# Patient Record
Sex: FEMALE | Race: OTHER
[De-identification: names, ages, dates, MRNs, and addresses within clinical notes are randomized per-mention and may not be internally consistent; named-entity substitution may affect disease eponyms.]

---

## 2020-08-21 ENCOUNTER — HOSPITAL ENCOUNTER (INPATIENT)
Dept: HOSPITAL 72 - EMR | Age: 70
LOS: 27 days | Discharge: NURSING, REHAB OR LONG-TERM CARE | DRG: 871 | End: 2020-09-17
Payer: MEDICARE

## 2020-08-21 VITALS — WEIGHT: 114.64 LBS | HEIGHT: 62 IN | BODY MASS INDEX: 21.1 KG/M2

## 2020-08-21 VITALS — DIASTOLIC BLOOD PRESSURE: 58 MMHG | SYSTOLIC BLOOD PRESSURE: 118 MMHG

## 2020-08-21 VITALS — SYSTOLIC BLOOD PRESSURE: 113 MMHG | DIASTOLIC BLOOD PRESSURE: 68 MMHG

## 2020-08-21 VITALS — SYSTOLIC BLOOD PRESSURE: 108 MMHG | DIASTOLIC BLOOD PRESSURE: 56 MMHG

## 2020-08-21 VITALS — DIASTOLIC BLOOD PRESSURE: 80 MMHG | SYSTOLIC BLOOD PRESSURE: 107 MMHG

## 2020-08-21 VITALS — SYSTOLIC BLOOD PRESSURE: 108 MMHG | DIASTOLIC BLOOD PRESSURE: 53 MMHG

## 2020-08-21 VITALS — DIASTOLIC BLOOD PRESSURE: 51 MMHG | SYSTOLIC BLOOD PRESSURE: 100 MMHG

## 2020-08-21 VITALS — SYSTOLIC BLOOD PRESSURE: 120 MMHG | DIASTOLIC BLOOD PRESSURE: 88 MMHG

## 2020-08-21 VITALS — DIASTOLIC BLOOD PRESSURE: 61 MMHG | SYSTOLIC BLOOD PRESSURE: 102 MMHG

## 2020-08-21 DIAGNOSIS — D63.1: ICD-10-CM

## 2020-08-21 DIAGNOSIS — Z66: ICD-10-CM

## 2020-08-21 DIAGNOSIS — E86.0: ICD-10-CM

## 2020-08-21 DIAGNOSIS — R65.20: ICD-10-CM

## 2020-08-21 DIAGNOSIS — J12.89: ICD-10-CM

## 2020-08-21 DIAGNOSIS — Z93.1: ICD-10-CM

## 2020-08-21 DIAGNOSIS — A41.9: Primary | ICD-10-CM

## 2020-08-21 DIAGNOSIS — J96.01: ICD-10-CM

## 2020-08-21 DIAGNOSIS — N39.0: ICD-10-CM

## 2020-08-21 DIAGNOSIS — R33.9: ICD-10-CM

## 2020-08-21 DIAGNOSIS — E87.5: ICD-10-CM

## 2020-08-21 DIAGNOSIS — F25.1: ICD-10-CM

## 2020-08-21 DIAGNOSIS — N17.9: ICD-10-CM

## 2020-08-21 DIAGNOSIS — U07.1: ICD-10-CM

## 2020-08-21 DIAGNOSIS — I12.9: ICD-10-CM

## 2020-08-21 DIAGNOSIS — B49: ICD-10-CM

## 2020-08-21 DIAGNOSIS — B02.9: ICD-10-CM

## 2020-08-21 DIAGNOSIS — E87.0: ICD-10-CM

## 2020-08-21 DIAGNOSIS — Z95.0: ICD-10-CM

## 2020-08-21 DIAGNOSIS — N18.9: ICD-10-CM

## 2020-08-21 LAB
ADD MANUAL DIFF: YES
ADD MANUAL DIFF: YES
ALBUMIN SERPL-MCNC: 2.7 G/DL (ref 3.4–5)
ALBUMIN/GLOB SERPL: 0.5 {RATIO} (ref 1–2.7)
ALP SERPL-CCNC: 95 U/L (ref 46–116)
ALT SERPL-CCNC: 38 U/L (ref 12–78)
ANION GAP SERPL CALC-SCNC: 13 MMOL/L (ref 5–15)
ANION GAP SERPL CALC-SCNC: 15 MMOL/L (ref 5–15)
APPEARANCE UR: (no result)
APTT PPP: YELLOW S
AST SERPL-CCNC: 34 U/L (ref 15–37)
BILIRUB SERPL-MCNC: 0.5 MG/DL (ref 0.2–1)
BUN SERPL-MCNC: 109 MG/DL (ref 7–18)
BUN SERPL-MCNC: 131 MG/DL (ref 7–18)
CALCIUM SERPL-MCNC: 8.2 MG/DL (ref 8.5–10.1)
CALCIUM SERPL-MCNC: 9.7 MG/DL (ref 8.5–10.1)
CHLORIDE SERPL-SCNC: 138 MMOL/L (ref 98–107)
CHLORIDE SERPL-SCNC: 142 MMOL/L (ref 98–107)
CK MB SERPL-MCNC: 1.8 NG/ML (ref 0–3.6)
CK SERPL-CCNC: 694 U/L (ref 26–308)
CO2 SERPL-SCNC: 22 MMOL/L (ref 21–32)
CO2 SERPL-SCNC: 26 MMOL/L (ref 21–32)
CREAT SERPL-MCNC: 4.1 MG/DL (ref 0.55–1.3)
CREAT SERPL-MCNC: 5.1 MG/DL (ref 0.55–1.3)
ERYTHROCYTE [DISTWIDTH] IN BLOOD BY AUTOMATED COUNT: 13.7 % (ref 11.6–14.8)
ERYTHROCYTE [DISTWIDTH] IN BLOOD BY AUTOMATED COUNT: 14.4 % (ref 11.6–14.8)
GLOBULIN SER-MCNC: 5.4 G/DL
GLUCOSE UR STRIP-MCNC: NEGATIVE MG/DL
HCT VFR BLD CALC: 32.5 % (ref 37–47)
HCT VFR BLD CALC: 37.8 % (ref 37–47)
HGB BLD-MCNC: 11.6 G/DL (ref 12–16)
HGB BLD-MCNC: 9.4 G/DL (ref 12–16)
KETONES UR QL STRIP: NEGATIVE
LEUKOCYTE ESTERASE UR QL STRIP: (no result)
MCV RBC AUTO: 103 FL (ref 80–99)
MCV RBC AUTO: 106 FL (ref 80–99)
NITRITE UR QL STRIP: NEGATIVE
PH UR STRIP: 6.5 [PH] (ref 4.5–8)
PLATELET # BLD: 169 K/UL (ref 150–450)
PLATELET # BLD: 257 K/UL (ref 150–450)
POTASSIUM SERPL-SCNC: 4.9 MMOL/L (ref 3.5–5.1)
POTASSIUM SERPL-SCNC: 5.5 MMOL/L (ref 3.5–5.1)
PROT UR QL STRIP: (no result)
RBC # BLD AUTO: 3.06 M/UL (ref 4.2–5.4)
RBC # BLD AUTO: 3.68 M/UL (ref 4.2–5.4)
SODIUM SERPL-SCNC: 179 MMOL/L (ref 136–145)
SODIUM SERPL-SCNC: 180 MMOL/L (ref 136–145)
SP GR UR STRIP: 1.01 (ref 1–1.03)
UROBILINOGEN UR-MCNC: NORMAL MG/DL (ref 0–1)
WBC # BLD AUTO: 31.6 K/UL (ref 4.8–10.8)
WBC # BLD AUTO: 36.6 K/UL (ref 4.8–10.8)

## 2020-08-21 PROCEDURE — 85025 COMPLETE CBC W/AUTO DIFF WBC: CPT

## 2020-08-21 PROCEDURE — 82553 CREATINE MB FRACTION: CPT

## 2020-08-21 PROCEDURE — 94640 AIRWAY INHALATION TREATMENT: CPT

## 2020-08-21 PROCEDURE — 84100 ASSAY OF PHOSPHORUS: CPT

## 2020-08-21 PROCEDURE — 86920 COMPATIBILITY TEST SPIN: CPT

## 2020-08-21 PROCEDURE — 96365 THER/PROPH/DIAG IV INF INIT: CPT

## 2020-08-21 PROCEDURE — 99291 CRITICAL CARE FIRST HOUR: CPT

## 2020-08-21 PROCEDURE — 82746 ASSAY OF FOLIC ACID SERUM: CPT

## 2020-08-21 PROCEDURE — 76770 US EXAM ABDO BACK WALL COMP: CPT

## 2020-08-21 PROCEDURE — 80053 COMPREHEN METABOLIC PANEL: CPT

## 2020-08-21 PROCEDURE — 87070 CULTURE OTHR SPECIMN AEROBIC: CPT

## 2020-08-21 PROCEDURE — 84484 ASSAY OF TROPONIN QUANT: CPT

## 2020-08-21 PROCEDURE — 84550 ASSAY OF BLOOD/URIC ACID: CPT

## 2020-08-21 PROCEDURE — 94664 DEMO&/EVAL PT USE INHALER: CPT

## 2020-08-21 PROCEDURE — 82977 ASSAY OF GGT: CPT

## 2020-08-21 PROCEDURE — 82150 ASSAY OF AMYLASE: CPT

## 2020-08-21 PROCEDURE — 87040 BLOOD CULTURE FOR BACTERIA: CPT

## 2020-08-21 PROCEDURE — 96361 HYDRATE IV INFUSION ADD-ON: CPT

## 2020-08-21 PROCEDURE — 85651 RBC SED RATE NONAUTOMATED: CPT

## 2020-08-21 PROCEDURE — 80202 ASSAY OF VANCOMYCIN: CPT

## 2020-08-21 PROCEDURE — 93306 TTE W/DOPPLER COMPLETE: CPT

## 2020-08-21 PROCEDURE — 80061 LIPID PANEL: CPT

## 2020-08-21 PROCEDURE — 80048 BASIC METABOLIC PNL TOTAL CA: CPT

## 2020-08-21 PROCEDURE — 83735 ASSAY OF MAGNESIUM: CPT

## 2020-08-21 PROCEDURE — 93970 EXTREMITY STUDY: CPT

## 2020-08-21 PROCEDURE — 71045 X-RAY EXAM CHEST 1 VIEW: CPT

## 2020-08-21 PROCEDURE — 87324 CLOSTRIDIUM AG IA: CPT

## 2020-08-21 PROCEDURE — 83605 ASSAY OF LACTIC ACID: CPT

## 2020-08-21 PROCEDURE — 86900 BLOOD TYPING SEROLOGIC ABO: CPT

## 2020-08-21 PROCEDURE — 93005 ELECTROCARDIOGRAM TRACING: CPT

## 2020-08-21 PROCEDURE — 86901 BLOOD TYPING SEROLOGIC RH(D): CPT

## 2020-08-21 PROCEDURE — 87205 SMEAR GRAM STAIN: CPT

## 2020-08-21 PROCEDURE — 81003 URINALYSIS AUTO W/O SCOPE: CPT

## 2020-08-21 PROCEDURE — 87086 URINE CULTURE/COLONY COUNT: CPT

## 2020-08-21 PROCEDURE — 82607 VITAMIN B-12: CPT

## 2020-08-21 PROCEDURE — 84300 ASSAY OF URINE SODIUM: CPT

## 2020-08-21 PROCEDURE — 96367 TX/PROPH/DG ADDL SEQ IV INF: CPT

## 2020-08-21 PROCEDURE — 83615 LACTATE (LD) (LDH) ENZYME: CPT

## 2020-08-21 PROCEDURE — 86140 C-REACTIVE PROTEIN: CPT

## 2020-08-21 PROCEDURE — 82728 ASSAY OF FERRITIN: CPT

## 2020-08-21 PROCEDURE — 83550 IRON BINDING TEST: CPT

## 2020-08-21 PROCEDURE — 83036 HEMOGLOBIN GLYCOSYLATED A1C: CPT

## 2020-08-21 PROCEDURE — 36415 COLL VENOUS BLD VENIPUNCTURE: CPT

## 2020-08-21 PROCEDURE — 85007 BL SMEAR W/DIFF WBC COUNT: CPT

## 2020-08-21 PROCEDURE — 82962 GLUCOSE BLOOD TEST: CPT

## 2020-08-21 PROCEDURE — 83540 ASSAY OF IRON: CPT

## 2020-08-21 PROCEDURE — 86850 RBC ANTIBODY SCREEN: CPT

## 2020-08-21 PROCEDURE — 83690 ASSAY OF LIPASE: CPT

## 2020-08-21 PROCEDURE — 85379 FIBRIN DEGRADATION QUANT: CPT

## 2020-08-21 PROCEDURE — 82550 ASSAY OF CK (CPK): CPT

## 2020-08-21 PROCEDURE — 87181 SC STD AGAR DILUTION PER AGT: CPT

## 2020-08-21 PROCEDURE — 83880 ASSAY OF NATRIURETIC PEPTIDE: CPT

## 2020-08-21 PROCEDURE — 82803 BLOOD GASES ANY COMBINATION: CPT

## 2020-08-21 PROCEDURE — 36600 WITHDRAWAL OF ARTERIAL BLOOD: CPT

## 2020-08-21 RX ADMIN — PANTOPRAZOLE SODIUM SCH MG: 40 INJECTION, POWDER, FOR SOLUTION INTRAVENOUS at 21:43

## 2020-08-21 RX ADMIN — DOCUSATE SODIUM SCH MG: 50 LIQUID ORAL at 13:27

## 2020-08-21 RX ADMIN — ENOXAPARIN SODIUM SCH MG: 30 INJECTION SUBCUTANEOUS at 10:10

## 2020-08-21 RX ADMIN — DOCUSATE SODIUM SCH MG: 50 LIQUID ORAL at 16:29

## 2020-08-21 RX ADMIN — SODIUM CHLORIDE SCH MLS/HR: 0.9 INJECTION INTRAVENOUS at 10:09

## 2020-08-21 RX ADMIN — POLYETHYLENE GLYCOL 3350 SCH GM: 17 POWDER, FOR SOLUTION ORAL at 21:43

## 2020-08-21 NOTE — EMERGENCY ROOM REPORT
History of Present Illness


General


Chief Complaint:  Dyspnea/Respdistress


Source:  Medical Record





Present Illness


HPI


This is an unfortunate 70-year-old female from a nursing home.  She presents 

with chief complaint of shortness of breath.  Onset around 10 PM.  She had 

multiple admission for sepsis and pneumonia.  She was covered positive in the 

past.  However on recent admission to [facility] last week, she was covered 

negative.  She was this sent back to nursing home and became dyspneic tonight.  

No nausea no vomiting.  No reported fever.  EMS states she was hypoxic and was 

placed on oxygen.  She also had fever here of 101.  Per EMS, patient has a 

POLST that stated DNR.  It was not signed however.


Allergies:  


Coded Allergies:  


     AMPICILLIN (Verified  Allergy, Unknown, 8/21/20)


     PENICILLINS (Verified  Allergy, Unknown, 8/21/20)


     TETRACYCLINES (Verified  Allergy, Unknown, 8/21/20)





COVID-19 Screening


Contact w/high risk pt:  No


Experienced COVID-19 symptoms?:  Yes


COVID-19 Testing performed PTA:  Yes


COVID-19 Screening:  Negative COVID-19


COVID-19 Testing Source:  8/9/20





Patient History


Past Medical History:  see triage record, old chart reviewed


Past Surgical History:  other


Pertinent Family History:  none


Social History:  Denies: smoking


Last Menstrual Period:  n/a


Pregnant Now:  No


Immunizations:  other


Reviewed Nursing Documentation:  PMH: Agreed; PSxH: Agreed





Nursing Documentation-PMH


Past Medical History:  No History, Except For


Hx Gastrointestinal Problems:  Yes - GERD





Review of Systems


Eye:  Denies: eye pain, blurred vision


ENT:  Denies: ear pain, nose congestion, throat swelling


Respiratory:  Reports: shortness of breath


Cardiovascular:  Denies: chest pain, palpitations


Gastrointestinal:  Denies: abdominal pain, diarrhea, nausea, vomiting


Musculoskeletal:  Denies: back pain, joint pain


Skin:  Denies: rash


Neurological:  Denies: headache, numbness


Endocrine:  Denies: increased thirst, increased urine


Hematologic/Lymphatic:  Denies: easy bruising


All Other Systems:  negative except mentioned in HPI





Physical Exam





Vital Signs








  Date Time  Temp Pulse Resp B/P (MAP) Pulse Ox O2 Delivery O2 Flow Rate FiO2


 


8/21/20 01:28  120 18 171/100 (123) 96 Nasal Cannula 6.0 





Vitals with fever and tachycardia


Sp02 EP Interpretation:  abnormal


General Appearance:  moderate distress, thin, Chronically Ill


Head:  normocephalic, atraumatic


Eyes:  bilateral eye PERRL, bilateral eye EOMI


ENT:  dry mucus membranes


Neck:  full range of motion, supple, no meningismus


Respiratory:  chest non-tender, respiratory distress, accessory muscle use


Cardiovascular #1:  regular rate, rhythm, no murmur


Gastrointestinal:  normal bowel sounds, non tender, no mass, no organomegaly, 

no bruit, distended


Musculoskeletal:  back normal, normal range of motion


Neurologic:  grossly normal


Skin:  other - Diffuse skin excoriation





Procedures


Critical Care Time


Critical Care Time


Critical care is mandated in this patient who presented with severe sepsis 

secondary to UTI.  Patient require my urgent intervention to attenuate the 

risks of the metabolic collapse which may lead to cardiovascular collapse and 

death.  Critical care time is 35 minutes excluding any reportable procedure.  

Critical care time included evaluation, multiple reevaluation, looking at old 

charts, interpreting laboratory and diagnostic data, discussing case with 

patient and family and consultants, and charting.





Medical Decision Making


Diagnostic Impression:  


 Primary Impression:  


 Sepsis


 Qualified Codes:  A41.9 - Sepsis, unspecified organism; R65.20 - Severe sepsis 

without septic shock; N17.9 - Acute kidney failure, unspecified


 Additional Impressions:  


 UTI (urinary tract infection)


 Qualified Codes:  N30.00 - Acute cystitis without hematuria


 Acute urinary retention


 ARF (acute renal failure)


 Qualified Codes:  N17.9 - Acute kidney failure, unspecified


 Severe dehydration


 Hypernatremia


ER Course


Patient presents with fever and altered mental status.  She has severe 

abdominal distention.  A Mitchell was placed and she had purulent urine that came 

out.  She actually had over 4 L urine output.  Distention resolved.  

Antibiotics given here.  Chest x-ray is unremarkable.  COVID was negative.





She has severe sepsis secondary to UTI.  This may be secondary to obstruction.  

After fluids and antibiotics, she is more alert and able to converse.  Will 

admit for further work-up.  Prognosis is serious.  I discussed the case with 

Dr. Wahl will admit.


EKG Diagnostic Results


Rate:  normal


Rhythm:  NSR


ST Segments:  no acute changes





Rhythm Strip Diag. Results


EP Interpretation:  yes


Rate:  105


Rhythm:  NSR, no PVC's, no ectopy





Chest X-Ray Diagnostic Results


Chest X-Ray Diagnostic Results :  


   Chest X-Ray Ordered:  Yes


   # of Views/Limited/Complete:  1 View


   Indication:  Shortness of Breath


   EP Interpretation:  Yes


   Interpretation:  no consolidation, no effusion, no pneumothorax, no acute 

cardiopulmonary disease


   Impression:  No acute disease


   Electronically Signed by:  Spencer Melendez MD





Last Vital Signs








  Date Time  Temp Pulse Resp B/P (MAP) Pulse Ox O2 Delivery O2 Flow Rate FiO2


 


8/21/20 01:28  120 18 171/100 (123) 96 Nasal Cannula 6.0 








Status:  improved


Disposition:  ADMITTED AS INPATIENT


Condition:  Serious


Referrals:  


NOT CHOSEN IPA/MD,REFERRING (PCP)











Spencer Melendez MD Aug 21, 2020 02:03

## 2020-08-21 NOTE — DIAGNOSTIC IMAGING REPORT
Procedure: XRAY Chest 1v

Reason for study: Reason For Exam: SOB

 

Comparison films:  None.

 

FINDINGS:

 

There is a pacer in the right upper chest with pacer leads at the right atrium and

right ventricle. Vascularity is normal. Mild densities noted in the left retrocardiac

region either atelectasis or mild infiltrate. Cardiac and mediastinal silhouette are

within normal limits. CP angles are sharp.  The bony thorax appear unremarkable.

 

IMPRESSION:  

 

Left retrocardiac mild atelectasis versus infiltrate.

## 2020-08-21 NOTE — CONSULTATION
History of Present Illness


General


Chief Complaint:  Dyspnea/Respdistress


Referring physician:  Dr. Wahl


Reason for Consultation:  Sepsis





Present Illness


Allergies:  


Coded Allergies:  


     AMPICILLIN (Verified  Allergy, Unknown, hives, 8/21/20)


     PENICILLINS (Verified  Allergy, Unknown, hives, 8/21/20)


     TETRACYCLINES (Verified  Allergy, Unknown, hives, 8/21/20)





Medication History


Scheduled


Aripiprazole* (Abilify*), 20 MG ORAL DAILY, (Reported)


Bethanechol Chl* (Urecholine*), 25 MG ORAL THREE TIMES A DAY, (Reported)


Bisacodyl* (Dulcolax*), 5 MG ORAL DAILY, (Reported)


Ciprofloxacin* (Ciprofloxacin*), 250 MG PO BID, (Reported)


Clonazepam* (Klonopin*), 0.5 MG ORAL Q6H, (Reported)


Clonidine Hcl* (Catapres*), 0.1 MG ORAL EVERY 6 HOURS, (Reported)


Docusate Sodium (Docusate Sodium), 100 MG GT DAILY, (Reported)


Enoxaparin* (Lovenox*), 30 MG SUBQ DAILY, (Reported)


Famotidine* (Pepcid 20mg tablet*), 20 MG ORAL DAILY, (Reported)


Ferrous Sulfate* (Ferrous Sulfate*), 325 MG ORAL DAILY, (Reported)


Latanoprost* (Xalatan*), 1 DROP BOTH EYES BEDTIME, (Reported)





Scheduled PRN


Acetaminophen 160MG/5ML* (Acetaminophen*), 5 ML ORAL THREE TIMES A DAY PRN for 

Fever/Headache/Mild Pain, (Reported)





Miscellaneous Medications


Metoclopramide Hcl (Metoclopramide Hcl*), 5 MG IJ, (Reported)


Sennosides/Docusate Sodium (Senna Plus 8.6-50 mg Tablet), 1 EACH PO, (Reported)


Vancomycin/Water For Inj (Peg) (Vancomycin 1 Gram/200 ml Bag), 1 GM IV, (

Reported)





Patient History


Healthcare decision maker





Resuscitation status





Advanced Directive on File








Physical Exam





Last 24 Hour Vital Signs








  Date Time  Temp Pulse Resp B/P (MAP) Pulse Ox O2 Delivery O2 Flow Rate FiO2


 


8/21/20 12:00  86      


 


8/21/20 12:00 97.7 87 20 113/68 (83) 100   


 


8/21/20 09:00      Non-Rebreather 15.0 


 


8/21/20 08:00 96.7 72 18 102/61 (75) 96   


 


8/21/20 08:00  93      


 


8/21/20 06:48      Nasal Cannula 6.0 


 


8/21/20 06:00 98.6 102 22 100/51 (67) 97   


 


8/21/20 04:45 98.5 105 18 120/88 100 Nasal Cannula 6.0 


 


8/21/20 04:00 98.5 105 18 120/88 100 Nasal Cannula 6.0 


 


8/21/20 03:00 99.3 108 18 108/53 100 Nasal Cannula 6.0 


 


8/21/20 02:36 101.2       


 


8/21/20 02:15 101.4 116 18 118/58 98 Nasal Cannula 6.0 


 


8/21/20 01:45  120 18   Nasal Cannula 6.0 


 


8/21/20 01:28 101.1 120 18 171/100 (123) 96 Nasal Cannula 6.0 

















Intake and Output  


 


 8/20/20 8/21/20





 19:00 07:00


 


Output Total  4200 ml


 


Balance  -4200 ml


 


  


 


Output Urine Total  4200 ml


 


# Bowel Movements  1











Laboratory Tests








Test


  8/21/20


01:40 8/21/20


01:53 8/21/20


03:43 8/21/20


10:35


 


White Blood Count


  31.6 K/UL


(4.8-10.8)  *H 


  


  


 


 


Red Blood Count


  3.68 M/UL


(4.20-5.40)  L 


  


  


 


 


Hemoglobin


  11.6 G/DL


(12.0-16.0)  L 


  


  


 


 


Hematocrit


  37.8 %


(37.0-47.0) 


  


  


 


 


Mean Corpuscular Volume


  103 FL (80-99)


H 


  


  


 


 


Mean Corpuscular Hemoglobin


  31.6 PG


(27.0-31.0)  H 


  


  


 


 


Mean Corpuscular Hemoglobin


Concent 30.8 G/DL


(32.0-36.0)  L 


  


  


 


 


Red Cell Distribution Width


  13.7 %


(11.6-14.8) 


  


  


 


 


Platelet Count


  257 K/UL


(150-450) 


  


  


 


 


Mean Platelet Volume


  6.3 FL


(6.5-10.1)  L 


  


  


 


 


Neutrophils (%) (Auto)


  % (45.0-75.0)


  


  


  


 


 


Lymphocytes (%) (Auto)


  % (20.0-45.0)


  


  


  


 


 


Monocytes (%) (Auto)  % (1.0-10.0)     


 


Eosinophils (%) (Auto)  % (0.0-3.0)     


 


Basophils (%) (Auto)  % (0.0-2.0)     


 


Differential Total Cells


Counted 100  


  


  


  


 


 


Neutrophils % (Manual) 77 % (45-75)  H   


 


Lymphocytes % (Manual) 20 % (20-45)     


 


Monocytes % (Manual) 3 % (1-10)     


 


Eosinophils % (Manual) 0 % (0-3)     


 


Basophils % (Manual) 0 % (0-2)     


 


Band Neutrophils 0 % (0-8)     


 


Platelet Estimate Adequate     


 


Platelet Morphology Normal     


 


D-Dimer


  6.46 mg/L FEU


(0.00-0.49)  H 


  


  


 


 


Sodium Level


  179 MMOL/L


(136-145)  *H 


  


  


 


 


Potassium Level


  5.5 MMOL/L


(3.5-5.1)  H 


  


  


 


 


Chloride Level


  138 MMOL/L


()  H 


  


  


 


 


Carbon Dioxide Level


  26 MMOL/L


(21-32) 


  


  


 


 


Anion Gap


  15 mmol/L


(5-15) 


  


  


 


 


Blood Urea Nitrogen


  131 mg/dL


(7-18)  H 


  


  


 


 


Creatinine


  5.1 MG/DL


(0.55-1.30)  H 


  


  


 


 


Estimat Glomerular Filtration


Rate 8.3 mL/min


(>60) 


  


  


 


 


Glucose Level


  150 MG/DL


()  H 


  


  


 


 


Lactic Acid Level


  3.70 mmol/L


(0.4-2.0)  H 


  3.30 mmol/L


(0.66-2.22)  H 


 


 


Calcium Level


  9.7 MG/DL


(8.5-10.1) 


  


  


 


 


Total Bilirubin


  0.5 MG/DL


(0.2-1.0) 


  


  


 


 


Aspartate Amino Transf


(AST/SGOT) 34 U/L (15-37)


  


  


  


 


 


Alanine Aminotransferase


(ALT/SGPT) 38 U/L (12-78)


  


  


  


 


 


Alkaline Phosphatase


  95 U/L


() 


  


  


 


 


Total Creatine Kinase


  694 U/L


()  H 


  


  


 


 


Creatine Kinase MB


  1.8 NG/ML


(0.0-3.6) 


  


  


 


 


Creatine Kinase MB Relative


Index 0.2  


  


  


  


 


 


Troponin I


  0.075 ng/mL


(0.000-0.056) 


  


  


 


 


C-Reactive Protein,


Quantitative 7.4 mg/dL


(0.00-0.90)  H 


  


  


 


 


Total Protein


  8.1 G/DL


(6.4-8.2) 


  


  


 


 


Albumin


  2.7 G/DL


(3.4-5.0)  L 


  


  


 


 


Globulin 5.4 g/dL     


 


Albumin/Globulin Ratio


  0.5 (1.0-2.7)


L 


  


  


 


 


Urine Color  Yellow    


 


Urine Appearance


  


  Slightly


cloudy 


  


 


 


Urine pH  6.5 (4.5-8.0)    


 


Urine Specific Gravity


  


  1.010


(1.005-1.035) 


  


 


 


Urine Protein


  


  2+ (NEGATIVE)


H 


  


 


 


Urine Glucose (UA)


  


  Negative


(NEGATIVE) 


  


 


 


Urine Ketones


  


  Negative


(NEGATIVE) 


  


 


 


Urine Blood


  


  3+ (NEGATIVE)


H 


  


 


 


Urine Nitrite


  


  Negative


(NEGATIVE) 


  


 


 


Urine Bilirubin


  


  Negative


(NEGATIVE) 


  


 


 


Urine Urobilinogen


  


  Normal MG/DL


(0.0-1.0) 


  


 


 


Urine Leukocyte Esterase


  


  3+ (NEGATIVE)


H 


  


 


 


Urine RBC


  


  5-10 /HPF (0 -


2)  H 


  


 


 


Urine WBC


  


  Tntc /HPF (0 -


2)  H 


  


 


 


Urine Squamous Epithelial


Cells 


  Few /LPF


(NONE/OCC) 


  


 


 


Urine Amorphous Sediment


  


  Many /LPF


(NONE)  H 


  


 


 


Urine Bacteria


  


  Many /HPF


(NONE)  H 


  


 


 


Urine Random Sodium


  


  


  


  79 mmol/L


()


 


Test


  8/21/20


12:00 


  


  


 


 


White Blood Count


  36.6 K/UL


(4.8-10.8)  *H 


  


  


 


 


Red Blood Count


  3.06 M/UL


(4.20-5.40)  L 


  


  


 


 


Hemoglobin


  9.4 G/DL


(12.0-16.0)  L 


  


  


 


 


Hematocrit


  32.5 %


(37.0-47.0)  L 


  


  


 


 


Mean Corpuscular Volume


  106 FL (80-99)


H 


  


  


 


 


Mean Corpuscular Hemoglobin


  30.6 PG


(27.0-31.0) 


  


  


 


 


Mean Corpuscular Hemoglobin


Concent 28.8 G/DL


(32.0-36.0)  L 


  


  


 


 


Red Cell Distribution Width


  14.4 %


(11.6-14.8) 


  


  


 


 


Platelet Count


  169 K/UL


(150-450) 


  


  


 


 


Mean Platelet Volume


  6.1 FL


(6.5-10.1)  L 


  


  


 


 


Neutrophils (%) (Auto)


  % (45.0-75.0)


  


  


  


 


 


Lymphocytes (%) (Auto)


  % (20.0-45.0)


  


  


  


 


 


Monocytes (%) (Auto)  % (1.0-10.0)     


 


Eosinophils (%) (Auto)  % (0.0-3.0)     


 


Basophils (%) (Auto)  % (0.0-2.0)     


 


Differential Total Cells


Counted 100  


  


  


  


 


 


Neutrophils % (Manual) 83 % (45-75)  H   


 


Lymphocytes % (Manual) 13 % (20-45)  L   


 


Monocytes % (Manual) 3 % (1-10)     


 


Eosinophils % (Manual) 0 % (0-3)     


 


Basophils % (Manual) 0 % (0-2)     


 


Band Neutrophils 1 % (0-8)     


 


Platelet Estimate Adequate     


 


Platelet Morphology Normal     


 


Hypochromasia 1+     


 


Macrocytosis 1+     


 


Sodium Level


  180 MMOL/L


(136-145)  *H 


  


  


 


 


Potassium Level


  4.9 MMOL/L


(3.5-5.1) 


  


  


 


 


Chloride Level


  142 MMOL/L


()  H 


  


  


 


 


Carbon Dioxide Level


  22 MMOL/L


(21-32) 


  


  


 


 


Anion Gap


  13 mmol/L


(5-15) 


  


  


 


 


Blood Urea Nitrogen


  109 mg/dL


(7-18)  H 


  


  


 


 


Creatinine


  4.1 MG/DL


(0.55-1.30)  H 


  


  


 


 


Estimat Glomerular Filtration


Rate 10.7 mL/min


(>60) 


  


  


 


 


Glucose Level


  265 MG/DL


()  #H 


  


  


 


 


Calcium Level


  8.2 MG/DL


(8.5-10.1)  L 


  


  


 











Microbiology








 Date/Time


Source Procedure


Growth Status


 


 


 8/21/20 01:40


Nasopharynx SARS-CoV-2 RdRp Gene Assay - Final Complete








Height (Feet):  5


Height (Inches):  2.00


Weight (Pounds):  125


Medications





Current Medications








 Medications


  (Trade)  Dose


 Ordered  Sig/Raji


 Route


 PRN Reason  Start Time


 Stop Time Status Last Admin


Dose Admin


 


 Albumin Human  500 ml @ 0


 mls/hr  Q0M


 IV


   8/21/20 11:00


 8/21/20 15:00   


 


 


 Bisacodyl


  (Dulcolax)  5 mg  DAILYPRN  PRN


 ORAL


 Constipation  8/21/20 08:30


 11/19/20 08:29 UNV  


 


 


 Cefepime HCl 1 gm/


 Dextrose  55 ml @ 


 110 mls/hr  Q24H


 IVPB


   8/21/20 10:00


 8/28/20 09:59  8/21/20 10:09


 


 


 Dextrose  1,000 ml @ 


 150 mls/hr  Q6H40M


 IV


   8/21/20 11:30


 9/20/20 11:29  8/21/20 11:13


 


 


 Docusate Sodium


  (Colace)  100 mg  TID


 GT


   8/21/20 13:00


 9/20/20 08:59  8/21/20 13:27


 


 


 Enoxaparin Sodium


  (Lovenox)  30 mg  DAILY


 SUBQ


   8/21/20 09:00


 11/19/20 08:59  8/21/20 10:10


 


 


 Pantoprazole


  (Protonix)  40 mg  EVERY 12  HOURS


 IVP


   8/21/20 21:00


 9/20/20 20:59   


 


 


 Vancomycin HCl


  (Vanco pharmacy


 to dose)  1 ea  DAILY  PRN


 MISC


 Per rx protocol  8/21/20 08:30


 9/20/20 08:29   


 











Assessment/Plan


Problem List:  


(1) Sepsis


ICD Codes:  A41.9 - Sepsis, unspecified organism


SNOMED:  81936644


Qualifiers:  


   Qualified Codes:  A41.9 - Sepsis, unspecified organism; R65.20 - Severe 

sepsis without septic shock; N17.9 - Acute kidney failure, unspecified


(2) ARF (acute renal failure)


ICD Codes:  N17.9 - Acute kidney failure, unspecified


SNOMED:  59615764


Qualifiers:  


   Qualified Codes:  N17.9 - Acute kidney failure, unspecified


(3) Severe dehydration


ICD Codes:  E86.0 - Dehydration


SNOMED:  270292446


(4) Hypernatremia


ICD Codes:  E87.0 - Hyperosmolality and hypernatremia


SNOMED:  465031762


(5) Psychosis


ICD Codes:  F29 - Unspecified psychosis not due to a substance or known 

physiological condition


SNOMED:  76398969


Assessment/Plan:


pan cultures


iv fluids 


f/u electrolytes


renal studies


urine electrolytes


dvt prophylaxis











Live Brambila MD Aug 21, 2020 14:24

## 2020-08-21 NOTE — CONSULTATION
History of Present Illness


General


Date patient seen:  Aug 21, 2020


Time patient seen:  11:40


Chief Complaint:  Dyspnea/Respdistress


Referring physician:  Dr. Wahl


Reason for Consultation:  Sepsis





Present Illness


HPI





69yo F from SNF who p/w SOB, multiple prior a/f sepsis and pna, prior COVID+, 

but upon admission to SNF last week was COVID neg. 





Pt is not interactive. History obtained via chart review.





PMH/PSH: Dementia, h/o pna, otherwise unable to obtain


All: PCNs, tetracyclines


Social hx: Resides at [facility]


Allergies:  


Coded Allergies:  


     AMPICILLIN (Verified  Allergy, Unknown, hives, 8/21/20)


     PENICILLINS (Verified  Allergy, Unknown, hives, 8/21/20)


     TETRACYCLINES (Verified  Allergy, Unknown, hives, 8/21/20)





Medication History


Scheduled


Aripiprazole* (Abilify*), 20 MG ORAL DAILY, (Reported)


Bethanechol Chl* (Urecholine*), 25 MG ORAL THREE TIMES A DAY, (Reported)


Bisacodyl* (Dulcolax*), 5 MG ORAL DAILY, (Reported)


Ciprofloxacin* (Ciprofloxacin*), 250 MG PO BID, (Reported)


Clonazepam* (Klonopin*), 0.5 MG ORAL Q6H, (Reported)


Clonidine Hcl* (Catapres*), 0.1 MG ORAL EVERY 6 HOURS, (Reported)


Docusate Sodium (Docusate Sodium), 100 MG GT DAILY, (Reported)


Enoxaparin* (Lovenox*), 30 MG SUBQ DAILY, (Reported)


Famotidine* (Pepcid 20mg tablet*), 20 MG ORAL DAILY, (Reported)


Ferrous Sulfate* (Ferrous Sulfate*), 325 MG ORAL DAILY, (Reported)


Latanoprost* (Xalatan*), 1 DROP BOTH EYES BEDTIME, (Reported)





Scheduled PRN


Acetaminophen 160MG/5ML* (Acetaminophen*), 5 ML ORAL THREE TIMES A DAY PRN for 

Fever/Headache/Mild Pain, (Reported)





Miscellaneous Medications


Metoclopramide Hcl (Metoclopramide Hcl*), 5 MG IJ, (Reported)


Sennosides/Docusate Sodium (Senna Plus 8.6-50 mg Tablet), 1 EACH PO, (Reported)


Vancomycin/Water For Inj (Peg) (Vancomycin 1 Gram/200 ml Bag), 1 GM IV, (

Reported)





Patient History


Healthcare decision maker





Resuscitation status





Advanced Directive on File








Review of Systems


ROS Narrative


Unable to obtain 2/2 pt condition





Physical Exam


Physical Exam Narrative





Gen: Older woman, on NRB face mask


HEENT: NRB mask


Pulm: BL chest rise on NRB face mask


Abd: Soft, NTND


Ext: R forearm w/ dry rash, appears likely perhaps resolving vesicular rash, 

now small pustules dry and crusting; L arm w/ less intense rash though also is 

small pin point erythematous macules, dry/crusting


Neuro: Awake, not alert nor interactive





Last 24 Hour Vital Signs








  Date Time  Temp Pulse Resp B/P (MAP) Pulse Ox O2 Delivery O2 Flow Rate FiO2


 


8/21/20 09:00      Non-Rebreather 15.0 


 


8/21/20 08:00 96.7 72 18 102/61 (75) 96   


 


8/21/20 08:00  93      


 


8/21/20 06:48      Nasal Cannula 6.0 


 


8/21/20 06:00 98.6 102 22 100/51 (67) 97   


 


8/21/20 04:45 98.5 105 18 120/88 100 Nasal Cannula 6.0 


 


8/21/20 04:00 98.5 105 18 120/88 100 Nasal Cannula 6.0 


 


8/21/20 03:00 99.3 108 18 108/53 100 Nasal Cannula 6.0 


 


8/21/20 02:36 101.2       


 


8/21/20 02:15 101.4 116 18 118/58 98 Nasal Cannula 6.0 


 


8/21/20 01:45  120 18   Nasal Cannula 6.0 


 


8/21/20 01:28 101.1 120 18 171/100 (123) 96 Nasal Cannula 6.0 

















Intake and Output  


 


 8/20/20 8/21/20





 19:00 07:00


 


Output Total  4200 ml


 


Balance  -4200 ml


 


  


 


Output Urine Total  4200 ml


 


# Bowel Movements  1











Laboratory Tests








Test


  8/21/20


01:40 8/21/20


01:53 8/21/20


03:43 8/21/20


10:35


 


White Blood Count


  31.6 K/UL


(4.8-10.8)  *H 


  


  


 


 


Red Blood Count


  3.68 M/UL


(4.20-5.40)  L 


  


  


 


 


Hemoglobin


  11.6 G/DL


(12.0-16.0)  L 


  


  


 


 


Hematocrit


  37.8 %


(37.0-47.0) 


  


  


 


 


Mean Corpuscular Volume


  103 FL (80-99)


H 


  


  


 


 


Mean Corpuscular Hemoglobin


  31.6 PG


(27.0-31.0)  H 


  


  


 


 


Mean Corpuscular Hemoglobin


Concent 30.8 G/DL


(32.0-36.0)  L 


  


  


 


 


Red Cell Distribution Width


  13.7 %


(11.6-14.8) 


  


  


 


 


Platelet Count


  257 K/UL


(150-450) 


  


  


 


 


Mean Platelet Volume


  6.3 FL


(6.5-10.1)  L 


  


  


 


 


Neutrophils (%) (Auto)


  % (45.0-75.0)


  


  


  


 


 


Lymphocytes (%) (Auto)


  % (20.0-45.0)


  


  


  


 


 


Monocytes (%) (Auto)  % (1.0-10.0)     


 


Eosinophils (%) (Auto)  % (0.0-3.0)     


 


Basophils (%) (Auto)  % (0.0-2.0)     


 


Differential Total Cells


Counted 100  


  


  


  


 


 


Neutrophils % (Manual) 77 % (45-75)  H   


 


Lymphocytes % (Manual) 20 % (20-45)     


 


Monocytes % (Manual) 3 % (1-10)     


 


Eosinophils % (Manual) 0 % (0-3)     


 


Basophils % (Manual) 0 % (0-2)     


 


Band Neutrophils 0 % (0-8)     


 


Platelet Estimate Adequate     


 


Platelet Morphology Normal     


 


D-Dimer


  6.46 mg/L FEU


(0.00-0.49)  H 


  


  


 


 


Sodium Level


  179 MMOL/L


(136-145)  *H 


  


  


 


 


Potassium Level


  5.5 MMOL/L


(3.5-5.1)  H 


  


  


 


 


Chloride Level


  138 MMOL/L


()  H 


  


  


 


 


Carbon Dioxide Level


  26 MMOL/L


(21-32) 


  


  


 


 


Anion Gap


  15 mmol/L


(5-15) 


  


  


 


 


Blood Urea Nitrogen


  131 mg/dL


(7-18)  H 


  


  


 


 


Creatinine


  5.1 MG/DL


(0.55-1.30)  H 


  


  


 


 


Estimat Glomerular Filtration


Rate 8.3 mL/min


(>60) 


  


  


 


 


Glucose Level


  150 MG/DL


()  H 


  


  


 


 


Lactic Acid Level


  3.70 mmol/L


(0.4-2.0)  H 


  3.30 mmol/L


(0.66-2.22)  H 


 


 


Calcium Level


  9.7 MG/DL


(8.5-10.1) 


  


  


 


 


Total Bilirubin


  0.5 MG/DL


(0.2-1.0) 


  


  


 


 


Aspartate Amino Transf


(AST/SGOT) 34 U/L (15-37)


  


  


  


 


 


Alanine Aminotransferase


(ALT/SGPT) 38 U/L (12-78)


  


  


  


 


 


Alkaline Phosphatase


  95 U/L


() 


  


  


 


 


Total Creatine Kinase


  694 U/L


()  H 


  


  


 


 


Creatine Kinase MB


  1.8 NG/ML


(0.0-3.6) 


  


  


 


 


Creatine Kinase MB Relative


Index 0.2  


  


  


  


 


 


Troponin I


  0.075 ng/mL


(0.000-0.056) 


  


  


 


 


C-Reactive Protein,


Quantitative 7.4 mg/dL


(0.00-0.90)  H 


  


  


 


 


Total Protein


  8.1 G/DL


(6.4-8.2) 


  


  


 


 


Albumin


  2.7 G/DL


(3.4-5.0)  L 


  


  


 


 


Globulin 5.4 g/dL     


 


Albumin/Globulin Ratio


  0.5 (1.0-2.7)


L 


  


  


 


 


Urine Color  Yellow    


 


Urine Appearance


  


  Slightly


cloudy 


  


 


 


Urine pH  6.5 (4.5-8.0)    


 


Urine Specific Gravity


  


  1.010


(1.005-1.035) 


  


 


 


Urine Protein


  


  2+ (NEGATIVE)


H 


  


 


 


Urine Glucose (UA)


  


  Negative


(NEGATIVE) 


  


 


 


Urine Ketones


  


  Negative


(NEGATIVE) 


  


 


 


Urine Blood


  


  3+ (NEGATIVE)


H 


  


 


 


Urine Nitrite


  


  Negative


(NEGATIVE) 


  


 


 


Urine Bilirubin


  


  Negative


(NEGATIVE) 


  


 


 


Urine Urobilinogen


  


  Normal MG/DL


(0.0-1.0) 


  


 


 


Urine Leukocyte Esterase


  


  3+ (NEGATIVE)


H 


  


 


 


Urine RBC


  


  5-10 /HPF (0 -


2)  H 


  


 


 


Urine WBC


  


  Tntc /HPF (0 -


2)  H 


  


 


 


Urine Squamous Epithelial


Cells 


  Few /LPF


(NONE/OCC) 


  


 


 


Urine Amorphous Sediment


  


  Many /LPF


(NONE)  H 


  


 


 


Urine Bacteria


  


  Many /HPF


(NONE)  H 


  


 


 


Urine Random Sodium    Pending  











Microbiology








 Date/Time


Source Procedure


Growth Status


 


 


 8/21/20 01:40


Nasopharynx SARS-CoV-2 RdRp Gene Assay - Final Complete








Height (Feet):  5


Height (Inches):  2.00


Weight (Pounds):  125


Medications





Current Medications








 Medications


  (Trade)  Dose


 Ordered  Sig/Raji


 Route


 PRN Reason  Start Time


 Stop Time Status Last Admin


Dose Admin


 


 Albumin Human  500 ml @ 0


 mls/hr  Q0M


 IV


   8/21/20 11:00


 8/21/20 15:00   


 


 


 Bisacodyl


  (Dulcolax)  5 mg  DAILYPRN  PRN


 ORAL


 Constipation  8/21/20 08:30


 11/19/20 08:29 UNV  


 


 


 Cefepime HCl 1 gm/


 Dextrose  55 ml @ 


 110 mls/hr  Q24H


 IVPB


   8/21/20 10:00


 8/28/20 09:59  8/21/20 10:09


 


 


 Dextrose  1,000 ml @ 


 150 mls/hr  Q6H40M


 IV


   8/21/20 11:30


 9/20/20 11:29  8/21/20 11:13


 


 


 Docusate Sodium


  (Colace)  100 mg  TID


 GT


   8/21/20 13:00


 9/20/20 08:59   


 


 


 Enoxaparin Sodium


  (Lovenox)  30 mg  DAILY


 SUBQ


   8/21/20 09:00


 11/19/20 08:59  8/21/20 10:10


 


 


 Pantoprazole


  (Protonix)  40 mg  EVERY 12  HOURS


 IVP


   8/21/20 21:00


 9/20/20 20:59   


 


 


 Sodium


 Polystyrene


 Sulfonate


  (Kayexalate)  30 gm  ONCE


 GT


   8/21/20 11:00


 8/21/20 13:00   


 


 


 Vancomycin HCl


  (Vanco pharmacy


 to dose)  1 ea  DAILY  PRN


 MISC


 Per rx protocol  8/21/20 08:30


 9/20/20 08:29   


 











Assessment/Plan


Assessment/Plan:





69yo F with:





Sepsis


Febrile to 101.4


Leukocytosis to 31


JASWINDER to 5.1


Hypoxic resp failure, on NRB mask


   8/21 BCx p


   8/21 UA+, UCx p


   8/21 CXR: Left retrocardiac mild atelectasis versus infiltrate.


   8/21 COVID rapid neg





BL arm rash, appears evolving, now small pustules with dry/crusting, querry 

resolving Shingles? Though odd to have on both arms


   8/21 HSV & VZV PCR ordered





Plan:


Cont empiric cefepime/vanco #1


Avoid acyclovir for possible Shingles for now given crusting of rash means that 

if Shingles it's resolving on its own and given severe JASWINDER want to avoid 

nephrotoxic acyclovir


Send HSV & VZV PCR swab from unroofed pustule, explained to MARTIN Hernandez how to 

obtain


F/u BCx, UCx


Trend leukocytosis


Trend JASWINDER


Trend resp status





Monitor CBC/BMP


Trend BUE rash





D/w RN at length





Thank you for this consult. [facility] will continue to follow.











Chiquita Penn M.D. Aug 21, 2020 11:29

## 2020-08-21 NOTE — DIAGNOSTIC IMAGING REPORT
Indication: Abnormal renal function tests

 

Technique: Grayscale and duplex images of the kidneys, retroperitoneum, and bladder

were obtained.

 

Comparison: none            

 

Findings: Right kidney measures 8.9 cm in length. Left kidney measures 9 cm in

length. Both kidneys demonstrate markedly increased echogenicity. No hydronephrosis. 

There are bilateral renal cysts. Normal inferior vena cava. Bladder is empty,

contains a Mitchell catheter. 

 

Incidentally noted are gallstones.

 

Impression: Markedly echogenic kidneys, consistent with medical renal disease

 

Negative for hydronephrosis

 

Empty bladder with a Mitchell catheter

 

Incidental finding of cholelithiasis.

## 2020-08-21 NOTE — EMERGENCY ROOM REPORT
Sepsis Event Note


Evaluation


Current Stage of Sepsis:  Severe Sepsis


Possible Source:  Genitourinary





Focused Exam


Allergies:  


Coded Allergies:  


     AMPICILLIN (Verified  Allergy, Unknown, 8/21/20)


     PENICILLINS (Verified  Allergy, Unknown, 8/21/20)


     TETRACYCLINES (Verified  Allergy, Unknown, 8/21/20)


Date Exam Occurred:  Aug 21, 2020


Time Exam Occurred:  03:07


Laboratory Studies





Laboratory Tests








Test


  8/21/20


01:40 8/21/20


01:53


 


White Blood Count


  31.6 K/UL


(4.8-10.8)  *H 


 


 


Red Blood Count


  3.68 M/UL


(4.20-5.40)  L 


 


 


Hemoglobin


  11.6 G/DL


(12.0-16.0)  L 


 


 


Hematocrit


  37.8 %


(37.0-47.0) 


 


 


Mean Corpuscular Volume


  103 FL (80-99)


H 


 


 


Mean Corpuscular Hemoglobin


  31.6 PG


(27.0-31.0)  H 


 


 


Mean Corpuscular Hemoglobin


Concent 30.8 G/DL


(32.0-36.0)  L 


 


 


Red Cell Distribution Width


  13.7 %


(11.6-14.8) 


 


 


Platelet Count


  257 K/UL


(150-450) 


 


 


Mean Platelet Volume


  6.3 FL


(6.5-10.1)  L 


 


 


Neutrophils (%) (Auto)


  % (45.0-75.0)


  


 


 


Lymphocytes (%) (Auto)


  % (20.0-45.0)


  


 


 


Monocytes (%) (Auto)  % (1.0-10.0)   


 


Eosinophils (%) (Auto)  % (0.0-3.0)   


 


Basophils (%) (Auto)  % (0.0-2.0)   


 


Neutrophils % (Manual) Pending   


 


Lymphocytes % (Manual) Pending   


 


Platelet Estimate Pending   


 


Platelet Morphology Pending   


 


D-Dimer


  6.46 mg/L FEU


(0.00-0.49)  H 


 


 


Sodium Level


  179 MMOL/L


(136-145)  *H 


 


 


Potassium Level


  5.5 MMOL/L


(3.5-5.1)  H 


 


 


Chloride Level


  138 MMOL/L


()  H 


 


 


Carbon Dioxide Level


  26 MMOL/L


(21-32) 


 


 


Anion Gap


  15 mmol/L


(5-15) 


 


 


Blood Urea Nitrogen


  131 mg/dL


(7-18)  H 


 


 


Creatinine


  5.1 MG/DL


(0.55-1.30)  H 


 


 


Estimat Glomerular Filtration


Rate 8.3 mL/min


(>60) 


 


 


Glucose Level


  150 MG/DL


()  H 


 


 


Lactic Acid Level


  3.70 mmol/L


(0.4-2.0)  H 


 


 


Calcium Level


  9.7 MG/DL


(8.5-10.1) 


 


 


Total Bilirubin


  0.5 MG/DL


(0.2-1.0) 


 


 


Aspartate Amino Transf


(AST/SGOT) 34 U/L (15-37)


  


 


 


Alanine Aminotransferase


(ALT/SGPT) 38 U/L (12-78)


  


 


 


Alkaline Phosphatase


  95 U/L


() 


 


 


Total Creatine Kinase


  694 U/L


()  H 


 


 


Creatine Kinase MB


  1.8 NG/ML


(0.0-3.6) 


 


 


Creatine Kinase MB Relative


Index 0.2  


  


 


 


Troponin I


  0.075 ng/mL


(0.000-0.056) 


 


 


C-Reactive Protein,


Quantitative 7.4 mg/dL


(0.00-0.90)  H 


 


 


Total Protein


  8.1 G/DL


(6.4-8.2) 


 


 


Albumin


  2.7 G/DL


(3.4-5.0)  L 


 


 


Globulin 5.4 g/dL   


 


Albumin/Globulin Ratio


  0.5 (1.0-2.7)


L 


 


 


Urine Color  Yellow  


 


Urine Appearance


  


  Slightly


cloudy


 


Urine pH  6.5 (4.5-8.0)  


 


Urine Specific Gravity


  


  1.010


(1.005-1.035)


 


Urine Protein


  


  2+ (NEGATIVE)


H


 


Urine Glucose (UA)


  


  Negative


(NEGATIVE)


 


Urine Ketones


  


  Negative


(NEGATIVE)


 


Urine Blood


  


  3+ (NEGATIVE)


H


 


Urine Nitrite


  


  Negative


(NEGATIVE)


 


Urine Bilirubin


  


  Negative


(NEGATIVE)


 


Urine Urobilinogen


  


  Normal MG/DL


(0.0-1.0)


 


Urine Leukocyte Esterase


  


  3+ (NEGATIVE)


H


 


Urine RBC


  


  5-10 /HPF (0 -


2)  H


 


Urine WBC


  


  Tntc /HPF (0 -


2)  H


 


Urine Squamous Epithelial


Cells 


  Few /LPF


(NONE/OCC)


 


Urine Amorphous Sediment


  


  Many /LPF


(NONE)  H


 


Urine Bacteria


  


  Many /HPF


(NONE)  H








Vital Signs





Last 24 Hour Vital Signs








  Date Time  Temp Pulse Resp B/P (MAP) Pulse Ox O2 Delivery O2 Flow Rate FiO2


 


8/21/20 02:36 101.2       


 


8/21/20 02:15 101.4 116 18 118/58 98 Nasal Cannula 6.0 


 


8/21/20 01:45  120 18   Nasal Cannula 6.0 


 


8/21/20 01:28 101.1 120 18 171/100 (123) 96 Nasal Cannula 6.0 








Respiratory Exam:  Clear


Cardiovascular Exam:  RRR


Capillary Refill:  Less Than 2 Seconds


Peripheral Pulse:  Strong


Pulse Location:  Radial


Skin Exam:  Normal Turgor











Spencer Melendez MD Aug 21, 2020 03:07

## 2020-08-21 NOTE — CONSULTATION
Consult Note


Consult Note





I was asked to evaluate the patient at the request of Dr. Wahl for renal 

failure and electrolyte imbalances


Patient seen, examined, non-historian


ER note:





Chief Complaint:  Dyspnea/Respdistress





This is an unfortunate 70-year-old female from a nursing home.  She presents 

with chief complaint of shortness of breath.  Onset around 10 PM.  She had 

multiple admission for sepsis and pneumonia.  She was covered positive in the 

past.  However on recent admission to [facility] last week, she was covered 

negative.  She was this sent back to nursing home and became dyspneic tonight.  

No nausea no vomiting.  No reported fever.  EMS states she was hypoxic and was 

placed on oxygen.  She also had fever here of 101.  Per EMS, patient has a 

POLST that stated DNR.  It was not signed however.


Allergies:  


AMPICILLIN (Verified  Allergy, Unknown, 8/21/20)


PENICILLINS (Verified  Allergy, Unknown, 8/21/20)


TETRACYCLINES (Verified  Allergy, Unknown, 8/21/20)





COVID-19 Screening


Contact w/high risk pt:  No


Experienced COVID-19 symptoms?:  Yes


COVID-19 Testing performed PTA:  Yes


COVID-19 Screening:  Negative COVID-19


COVID-19 Testing Source:  8/9/20








Past Medical History:  see triage record, old chart reviewed


Past Surgical History:  other


Pertinent Family History:  none


Social History:  Denies: smoking


Last Menstrual Period:  n/a


Pregnant Now:  No


Immunizations:  other


Reviewed Nursing Documentation:  PMH: Agreed; PSxH: Agreed








Past Medical History:  No History, Except For


Hx Gastrointestinal Problems:  Yes - GERD





PHYSICAL EXAMINATION:


VITAL SIGNS:  Temperature 101.1 degrees Fahrenheit, pulse 120, respirations


18, and blood pressure 171/100.


GENERAL:  The patient is a well-developed and well-nourished 


female, in no apparent distress.


HEENT:  Eyes, pupils are equal and responsive to light and accommodation.


Extraocular movements are intact.


NECK:  Supple without lymphadenopathy.


CHEST:  Decreased breath sounds to bilateral base, otherwise clear to


auscultation without wheezes or rales.


CARDIOVASCULAR:  Regular rhythm and rate.  S1, S2 normal without murmurs,


rubs, or gallops.


ABDOMEN:  Soft, nontender, and nondistended.  Positive bowel sounds.  No


evidence of hepatosplenomegaly.  Currently, no rebound or guarding


noted.


EXTREMITIES:  Negative for clubbing, cyanosis, or edema.


RECTAL/GENITAL:  Not performed.


NEUROLOGIC:  Cranial nerves II through XII are grossly intact without focal


deficits.  Motor strength is 5/5 bilaterally.  Deep tendon reflexes 2+


plantar.





IMAGING:  Chest x-ray demonstrated left retrocardiac consolidation


consistent with pneumonia.





LABORATORY STUDIES:  WBC 31.6, hemoglobin 11.6, hematocrit 37.8, and


platelets 257,000.  Sodium 180, potassium 4.9, chloride 142, CO2 22, BUN


109, creatinine 4.1.  Glucose 265.





.





.


Assessment/Plan





Impression:


Acute renal failure


Possible underlying chronic renal insufficiency


Severe dehydration


Hypotension


Sepsis, UTI


History of psychiatric disease


Elevated troponin I


Patient full code





\





Suggestion:


Fluid challenge


Monitor renal parameters and electrolytes


Monitor intake and output


Mitchell catheter


Antibiotics


Avoid nephrotoxic's





Chest x-ray:IMPRESSION:  


Left retrocardiac mild atelectasis versus infiltrate.





Kidney ultrasound impression: 


Markedly echogenic kidneys, consistent with medical renal disease


Negative for hydronephrosis


Empty bladder with a Mitchell catheter


Incidental finding of cholelithiasis.














Derik Dos Santos MD Aug 21, 2020 09:38

## 2020-08-22 VITALS — DIASTOLIC BLOOD PRESSURE: 47 MMHG | SYSTOLIC BLOOD PRESSURE: 96 MMHG

## 2020-08-22 VITALS — DIASTOLIC BLOOD PRESSURE: 50 MMHG | SYSTOLIC BLOOD PRESSURE: 108 MMHG

## 2020-08-22 VITALS — DIASTOLIC BLOOD PRESSURE: 52 MMHG | SYSTOLIC BLOOD PRESSURE: 107 MMHG

## 2020-08-22 VITALS — SYSTOLIC BLOOD PRESSURE: 96 MMHG | DIASTOLIC BLOOD PRESSURE: 54 MMHG

## 2020-08-22 VITALS — SYSTOLIC BLOOD PRESSURE: 98 MMHG | DIASTOLIC BLOOD PRESSURE: 49 MMHG

## 2020-08-22 VITALS — DIASTOLIC BLOOD PRESSURE: 54 MMHG | SYSTOLIC BLOOD PRESSURE: 96 MMHG

## 2020-08-22 LAB
ADD MANUAL DIFF: YES
ADD MANUAL DIFF: YES
ALBUMIN SERPL-MCNC: 2.3 G/DL (ref 3.4–5)
ALBUMIN/GLOB SERPL: 0.8 {RATIO} (ref 1–2.7)
ALP SERPL-CCNC: 70 U/L (ref 46–116)
ALT SERPL-CCNC: 25 U/L (ref 12–78)
ANION GAP SERPL CALC-SCNC: 13 MMOL/L (ref 5–15)
AST SERPL-CCNC: 31 U/L (ref 15–37)
BILIRUB SERPL-MCNC: 0.5 MG/DL (ref 0.2–1)
BUN SERPL-MCNC: 86 MG/DL (ref 7–18)
CALCIUM SERPL-MCNC: 7.8 MG/DL (ref 8.5–10.1)
CHLORIDE SERPL-SCNC: 129 MMOL/L (ref 98–107)
CK SERPL-CCNC: 517 U/L (ref 26–308)
CO2 SERPL-SCNC: 24 MMOL/L (ref 21–32)
CREAT SERPL-MCNC: 2.9 MG/DL (ref 0.55–1.3)
ERYTHROCYTE [DISTWIDTH] IN BLOOD BY AUTOMATED COUNT: 13.6 % (ref 11.6–14.8)
ERYTHROCYTE [DISTWIDTH] IN BLOOD BY AUTOMATED COUNT: 13.7 % (ref 11.6–14.8)
GAMMA GLUTAMYL TRANSPEPTIDASE: 24 U/L (ref 5–85)
GLOBULIN SER-MCNC: 2.8 G/DL
HCT VFR BLD CALC: 25.6 % (ref 37–47)
HCT VFR BLD CALC: 26.4 % (ref 37–47)
HGB BLD-MCNC: 7.8 G/DL (ref 12–16)
HGB BLD-MCNC: 8.2 G/DL (ref 12–16)
LDH SERPL L TO P-CCNC: 319 U/L (ref 81–234)
MCV RBC AUTO: 103 FL (ref 80–99)
MCV RBC AUTO: 104 FL (ref 80–99)
PHOSPHATE SERPL-MCNC: 4.7 MG/DL (ref 2.5–4.9)
PLATELET # BLD: 109 K/UL (ref 150–450)
PLATELET # BLD: 121 K/UL (ref 150–450)
POTASSIUM SERPL-SCNC: 2.8 MMOL/L (ref 3.5–5.1)
RBC # BLD AUTO: 2.47 M/UL (ref 4.2–5.4)
RBC # BLD AUTO: 2.55 M/UL (ref 4.2–5.4)
SODIUM SERPL-SCNC: 166 MMOL/L (ref 136–145)
WBC # BLD AUTO: 22 K/UL (ref 4.8–10.8)
WBC # BLD AUTO: 24.4 K/UL (ref 4.8–10.8)

## 2020-08-22 RX ADMIN — SODIUM CHLORIDE SCH MLS/HR: 0.9 INJECTION INTRAVENOUS at 09:03

## 2020-08-22 RX ADMIN — PANTOPRAZOLE SODIUM SCH MG: 40 INJECTION, POWDER, FOR SOLUTION INTRAVENOUS at 09:02

## 2020-08-22 RX ADMIN — PANTOPRAZOLE SODIUM SCH MG: 40 INJECTION, POWDER, FOR SOLUTION INTRAVENOUS at 20:16

## 2020-08-22 RX ADMIN — DOCUSATE SODIUM SCH MG: 50 LIQUID ORAL at 17:48

## 2020-08-22 RX ADMIN — DOCUSATE SODIUM SCH MG: 50 LIQUID ORAL at 09:02

## 2020-08-22 RX ADMIN — DOCUSATE SODIUM SCH MG: 50 LIQUID ORAL at 13:00

## 2020-08-22 RX ADMIN — POLYETHYLENE GLYCOL 3350 SCH GM: 17 POWDER, FOR SOLUTION ORAL at 20:16

## 2020-08-22 RX ADMIN — ENOXAPARIN SODIUM SCH MG: 30 INJECTION SUBCUTANEOUS at 09:00

## 2020-08-22 NOTE — PULMONOLOGY PROGRESS NOTE
Subjective


ROS Limited/Unobtainable:  No


Allergies:  


Coded Allergies:  


     AMPICILLIN (Verified  Allergy, Unknown, hives, 8/21/20)


     PENICILLINS (Verified  Allergy, Unknown, hives, 8/21/20)


     TETRACYCLINES (Verified  Allergy, Unknown, hives, 8/21/20)





Objective





Last 24 Hour Vital Signs








  Date Time  Temp Pulse Resp B/P (MAP) Pulse Ox O2 Delivery O2 Flow Rate FiO2


 


8/22/20 09:00      Non-Rebreather 15.0 


 


8/22/20 08:00 97.9 67 20 96/54 (68) 100   


 


8/22/20 04:00 98.4 78 20 98/49 (65) 100   


 


8/22/20 04:00  78      


 


8/22/20 00:00 97.9 78 20 107/52 (70) 100   


 


8/22/20 00:00  78      


 


8/21/20 21:00      Non-Rebreather 15.0 


 


8/21/20 20:00 97.7 82 20 108/56 (73) 95   


 


8/21/20 20:00  83      

















Intake and Output  


 


 8/21/20 8/22/20





 19:00 07:00


 


Intake Total 55 ml 950 ml


 


Output Total  1050 ml


 


Balance 55 ml -100 ml


 


  


 


Intake Free Water  400 ml


 


Tube Feeding 55 ml 550 ml


 


Output Urine Total  1050 ml


 


# Bowel Movements  2








General Appearance:  cachetic


HEENT:  normocephalic, atraumatic


Respiratory:  chest wall non-tender, lungs clear


Cardiovascular:  normal peripheral pulses, normal rate


Abdomen:  normal bowel sounds, soft, non tender


Genitourinary:  normal external genitalia


Skin:  no rash


Neurologic:  CNs II-XII grossly normal


Lymphatic:  no neck adenopathy





Microbiology








 Date/Time


Source Procedure


Growth Status


 


 


 8/21/20 01:55


Blood Blood Culture - Preliminary


NO GROWTH AFTER 24 HOURS Resulted


 


 8/21/20 01:40


Blood Blood Culture - Preliminary


NO GROWTH AFTER 24 HOURS Resulted





 8/22/20 02:00


Sputum Expectorated Gram Stain - Final Resulted


 


 8/22/20 02:00


Sputum Expectorated Sputum Culture


Pending Resulted


 


 8/21/20 01:40


Nasopharynx SARS-CoV-2 RdRp Gene Assay - Final Complete


 


 8/21/20 01:53


Urine,Clean Catch Urine Culture - Preliminary


Strep Species, Gamma-Hemolytic Resulted








Laboratory Tests


8/22/20 08:25: 


White Blood Count 24.4*H, Red Blood Count 2.55L, Hemoglobin 8.2L, Hematocrit 

26.4L, Mean Corpuscular Volume 103H, Mean Corpuscular Hemoglobin 32.0H, Mean 

Corpuscular Hemoglobin Concent 31.0L, Red Cell Distribution Width 13.6, 

Platelet Count 121L, Mean Platelet Volume 7.0, Neutrophils (%) (Auto) , 

Lymphocytes (%) (Auto) , Monocytes (%) (Auto) , Eosinophils (%) (Auto) , 

Basophils (%) (Auto) , Differential Total Cells Counted 100, Neutrophils % (

Manual) 86H, Lymphocytes % (Manual) 12L, Monocytes % (Manual) 0L, Eosinophils % 

(Manual) 2, Basophils % (Manual) 0, Band Neutrophils 0, Platelet Estimate 

DecreasedL, Platelet Morphology Normal, Hypochromasia 1+, Macrocytosis 1+, 

Sodium Level 166#*H, Potassium Level 2.8L, Chloride Level 129H, Carbon Dioxide 

Level 24, Anion Gap 13, Blood Urea Nitrogen 86H, Creatinine 2.9H, Estimat 

Glomerular Filtration Rate 16.0, Glucose Level 168H, Uric Acid 4.1, Calcium 

Level 7.8L, Phosphorus Level 4.7, Magnesium Level 3.0H, Total Bilirubin 0.5, 

Gamma Glutamyl Transpeptidase 24, Aspartate Amino Transf (AST/SGOT) 31, Alanine 

Aminotransferase (ALT/SGPT) 25, Alkaline Phosphatase 70, Lactate Dehydrogenase 

319H, Total Creatine Kinase 517H, Troponin I 0.040, C-Reactive Protein, 

Quantitative 6.5H, Total Protein 5.1#L, Albumin 2.3L, Globulin 2.8, Albumin/

Globulin Ratio 0.8L





Current Medications








 Medications


  (Trade)  Dose


 Ordered  Sig/Raji


 Route


 PRN Reason  Start Time


 Stop Time Status Last Admin


Dose Admin


 


 Acetaminophen


  (Tylenol)  650 mg  Q6H  PRN


 ORAL


 For Headache  8/22/20 15:45


 9/21/20 15:44   


 


 


 Cefepime HCl 1 gm/


 Dextrose  55 ml @ 


 110 mls/hr  Q24H


 IVPB


   8/21/20 10:00


 8/28/20 09:59  8/22/20 09:03


 


 


 Dextrose  1,000 ml @ 


 150 mls/hr  Q6H40M


 IV


   8/21/20 11:30


 9/20/20 11:29  8/22/20 14:25


 


 


 Docusate Sodium


  (Colace)  100 mg  TID


 GT


   8/21/20 13:00


 9/20/20 08:59  8/22/20 13:00


 


 


 Enoxaparin Sodium


  (Lovenox)  30 mg  DAILY


 SUBQ


   8/21/20 09:00


 11/19/20 08:59  8/21/20 10:10


 


 


 Pantoprazole


  (Protonix)  40 mg  EVERY 12  HOURS


 IVP


   8/21/20 21:00


 9/20/20 20:59  8/22/20 09:02


 


 


 Polyethylene


 Glycol


  (Miralax)  17 gm  BEDTIME


 ORAL


   8/21/20 21:00


 9/20/20 20:59  8/21/20 21:43


 


 


 Vancomycin HCl


  (Vanco pharmacy


 to dose)  1 ea  DAILY  PRN


 MISC


 Per rx protocol  8/21/20 08:30


 9/20/20 08:29   


 











Assessment/Plan


Problems:  


(1) Sepsis


(2) ARF (acute renal failure)


(3) Severe dehydration


(4) Hypernatremia


(5) Psychosis


Assessment/Plan


febrile again


pan cultures


iv fluids 


f/u electrolytes


renal studies


urine electrolytes


dvt prophylaxis











Live Brambila MD Aug 22, 2020 16:24

## 2020-08-22 NOTE — DIAGNOSTIC IMAGING REPORT
EXAM:

  XR Chest, 1 View

 

CLINICAL HISTORY:

  COUGH

 

TECHNIQUE:

  Frontal view of the chest.

 

COMPARISON:

  Chest x-ray 8/21/20 145

 

FINDINGS:

  Lungs:  Hypoventilatory lungs.  Bibasilar lung atelectasis.  Continued 

elevated right hemidiaphragm.

  Pleural space:  Unremarkable.  No pneumothorax.

  Heart:  Unremarkable.  No cardiomegaly.

  Mediastinum:  Unremarkable.

  Bones/joints:  Unremarkable.

  Tubes, lines and devices:  Cardiac pacemaker.

 

IMPRESSION:     

  Hypoventilatory lungs.  Bibasilar lung atelectasis.  Continued elevated 

right hemidiaphragm.

## 2020-08-22 NOTE — INTERNAL MED PROGRESS NOTE
Subjective


Date of Service:  Aug 22, 2020


Physician Name


EmileeWing


Attending Physician


Umer Wahl MD





Current Medications








 Medications


  (Trade)  Dose


 Ordered  Sig/Raji


 Route


 PRN Reason  Start Time


 Stop Time Status Last Admin


Dose Admin


 


 Acetaminophen


  (Tylenol)  650 mg  Q6H  PRN


 ORAL


 For Headache  8/22/20 15:45


 9/21/20 15:44   


 


 


 Cefepime HCl 1 gm/


 Dextrose  55 ml @ 


 110 mls/hr  Q24H


 IVPB


   8/21/20 10:00


 8/28/20 09:59  8/22/20 09:03


 


 


 Dextrose  1,000 ml @ 


 150 mls/hr  Q6H40M


 IV


   8/21/20 11:30


 9/20/20 11:29  8/22/20 14:25


 


 


 Docusate Sodium


  (Colace)  100 mg  TID


 GT


   8/21/20 13:00


 9/20/20 08:59  8/22/20 13:00


 


 


 Enoxaparin Sodium


  (Lovenox)  30 mg  DAILY


 SUBQ


   8/21/20 09:00


 11/19/20 08:59  8/21/20 10:10


 


 


 Pantoprazole


  (Protonix)  40 mg  EVERY 12  HOURS


 IVP


   8/21/20 21:00


 9/20/20 20:59  8/22/20 09:02


 


 


 Polyethylene


 Glycol


  (Miralax)  17 gm  BEDTIME


 ORAL


   8/21/20 21:00


 9/20/20 20:59  8/21/20 21:43


 


 


 Vancomycin HCl


  ([facility] pharmacy


 to dose)  1 ea  DAILY  PRN


 MISC


 Per rx protocol  8/21/20 08:30


 9/20/20 08:29   


 








Allergies:  


Coded Allergies:  


     AMPICILLIN (Verified  Allergy, Unknown, hives, 8/21/20)


     PENICILLINS (Verified  Allergy, Unknown, hives, 8/21/20)


     TETRACYCLINES (Verified  Allergy, Unknown, hives, 8/21/20)


ROS Limited/Unobtainable:  No


Constitutional:  Reports: no symptoms


HEENT:  Reports: no symptoms


Cardiovascular:  Reports: no symptoms


Respiratory:  Reports: no symptoms


Gastrointestinal/Abdominal:  Reports: no symptoms


Genitourinary:  Reports: no symptoms


Neurologic/Psychiatric:  Reports: no symptoms


Subjective


71 YO F with previous COVID 19 pos admitted with shortness of breath.  Now 

pneumonia and sepsis.  Cover for Int Med-DR Wahl





Objective





Last Vital Signs








  Date Time  Temp Pulse Resp B/P (MAP) Pulse Ox O2 Delivery O2 Flow Rate FiO2


 


8/22/20 09:00      Non-Rebreather 15.0 


 


8/22/20 08:00 97.9 67 20 96/54 (68) 100   








General Appearance:  WD/WN, no apparent distress, lethargic


EENT:  PERRL/EOMI, normal ENT inspection


Neck:  non-tender, normal alignment, supple, normal inspection


Cardiovascular:  normal peripheral pulses, normal rate, regular rhythm, no 

gallop/murmur, no JVD


Respiratory/Chest:  chest wall non-tender, respiratory distress, crackles/rales

, rhonchi - bilaterally, expiratory wheezing


Abdomen:  normal bowel sounds, non tender, soft, no organomegaly, no mass


Extremities:  normal range of motion, non-tender


Neurologic:  CNs II-XII grossly normal, no motor/sensory deficits


Skin:  normal pigmentation, warm/dry





Laboratory Tests








Test


  8/22/20


08:25


 


White Blood Count


  24.4 K/UL


(4.8-10.8)  *H


 


Red Blood Count


  2.55 M/UL


(4.20-5.40)  L


 


Hemoglobin


  8.2 G/DL


(12.0-16.0)  L


 


Hematocrit


  26.4 %


(37.0-47.0)  L


 


Mean Corpuscular Volume


  103 FL (80-99)


H


 


Mean Corpuscular Hemoglobin


  32.0 PG


(27.0-31.0)  H


 


Mean Corpuscular Hemoglobin


Concent 31.0 G/DL


(32.0-36.0)  L


 


Red Cell Distribution Width


  13.6 %


(11.6-14.8)


 


Platelet Count


  121 K/UL


(150-450)  L


 


Mean Platelet Volume


  7.0 FL


(6.5-10.1)


 


Neutrophils (%) (Auto)


  % (45.0-75.0)


 


 


Lymphocytes (%) (Auto)


  % (20.0-45.0)


 


 


Monocytes (%) (Auto)  % (1.0-10.0)  


 


Eosinophils (%) (Auto)  % (0.0-3.0)  


 


Basophils (%) (Auto)  % (0.0-2.0)  


 


Differential Total Cells


Counted 100  


 


 


Neutrophils % (Manual) 86 % (45-75)  H


 


Lymphocytes % (Manual) 12 % (20-45)  L


 


Monocytes % (Manual) 0 % (1-10)  L


 


Eosinophils % (Manual) 2 % (0-3)  


 


Basophils % (Manual) 0 % (0-2)  


 


Band Neutrophils 0 % (0-8)  


 


Platelet Estimate Decreased  L


 


Platelet Morphology Normal  


 


Hypochromasia 1+  


 


Macrocytosis 1+  


 


Sodium Level


  166 MMOL/L


(136-145)  #*H


 


Potassium Level


  2.8 MMOL/L


(3.5-5.1)  L


 


Chloride Level


  129 MMOL/L


()  H


 


Carbon Dioxide Level


  24 MMOL/L


(21-32)


 


Anion Gap


  13 mmol/L


(5-15)


 


Blood Urea Nitrogen


  86 mg/dL


(7-18)  H


 


Creatinine


  2.9 MG/DL


(0.55-1.30)  H


 


Estimat Glomerular Filtration


Rate 16.0 mL/min


(>60)


 


Glucose Level


  168 MG/DL


()  H


 


Uric Acid


  4.1 MG/DL


(2.6-7.2)


 


Calcium Level


  7.8 MG/DL


(8.5-10.1)  L


 


Phosphorus Level


  4.7 MG/DL


(2.5-4.9)


 


Magnesium Level


  3.0 MG/DL


(1.8-2.4)  H


 


Total Bilirubin


  0.5 MG/DL


(0.2-1.0)


 


Gamma Glutamyl Transpeptidase 24 U/L (5-85)  


 


Aspartate Amino Transf


(AST/SGOT) 31 U/L (15-37)


 


 


Alanine Aminotransferase


(ALT/SGPT) 25 U/L (12-78)


 


 


Alkaline Phosphatase


  70 U/L


()


 


Lactate Dehydrogenase


  319 U/L


()  H


 


Total Creatine Kinase


  517 U/L


()  H


 


Troponin I


  0.040 ng/mL


(0.000-0.056)


 


C-Reactive Protein,


Quantitative 6.5 mg/dL


(0.00-0.90)  H


 


Total Protein


  5.1 G/DL


(6.4-8.2)  #L


 


Albumin


  2.3 G/DL


(3.4-5.0)  L


 


Globulin 2.8 g/dL  


 


Albumin/Globulin Ratio


  0.8 (1.0-2.7)


L











Microbiology








 Date/Time


Source Procedure


Growth Status


 


 


 8/21/20 01:55


Blood Blood Culture - Preliminary


NO GROWTH AFTER 24 HOURS Resulted


 


 8/21/20 01:40


Blood Blood Culture - Preliminary


NO GROWTH AFTER 24 HOURS Resulted





 8/22/20 02:00


Sputum Expectorated Gram Stain - Final Resulted


 


 8/22/20 02:00


Sputum Expectorated Sputum Culture


Pending Resulted


 


 8/21/20 01:40


Nasopharynx SARS-CoV-2 RdRp Gene Assay - Final Complete


 


 8/21/20 01:53


Urine,Clean Catch Urine Culture - Preliminary


Strep Species, Gamma-Hemolytic Resulted

















Intake and Output  


 


 8/21/20 8/22/20





 19:00 07:00


 


Intake Total 55 ml 950 ml


 


Output Total  1050 ml


 


Balance 55 ml -100 ml


 


  


 


Intake Free Water  400 ml


 


Tube Feeding 55 ml 550 ml


 


Output Urine Total  1050 ml


 


# Bowel Movements  2











Assessment/Plan


Problem List:  


(1) UTI (urinary tract infection)


Assessment & Plan:  Strep; gamma hemolytic.  Continue vanco and cefepime per ID





(2) Sepsis


Assessment & Plan:  ID= Dr Penn.  Continue vanco and cefepime





(3) Hypernatremia


(4) Severe dehydration


(5) ARF (acute renal failure)


Assessment & Plan:  Nephrology = Wing Orozco MD Aug 22, 2020 17:31

## 2020-08-22 NOTE — HISTORY AND PHYSICAL REPORT
DATE OF ADMISSION:  08/21/2020

CHIEF COMPLAINT:  The patient is a 70-year-old  female, who

presents with a chief complaint of shortness of breath.



HISTORY OF PRESENT ILLNESS:  The patient is a resident of [facility].  The patient herself is unable to

contribute much to the history and physical.  Much of the history and

physical is obtained from the patient's chart.



The patient has a history of COVID-19 positive approximately a month

ago.  The patient has been seen a couple of times at [facility] for shortness of breath.  The patient was not admitted for

COVID-19.



According to staff at [facility], the patient became short of

breath On August 21, 2020.  The patient was transferred to [facility].  The patient was admitted for shortness of breath to rule out

pneumonia.



REVIEW OF SYSTEMS:  Unable to assess secondary to the patient's mental

status.



PAST MEDICAL HISTORY:  Significant for:



1. COVID-19 positive one month previously.

2. Schizophrenia.

3. Major depression.

4. History of pacemaker implantation.

5. Chronic renal failure.



PAST SURGICAL HISTORY:  Significant for:



1. Pacemaker implantation.

2. Gastrostomy tube placement.



CURRENT MEDICATIONS:

1. Abilify 20 mg p.o. daily.

2. Urecholine 25 mg p.o. three times daily.

3. Klonopin 0.5 mg p.o. q.6 h. p.r.n.

4. Lovenox 30 mg subcutaneous daily.

5. Pepcid 20 mg p.o. daily.

6. Iron sulfate 325 mg p.o. daily.

7. Xalatan one drop to both eyes nightly.

8. Metoclopramide 5 mg p.o. three times daily.



ALLERGIES:

1. Ampicillin.

2. Penicillin.

3. Tetracycline.



SOCIAL HISTORY:  The patient is single and a resident of [facility].  The patient denies tobacco or alcohol

use.



PHYSICAL EXAMINATION:

VITAL SIGNS:  Temperature 101.1 degrees Fahrenheit, pulse 120, respirations

18, and blood pressure 171/100.

GENERAL:  The patient is a well-developed and well-nourished 

female, in no apparent distress.

HEENT:  Eyes, pupils are equal and responsive to light and accommodation.

Extraocular movements are intact.

NECK:  Supple without lymphadenopathy.

CHEST:  Decreased breath sounds to bilateral base, otherwise clear to

auscultation without wheezes or rales.

CARDIOVASCULAR:  Regular rhythm and rate.  S1, S2 normal without murmurs,

rubs, or gallops.

ABDOMEN:  Soft, nontender, and nondistended.  Positive bowel sounds.  No

evidence of hepatosplenomegaly.  Currently, no rebound or guarding

noted.

EXTREMITIES:  Negative for clubbing, cyanosis, or edema.

RECTAL/GENITAL:  Not performed.

NEUROLOGIC:  Cranial nerves II through XII are grossly intact without focal

deficits.  Motor strength is 5/5 bilaterally.  Deep tendon reflexes 2+

plantar.



IMAGING:  Chest x-ray demonstrated left retrocardiac consolidation

consistent with pneumonia.



LABORATORY STUDIES:  WBC 31.6, hemoglobin 11.6, hematocrit 37.8, and

platelets 257,000.  Sodium 180, potassium 4.9, chloride 142, CO2 22, BUN

109, creatinine 4.1.  Glucose 265.



ASSESSMENT:  This is a 70-year-old  female with:



1. Left pneumonia.

2. Shortness of breath.

3. Presumed sepsis.

4. Renal failure.

5. Respiratory failure.

6. History of COVID-19 positive.

7. Depression.

8. Schizophrenia.

9. Chronic renal failure.

10. Pacemaker.



TREATMENT:

1. Left pneumonia/sepsis.  An Infectious Disease consultation has been

obtained with  _____.  The patient has been started empirically on

intravenous cefepime and vancomycin.  We will follow recommendations of

Infectious Disease.  Pulmonary consultation has been obtained with Dr. Live Brambila.  Follow recommendations of Pulmonary.

2. Renal failure/hypernatremia.  A Nephrology consultation has been

obtained with Dr. Derik Dos Santos.  Follow recommendations of Nephrology.

Differential includes acute on chronic renal failure versus extreme

dehydration.

3. Respiratory failure.  As above, a Pulmonary consultation has been

obtained with Dr. Live Brambila.

4. Depression/schizophrenia.  A psychiatric consultation has been

obtained with Dr. Hopson.

5. Pacemaker in situ.









  ______________________________________________

  Wing Hebert M.D.





DR:  KATHRYN

D:  08/22/2020 17:25

T:  08/22/2020 17:49

JOB#:  [id number]

CC:

## 2020-08-22 NOTE — NEPHROLOGY PROGRESS NOTE
Assessment/Plan


Problem List:  


(1) ARF (acute renal failure)


(2) Sepsis


(3) UTI (urinary tract infection)


(4) Severe dehydration


(5) Hypernatremia


(6) Acute urinary retention


Assessment





Acute renal failure


Possible underlying chronic renal insufficiency


Severe dehydration


Hypotension


Sepsis, UTI


History of psychiatric disease


Elevated troponin I


Patient full code


Plan


August 22: Renal parameters improving.  Continue D5W IV fluid.  Potassium 

supplement IV given.  Continue per consultants.





Previously:


Fluid challenge


Monitor renal parameters and electrolytes


Monitor intake and output


Mitchell catheter


Antibiotics


Avoid nephrotoxic's


Chest x-ray





Subjective


ROS Limited/Unobtainable:  Yes





Objective


Objective





Last 24 Hour Vital Signs








  Date Time  Temp Pulse Resp B/P (MAP) Pulse Ox O2 Delivery O2 Flow Rate FiO2


 


8/22/20 09:00      Non-Rebreather 15.0 


 


8/22/20 08:00 97.9 67 20 96/54 (68) 100   


 


8/22/20 04:00 98.4 78 20 98/49 (65) 100   


 


8/22/20 04:00  78      


 


8/22/20 00:00 97.9 78 20 107/52 (70) 100   


 


8/22/20 00:00  78      


 


8/21/20 21:00      Non-Rebreather 15.0 


 


8/21/20 20:00 97.7 82 20 108/56 (73) 95   


 


8/21/20 20:00  83      


 


8/21/20 16:00  82      


 


8/21/20 16:00 98.1 68 18 107/80 (89) 96   

















Intake and Output  


 


 8/21/20 8/22/20





 19:00 07:00


 


Intake Total 55 ml 950 ml


 


Output Total  1050 ml


 


Balance 55 ml -100 ml


 


  


 


Intake Free Water  400 ml


 


Tube Feeding 55 ml 550 ml


 


Output Urine Total  1050 ml


 


# Bowel Movements  2








Laboratory Tests


8/21/20 16:05: Miscellaneous Test [Pending]


8/22/20 08:25: 


White Blood Count 24.4*H, Red Blood Count 2.55L, Hemoglobin 8.2L, Hematocrit 

26.4L, Mean Corpuscular Volume 103H, Mean Corpuscular Hemoglobin 32.0H, Mean 

Corpuscular Hemoglobin Concent 31.0L, Red Cell Distribution Width 13.6, 

Platelet Count 121L, Mean Platelet Volume 7.0, Neutrophils (%) (Auto) , 

Lymphocytes (%) (Auto) , Monocytes (%) (Auto) , Eosinophils (%) (Auto) , 

Basophils (%) (Auto) , Differential Total Cells Counted 100, Neutrophils % (

Manual) 86H, Lymphocytes % (Manual) 12L, Monocytes % (Manual) 0L, Eosinophils % 

(Manual) 2, Basophils % (Manual) 0, Band Neutrophils 0, Platelet Estimate 

DecreasedL, Platelet Morphology Normal, Hypochromasia 1+, Macrocytosis 1+, 

Sodium Level 166#*H, Potassium Level 2.8L, Chloride Level 129H, Carbon Dioxide 

Level 24, Anion Gap 13, Blood Urea Nitrogen 86H, Creatinine 2.9H, Estimat 

Glomerular Filtration Rate 16.0, Glucose Level 168H, Uric Acid 4.1, Calcium 

Level 7.8L, Phosphorus Level 4.7, Magnesium Level 3.0H, Total Bilirubin 0.5, 

Gamma Glutamyl Transpeptidase 24, Aspartate Amino Transf (AST/SGOT) 31, Alanine 

Aminotransferase (ALT/SGPT) 25, Alkaline Phosphatase 70, Lactate Dehydrogenase 

319H, Total Creatine Kinase 517H, Troponin I 0.040, C-Reactive Protein, 

Quantitative 6.5H, Total Protein 5.1#L, Albumin 2.3L, Globulin 2.8, Albumin/

Globulin Ratio 0.8L


Height (Feet):  5


Height (Inches):  2.00


Weight (Pounds):  118


General Appearance:  no apparent distress


Cardiovascular:  normal rate


Respiratory/Chest:  decreased breath sounds


Abdomen:  soft











Derik Dos Santos MD Aug 22, 2020 14:02

## 2020-08-23 VITALS — DIASTOLIC BLOOD PRESSURE: 81 MMHG | SYSTOLIC BLOOD PRESSURE: 103 MMHG

## 2020-08-23 VITALS — DIASTOLIC BLOOD PRESSURE: 68 MMHG | SYSTOLIC BLOOD PRESSURE: 156 MMHG

## 2020-08-23 VITALS — SYSTOLIC BLOOD PRESSURE: 106 MMHG | DIASTOLIC BLOOD PRESSURE: 71 MMHG

## 2020-08-23 VITALS — SYSTOLIC BLOOD PRESSURE: 111 MMHG | DIASTOLIC BLOOD PRESSURE: 57 MMHG

## 2020-08-23 VITALS — DIASTOLIC BLOOD PRESSURE: 82 MMHG | SYSTOLIC BLOOD PRESSURE: 110 MMHG

## 2020-08-23 VITALS — DIASTOLIC BLOOD PRESSURE: 61 MMHG | SYSTOLIC BLOOD PRESSURE: 108 MMHG

## 2020-08-23 LAB
% IRON SATURATION: 16 % (ref 15–50)
ADD MANUAL DIFF: YES
ALBUMIN SERPL-MCNC: 2 G/DL (ref 3.4–5)
ALBUMIN/GLOB SERPL: 0.6 {RATIO} (ref 1–2.7)
ALP SERPL-CCNC: 89 U/L (ref 46–116)
ALT SERPL-CCNC: 26 U/L (ref 12–78)
ANION GAP SERPL CALC-SCNC: 13 MMOL/L (ref 5–15)
AST SERPL-CCNC: 25 U/L (ref 15–37)
BILIRUB SERPL-MCNC: 0.3 MG/DL (ref 0.2–1)
BUN SERPL-MCNC: 74 MG/DL (ref 7–18)
CALCIUM SERPL-MCNC: 8.2 MG/DL (ref 8.5–10.1)
CHLORIDE SERPL-SCNC: 119 MMOL/L (ref 98–107)
CHOLEST SERPL-MCNC: 114 MG/DL (ref ?–200)
CO2 SERPL-SCNC: 24 MMOL/L (ref 21–32)
CREAT SERPL-MCNC: 2.4 MG/DL (ref 0.55–1.3)
ERYTHROCYTE [DISTWIDTH] IN BLOOD BY AUTOMATED COUNT: 12.7 % (ref 11.6–14.8)
FERRITIN SERPL-MCNC: 327 NG/ML (ref 8–388)
GAMMA GLUTAMYL TRANSPEPTIDASE: 19 U/L (ref 5–85)
GLOBULIN SER-MCNC: 3.5 G/DL
HCT VFR BLD CALC: 26 % (ref 37–47)
HDLC SERPL-MCNC: 21 MG/DL (ref 40–60)
HGB BLD-MCNC: 8 G/DL (ref 12–16)
IRON SERPL-MCNC: 29 UG/DL (ref 50–175)
LDH SERPL L TO P-CCNC: 347 U/L (ref 81–234)
MCV RBC AUTO: 101 FL (ref 80–99)
PHOSPHATE SERPL-MCNC: 4.5 MG/DL (ref 2.5–4.9)
PLATELET # BLD: 117 K/UL (ref 150–450)
POTASSIUM SERPL-SCNC: 3.3 MMOL/L (ref 3.5–5.1)
RBC # BLD AUTO: 2.56 M/UL (ref 4.2–5.4)
SODIUM SERPL-SCNC: 155 MMOL/L (ref 136–145)
TIBC SERPL-MCNC: 180 UG/DL (ref 250–450)
TRIGL SERPL-MCNC: 158 MG/DL (ref 30–150)
UNSATURATED IRON BINDING: 151 UG/DL (ref 112–346)
WBC # BLD AUTO: 20.7 K/UL (ref 4.8–10.8)

## 2020-08-23 RX ADMIN — ENOXAPARIN SODIUM SCH MG: 30 INJECTION SUBCUTANEOUS at 08:36

## 2020-08-23 RX ADMIN — DOCUSATE SODIUM SCH MG: 50 LIQUID ORAL at 17:21

## 2020-08-23 RX ADMIN — DOCUSATE SODIUM SCH MG: 50 LIQUID ORAL at 12:54

## 2020-08-23 RX ADMIN — SODIUM CHLORIDE SCH MLS/HR: 0.9 INJECTION INTRAVENOUS at 12:54

## 2020-08-23 RX ADMIN — DOCUSATE SODIUM SCH MG: 50 LIQUID ORAL at 08:34

## 2020-08-23 RX ADMIN — POLYETHYLENE GLYCOL 3350 SCH GM: 17 POWDER, FOR SOLUTION ORAL at 20:49

## 2020-08-23 RX ADMIN — PANTOPRAZOLE SODIUM SCH MG: 40 INJECTION, POWDER, FOR SOLUTION INTRAVENOUS at 08:34

## 2020-08-23 RX ADMIN — PANTOPRAZOLE SODIUM SCH MG: 40 INJECTION, POWDER, FOR SOLUTION INTRAVENOUS at 20:49

## 2020-08-23 NOTE — INTERNAL MED PROGRESS NOTE
Subjective


Date of Service:  Aug 23, 2020


Physician Name


Wing Hebert


Attending Physician


Umer Wahl MD





Current Medications








 Medications


  (Trade)  Dose


 Ordered  Sig/Raji


 Route


 PRN Reason  Start Time


 Stop Time Status Last Admin


Dose Admin


 


 Acetaminophen


  (Tylenol)  650 mg  Q6H  PRN


 ORAL


 For Headache  8/22/20 15:45


 9/21/20 15:44  8/22/20 18:05


 


 


 Cefepime HCl 1 gm/


 Dextrose  55 ml @ 


 110 mls/hr  Q24H


 IVPB


   8/21/20 10:00


 8/28/20 09:59  8/23/20 12:54


 


 


 Dextrose  1,000 ml @ 


 100 mls/hr  Q10H


 IV


   8/24/20 16:00


 9/20/20 15:59   


 


 


 Docusate Sodium


  (Colace)  100 mg  TID


 GT


   8/21/20 13:00


 9/20/20 08:59  8/23/20 12:54


 


 


 Enoxaparin Sodium


  (Lovenox)  30 mg  DAILY


 SUBQ


   8/21/20 09:00


 11/19/20 08:59  8/23/20 08:36


 


 


 Epoetin Jaswant


  (Epoetin


 Jaswant-EPBX(NON


 ESRD))  10,000 unit  MON-WED-FRI


 SUBQ


   8/24/20 21:00


 11/22/20 20:59   


 


 


 Linezolid  300 ml @ 


 300 mls/hr  Q12H


 IVPB


   8/23/20 14:00


 8/30/20 13:59  8/23/20 14:26


 


 


 Non-Formulary


 Medication


  (Non-Formulary


 Med)  1 ea  DAILY


 IV


   8/23/20 09:00


 9/22/20 08:59 UNV  


 


 


 Pantoprazole


  (Protonix)  40 mg  EVERY 12  HOURS


 IVP


   8/21/20 21:00


 9/20/20 20:59  8/23/20 08:34


 


 


 Polyethylene


 Glycol


  (Miralax)  17 gm  BEDTIME


 ORAL


   8/21/20 21:00


 9/20/20 20:59  8/21/20 21:43


 








Allergies:  


Coded Allergies:  


     AMPICILLIN (Verified  Allergy, Unknown, hives, 8/21/20)


     PENICILLINS (Verified  Allergy, Unknown, hives, 8/21/20)


     TETRACYCLINES (Verified  Allergy, Unknown, hives, 8/21/20)


ROS Limited/Unobtainable:  No


Constitutional:  Reports: no symptoms


HEENT:  Reports: no symptoms


Cardiovascular:  Reports: no symptoms


Respiratory:  Reports: shortness of breath


Gastrointestinal/Abdominal:  Reports: no symptoms


Genitourinary:  Reports: no symptoms


Subjective


69 YO F with previous COVID 19 pos admitted with shortness of breath.  Now 

pneumonia and sepsis.  Cover for Int [place]-DR Wahl





Objective





Last Vital Signs








  Date Time  Temp Pulse Resp B/P (MAP) Pulse Ox O2 Delivery O2 Flow Rate FiO2


 


8/23/20 16:00 98.7 61 20 108/61 (77) 100   


 


8/23/20 09:00      Non-Rebreather 15.0 











Laboratory Tests








Test


  8/22/20


18:10 8/23/20


07:45


 


White Blood Count


  22.0 K/UL


(4.8-10.8)  H 20.7 K/UL


(4.8-10.8)  H


 


Red Blood Count


  2.47 M/UL


(4.20-5.40)  L 2.56 M/UL


(4.20-5.40)  L


 


Hemoglobin


  7.8 G/DL


(12.0-16.0)  L 8.0 G/DL


(12.0-16.0)  L


 


Hematocrit


  25.6 %


(37.0-47.0)  L 26.0 %


(37.0-47.0)  L


 


Mean Corpuscular Volume


  104 FL (80-99)


H 101 FL (80-99)


H


 


Mean Corpuscular Hemoglobin


  31.8 PG


(27.0-31.0)  H 31.4 PG


(27.0-31.0)  H


 


Mean Corpuscular Hemoglobin


Concent 30.6 G/DL


(32.0-36.0)  L 30.9 G/DL


(32.0-36.0)  L


 


Red Cell Distribution Width


  13.7 %


(11.6-14.8) 12.7 %


(11.6-14.8)


 


Platelet Count


  109 K/UL


(150-450)  L 117 K/UL


(150-450)  L


 


Mean Platelet Volume


  8.1 FL


(6.5-10.1) 7.8 FL


(6.5-10.1)


 


Neutrophils (%) (Auto)


  % (45.0-75.0)


  % (45.0-75.0)


 


 


Lymphocytes (%) (Auto)


  % (20.0-45.0)


  % (20.0-45.0)


 


 


Monocytes (%) (Auto)  % (1.0-10.0)    % (1.0-10.0)  


 


Eosinophils (%) (Auto)  % (0.0-3.0)    % (0.0-3.0)  


 


Basophils (%) (Auto)  % (0.0-2.0)    % (0.0-2.0)  


 


Differential Total Cells


Counted 100  


  100  


 


 


Neutrophils % (Manual) 83 % (45-75)  H 85 % (45-75)  H


 


Lymphocytes % (Manual) 13 % (20-45)  L 10 % (20-45)  L


 


Monocytes % (Manual) 1 % (1-10)   1 % (1-10)  


 


Eosinophils % (Manual) 1 % (0-3)   4 % (0-3)  H


 


Basophils % (Manual) 0 % (0-2)   0 % (0-2)  


 


Band Neutrophils 2 % (0-8)   0 % (0-8)  


 


Platelet Estimate Decreased  L Decreased  L


 


Platelet Morphology Normal   Normal  


 


Hypochromasia 1+   1+  


 


Macrocytosis 1+   1+  


 


Erythrocyte Sedimentation Rate


  


  131 MM/HR


(0-30)  H


 


Sodium Level


  


  155 MMOL/L


(136-145)  #H


 


Potassium Level


  


  3.3 MMOL/L


(3.5-5.1)  L


 


Chloride Level


  


  119 MMOL/L


()  H


 


Carbon Dioxide Level


  


  24 MMOL/L


(21-32)


 


Anion Gap


  


  13 mmol/L


(5-15)


 


Blood Urea Nitrogen


  


  74 mg/dL


(7-18)  H


 


Creatinine


  


  2.4 MG/DL


(0.55-1.30)  H


 


Estimat Glomerular Filtration


Rate 


  19.8 mL/min


(>60)


 


Glucose Level


  


  154 MG/DL


()  H


 


Hemoglobin A1c


  


  6.4 %


(4.3-6.0)  H


 


Uric Acid


  


  7.7 MG/DL


(2.6-7.2)  H


 


Calcium Level


  


  8.2 MG/DL


(8.5-10.1)  L


 


Phosphorus Level


  


  4.5 MG/DL


(2.5-4.9)


 


Magnesium Level


  


  2.9 MG/DL


(1.8-2.4)  H


 


Iron Level


  


  29 ug/dL


()  L


 


Total Iron Binding Capacity


  


  180 ug/dL


(250-450)  L


 


Percent Iron Saturation  16 % (15-50)  


 


Unsaturated Iron Binding


  


  151 ug/dL


(112-346)


 


Ferritin


  


  327 NG/ML


(8-388)


 


Total Bilirubin


  


  0.3 MG/DL


(0.2-1.0)


 


Gamma Glutamyl Transpeptidase  19 U/L (5-85)  


 


Aspartate Amino Transf


(AST/SGOT) 


  25 U/L (15-37)


 


 


Alanine Aminotransferase


(ALT/SGPT) 


  26 U/L (12-78)


 


 


Alkaline Phosphatase


  


  89 U/L


()


 


Lactate Dehydrogenase


  


  347 U/L


()  H


 


C-Reactive Protein,


Quantitative 


  7.6 mg/dL


(0.00-0.90)  H


 


Pro-B-Type Natriuretic Peptide


  


  4633 pg/mL


(0-125)  H


 


Total Protein


  


  5.5 G/DL


(6.4-8.2)  L


 


Albumin


  


  2.0 G/DL


(3.4-5.0)  L


 


Globulin  3.5 g/dL  


 


Albumin/Globulin Ratio


  


  0.6 (1.0-2.7)


L


 


Triglycerides Level


  


  158 MG/DL


()  H


 


Cholesterol Level


  


  114 MG/DL (<


200)


 


LDL Cholesterol


  


  72 mg/dL


(<100)


 


HDL Cholesterol


  


  21 MG/DL


(40-60)  L


 


Cholesterol/HDL Ratio


  


  5.4 (3.3-4.4)


H


 


Vitamin B12 Level


  


  478 PG/ML


(193-986)


 


Folate


  


  12.9 NG/ML


(8.6-58.9)


 


Random Vancomycin Level  37.2 ug/mL  











Microbiology








 Date/Time


Source Procedure


Growth Status


 


 


 8/21/20 01:55


Blood Blood Culture - Preliminary


NO GROWTH AFTER 48 HOURS Resulted


 


 8/21/20 01:40


Blood Blood Culture - Preliminary


NO GROWTH AFTER 48 HOURS Resulted


 


 8/22/20 20:30


Nasopharynx SARS-CoV-2 RdRp Gene Assay - Final Complete





 8/22/20 02:00


Sputum Expectorated Gram Stain - Final Resulted


 


 8/22/20 02:00


Sputum Expectorated Sputum Culture - Preliminary Resulted


 


 8/21/20 01:40


Nasopharynx SARS-CoV-2 RdRp Gene Assay - Final Complete


 


 8/21/20 01:53


Urine,Clean Catch Urine Culture - Final


Enterococcus Faecalis - Vre Complete

















Intake and Output  


 


 8/22/20 8/23/20





 19:00 07:00


 


Intake Total  205 ml


 


Balance  205 ml


 


  


 


IV Total  150 ml


 


Tube Feeding  55 ml


 


# Voids 1 2


 


# Bowel Movements 1 2








Objective


General Appearance:  WD/WN, no apparent distress, lethargic


EENT:  PERRL/EOMI, normal ENT inspection


Neck:  non-tender, normal alignment, supple, normal inspection


Cardiovascular:  normal peripheral pulses, normal rate, regular rhythm, no 

gallop/murmur, no JVD


Respiratory/Chest:  chest wall non-tender, respiratory distress, crackles/rales

, rhonchi - bilaterally, expiratory wheezing


Abdomen:  normal bowel sounds, non tender, soft, no organomegaly, no mass


Extremities:  normal range of motion, non-tender


Neurologic:  CNs II-XII grossly normal, no motor/sensory deficits


Skin:  normal pigmentation, warm/dry





Assessment/Plan


Problem List:  


(1) UTI (urinary tract infection)


Assessment & Plan:  Strep; gamma hemolytic.  Continue vanco and cefepime per ID





(2) Sepsis


Assessment & Plan:  ID= Dr Penn.  Continue vanco and cefepime





(3) Hypernatremia


(4) Severe dehydration


(5) ARF (acute renal failure)


Assessment & Plan:  Nephrology = Wing Orozco MD Aug 23, 2020 17:01

## 2020-08-23 NOTE — NEPHROLOGY PROGRESS NOTE
Assessment/Plan


Problem List:  


(1) ARF (acute renal failure)


(2) Sepsis


(3) UTI (urinary tract infection)


(4) Severe dehydration


(5) Hypernatremia


(6) Acute urinary retention


(7) Anemia


Assessment





Acute renal failure


Possible underlying chronic renal insufficiency


Severe dehydration


Hypotension


Sepsis, UTI


History of psychiatric disease


Elevated troponin I


Patient full code


Plan


August 23: Discussed with MARTIN Carter.  Patient needs a Mitchell catheter.  Accurate 

intake and output.  Decrease  cc D5W an hour.  1 dose of IV Venofer and 

then subcu Epogen ordered for anemia.  Continue to monitor renal parameters


August 22: Renal parameters improving.  Continue D5W IV fluid.  Potassium 

supplement IV given.  Continue per consultants.





Previously:


Fluid challenge


Monitor renal parameters and electrolytes


Monitor intake and output


Mitchell catheter


Antibiotics


Avoid nephrotoxic's


Chest x-ray





Subjective


ROS Limited/Unobtainable:  No


Constitutional:  Reports: malaise, weakness





Objective


Objective





Last 24 Hour Vital Signs








  Date Time  Temp Pulse Resp B/P (MAP) Pulse Ox O2 Delivery O2 Flow Rate FiO2


 


8/23/20 12:00  86      


 


8/23/20 12:00 96.7 87 20 106/71 (83) 100   


 


8/23/20 08:00  83      


 


8/23/20 08:00 99.0 87 20 103/81 (88) 98   


 


8/23/20 07:50 99.0 87 20 103/81 (88) 98   


 


8/23/20 04:00  100      


 


8/23/20 04:00 99.0 102 24 111/57 (75) 100   


 


8/23/20 00:00 99.1 99 24 156/68 (97) 100   


 


8/23/20 00:00  82      


 


8/22/20 21:00      Non-Rebreather 15.0 


 


8/22/20 20:00 98.1 78 24 108/50 (69) 100   


 


8/22/20 20:00  77      


 


8/22/20 18:35 99.3       


 


8/22/20 16:55 99.3       


 


8/22/20 16:00  77      


 


8/22/20 16:00 101.3 62 20 100/52 (68) 98   


 


8/22/20 16:00 97.9 67 20 96/54 (68) 100   

















Intake and Output  


 


 8/22/20 8/23/20





 19:00 07:00


 


  


 


# Voids 1 2


 


# Bowel Movements 1 2











Current Medications








 Medications


  (Trade)  Dose


 Ordered  Sig/Raji


 Route


 PRN Reason  Start Time


 Stop Time Status Last Admin


Dose Admin


 


 Acetaminophen


  (Tylenol)  650 mg  Q6H  PRN


 ORAL


 For Headache  8/22/20 15:45


 9/21/20 15:44  8/22/20 18:05


 


 


 Cefepime HCl 1 gm/


 Dextrose  55 ml @ 


 110 mls/hr  Q24H


 IVPB


   8/21/20 10:00


 8/28/20 09:59  8/23/20 12:54


 


 


 Dextrose  1,000 ml @ 


 100 mls/hr  Q10H


 IV


   8/24/20 11:30


 9/20/20 11:29 UNV  


 


 


 Docusate Sodium


  (Colace)  100 mg  TID


 GT


   8/21/20 13:00


 9/20/20 08:59  8/23/20 12:54


 


 


 Enoxaparin Sodium


  (Lovenox)  30 mg  DAILY


 SUBQ


   8/21/20 09:00


 11/19/20 08:59  8/23/20 08:36


 


 


 Linezolid  300 ml @ 


 300 mls/hr  Q12H


 IVPB


   8/23/20 14:00


 8/30/20 13:59  8/23/20 14:26


 


 


 Non-Formulary


 Medication


  (Non-Formulary


 Med)  1 ea  DAILY


 IV


   8/23/20 09:00


 9/22/20 08:59 UNV  


 


 


 Pantoprazole


  (Protonix)  40 mg  EVERY 12  HOURS


 IVP


   8/21/20 21:00


 9/20/20 20:59  8/23/20 08:34


 


 


 Polyethylene


 Glycol


  (Miralax)  17 gm  BEDTIME


 ORAL


   8/21/20 21:00


 9/20/20 20:59  8/21/20 21:43


 





Laboratory Tests


8/22/20 18:10: 


White Blood Count 22.0H, Red Blood Count 2.47L, Hemoglobin 7.8L, Hematocrit 

25.6L, Mean Corpuscular Volume 104H, Mean Corpuscular Hemoglobin 31.8H, Mean 

Corpuscular Hemoglobin Concent 30.6L, Red Cell Distribution Width 13.7, 

Platelet Count 109L, Mean Platelet Volume 8.1, Neutrophils (%) (Auto) , 

Lymphocytes (%) (Auto) , Monocytes (%) (Auto) , Eosinophils (%) (Auto) , 

Basophils (%) (Auto) , Differential Total Cells Counted 100, Neutrophils % (

Manual) 83H, Lymphocytes % (Manual) 13L, Monocytes % (Manual) 1, Eosinophils % (

Manual) 1, Basophils % (Manual) 0, Band Neutrophils 2, Platelet Estimate 

DecreasedL, Platelet Morphology Normal, Hypochromasia 1+, Macrocytosis 1+


8/23/20 07:45: 


White Blood Count 20.7H, Red Blood Count 2.56L, Hemoglobin 8.0L, Hematocrit 

26.0L, Mean Corpuscular Volume 101H, Mean Corpuscular Hemoglobin 31.4H, Mean 

Corpuscular Hemoglobin Concent 30.9L, Red Cell Distribution Width 12.7, 

Platelet Count 117L, Mean Platelet Volume 7.8, Neutrophils (%) (Auto) , 

Lymphocytes (%) (Auto) , Monocytes (%) (Auto) , Eosinophils (%) (Auto) , 

Basophils (%) (Auto) , Differential Total Cells Counted 100, Neutrophils % (

Manual) 85H, Lymphocytes % (Manual) 10L, Monocytes % (Manual) 1, Eosinophils % (

Manual) 4H, Basophils % (Manual) 0, Band Neutrophils 0, Platelet Estimate 

DecreasedL, Platelet Morphology Normal, Hypochromasia 1+, Macrocytosis 1+, 

Erythrocyte Sedimentation Rate 131H, Sodium Level 155#H, Potassium Level 3.3L, 

Chloride Level 119H, Carbon Dioxide Level 24, Anion Gap 13, Blood Urea Nitrogen 

74H, Creatinine 2.4H, Estimat Glomerular Filtration Rate 19.8, Glucose Level 

154H, Hemoglobin A1c 6.4H, Uric Acid 7.7H, Calcium Level 8.2L, Phosphorus Level 

4.5, Magnesium Level 2.9H, Iron Level 29L, Total Iron Binding Capacity 180L, 

Percent Iron Saturation 16, Unsaturated Iron Binding 151, Ferritin 327, Total 

Bilirubin 0.3, Gamma Glutamyl Transpeptidase 19, Aspartate Amino Transf (AST/

SGOT) 25, Alanine Aminotransferase (ALT/SGPT) 26, Alkaline Phosphatase 89, 

Lactate Dehydrogenase 347H, C-Reactive Protein, Quantitative 7.6H, Pro-B-Type 

Natriuretic Peptide 4633H, Total Protein 5.5L, Albumin 2.0L, Globulin 3.5, 

Albumin/Globulin Ratio 0.6L, Triglycerides Level 158H, Cholesterol Level 114, 

LDL Cholesterol 72, HDL Cholesterol 21L, Cholesterol/HDL Ratio 5.4H, Vitamin 

B12 Level 478, Folate 12.9, Random Vancomycin Level 37.2


Height (Feet):  5


Height (Inches):  2.00


Weight (Pounds):  123


General Appearance:  no apparent distress, lethargic


Cardiovascular:  tachycardia - Heart rate 80s and 90s


Respiratory/Chest:  decreased breath sounds


Abdomen:  soft, distended











Derik Dos Santos MD Aug 23, 2020 15:22

## 2020-08-23 NOTE — INFECTIOUS DISEASES PROG NOTE
Assessment/Plan


69yo F with:





Sepsis


Febrile to 101.4


Leukocytosis to 31, improving 


JASWINDER to 5.1m, improving 


Hypoxic resp failure, on NRB mask


? VRE UTI


Hx of COVID-19 1 mo ago ( doubt recurrent COVID-19) 


   8/21 BCx p


   8/21 UA+, UCx p


   8/21 CXR: Left retrocardiac mild atelectasis versus infiltrate.


   8/21 COVID rapid neg repeat COVID-19 +





BL arm rash, appears evolving, now small pustules with dry/crusting, querry 

resolving Shingles? Though odd to have on both arms


   8/21 HSV & VZV PCR ordered





Plan:


Cont empiric cefepime # 3 and Change vanco #3 to Zyvox (2/2  JASWINDER and VRE)


Avoid acyclovir for possible Shingles for now given crusting of rash means that 

if Shingles it's resolving on its own and given severe JASWINDER want to avoid 

nephrotoxic acyclovir


Send HSV & VZV PCR swab from unroofed pustule, explained to MARTIN Hernandez how to 

obtain


F/u BCx, UCx


Trend leukocytosis


Trend JASWINDER


Trend resp status





Monitor CBC/BMP


Trend BUE rash


CT A/P ( oral Contrast ) 


COVID isolation for now





Subjective


Allergies:  


Coded Allergies:  


     AMPICILLIN (Verified  Allergy, Unknown, hives, 8/21/20)


     PENICILLINS (Verified  Allergy, Unknown, hives, 8/21/20)


     TETRACYCLINES (Verified  Allergy, Unknown, hives, 8/21/20)


fever


on NRB FM





Objective





Last 24 Hour Vital Signs








  Date Time  Temp Pulse Resp B/P (MAP) Pulse Ox O2 Delivery O2 Flow Rate FiO2


 


8/23/20 08:00 99.0 87 20 103/81 (88) 98   


 


8/23/20 07:50 99.0 87 20 103/81 (88) 98   


 


8/23/20 04:00  100      


 


8/23/20 04:00 99.0 102 24 111/57 (75) 100   


 


8/23/20 00:00 99.1 99 24 156/68 (97) 100   


 


8/23/20 00:00  82      


 


8/22/20 21:00      Non-Rebreather 15.0 


 


8/22/20 20:00 98.1 78 24 108/50 (69) 100   


 


8/22/20 20:00  77      


 


8/22/20 18:35 99.3       


 


8/22/20 16:55 99.3       


 


8/22/20 16:00  77      


 


8/22/20 16:00 101.3 62 20 100/52 (68) 98   


 


8/22/20 16:00 97.9 67 20 96/54 (68) 100   


 


8/22/20 12:00  79      


 


8/22/20 12:00 100.1 62 20 100/47 (64) 100   


 


8/22/20 12:00 97.9 67 20 96/54 (68) 100   








Height (Feet):  5


Height (Inches):  2.00


Weight (Pounds):  123


HEENT:  atraumatic


Respiratory/Chest:  other - mild Rsp distress


Abdomen:  no organomegaly





Microbiology








 Date/Time


Source Procedure


Growth Status


 


 


 8/21/20 01:55


Blood Blood Culture - Preliminary


NO GROWTH AFTER 48 HOURS Resulted


 


 8/21/20 01:40


Blood Blood Culture - Preliminary


NO GROWTH AFTER 48 HOURS Resulted


 


 8/22/20 20:30


Nasopharynx SARS-CoV-2 RdRp Gene Assay - Final Complete





 8/22/20 02:00


Sputum Expectorated Gram Stain - Final Resulted


 


 8/22/20 02:00


Sputum Expectorated Sputum Culture - Preliminary Resulted


 


 8/21/20 01:40


Nasopharynx SARS-CoV-2 RdRp Gene Assay - Final Complete


 


 8/21/20 01:53


Urine,Clean Catch Urine Culture - Final


Enterococcus Faecalis - Vre Complete











Laboratory Tests








Test


  8/22/20


18:10 8/23/20


07:45


 


White Blood Count


  22.0 K/UL


(4.8-10.8)  H 20.7 K/UL


(4.8-10.8)  H


 


Red Blood Count


  2.47 M/UL


(4.20-5.40)  L 2.56 M/UL


(4.20-5.40)  L


 


Hemoglobin


  7.8 G/DL


(12.0-16.0)  L 8.0 G/DL


(12.0-16.0)  L


 


Hematocrit


  25.6 %


(37.0-47.0)  L 26.0 %


(37.0-47.0)  L


 


Mean Corpuscular Volume


  104 FL (80-99)


H 101 FL (80-99)


H


 


Mean Corpuscular Hemoglobin


  31.8 PG


(27.0-31.0)  H 31.4 PG


(27.0-31.0)  H


 


Mean Corpuscular Hemoglobin


Concent 30.6 G/DL


(32.0-36.0)  L 30.9 G/DL


(32.0-36.0)  L


 


Red Cell Distribution Width


  13.7 %


(11.6-14.8) 12.7 %


(11.6-14.8)


 


Platelet Count


  109 K/UL


(150-450)  L 117 K/UL


(150-450)  L


 


Mean Platelet Volume


  8.1 FL


(6.5-10.1) 7.8 FL


(6.5-10.1)


 


Neutrophils (%) (Auto)


  % (45.0-75.0)


  % (45.0-75.0)


 


 


Lymphocytes (%) (Auto)


  % (20.0-45.0)


  % (20.0-45.0)


 


 


Monocytes (%) (Auto)  % (1.0-10.0)    % (1.0-10.0)  


 


Eosinophils (%) (Auto)  % (0.0-3.0)    % (0.0-3.0)  


 


Basophils (%) (Auto)  % (0.0-2.0)    % (0.0-2.0)  


 


Differential Total Cells


Counted 100  


  


 


 


Neutrophils % (Manual) 83 % (45-75)  H Pending  


 


Lymphocytes % (Manual) 13 % (20-45)  L Pending  


 


Monocytes % (Manual) 1 % (1-10)   


 


Eosinophils % (Manual) 1 % (0-3)   


 


Basophils % (Manual) 0 % (0-2)   


 


Band Neutrophils 2 % (0-8)   


 


Platelet Estimate Decreased  L Pending  


 


Platelet Morphology Normal   Pending  


 


Hypochromasia 1+   


 


Macrocytosis 1+   


 


Erythrocyte Sedimentation Rate


  


  131 MM/HR


(0-30)  H


 


Sodium Level


  


  155 MMOL/L


(136-145)  #H


 


Potassium Level


  


  3.3 MMOL/L


(3.5-5.1)  L


 


Chloride Level


  


  119 MMOL/L


()  H


 


Carbon Dioxide Level


  


  24 MMOL/L


(21-32)


 


Anion Gap


  


  13 mmol/L


(5-15)


 


Blood Urea Nitrogen


  


  74 mg/dL


(7-18)  H


 


Creatinine


  


  2.4 MG/DL


(0.55-1.30)  H


 


Estimat Glomerular Filtration


Rate 


  19.8 mL/min


(>60)


 


Glucose Level


  


  154 MG/DL


()  H


 


Hemoglobin A1c


  


  6.4 %


(4.3-6.0)  H


 


Uric Acid


  


  7.7 MG/DL


(2.6-7.2)  H


 


Calcium Level


  


  8.2 MG/DL


(8.5-10.1)  L


 


Phosphorus Level


  


  4.5 MG/DL


(2.5-4.9)


 


Magnesium Level


  


  2.9 MG/DL


(1.8-2.4)  H


 


Iron Level


  


  29 ug/dL


()  L


 


Total Iron Binding Capacity


  


  180 ug/dL


(250-450)  L


 


Percent Iron Saturation  16 % (15-50)  


 


Unsaturated Iron Binding


  


  151 ug/dL


(112-346)


 


Ferritin


  


  327 NG/ML


(8-388)


 


Total Bilirubin


  


  0.3 MG/DL


(0.2-1.0)


 


Gamma Glutamyl Transpeptidase  19 U/L (5-85)  


 


Aspartate Amino Transf


(AST/SGOT) 


  25 U/L (15-37)


 


 


Alanine Aminotransferase


(ALT/SGPT) 


  26 U/L (12-78)


 


 


Alkaline Phosphatase


  


  89 U/L


()


 


Lactate Dehydrogenase


  


  347 U/L


()  H


 


C-Reactive Protein,


Quantitative 


  7.6 mg/dL


(0.00-0.90)  H


 


Pro-B-Type Natriuretic Peptide


  


  4633 pg/mL


(0-125)  H


 


Total Protein


  


  5.5 G/DL


(6.4-8.2)  L


 


Albumin


  


  2.0 G/DL


(3.4-5.0)  L


 


Globulin  3.5 g/dL  


 


Albumin/Globulin Ratio


  


  0.6 (1.0-2.7)


L


 


Triglycerides Level


  


  158 MG/DL


()  H


 


Cholesterol Level


  


  114 MG/DL (<


200)


 


LDL Cholesterol


  


  72 mg/dL


(<100)


 


HDL Cholesterol


  


  21 MG/DL


(40-60)  L


 


Cholesterol/HDL Ratio


  


  5.4 (3.3-4.4)


H


 


Vitamin B12 Level


  


  478 PG/ML


(193-986)


 


Folate


  


  12.9 NG/ML


(8.6-58.9)


 


Random Vancomycin Level  37.2 ug/mL  











Current Medications








 Medications


  (Trade)  Dose


 Ordered  Sig/Raji


 Route


 PRN Reason  Start Time


 Stop Time Status Last Admin


Dose Admin


 


 Acetaminophen


  (Tylenol)  650 mg  Q6H  PRN


 ORAL


 For Headache  8/22/20 15:45


 9/21/20 15:44  8/22/20 18:05


 


 


 Cefepime HCl 1 gm/


 Dextrose  55 ml @ 


 110 mls/hr  Q24H


 IVPB


   8/21/20 10:00


 8/28/20 09:59  8/22/20 09:03


 


 


 Dextrose  1,000 ml @ 


 150 mls/hr  Q6H40M


 IV


   8/21/20 11:30


 9/20/20 11:29  8/23/20 03:45


 


 


 Docusate Sodium


  (Colace)  100 mg  TID


 GT


   8/21/20 13:00


 9/20/20 08:59  8/23/20 08:34


 


 


 Enoxaparin Sodium


  (Lovenox)  30 mg  DAILY


 SUBQ


   8/21/20 09:00


 11/19/20 08:59  8/23/20 08:36


 


 


 Non-Formulary


 Medication


  (Non-Formulary


 Med)  1 ea  DAILY


 IV


   8/23/20 09:00


 9/22/20 08:59 UNV  


 


 


 Pantoprazole


  (Protonix)  40 mg  EVERY 12  HOURS


 IVP


   8/21/20 21:00


 9/20/20 20:59  8/23/20 08:34


 


 


 Polyethylene


 Glycol


  (Miralax)  17 gm  BEDTIME


 ORAL


   8/21/20 21:00


 9/20/20 20:59  8/21/20 21:43


 


 


 Vancomycin HCl


  (Vanco pharmacy


 to dose)  1 ea  DAILY  PRN


 MISC


 Per rx protocol  8/21/20 08:30


 9/20/20 08:29   


 

















Hiren Urbina MD Aug 23, 2020 11:33

## 2020-08-23 NOTE — PULMONOLOGY PROGRESS NOTE
Subjective


ROS Limited/Unobtainable:  No


Constitutional:  Reports: no symptoms


HEENT:  Repors: no symptoms


Respiratory:  Reports: no symptoms


Allergies:  


Coded Allergies:  


     AMPICILLIN (Verified  Allergy, Unknown, hives, 8/21/20)


     PENICILLINS (Verified  Allergy, Unknown, hives, 8/21/20)


     TETRACYCLINES (Verified  Allergy, Unknown, hives, 8/21/20)





Objective





Last 24 Hour Vital Signs








  Date Time  Temp Pulse Resp B/P (MAP) Pulse Ox O2 Delivery O2 Flow Rate FiO2


 


8/23/20 16:00 98.7 61 20 108/61 (77) 100   


 


8/23/20 12:00  86      


 


8/23/20 12:00 96.7 87 20 106/71 (83) 100   


 


8/23/20 09:00      Non-Rebreather 15.0 


 


8/23/20 08:00  83      


 


8/23/20 08:00 99.0 87 20 103/81 (88) 98   


 


8/23/20 07:50 99.0 87 20 103/81 (88) 98   


 


8/23/20 04:00  100      


 


8/23/20 04:00 99.0 102 24 111/57 (75) 100   


 


8/23/20 00:00 99.1 99 24 156/68 (97) 100   


 


8/23/20 00:00  82      


 


8/22/20 21:00      Non-Rebreather 15.0 


 


8/22/20 20:00 98.1 78 24 108/50 (69) 100   


 


8/22/20 20:00  77      


 


8/22/20 18:35 99.3       

















Intake and Output  


 


 8/22/20 8/23/20





 19:00 07:00


 


Intake Total  205 ml


 


Balance  205 ml


 


  


 


IV Total  150 ml


 


Tube Feeding  55 ml


 


# Voids 1 2


 


# Bowel Movements 1 2








General Appearance:  cachetic


HEENT:  normocephalic, atraumatic


Respiratory:  chest wall non-tender, lungs clear


Cardiovascular:  normal peripheral pulses, normal rate


Abdomen:  normal bowel sounds, soft, non tender


Genitourinary:  normal external genitalia


Skin:  no rash


Neurologic:  CNs II-XII grossly normal


Lymphatic:  no neck adenopathy





Microbiology








 Date/Time


Source Procedure


Growth Status


 


 


 8/21/20 01:55


Blood Blood Culture - Preliminary


NO GROWTH AFTER 48 HOURS Resulted


 


 8/21/20 01:40


Blood Blood Culture - Preliminary


NO GROWTH AFTER 48 HOURS Resulted


 


 8/22/20 20:30


Nasopharynx SARS-CoV-2 RdRp Gene Assay - Final Complete





 8/22/20 02:00


Sputum Expectorated Gram Stain - Final Resulted


 


 8/22/20 02:00


Sputum Expectorated Sputum Culture - Preliminary Resulted


 


 8/21/20 01:40


Nasopharynx SARS-CoV-2 RdRp Gene Assay - Final Complete


 


 8/21/20 01:53


Urine,Clean Catch Urine Culture - Final


Enterococcus Faecalis - Vre Complete








Laboratory Tests


8/22/20 18:10: 


White Blood Count 22.0H, Red Blood Count 2.47L, Hemoglobin 7.8L, Hematocrit 

25.6L, Mean Corpuscular Volume 104H, Mean Corpuscular Hemoglobin 31.8H, Mean 

Corpuscular Hemoglobin Concent 30.6L, Red Cell Distribution Width 13.7, 

Platelet Count 109L, Mean Platelet Volume 8.1, Neutrophils (%) (Auto) , 

Lymphocytes (%) (Auto) , Monocytes (%) (Auto) , Eosinophils (%) (Auto) , 

Basophils (%) (Auto) , Differential Total Cells Counted 100, Neutrophils % (

Manual) 83H, Lymphocytes % (Manual) 13L, Monocytes % (Manual) 1, Eosinophils % (

Manual) 1, Basophils % (Manual) 0, Band Neutrophils 2, Platelet Estimate 

DecreasedL, Platelet Morphology Normal, Hypochromasia 1+, Macrocytosis 1+


8/23/20 07:45: 


White Blood Count 20.7H, Red Blood Count 2.56L, Hemoglobin 8.0L, Hematocrit 

26.0L, Mean Corpuscular Volume 101H, Mean Corpuscular Hemoglobin 31.4H, Mean 

Corpuscular Hemoglobin Concent 30.9L, Red Cell Distribution Width 12.7, 

Platelet Count 117L, Mean Platelet Volume 7.8, Neutrophils (%) (Auto) , 

Lymphocytes (%) (Auto) , Monocytes (%) (Auto) , Eosinophils (%) (Auto) , 

Basophils (%) (Auto) , Differential Total Cells Counted 100, Neutrophils % (

Manual) 85H, Lymphocytes % (Manual) 10L, Monocytes % (Manual) 1, Eosinophils % (

Manual) 4H, Basophils % (Manual) 0, Band Neutrophils 0, Platelet Estimate 

DecreasedL, Platelet Morphology Normal, Hypochromasia 1+, Macrocytosis 1+, 

Erythrocyte Sedimentation Rate 131H, Sodium Level 155#H, Potassium Level 3.3L, 

Chloride Level 119H, Carbon Dioxide Level 24, Anion Gap 13, Blood Urea Nitrogen 

74H, Creatinine 2.4H, Estimat Glomerular Filtration Rate 19.8, Glucose Level 

154H, Hemoglobin A1c 6.4H, Uric Acid 7.7H, Calcium Level 8.2L, Phosphorus Level 

4.5, Magnesium Level 2.9H, Iron Level 29L, Total Iron Binding Capacity 180L, 

Percent Iron Saturation 16, Unsaturated Iron Binding 151, Ferritin 327, Total 

Bilirubin 0.3, Gamma Glutamyl Transpeptidase 19, Aspartate Amino Transf (AST/

SGOT) 25, Alanine Aminotransferase (ALT/SGPT) 26, Alkaline Phosphatase 89, 

Lactate Dehydrogenase 347H, C-Reactive Protein, Quantitative 7.6H, Pro-B-Type 

Natriuretic Peptide 4633H, Total Protein 5.5L, Albumin 2.0L, Globulin 3.5, 

Albumin/Globulin Ratio 0.6L, Triglycerides Level 158H, Cholesterol Level 114, 

LDL Cholesterol 72, HDL Cholesterol 21L, Cholesterol/HDL Ratio 5.4H, Vitamin 

B12 Level 478, Folate 12.9, Random Vancomycin Level 37.2





Current Medications








 Medications


  (Trade)  Dose


 Ordered  Sig/Raji


 Route


 PRN Reason  Start Time


 Stop Time Status Last Admin


Dose Admin


 


 Acetaminophen


  (Tylenol)  650 mg  Q6H  PRN


 ORAL


 For Headache  8/22/20 15:45


 9/21/20 15:44  8/22/20 18:05


 


 


 Cefepime HCl 1 gm/


 Dextrose  55 ml @ 


 110 mls/hr  Q24H


 IVPB


   8/21/20 10:00


 8/28/20 09:59  8/23/20 12:54


 


 


 Dextrose  1,000 ml @ 


 100 mls/hr  Q10H


 IV


   8/24/20 16:00


 9/20/20 15:59   


 


 


 Docusate Sodium


  (Colace)  100 mg  TID


 GT


   8/21/20 13:00


 9/20/20 08:59  8/23/20 17:21


 


 


 Enoxaparin Sodium


  (Lovenox)  30 mg  DAILY


 SUBQ


   8/21/20 09:00


 11/19/20 08:59  8/23/20 08:36


 


 


 Epoetin Jaswant


  (Epoetin


 Jaswant-EPBX(NON


 ESRD))  10,000 unit  MON-WED-FRI


 SUBQ


   8/24/20 21:00


 11/22/20 20:59   


 


 


 Linezolid  300 ml @ 


 300 mls/hr  Q12H


 IVPB


   8/23/20 14:00


 8/30/20 13:59  8/23/20 14:26


 


 


 Non-Formulary


 Medication


  (Non-Formulary


 Med)  1 ea  DAILY


 IV


   8/23/20 09:00


 9/22/20 08:59 UNV  


 


 


 Pantoprazole


  (Protonix)  40 mg  EVERY 12  HOURS


 IVP


   8/21/20 21:00


 9/20/20 20:59  8/23/20 08:34


 


 


 Polyethylene


 Glycol


  (Miralax)  17 gm  BEDTIME


 ORAL


   8/21/20 21:00


 9/20/20 20:59  8/21/20 21:43


 











Assessment/Plan


Problems:  


(1) Sepsis


(2) ARF (acute renal failure)


(3) Severe dehydration


(4) Hypernatremia


(5) Psychosis


Assessment/Plan


doing better


WBC lower


COVID negative X 2


pan cultures


iv fluids 


f/u electrolytes


renal studies


urine electrolytes


dvt prophylaxis











Live Brambila MD Aug 23, 2020 17:58

## 2020-08-24 VITALS — DIASTOLIC BLOOD PRESSURE: 83 MMHG | SYSTOLIC BLOOD PRESSURE: 151 MMHG

## 2020-08-24 VITALS — SYSTOLIC BLOOD PRESSURE: 133 MMHG | DIASTOLIC BLOOD PRESSURE: 67 MMHG

## 2020-08-24 VITALS — SYSTOLIC BLOOD PRESSURE: 135 MMHG | DIASTOLIC BLOOD PRESSURE: 90 MMHG

## 2020-08-24 VITALS — SYSTOLIC BLOOD PRESSURE: 156 MMHG | DIASTOLIC BLOOD PRESSURE: 77 MMHG

## 2020-08-24 VITALS — DIASTOLIC BLOOD PRESSURE: 63 MMHG | SYSTOLIC BLOOD PRESSURE: 129 MMHG

## 2020-08-24 VITALS — SYSTOLIC BLOOD PRESSURE: 123 MMHG | DIASTOLIC BLOOD PRESSURE: 62 MMHG

## 2020-08-24 LAB
ADD MANUAL DIFF: YES
ALBUMIN SERPL-MCNC: 2 G/DL (ref 3.4–5)
ALBUMIN/GLOB SERPL: 0.5 {RATIO} (ref 1–2.7)
ALP SERPL-CCNC: 77 U/L (ref 46–116)
ALT SERPL-CCNC: 24 U/L (ref 12–78)
ANION GAP SERPL CALC-SCNC: 14 MMOL/L (ref 5–15)
AST SERPL-CCNC: 22 U/L (ref 15–37)
BILIRUB SERPL-MCNC: 0.4 MG/DL (ref 0.2–1)
BUN SERPL-MCNC: 61 MG/DL (ref 7–18)
CALCIUM SERPL-MCNC: 9 MG/DL (ref 8.5–10.1)
CHLORIDE SERPL-SCNC: 116 MMOL/L (ref 98–107)
CO2 SERPL-SCNC: 24 MMOL/L (ref 21–32)
CREAT SERPL-MCNC: 2.4 MG/DL (ref 0.55–1.3)
ERYTHROCYTE [DISTWIDTH] IN BLOOD BY AUTOMATED COUNT: 12.7 % (ref 11.6–14.8)
GLOBULIN SER-MCNC: 3.9 G/DL
HCT VFR BLD CALC: 25.5 % (ref 37–47)
HGB BLD-MCNC: 8 G/DL (ref 12–16)
MCV RBC AUTO: 100 FL (ref 80–99)
PHOSPHATE SERPL-MCNC: 4.1 MG/DL (ref 2.5–4.9)
PLATELET # BLD: 118 K/UL (ref 150–450)
POTASSIUM SERPL-SCNC: 3.7 MMOL/L (ref 3.5–5.1)
RBC # BLD AUTO: 2.55 M/UL (ref 4.2–5.4)
SODIUM SERPL-SCNC: 154 MMOL/L (ref 136–145)
WBC # BLD AUTO: 19.2 K/UL (ref 4.8–10.8)

## 2020-08-24 RX ADMIN — ENOXAPARIN SODIUM SCH MG: 30 INJECTION SUBCUTANEOUS at 08:49

## 2020-08-24 RX ADMIN — DOCUSATE SODIUM SCH MG: 50 LIQUID ORAL at 13:56

## 2020-08-24 RX ADMIN — SULFAMETHOXAZOLE AND TRIMETHOPRIM SCH ML: 200; 40 SUSPENSION ORAL at 17:09

## 2020-08-24 RX ADMIN — PANTOPRAZOLE SODIUM SCH MG: 40 INJECTION, POWDER, FOR SOLUTION INTRAVENOUS at 08:47

## 2020-08-24 RX ADMIN — PANTOPRAZOLE SODIUM SCH MG: 40 INJECTION, POWDER, FOR SOLUTION INTRAVENOUS at 21:20

## 2020-08-24 RX ADMIN — DOCUSATE SODIUM SCH MG: 50 LIQUID ORAL at 08:46

## 2020-08-24 RX ADMIN — SODIUM CHLORIDE SCH MLS/HR: 0.9 INJECTION INTRAVENOUS at 11:19

## 2020-08-24 RX ADMIN — POLYETHYLENE GLYCOL 3350 SCH GM: 17 POWDER, FOR SOLUTION ORAL at 21:21

## 2020-08-24 RX ADMIN — DOCUSATE SODIUM SCH MG: 50 LIQUID ORAL at 17:10

## 2020-08-24 RX ADMIN — COLISTIMETHATE SODIUM SCH MG: 150 INJECTION, POWDER, LYOPHILIZED, FOR SOLUTION INTRAMUSCULAR; INTRAVENOUS at 22:00

## 2020-08-24 NOTE — INFECTIOUS DISEASES PROG NOTE
Assessment/Plan


69yo F with:





Sepsis


Febrile to 101.4


Leukocytosis to 31, improving 


JASWINDER to 5.1m, improving 


Hypoxic resp failure, sp NRB mask >VM


VRE UTI


Hx of COVID-19 1 mo ago ( doubt recurrent COVID-19) 


HAP


            8/22 sp cx MDR ABC (S bactrim)


   8/21 BCx NTD


   8/21 UA+, UCx >100k VRE (S Zyvox)


   8/21 CXR: Left retrocardiac mild atelectasis versus infiltrate.


   8/21 COVID rapid neg repeat COVID-19 +





BL arm rash, appears evolving, now small pustules with dry/crusting, querry 

resolving Shingles? Though odd to have on both arms


   8/21 HSV & VZV PCR ordered





Plan:


Switch empiric cefepime # 4 to bactrim and start INH colistin for MDR ABC PNA 

based on cultures


 Zyvox #2 (2/2  JASWINDER and VRE)





Avoid acyclovir for possible Shingles for now given crusting of rash means that 

if Shingles it's resolving on its own and given severe JASWINDER want to avoid 

nephrotoxic acyclovir


Send HSV & VZV PCR swab from unroofed pustule, explained to RN David how to 

obtain


       --8/23 SP IV Vancomcyin #4





F/u BCx, UCx


Trend leukocytosis


Trend JASWINDER


Trend resp status





Monitor CBC/BMP


Trend BUE rash


CT A/P ( oral Contrast ) 


COVID isolation for now 





Discussed with RN and pharmacist





Subjective


Allergies:  


Coded Allergies:  


     AMPICILLIN (Verified  Allergy, Unknown, hives, 8/21/20)


     PENICILLINS (Verified  Allergy, Unknown, hives, 8/21/20)


     TETRACYCLINES (Verified  Allergy, Unknown, hives, 8/21/20)


.6


on VM


wbc improving





Objective





Last 24 Hour Vital Signs








  Date Time  Temp Pulse Resp B/P (MAP) Pulse Ox O2 Delivery O2 Flow Rate FiO2


 


8/24/20 12:00  101      


 


8/24/20 12:00 98.8 99 20 135/90 (105) 100   


 


8/24/20 10:28      Venturi Mask 8.0 


 


8/24/20 09:18 98.8       


 


8/24/20 08:00  96      


 


8/24/20 08:00 100.0 102 18 156/77 (103) 100   


 


8/24/20 04:00  103      


 


8/24/20 04:00 98.9 100 22 129/63 (85) 98   


 


8/24/20 00:00  95      


 


8/24/20 00:00 100.6 76 22 133/67 (89) 96   


 


8/23/20 21:00      Non-Rebreather 15.0 


 


8/23/20 20:00 98.2 80 20 110/82 (91) 95   


 


8/23/20 20:00  97      


 


8/23/20 16:00  87      


 


8/23/20 16:00 98.7 61 20 108/61 (77) 100   








Height (Feet):  5


Height (Inches):  2.00


Weight (Pounds):  121


HEENT:  atraumatic


Respiratory/Chest:  other - mild Rsp distress


Abdomen:  no organomegaly





Microbiology








 Date/Time


Source Procedure


Growth Status


 


 


 8/22/20 20:30


Nasopharynx SARS-CoV-2 RdRp Gene Assay - Final Complete





 8/22/20 02:00


Sputum Expectorated Gram Stain - Final Resulted


 


 8/22/20 02:00 Sputum Culture - Preliminary


A.baumanii Complx - Mdr Resulted


 


 8/23/20 11:38


Urine,Clean Catch Urine Culture - Preliminary


NO GROWTH Resulted











Laboratory Tests








Test


  8/24/20


04:00


 


White Blood Count


  19.2 K/UL


(4.8-10.8)  H


 


Red Blood Count


  2.55 M/UL


(4.20-5.40)  L


 


Hemoglobin


  8.0 G/DL


(12.0-16.0)  L


 


Hematocrit


  25.5 %


(37.0-47.0)  L


 


Mean Corpuscular Volume


  100 FL (80-99)


H


 


Mean Corpuscular Hemoglobin


  31.6 PG


(27.0-31.0)  H


 


Mean Corpuscular Hemoglobin


Concent 31.5 G/DL


(32.0-36.0)  L


 


Red Cell Distribution Width


  12.7 %


(11.6-14.8)


 


Platelet Count


  118 K/UL


(150-450)  L


 


Mean Platelet Volume


  6.6 FL


(6.5-10.1)


 


Neutrophils (%) (Auto)


  % (45.0-75.0)


 


 


Lymphocytes (%) (Auto)


  % (20.0-45.0)


 


 


Monocytes (%) (Auto)  % (1.0-10.0)  


 


Eosinophils (%) (Auto)  % (0.0-3.0)  


 


Basophils (%) (Auto)  % (0.0-2.0)  


 


Differential Total Cells


Counted 100  


 


 


Neutrophils % (Manual) 81 % (45-75)  H


 


Lymphocytes % (Manual) 11 % (20-45)  L


 


Monocytes % (Manual) 6 % (1-10)  


 


Eosinophils % (Manual) 2 % (0-3)  


 


Basophils % (Manual) 0 % (0-2)  


 


Band Neutrophils 0 % (0-8)  


 


Platelet Estimate Decreased  L


 


Platelet Morphology Normal  


 


Hypochromasia 1+  


 


Macrocytosis 1+  


 


Sodium Level


  154 MMOL/L


(136-145)  H


 


Potassium Level


  3.7 MMOL/L


(3.5-5.1)


 


Chloride Level


  116 MMOL/L


()  H


 


Carbon Dioxide Level


  24 MMOL/L


(21-32)


 


Anion Gap


  14 mmol/L


(5-15)


 


Blood Urea Nitrogen


  61 mg/dL


(7-18)  H


 


Creatinine


  2.4 MG/DL


(0.55-1.30)  H


 


Estimat Glomerular Filtration


Rate 19.8 mL/min


(>60)


 


Glucose Level


  131 MG/DL


()  H


 


Calcium Level


  9.0 MG/DL


(8.5-10.1)


 


Phosphorus Level


  4.1 MG/DL


(2.5-4.9)


 


Magnesium Level


  2.7 MG/DL


(1.8-2.4)  H


 


Total Bilirubin


  0.4 MG/DL


(0.2-1.0)


 


Aspartate Amino Transf


(AST/SGOT) 22 U/L (15-37)


 


 


Alanine Aminotransferase


(ALT/SGPT) 24 U/L (12-78)


 


 


Alkaline Phosphatase


  77 U/L


()


 


C-Reactive Protein,


Quantitative 13.8 mg/dL


(0.00-0.90)  H


 


Pro-B-Type Natriuretic Peptide


  9342 pg/mL


(0-125)  H


 


Total Protein


  5.9 G/DL


(6.4-8.2)  L


 


Albumin


  2.0 G/DL


(3.4-5.0)  L


 


Globulin 3.9 g/dL  


 


Albumin/Globulin Ratio


  0.5 (1.0-2.7)


L











Current Medications








 Medications


  (Trade)  Dose


 Ordered  Sig/Raji


 Route


 PRN Reason  Start Time


 Stop Time Status Last Admin


Dose Admin


 


 Acetaminophen


  (Tylenol)  650 mg  Q6H  PRN


 ORAL


 For Headache  8/22/20 15:45


 9/21/20 15:44  8/24/20 08:48


 


 


 Cefepime HCl 1 gm/


 Dextrose  55 ml @ 


 110 mls/hr  Q24H


 IVPB


   8/21/20 10:00


 8/28/20 09:59  8/24/20 11:19


 


 


 Clonidine HCl


  (Catapres Tab)  0.1 mg  Q4H  PRN


 ORAL


 bp over 160 syst  8/24/20 10:15


 11/22/20 10:14   


 


 


 Dextrose  1,000 ml @ 


 75 mls/hr  [id number]


 IV


   8/24/20 10:15


 9/20/20 10:14  8/24/20 11:19


 


 


 Docusate Sodium


  (Colace)  100 mg  TID


 GT


   8/21/20 13:00


 9/20/20 08:59  8/24/20 13:56


 


 


 Enoxaparin Sodium


  (Lovenox)  30 mg  DAILY


 SUBQ


   8/21/20 09:00


 11/19/20 08:59  8/23/20 08:36


 


 


 Epoetin Jaswant


  (Epoetin


 Jaswant-EPBX(NON


 ESRD))  10,000 unit  MON-WED-FRI


 SUBQ


   8/24/20 21:00


 11/22/20 20:59   


 


 


 Linezolid  300 ml @ 


 300 mls/hr  Q12H


 IVPB


   8/23/20 14:00


 8/30/20 13:59  8/24/20 13:58


 


 


 Non-Formulary


 Medication


  (Non-Formulary


 Med)  1 ea  DAILY


 IV


   8/23/20 09:00


 9/22/20 08:59 UNV  


 


 


 Pantoprazole


  (Protonix)  40 mg  EVERY 12  HOURS


 IVP


   8/21/20 21:00


 9/20/20 20:59  8/24/20 08:47


 


 


 Polyethylene


 Glycol


  (Miralax)  17 gm  BEDTIME


 ORAL


   8/21/20 21:00


 9/20/20 20:59  8/23/20 20:49


 

















Tanisha Brownlee M.D. Aug 24, 2020 15:35

## 2020-08-24 NOTE — INTERNAL MED PROGRESS NOTE
Subjective


Date of Service:  Aug 24, 2020


Physician Name


Wing Hebert


Attending Physician


Umer Wahl MD





Current Medications








 Medications


  (Trade)  Dose


 Ordered  Sig/Raji


 Route


 PRN Reason  Start Time


 Stop Time Status Last Admin


Dose Admin


 


 Acetaminophen


  (Tylenol)  650 mg  Q6H  PRN


 ORAL


 For Headache  8/22/20 15:45


 9/21/20 15:44  8/24/20 08:48


 


 


 Cefepime HCl 1 gm/


 Dextrose  55 ml @ 


 110 mls/hr  Q24H


 IVPB


   8/21/20 10:00


 8/28/20 09:59  8/24/20 11:19


 


 


 Clonidine HCl


  (Catapres Tab)  0.1 mg  Q4H  PRN


 ORAL


 bp over 160 syst  8/24/20 10:15


 11/22/20 10:14   


 


 


 Dextrose  1,000 ml @ 


 75 mls/hr  [id number]


 IV


   8/24/20 10:15


 9/20/20 10:14  8/24/20 11:19


 


 


 Docusate Sodium


  (Colace)  100 mg  TID


 GT


   8/21/20 13:00


 9/20/20 08:59  8/24/20 08:46


 


 


 Enoxaparin Sodium


  (Lovenox)  30 mg  DAILY


 SUBQ


   8/21/20 09:00


 11/19/20 08:59  8/23/20 08:36


 


 


 Epoetin Jaswant


  (Epoetin


 Jaswant-EPBX(NON


 ESRD))  10,000 unit  MON-WED-FRI


 SUBQ


   8/24/20 21:00


 11/22/20 20:59   


 


 


 Linezolid  300 ml @ 


 300 mls/hr  Q12H


 IVPB


   8/23/20 14:00


 8/30/20 13:59  8/24/20 01:47


 


 


 Non-Formulary


 Medication


  (Non-Formulary


 Med)  1 ea  DAILY


 IV


   8/23/20 09:00


 9/22/20 08:59 UNV  


 


 


 Pantoprazole


  (Protonix)  40 mg  EVERY 12  HOURS


 IVP


   8/21/20 21:00


 9/20/20 20:59  8/24/20 08:47


 


 


 Polyethylene


 Glycol


  (Miralax)  17 gm  BEDTIME


 ORAL


   8/21/20 21:00


 9/20/20 20:59  8/23/20 20:49


 








Allergies:  


Coded Allergies:  


     AMPICILLIN (Verified  Allergy, Unknown, hives, 8/21/20)


     PENICILLINS (Verified  Allergy, Unknown, hives, 8/21/20)


     TETRACYCLINES (Verified  Allergy, Unknown, hives, 8/21/20)


ROS Limited/Unobtainable:  No


Constitutional:  Reports: no symptoms


HEENT:  Reports: no symptoms


Cardiovascular:  Reports: no symptoms


Respiratory:  Reports: shortness of breath


Gastrointestinal/Abdominal:  Reports: no symptoms


Genitourinary:  Reports: no symptoms


Neurologic/Psychiatric:  Reports: no symptoms


Subjective


71 YO F with previous COVID 19 pos admitted with shortness of breath.  Now 

pneumonia and sepsis.  Cover for Int Med-DR Wahl.  Low grade fever





Objective





Last Vital Signs








  Date Time  Temp Pulse Resp B/P (MAP) Pulse Ox O2 Delivery O2 Flow Rate FiO2


 


8/24/20 12:00  101      


 


8/24/20 12:00 98.8  20 135/90 (105) 100   


 


8/24/20 10:28      Venturi Mask 8.0 











Laboratory Tests








Test


  8/24/20


04:00


 


White Blood Count


  19.2 K/UL


(4.8-10.8)  H


 


Red Blood Count


  2.55 M/UL


(4.20-5.40)  L


 


Hemoglobin


  8.0 G/DL


(12.0-16.0)  L


 


Hematocrit


  25.5 %


(37.0-47.0)  L


 


Mean Corpuscular Volume


  100 FL (80-99)


H


 


Mean Corpuscular Hemoglobin


  31.6 PG


(27.0-31.0)  H


 


Mean Corpuscular Hemoglobin


Concent 31.5 G/DL


(32.0-36.0)  L


 


Red Cell Distribution Width


  12.7 %


(11.6-14.8)


 


Platelet Count


  118 K/UL


(150-450)  L


 


Mean Platelet Volume


  6.6 FL


(6.5-10.1)


 


Neutrophils (%) (Auto)


  % (45.0-75.0)


 


 


Lymphocytes (%) (Auto)


  % (20.0-45.0)


 


 


Monocytes (%) (Auto)  % (1.0-10.0)  


 


Eosinophils (%) (Auto)  % (0.0-3.0)  


 


Basophils (%) (Auto)  % (0.0-2.0)  


 


Differential Total Cells


Counted 100  


 


 


Neutrophils % (Manual) 81 % (45-75)  H


 


Lymphocytes % (Manual) 11 % (20-45)  L


 


Monocytes % (Manual) 6 % (1-10)  


 


Eosinophils % (Manual) 2 % (0-3)  


 


Basophils % (Manual) 0 % (0-2)  


 


Band Neutrophils 0 % (0-8)  


 


Platelet Estimate Decreased  L


 


Platelet Morphology Normal  


 


Hypochromasia 1+  


 


Macrocytosis 1+  


 


Sodium Level


  154 MMOL/L


(136-145)  H


 


Potassium Level


  3.7 MMOL/L


(3.5-5.1)


 


Chloride Level


  116 MMOL/L


()  H


 


Carbon Dioxide Level


  24 MMOL/L


(21-32)


 


Anion Gap


  14 mmol/L


(5-15)


 


Blood Urea Nitrogen


  61 mg/dL


(7-18)  H


 


Creatinine


  2.4 MG/DL


(0.55-1.30)  H


 


Estimat Glomerular Filtration


Rate 19.8 mL/min


(>60)


 


Glucose Level


  131 MG/DL


()  H


 


Calcium Level


  9.0 MG/DL


(8.5-10.1)


 


Phosphorus Level


  4.1 MG/DL


(2.5-4.9)


 


Magnesium Level


  2.7 MG/DL


(1.8-2.4)  H


 


Total Bilirubin


  0.4 MG/DL


(0.2-1.0)


 


Aspartate Amino Transf


(AST/SGOT) 22 U/L (15-37)


 


 


Alanine Aminotransferase


(ALT/SGPT) 24 U/L (12-78)


 


 


Alkaline Phosphatase


  77 U/L


()


 


C-Reactive Protein,


Quantitative 13.8 mg/dL


(0.00-0.90)  H


 


Pro-B-Type Natriuretic Peptide


  9342 pg/mL


(0-125)  H


 


Total Protein


  5.9 G/DL


(6.4-8.2)  L


 


Albumin


  2.0 G/DL


(3.4-5.0)  L


 


Globulin 3.9 g/dL  


 


Albumin/Globulin Ratio


  0.5 (1.0-2.7)


L











Microbiology








 Date/Time


Source Procedure


Growth Status


 


 


 8/22/20 20:30


Nasopharynx SARS-CoV-2 RdRp Gene Assay - Final Complete





 8/22/20 02:00


Sputum Expectorated Gram Stain - Final Resulted


 


 8/22/20 02:00 Sputum Culture - Preliminary


A.baumanii Complx - Mdr Resulted


 


 8/23/20 11:38


Urine,Clean Catch Urine Culture - Preliminary


NO GROWTH Resulted

















Intake and Output  


 


 8/23/20 8/24/20





 19:00 07:00


 


Intake Total 2035 ml 805 ml


 


Output Total 2200 ml 2000 ml


 


Balance -165 ml -1195 ml


 


  


 


Intake Free Water  200 ml


 


IV Total 1540 ml 


 


Tube Feeding 495 ml 605 ml


 


Output Urine Total 2200 ml 2000 ml


 


# Bowel Movements 1 2








Objective


General Appearance:  WD/WN, no apparent distress, lethargic


EENT:  PERRL/EOMI, normal ENT inspection


Neck:  non-tender, normal alignment, supple, normal inspection


Cardiovascular:  normal peripheral pulses, normal rate, regular rhythm, no 

gallop/murmur, no JVD


Respiratory/Chest:  chest wall non-tender, respiratory distress, crackles/rales

, rhonchi - bilaterally, expiratory wheezing


Abdomen:  normal bowel sounds, non tender, soft, no organomegaly, no mass


Extremities:  normal range of motion, non-tender


Neurologic:  CNs II-XII grossly normal, no motor/sensory deficits


Skin:  normal pigmentation, warm/dry





Assessment/Plan


Problem List:  


(1) UTI (urinary tract infection)


Assessment & Plan:  Strep; gamma hemolytic.  Continue vanco and cefepime per ID





(2) Sepsis


Assessment & Plan:  ID= Dr Penn.  Continue vanco and cefepime





(3) Hypernatremia


(4) Severe dehydration


(5) ARF (acute renal failure)


Assessment & Plan:  Nephrology = Wing Orozco MD Aug 24, 2020 13:25

## 2020-08-24 NOTE — NEPHROLOGY PROGRESS NOTE
Assessment/Plan


Problem List:  


(1) ARF (acute renal failure)


Assessment:  Underlying CKD





(2) Sepsis


(3) UTI (urinary tract infection)


(4) Severe dehydration


(5) Hypernatremia


(6) Acute urinary retention


(7) Anemia


Assessment





Acute renal failure


Possible underlying chronic renal insufficiency


Severe dehydration


Hypotension


Sepsis, UTI


History of psychiatric disease


Elevated troponin I


Patient full code


Plan


August 24: Patient now on isolation for COVID-19.  Serum creatinine is 

plateauing.  Will decrease the IV fluid to 75 cc an hour.  Continue management 

per ID.  Will monitor electrolytes and renal parameters.


August 23: Discussed with MARTIN Carter.  Patient needs a Mitchell catheter.  Accurate 

intake and output.  Decrease  cc D5W an hour.  1 dose of IV Venofer and 

then subcu Epogen ordered for anemia.  Continue to monitor renal parameters


August 22: Renal parameters improving.  Continue D5W IV fluid.  Potassium 

supplement IV given.  Continue per consultants.





Previously:


Fluid challenge


Monitor renal parameters and electrolytes


Monitor intake and output


Mitchell catheter


Antibiotics


Avoid nephrotoxic's


Chest x-ray





Kidney ultrasound findings: 


Right kidney measures 8.9 cm in length. Left kidney measures 9 cm in


length. Both kidneys demonstrate markedly increased echogenicity. No 

hydronephrosis. 


There are bilateral renal cysts. Normal inferior vena cava. Bladder is empty,


contains a Mitchell catheter. 


 


Incidentally noted are gallstones.


 


Impression: Markedly echogenic kidneys, consistent with medical renal disease


Negative for hydronephrosis


Empty bladder with a Mitchell catheter


Incidental finding of cholelithiasis.





Subjective


ROS Limited/Unobtainable:  Yes





Objective


Objective





Last 24 Hour Vital Signs








  Date Time  Temp Pulse Resp B/P (MAP) Pulse Ox O2 Delivery O2 Flow Rate FiO2


 


8/24/20 08:00 100.0 102 18 156/77 (103) 100   


 


8/24/20 04:00  103      


 


8/24/20 04:00 98.9 100 22 129/63 (85) 98   


 


8/24/20 00:38 98.2       


 


8/24/20 00:00  95      


 


8/24/20 00:00 100.6 76 22 133/67 (89) 96   


 


8/23/20 21:00      Non-Rebreather 15.0 


 


8/23/20 20:00 98.2 80 20 110/82 (91) 95   


 


8/23/20 20:00  97      


 


8/23/20 16:00  87      


 


8/23/20 16:00 98.7 61 20 108/61 (77) 100   


 


8/23/20 12:00  86      


 


8/23/20 12:00 96.7 87 20 106/71 (83) 100   

















Intake and Output  


 


 8/23/20 8/24/20





 19:00 07:00


 


Intake Total 2035 ml 805 ml


 


Output Total 2200 ml 2000 ml


 


Balance -165 ml -1195 ml


 


  


 


Intake Free Water  200 ml


 


IV Total 1540 ml 


 


Tube Feeding 495 ml 605 ml


 


Output Urine Total 2200 ml 2000 ml


 


# Bowel Movements 1 2








Laboratory Tests


8/24/20 04:00: 


White Blood Count 19.2H, Red Blood Count 2.55L, Hemoglobin 8.0L, Hematocrit 

25.5L, Mean Corpuscular Volume 100H, Mean Corpuscular Hemoglobin 31.6H, Mean 

Corpuscular Hemoglobin Concent 31.5L, Red Cell Distribution Width 12.7, 

Platelet Count 118L, Mean Platelet Volume 6.6, Neutrophils (%) (Auto) , 

Lymphocytes (%) (Auto) , Monocytes (%) (Auto) , Eosinophils (%) (Auto) , 

Basophils (%) (Auto) , Differential Total Cells Counted 100, Neutrophils % (

Manual) 81H, Lymphocytes % (Manual) 11L, Monocytes % (Manual) 6, Eosinophils % (

Manual) 2, Basophils % (Manual) 0, Band Neutrophils 0, Platelet Estimate 

DecreasedL, Platelet Morphology Normal, Hypochromasia 1+, Macrocytosis 1+, 

Sodium Level 154H, Potassium Level 3.7, Chloride Level 116H, Carbon Dioxide 

Level 24, Anion Gap 14, Blood Urea Nitrogen 61H, Creatinine 2.4H, Estimat 

Glomerular Filtration Rate 19.8, Glucose Level 131H, Calcium Level 9.0, 

Phosphorus Level 4.1, Magnesium Level 2.7H, Total Bilirubin 0.4, Aspartate 

Amino Transf (AST/SGOT) 22, Alanine Aminotransferase (ALT/SGPT) 24, Alkaline 

Phosphatase 77, C-Reactive Protein, Quantitative 13.8H, Pro-B-Type Natriuretic 

Peptide 9342H, Total Protein 5.9L, Albumin 2.0L, Globulin 3.9, Albumin/Globulin 

Ratio 0.5L


Height (Feet):  5


Height (Inches):  2.00


Weight (Pounds):  121


General Appearance:  no apparent distress, lethargic


Cardiovascular:  tachycardia


Respiratory/Chest:  decreased breath sounds


Abdomen:  distended











Derik Dos Santos MD Aug 24, 2020 10:08

## 2020-08-24 NOTE — PULMONOLOGY PROGRESS NOTE
Subjective


ROS Limited/Unobtainable:  Yes


Constitutional:  Reports: no symptoms


HEENT:  Repors: no symptoms


Respiratory:  Reports: no symptoms


Allergies:  


Coded Allergies:  


     AMPICILLIN (Verified  Allergy, Unknown, hives, 8/21/20)


     PENICILLINS (Verified  Allergy, Unknown, hives, 8/21/20)


     TETRACYCLINES (Verified  Allergy, Unknown, hives, 8/21/20)





Objective





Last 24 Hour Vital Signs








  Date Time  Temp Pulse Resp B/P (MAP) Pulse Ox O2 Delivery O2 Flow Rate FiO2


 


8/24/20 10:28      Venturi Mask 8.0 


 


8/24/20 08:00  96      


 


8/24/20 08:00 100.0 102 18 156/77 (103) 100   


 


8/24/20 04:00  103      


 


8/24/20 04:00 98.9 100 22 129/63 (85) 98   


 


8/24/20 00:38 98.2       


 


8/24/20 00:00  95      


 


8/24/20 00:00 100.6 76 22 133/67 (89) 96   


 


8/23/20 21:00      Non-Rebreather 15.0 


 


8/23/20 20:00 98.2 80 20 110/82 (91) 95   


 


8/23/20 20:00  97      


 


8/23/20 16:00  87      


 


8/23/20 16:00 98.7 61 20 108/61 (77) 100   


 


8/23/20 12:00  86      


 


8/23/20 12:00 96.7 87 20 106/71 (83) 100   

















Intake and Output  


 


 8/23/20 8/24/20





 19:00 07:00


 


Intake Total 2035 ml 805 ml


 


Output Total 2200 ml 2000 ml


 


Balance -165 ml -1195 ml


 


  


 


Intake Free Water  200 ml


 


IV Total 1540 ml 


 


Tube Feeding 495 ml 605 ml


 


Output Urine Total 2200 ml 2000 ml


 


# Bowel Movements 1 2








General Appearance:  cachetic


HEENT:  normocephalic, atraumatic


Respiratory:  chest wall non-tender, lungs clear


Cardiovascular:  normal peripheral pulses, normal rate


Abdomen:  normal bowel sounds, soft, non tender


Genitourinary:  normal external genitalia


Skin:  no rash


Neurologic:  CNs II-XII grossly normal


Lymphatic:  no neck adenopathy





Microbiology








 Date/Time


Source Procedure


Growth Status


 


 


 8/22/20 20:30


Nasopharynx SARS-CoV-2 RdRp Gene Assay - Final Complete





 8/22/20 02:00


Sputum Expectorated Gram Stain - Final Resulted


 


 8/22/20 02:00 Sputum Culture - Preliminary


A.baumanii Complx - Mdr Resulted


 


 8/23/20 11:38


Urine,Clean Catch Urine Culture - Preliminary


NO GROWTH Resulted








Laboratory Tests


8/24/20 04:00: 


White Blood Count 19.2H, Red Blood Count 2.55L, Hemoglobin 8.0L, Hematocrit 

25.5L, Mean Corpuscular Volume 100H, Mean Corpuscular Hemoglobin 31.6H, Mean 

Corpuscular Hemoglobin Concent 31.5L, Red Cell Distribution Width 12.7, 

Platelet Count 118L, Mean Platelet Volume 6.6, Neutrophils (%) (Auto) , 

Lymphocytes (%) (Auto) , Monocytes (%) (Auto) , Eosinophils (%) (Auto) , 

Basophils (%) (Auto) , Differential Total Cells Counted 100, Neutrophils % (

Manual) 81H, Lymphocytes % (Manual) 11L, Monocytes % (Manual) 6, Eosinophils % (

Manual) 2, Basophils % (Manual) 0, Band Neutrophils 0, Platelet Estimate 

DecreasedL, Platelet Morphology Normal, Hypochromasia 1+, Macrocytosis 1+, 

Sodium Level 154H, Potassium Level 3.7, Chloride Level 116H, Carbon Dioxide 

Level 24, Anion Gap 14, Blood Urea Nitrogen 61H, Creatinine 2.4H, Estimat 

Glomerular Filtration Rate 19.8, Glucose Level 131H, Calcium Level 9.0, 

Phosphorus Level 4.1, Magnesium Level 2.7H, Total Bilirubin 0.4, Aspartate 

Amino Transf (AST/SGOT) 22, Alanine Aminotransferase (ALT/SGPT) 24, Alkaline 

Phosphatase 77, C-Reactive Protein, Quantitative 13.8H, Pro-B-Type Natriuretic 

Peptide 9342H, Total Protein 5.9L, Albumin 2.0L, Globulin 3.9, Albumin/Globulin 

Ratio 0.5L





Current Medications








 Medications


  (Trade)  Dose


 Ordered  Sig/Raji


 Route


 PRN Reason  Start Time


 Stop Time Status Last Admin


Dose Admin


 


 Acetaminophen


  (Tylenol)  650 mg  Q6H  PRN


 ORAL


 For Headache  8/22/20 15:45


 9/21/20 15:44  8/24/20 08:48


 


 


 Cefepime HCl 1 gm/


 Dextrose  55 ml @ 


 110 mls/hr  Q24H


 IVPB


   8/21/20 10:00


 8/28/20 09:59  8/24/20 11:19


 


 


 Clonidine HCl


  (Catapres Tab)  0.1 mg  Q4H  PRN


 ORAL


 bp over 160 syst  8/24/20 10:15


 11/22/20 10:14   


 


 


 Dextrose  1,000 ml @ 


 75 mls/hr  [id number]


 IV


   8/24/20 10:15


 9/20/20 10:14  8/24/20 11:19


 


 


 Docusate Sodium


  (Colace)  100 mg  TID


 GT


   8/21/20 13:00


 9/20/20 08:59  8/24/20 08:46


 


 


 Enoxaparin Sodium


  (Lovenox)  30 mg  DAILY


 SUBQ


   8/21/20 09:00


 11/19/20 08:59  8/23/20 08:36


 


 


 Epoetin Jaswant


  (Epoetin


 Jaswant-EPBX(NON


 ESRD))  10,000 unit  MON-WED-FRI


 SUBQ


   8/24/20 21:00


 11/22/20 20:59   


 


 


 Linezolid  300 ml @ 


 300 mls/hr  Q12H


 IVPB


   8/23/20 14:00


 8/30/20 13:59  8/24/20 01:47


 


 


 Non-Formulary


 Medication


  (Non-Formulary


 Med)  1 ea  DAILY


 IV


   8/23/20 09:00


 9/22/20 08:59 UNV  


 


 


 Pantoprazole


  (Protonix)  40 mg  EVERY 12  HOURS


 IVP


   8/21/20 21:00


 9/20/20 20:59  8/24/20 08:47


 


 


 Polyethylene


 Glycol


  (Miralax)  17 gm  BEDTIME


 ORAL


   8/21/20 21:00


 9/20/20 20:59  8/23/20 20:49


 











Assessment/Plan


Problems:  


(1) Sepsis


(2) ARF (acute renal failure)


(3) Severe dehydration


(4) Hypernatremia


(5) Psychosis


Assessment/Plan


Low grade fever


doing better


WBC lower


COVID negative X 2


pan cultures


iv fluids 


f/u electrolytes


renal studies


urine electrolytes


dvt prophylaxis











Live Brambila MD Aug 24, 2020 11:57

## 2020-08-25 VITALS — SYSTOLIC BLOOD PRESSURE: 145 MMHG | DIASTOLIC BLOOD PRESSURE: 31 MMHG

## 2020-08-25 VITALS — DIASTOLIC BLOOD PRESSURE: 50 MMHG | SYSTOLIC BLOOD PRESSURE: 119 MMHG

## 2020-08-25 VITALS — SYSTOLIC BLOOD PRESSURE: 141 MMHG | DIASTOLIC BLOOD PRESSURE: 97 MMHG

## 2020-08-25 VITALS — SYSTOLIC BLOOD PRESSURE: 132 MMHG | DIASTOLIC BLOOD PRESSURE: 57 MMHG

## 2020-08-25 VITALS — SYSTOLIC BLOOD PRESSURE: 105 MMHG | DIASTOLIC BLOOD PRESSURE: 51 MMHG

## 2020-08-25 VITALS — DIASTOLIC BLOOD PRESSURE: 57 MMHG | SYSTOLIC BLOOD PRESSURE: 137 MMHG

## 2020-08-25 LAB
ADD MANUAL DIFF: YES
ALBUMIN SERPL-MCNC: 2 G/DL (ref 3.4–5)
ALBUMIN/GLOB SERPL: 0.5 {RATIO} (ref 1–2.7)
ALP SERPL-CCNC: 97 U/L (ref 46–116)
ALT SERPL-CCNC: 25 U/L (ref 12–78)
ANION GAP SERPL CALC-SCNC: 14 MMOL/L (ref 5–15)
AST SERPL-CCNC: 24 U/L (ref 15–37)
BILIRUB SERPL-MCNC: 0.3 MG/DL (ref 0.2–1)
BUN SERPL-MCNC: 48 MG/DL (ref 7–18)
CALCIUM SERPL-MCNC: 8.7 MG/DL (ref 8.5–10.1)
CHLORIDE SERPL-SCNC: 126 MMOL/L (ref 98–107)
CO2 SERPL-SCNC: 25 MMOL/L (ref 21–32)
CREAT SERPL-MCNC: 2.1 MG/DL (ref 0.55–1.3)
ERYTHROCYTE [DISTWIDTH] IN BLOOD BY AUTOMATED COUNT: 12.6 % (ref 11.6–14.8)
GLOBULIN SER-MCNC: 4.3 G/DL
HCT VFR BLD CALC: 26.5 % (ref 37–47)
HGB BLD-MCNC: 8.4 G/DL (ref 12–16)
MCV RBC AUTO: 99 FL (ref 80–99)
PHOSPHATE SERPL-MCNC: 3.1 MG/DL (ref 2.5–4.9)
PLATELET # BLD: 132 K/UL (ref 150–450)
POTASSIUM SERPL-SCNC: 4.1 MMOL/L (ref 3.5–5.1)
RBC # BLD AUTO: 2.68 M/UL (ref 4.2–5.4)
SODIUM SERPL-SCNC: 164 MMOL/L (ref 136–145)
WBC # BLD AUTO: 20.9 K/UL (ref 4.8–10.8)

## 2020-08-25 RX ADMIN — POLYETHYLENE GLYCOL 3350 SCH GM: 17 POWDER, FOR SOLUTION ORAL at 20:49

## 2020-08-25 RX ADMIN — SULFAMETHOXAZOLE AND TRIMETHOPRIM SCH ML: 200; 40 SUSPENSION ORAL at 09:10

## 2020-08-25 RX ADMIN — PANTOPRAZOLE SODIUM SCH MG: 40 INJECTION, POWDER, FOR SOLUTION INTRAVENOUS at 09:10

## 2020-08-25 RX ADMIN — PANTOPRAZOLE SODIUM SCH MG: 40 INJECTION, POWDER, FOR SOLUTION INTRAVENOUS at 20:52

## 2020-08-25 RX ADMIN — DOCUSATE SODIUM SCH MG: 50 LIQUID ORAL at 09:10

## 2020-08-25 RX ADMIN — ENOXAPARIN SODIUM SCH MG: 30 INJECTION SUBCUTANEOUS at 09:00

## 2020-08-25 RX ADMIN — DOCUSATE SODIUM SCH MG: 50 LIQUID ORAL at 17:11

## 2020-08-25 RX ADMIN — MINOCYCLINE HYDROCHLORIDE SCH MG: 50 CAPSULE ORAL at 20:52

## 2020-08-25 RX ADMIN — SULFAMETHOXAZOLE AND TRIMETHOPRIM SCH MLS/HR: 80; 16 INJECTION, SOLUTION, CONCENTRATE INTRAVENOUS at 20:50

## 2020-08-25 RX ADMIN — MINOCYCLINE HYDROCHLORIDE SCH MG: 50 CAPSULE ORAL at 13:00

## 2020-08-25 RX ADMIN — DOCUSATE SODIUM SCH MG: 50 LIQUID ORAL at 13:00

## 2020-08-25 RX ADMIN — COLISTIMETHATE SODIUM SCH MG: 150 INJECTION, POWDER, LYOPHILIZED, FOR SOLUTION INTRAMUSCULAR; INTRAVENOUS at 10:00

## 2020-08-25 NOTE — INFECTIOUS DISEASES PROG NOTE
Assessment/Plan


69yo F with:





Sepsis


Fever; ongoing


Leukocytosis to 31, overall improved, but persistent


JASWINDER to 5.1m, improving 


Hypoxic resp failure, sp NRB mask >VM


VRE UTI


Hx of COVID-19 1 mo ago ( doubt recurrent COVID-19) 


HAP


            8/22 sp cx MDR ABC (S bactrim; I Colistin, Polymixin B, Minocycline)


   8/21 BCx NTD


   8/21 UA+, UCx >100k VRE (S Zyvox)


   8/21 CXR: Left retrocardiac mild atelectasis versus infiltrate.


   8/21 COVID rapid neg repeat COVID-19 +





BL arm rash, appears evolving, now small pustules with dry/crusting, querry 

resolving Shingles? Though odd to have on both arms


   8/21 HSV & VZV PCR ordered





Plan:


Continue bactrim #2 (switch to IV) and add PO Minocycline 200mg bid (high dose) 

MDR ABC PNA based on cultures


 Zyvox #3/5 for VRE UTI


      -monitor Platelets, cr





Avoid acyclovir for possible Shingles for now given crusting of rash means that 

if Shingles it's resolving on its own and given severe JASWINDER want to avoid 

nephrotoxic acyclovir


Send HSV & VZV PCR swab from unroofed pustule, explained to MARTIN Hernandez how to 

obtain


       --8/24 SP Cefepime #4


       --8/23 SP IV Vancomcyin #4





F/u cx


Monitor CBC/BMP


Trend BUE rash


COVID isolation for now 





Discussed with RN and pharmacist





Subjective


Allergies:  


Coded Allergies:  


     AMPICILLIN (Verified  Allergy, Unknown, hives, 8/21/20)


     PENICILLINS (Verified  Allergy, Unknown, hives, 8/21/20)


     TETRACYCLINES (Verified  Allergy, Unknown, hives, 8/21/20)


.6


on VM


wbc improving





Objective





Last 24 Hour Vital Signs








  Date Time  Temp Pulse Resp B/P (MAP) Pulse Ox O2 Delivery O2 Flow Rate FiO2


 


8/25/20 09:00      Venturi Mask 8.0 


 


8/25/20 08:00 101.1 104 24 132/57 (82) 98   


 


8/25/20 08:00  115      


 


8/25/20 07:00     98 Venturi Mask 8.0 40


 


8/25/20 06:15 100.1       


 


8/25/20 04:00 100.3 105 24 105/51 (69) 96   


 


8/25/20 04:00  105      


 


8/25/20 00:00  72      


 


8/25/20 00:00 100.0 72 22 137/57 (83) 99   


 


8/24/20 21:00      Venturi Mask 8.0 


 


8/24/20 20:00 98.2 114 22 123/62 (82) 96   


 


8/24/20 20:00  114      


 


8/24/20 16:00 98.8 105 20 151/83 (105) 100   


 


8/24/20 16:00  94      


 


8/24/20 12:00  101      


 


8/24/20 12:00 98.8 99 20 135/90 (105) 100   








Height (Feet):  5


Height (Inches):  2.00


Weight (Pounds):  120


HEENT:  atraumatic


Respiratory/Chest:  other - mild Rsp distress


Abdomen:  no organomegaly





Microbiology








 Date/Time


Source Procedure


Growth Status


 


 


 8/22/20 20:30


Nasopharynx SARS-CoV-2 RdRp Gene Assay - Final Complete


 


 8/23/20 11:38


Urine,Clean Catch Urine Culture - Preliminary Resulted











Laboratory Tests








Test


  8/25/20


05:00


 


White Blood Count


  20.9 K/UL


(4.8-10.8)  H


 


Red Blood Count


  2.68 M/UL


(4.20-5.40)  L


 


Hemoglobin


  8.4 G/DL


(12.0-16.0)  L


 


Hematocrit


  26.5 %


(37.0-47.0)  L


 


Mean Corpuscular Volume 99 FL (80-99)  


 


Mean Corpuscular Hemoglobin


  31.3 PG


(27.0-31.0)  H


 


Mean Corpuscular Hemoglobin


Concent 31.6 G/DL


(32.0-36.0)  L


 


Red Cell Distribution Width


  12.6 %


(11.6-14.8)


 


Platelet Count


  132 K/UL


(150-450)  L


 


Mean Platelet Volume


  8.0 FL


(6.5-10.1)


 


Neutrophils (%) (Auto)


  % (45.0-75.0)


 


 


Lymphocytes (%) (Auto)


  % (20.0-45.0)


 


 


Monocytes (%) (Auto)  % (1.0-10.0)  


 


Eosinophils (%) (Auto)  % (0.0-3.0)  


 


Basophils (%) (Auto)  % (0.0-2.0)  


 


Differential Total Cells


Counted 100  


 


 


Neutrophils % (Manual) 82 % (45-75)  H


 


Lymphocytes % (Manual) 16 % (20-45)  L


 


Monocytes % (Manual) 2 % (1-10)  


 


Eosinophils % (Manual) 0 % (0-3)  


 


Basophils % (Manual) 0 % (0-2)  


 


Band Neutrophils 0 % (0-8)  


 


Platelet Estimate Decreased  L


 


Platelet Morphology Normal  


 


Hypochromasia 2+  


 


Anisocytosis 1+  


 


Erythrocyte Sedimentation Rate


  134 MM/HR


(0-30)  H


 


Sodium Level


  164 MMOL/L


(136-145)  *H


 


Potassium Level


  4.1 MMOL/L


(3.5-5.1)


 


Chloride Level


  126 MMOL/L


()  H


 


Carbon Dioxide Level


  25 MMOL/L


(21-32)


 


Anion Gap


  14 mmol/L


(5-15)


 


Blood Urea Nitrogen


  48 mg/dL


(7-18)  H


 


Creatinine


  2.1 MG/DL


(0.55-1.30)  H


 


Estimat Glomerular Filtration


Rate 23.2 mL/min


(>60)


 


Glucose Level


  144 MG/DL


()  H


 


Uric Acid


  7.2 MG/DL


(2.6-7.2)


 


Calcium Level


  8.7 MG/DL


(8.5-10.1)


 


Phosphorus Level


  3.1 MG/DL


(2.5-4.9)


 


Magnesium Level


  2.5 MG/DL


(1.8-2.4)  H


 


Total Bilirubin


  0.3 MG/DL


(0.2-1.0)


 


Aspartate Amino Transf


(AST/SGOT) 24 U/L (15-37)


 


 


Alanine Aminotransferase


(ALT/SGPT) 25 U/L (12-78)


 


 


Alkaline Phosphatase


  97 U/L


()


 


C-Reactive Protein,


Quantitative 18.5 mg/dL


(0.00-0.90)  H


 


Pro-B-Type Natriuretic Peptide


  43667 pg/mL


(0-125)  H


 


Total Protein


  6.3 G/DL


(6.4-8.2)  L


 


Albumin


  2.0 G/DL


(3.4-5.0)  L


 


Globulin 4.3 g/dL  


 


Albumin/Globulin Ratio


  0.5 (1.0-2.7)


L











Current Medications








 Medications


  (Trade)  Dose


 Ordered  Sig/Raji


 Route


 PRN Reason  Start Time


 Stop Time Status Last Admin


Dose Admin


 


 Acetaminophen


  (Tylenol)  650 mg  Q6H  PRN


 ORAL


 For Headache  8/22/20 15:45


 9/21/20 15:44  8/25/20 05:45


 


 


 Clonidine HCl


  (Catapres Tab)  0.1 mg  Q4H  PRN


 ORAL


 bp over 160 syst  8/24/20 10:15


 11/22/20 10:14   


 


 


 Colistimethate


 Sodium


  (Colistin


 *inhalation use


 only*)  75 mg  Q12HR@10,22


 INH


   8/24/20 22:00


 8/31/20 21:59   


 


 


 Dextrose  1,000 ml @ 


 75 mls/hr  [id number]


 IV


   8/24/20 10:15


 9/20/20 10:14  8/24/20 23:28


 


 


 Docusate Sodium


  (Colace)  100 mg  TID


 GT


   8/21/20 13:00


 9/20/20 08:59  8/25/20 09:10


 


 


 Enoxaparin Sodium


  (Lovenox)  30 mg  DAILY


 SUBQ


   8/21/20 09:00


 11/19/20 08:59  8/23/20 08:36


 


 


 Epoetin Jaswant


  (Epoetin


 Jaswant-EPBX(NON


 ESRD))  10,000 unit  MON-WED-FRI


 SUBQ


   8/24/20 21:00


 11/22/20 20:59  8/24/20 21:20


 


 


 Linezolid  300 ml @ 


 300 mls/hr  Q12H


 IVPB


   8/23/20 14:00


 8/30/20 13:59  8/25/20 01:29


 


 


 Pantoprazole


  (Protonix)  40 mg  EVERY 12  HOURS


 IVP


   8/21/20 21:00


 9/20/20 20:59  8/25/20 09:10


 


 


 Polyethylene


 Glycol


  (Miralax)  17 gm  BEDTIME


 ORAL


   8/21/20 21:00


 9/20/20 20:59  8/24/20 21:21


 


 


 Trimethoprim/


 Sulfamethoxazole


  (Bactrim-DS)  20 ml  EVERY 12  HOURS


 GT


   8/24/20 17:00


 8/31/20 16:59  8/25/20 09:10


 

















Tanisha Brownlee M.D. Aug 25, 2020 12:06

## 2020-08-25 NOTE — PULMONOLOGY PROGRESS NOTE
Subjective


ROS Limited/Unobtainable:  Yes


Constitutional:  Reports: no symptoms


HEENT:  Repors: no symptoms


Respiratory:  Reports: no symptoms


Allergies:  


Coded Allergies:  


     AMPICILLIN (Verified  Allergy, Unknown, hives, 8/21/20)


     PENICILLINS (Verified  Allergy, Unknown, hives, 8/21/20)


     TETRACYCLINES (Verified  Allergy, Unknown, hives, 8/21/20)





Objective





Last 24 Hour Vital Signs








  Date Time  Temp Pulse Resp B/P (MAP) Pulse Ox O2 Delivery O2 Flow Rate FiO2


 


8/25/20 09:00      Venturi Mask 8.0 


 


8/25/20 08:00 101.1 104 24 132/57 (82) 98   


 


8/25/20 08:00  115      


 


8/25/20 07:00     98 Venturi Mask 8.0 40


 


8/25/20 06:15 100.1       


 


8/25/20 04:00 100.3 105 24 105/51 (69) 96   


 


8/25/20 04:00  105      


 


8/25/20 00:00  72      


 


8/25/20 00:00 100.0 72 22 137/57 (83) 99   


 


8/24/20 21:00      Venturi Mask 8.0 


 


8/24/20 20:00 98.2 114 22 123/62 (82) 96   


 


8/24/20 20:00  114      


 


8/24/20 16:00 98.8 105 20 151/83 (105) 100   


 


8/24/20 16:00  94      

















Intake and Output  


 


 8/24/20 8/25/20





 19:00 07:00


 


Intake Total 155 ml 860 ml


 


Output Total 2000 ml 1800 ml


 


Balance -1845 ml -940 ml


 


  


 


Intake Free Water 100 ml 200 ml


 


Tube Feeding 55 ml 660 ml


 


Output Urine Total 2000 ml 1800 ml


 


# Bowel Movements 1 2








General Appearance:  cachetic


HEENT:  normocephalic, atraumatic


Respiratory:  chest wall non-tender, lungs clear


Cardiovascular:  normal peripheral pulses, normal rate


Abdomen:  normal bowel sounds, soft, non tender


Genitourinary:  normal external genitalia


Skin:  no rash


Neurologic:  CNs II-XII grossly normal


Lymphatic:  no neck adenopathy





Microbiology








 Date/Time


Source Procedure


Growth Status


 


 


 8/22/20 20:30


Nasopharynx SARS-CoV-2 RdRp Gene Assay - Final Complete


 


 8/23/20 11:38


Urine,Clean Catch Urine Culture - Preliminary Resulted








Laboratory Tests


8/25/20 05:00: 


White Blood Count 20.9H, Red Blood Count 2.68L, Hemoglobin 8.4L, Hematocrit 

26.5L, Mean Corpuscular Volume 99, Mean Corpuscular Hemoglobin 31.3H, Mean 

Corpuscular Hemoglobin Concent 31.6L, Red Cell Distribution Width 12.6, 

Platelet Count 132L, Mean Platelet Volume 8.0, Neutrophils (%) (Auto) , 

Lymphocytes (%) (Auto) , Monocytes (%) (Auto) , Eosinophils (%) (Auto) , 

Basophils (%) (Auto) , Differential Total Cells Counted 100, Neutrophils % (

Manual) 82H, Lymphocytes % (Manual) 16L, Monocytes % (Manual) 2, Eosinophils % (

Manual) 0, Basophils % (Manual) 0, Band Neutrophils 0, Platelet Estimate 

DecreasedL, Platelet Morphology Normal, Hypochromasia 2+, Anisocytosis 1+, 

Erythrocyte Sedimentation Rate 134H, Sodium Level 164*H, Potassium Level 4.1, 

Chloride Level 126H, Carbon Dioxide Level 25, Anion Gap 14, Blood Urea Nitrogen 

48H, Creatinine 2.1H, Estimat Glomerular Filtration Rate 23.2, Glucose Level 

144H, Uric Acid 7.2, Calcium Level 8.7, Phosphorus Level 3.1, Magnesium Level 

2.5H, Total Bilirubin 0.3, Aspartate Amino Transf (AST/SGOT) 24, Alanine 

Aminotransferase (ALT/SGPT) 25, Alkaline Phosphatase 97, C-Reactive Protein, 

Quantitative 18.5H, Pro-B-Type Natriuretic Peptide 18858R, Total Protein 6.3L, 

Albumin 2.0L, Globulin 4.3, Albumin/Globulin Ratio 0.5L





Current Medications








 Medications


  (Trade)  Dose


 Ordered  Sig/Raji


 Route


 PRN Reason  Start Time


 Stop Time Status Last Admin


Dose Admin


 


 Acetaminophen


  (Tylenol)  650 mg  Q6H  PRN


 ORAL


 For Headache  8/22/20 15:45


 9/21/20 15:44  8/25/20 05:45


 


 


 Clonidine HCl


  (Catapres Tab)  0.1 mg  Q4H  PRN


 ORAL


 bp over 160 syst  8/24/20 10:15


 11/22/20 10:14   


 


 


 Dextrose  1,000 ml @ 


 75 mls/hr  [id number]


 IV


   8/24/20 10:15


 9/20/20 10:14  8/24/20 23:28


 


 


 Docusate Sodium


  (Colace)  100 mg  TID


 GT


   8/21/20 13:00


 9/20/20 08:59  8/25/20 09:10


 


 


 Enoxaparin Sodium


  (Lovenox)  30 mg  DAILY


 SUBQ


   8/21/20 09:00


 11/19/20 08:59  8/23/20 08:36


 


 


 Epoetin Jaswant


  (Epoetin


 Jaswant-EPBX(NON


 ESRD))  10,000 unit  MON-WED-FRI


 SUBQ


   8/24/20 21:00


 11/22/20 20:59  8/24/20 21:20


 


 


 Linezolid  300 ml @ 


 300 mls/hr  Q12H


 IVPB


   8/23/20 14:00


 8/30/20 13:59  8/25/20 01:29


 


 


 Minocycline HCl


  (Minocin)  200 mg  Q12HR


 ORAL


   8/25/20 12:00


 9/1/20 11:59   


 


 


 Pantoprazole


  (Protonix)  40 mg  EVERY 12  HOURS


 IVP


   8/21/20 21:00


 9/20/20 20:59  8/25/20 09:10


 


 


 Polyethylene


 Glycol


  (Miralax)  17 gm  BEDTIME


 ORAL


   8/21/20 21:00


 9/20/20 20:59  8/24/20 21:21


 


 


 Trimethoprim/


 Sulfamethoxazole


  (Bactrim-DS)  20 ml  EVERY 12  HOURS


 GT


   8/24/20 17:00


 8/31/20 16:59  8/25/20 09:10


 











Assessment/Plan


Problems:  


(1) 2019 novel coronavirus disease (COVID-19)


(2) Sepsis


(3) ARF (acute renal failure)


(4) Severe dehydration


(5) Hypernatremia


(6) Psychosis


Assessment/Plan


Low grade fever





WBC still high


COVID positive


pan cultures


iv fluids 


f/u electrolytes


renal studies


urine electrolytes


dvt prophylaxis











Live Brambila MD Aug 25, 2020 12:15

## 2020-08-25 NOTE — NEPHROLOGY PROGRESS NOTE
Assessment/Plan


Problem List:  


(1) ARF (acute renal failure)


Assessment:  Underlying CKD





(2) Sepsis


(3) UTI (urinary tract infection)


(4) Severe dehydration


(5) Hypernatremia


(6) Acute urinary retention


(7) Anemia


Assessment





Acute renal failure


Possible underlying chronic renal insufficiency


Severe dehydration


Hypotension


Sepsis, UTI


History of psychiatric disease


Elevated troponin I


Patient full code


Plan


August 25: Serum sodium higher.  Continue D5W 75 cc an hour.  Continue to 

monitor electrolytes.  Continue per consultants.  Serum creatinine down to 2.4 

from 5.1 on admission.  Leukocytosis persists.


August 24: Patient now on isolation for COVID-19.  Serum creatinine is 

plateauing.  Will decrease the IV fluid to 75 cc an hour.  Continue management 

per ID.  Will monitor electrolytes and renal parameters.


August 23: Discussed with MARTIN Carter.  Patient needs a Mitchell catheter.  Accurate 

intake and output.  Decrease  cc D5W an hour.  1 dose of IV Venofer and 

then subcu Epogen ordered for anemia.  Continue to monitor renal parameters


August 22: Renal parameters improving.  Continue D5W IV fluid.  Potassium 

supplement IV given.  Continue per consultants.





Previously:


Fluid challenge


Monitor renal parameters and electrolytes


Monitor intake and output


Mitchell catheter


Antibiotics


Avoid nephrotoxic's


Chest x-ray





Kidney ultrasound findings: 


Right kidney measures 8.9 cm in length. Left kidney measures 9 cm in


length. Both kidneys demonstrate markedly increased echogenicity. No 

hydronephrosis. 


There are bilateral renal cysts. Normal inferior vena cava. Bladder is empty,


contains a Mitchell catheter. 


 


Incidentally noted are gallstones.


 


Impression: Markedly echogenic kidneys, consistent with medical renal disease


Negative for hydronephrosis


Empty bladder with a Mitchell catheter


Incidental finding of cholelithiasis.





Subjective


ROS Limited/Unobtainable:  Yes





Objective


Objective





Last 24 Hour Vital Signs








  Date Time  Temp Pulse Resp B/P (MAP) Pulse Ox O2 Delivery O2 Flow Rate FiO2


 


8/25/20 08:00 101.1 104 24 132/57 (82) 98   


 


8/25/20 06:15 100.1       


 


8/25/20 04:00 100.3 105 24 105/51 (69) 96   


 


8/25/20 04:00  105      


 


8/25/20 00:00  72      


 


8/25/20 00:00 100.0 72 22 137/57 (83) 99   


 


8/24/20 21:00      Venturi Mask 8.0 


 


8/24/20 20:00 98.2 114 22 123/62 (82) 96   


 


8/24/20 20:00  114      


 


8/24/20 16:00 98.8 105 20 151/83 (105) 100   


 


8/24/20 16:00  94      


 


8/24/20 12:00  101      


 


8/24/20 12:00 98.8 99 20 135/90 (105) 100   


 


8/24/20 10:28      Venturi Mask 8.0 

















Intake and Output  


 


 8/24/20 8/25/20





 19:00 07:00


 


Intake Total 155 ml 860 ml


 


Output Total 2000 ml 1800 ml


 


Balance -1845 ml -940 ml


 


  


 


Intake Free Water 100 ml 200 ml


 


Tube Feeding 55 ml 660 ml


 


Output Urine Total 2000 ml 1800 ml


 


# Bowel Movements 1 2











Current Medications








 Medications


  (Trade)  Dose


 Ordered  Sig/Raji


 Route


 PRN Reason  Start Time


 Stop Time Status Last Admin


Dose Admin


 


 Acetaminophen


  (Tylenol)  650 mg  Q6H  PRN


 ORAL


 For Headache  8/22/20 15:45


 9/21/20 15:44  8/25/20 05:45


 


 


 Clonidine HCl


  (Catapres Tab)  0.1 mg  Q4H  PRN


 ORAL


 bp over 160 syst  8/24/20 10:15


 11/22/20 10:14   


 


 


 Colistimethate


 Sodium


  (Colistin


 *inhalation use


 only*)  75 mg  Q12HR@10,22


 INH


   8/24/20 22:00


 8/31/20 21:59   


 


 


 Dextrose  1,000 ml @ 


 75 mls/hr  [id number]


 IV


   8/24/20 10:15


 9/20/20 10:14  8/24/20 23:28


 


 


 Docusate Sodium


  (Colace)  100 mg  TID


 GT


   8/21/20 13:00


 9/20/20 08:59  8/25/20 09:10


 


 


 Enoxaparin Sodium


  (Lovenox)  30 mg  DAILY


 SUBQ


   8/21/20 09:00


 11/19/20 08:59  8/23/20 08:36


 


 


 Epoetin Jaswant


  (Epoetin


 Jaswant-EPBX(NON


 ESRD))  10,000 unit  MON-WED-FRI


 SUBQ


   8/24/20 21:00


 11/22/20 20:59  8/24/20 21:20


 


 


 Linezolid  300 ml @ 


 300 mls/hr  Q12H


 IVPB


   8/23/20 14:00


 8/30/20 13:59  8/25/20 01:29


 


 


 Pantoprazole


  (Protonix)  40 mg  EVERY 12  HOURS


 IVP


   8/21/20 21:00


 9/20/20 20:59  8/25/20 09:10


 


 


 Polyethylene


 Glycol


  (Miralax)  17 gm  BEDTIME


 ORAL


   8/21/20 21:00


 9/20/20 20:59  8/24/20 21:21


 


 


 Trimethoprim/


 Sulfamethoxazole


  (Bactrim-DS)  20 ml  EVERY 12  HOURS


 GT


   8/24/20 17:00


 8/31/20 16:59  8/25/20 09:10


 





Laboratory Tests


8/25/20 05:00: 


White Blood Count 20.9H, Red Blood Count 2.68L, Hemoglobin 8.4L, Hematocrit 

26.5L, Mean Corpuscular Volume 99, Mean Corpuscular Hemoglobin 31.3H, Mean 

Corpuscular Hemoglobin Concent 31.6L, Red Cell Distribution Width 12.6, 

Platelet Count 132L, Mean Platelet Volume 8.0, Neutrophils (%) (Auto) , 

Lymphocytes (%) (Auto) , Monocytes (%) (Auto) , Eosinophils (%) (Auto) , 

Basophils (%) (Auto) , Differential Total Cells Counted 100, Neutrophils % (

Manual) 82H, Lymphocytes % (Manual) 16L, Monocytes % (Manual) 2, Eosinophils % (

Manual) 0, Basophils % (Manual) 0, Band Neutrophils 0, Platelet Estimate 

DecreasedL, Platelet Morphology Normal, Hypochromasia 2+, Anisocytosis 1+, 

Erythrocyte Sedimentation Rate 134H, Sodium Level 164*H, Potassium Level 4.1, 

Chloride Level 126H, Carbon Dioxide Level 25, Anion Gap 14, Blood Urea Nitrogen 

48H, Creatinine 2.1H, Estimat Glomerular Filtration Rate 23.2, Glucose Level 

144H, Uric Acid 7.2, Calcium Level 8.7, Phosphorus Level 3.1, Magnesium Level 

2.5H, Total Bilirubin 0.3, Aspartate Amino Transf (AST/SGOT) 24, Alanine 

Aminotransferase (ALT/SGPT) 25, Alkaline Phosphatase 97, C-Reactive Protein, 

Quantitative 18.5H, Pro-B-Type Natriuretic Peptide 01501O, Total Protein 6.3L, 

Albumin 2.0L, Globulin 4.3, Albumin/Globulin Ratio 0.5L


Height (Feet):  5


Height (Inches):  2.00


Weight (Pounds):  120


General Appearance:  no apparent distress, lethargic


EENT:  other - On Venturi mask


Cardiovascular:  tachycardia


Respiratory/Chest:  decreased breath sounds


Abdomen:  distended











Derik Dos Santos MD Aug 25, 2020 10:14

## 2020-08-25 NOTE — INTERNAL MED PROGRESS NOTE
Subjective


Date of Service:  Aug 25, 2020


Physician Name


Wing Hebert


Attending Physician


Umer Wahl MD





Current Medications








 Medications


  (Trade)  Dose


 Ordered  Sig/Raji


 Route


 PRN Reason  Start Time


 Stop Time Status Last Admin


Dose Admin


 


 Acetaminophen


  (Tylenol)  650 mg  Q6H  PRN


 ORAL


 For Headache  8/22/20 15:45


 9/21/20 15:44  8/25/20 05:45


 


 


 Clonidine HCl


  (Catapres Tab)  0.1 mg  Q4H  PRN


 ORAL


 bp over 160 syst  8/24/20 10:15


 11/22/20 10:14   


 


 


 Dextrose  1,000 ml @ 


 75 mls/hr  [id number]


 IV


   8/24/20 10:15


 9/20/20 10:14  8/25/20 13:01


 


 


 Docusate Sodium


  (Colace)  100 mg  TID


 GT


   8/21/20 13:00


 9/20/20 08:59  8/25/20 17:11


 


 


 Enoxaparin Sodium


  (Lovenox)  30 mg  DAILY


 SUBQ


   8/21/20 09:00


 11/19/20 08:59  8/23/20 08:36


 


 


 Epoetin Jaswant


  (Epoetin


 Jaswant-EPBX(NON


 ESRD))  10,000 unit  MON-WED-FRI


 SUBQ


   8/24/20 21:00


 11/22/20 20:59  8/24/20 21:20


 


 


 Linezolid  300 ml @ 


 300 mls/hr  Q12H


 IVPB


   8/23/20 14:00


 8/30/20 13:59  8/25/20 13:02


 


 


 Minocycline HCl


  (Minocin)  200 mg  Q12HR


 ORAL


   8/25/20 12:00


 9/1/20 11:59  8/25/20 13:00


 


 


 Pantoprazole


  (Protonix)  40 mg  EVERY 12  HOURS


 IVP


   8/21/20 21:00


 9/20/20 20:59  8/25/20 09:10


 


 


 Polyethylene


 Glycol


  (Miralax)  17 gm  BEDTIME


 ORAL


   8/21/20 21:00


 9/20/20 20:59  8/24/20 21:21


 


 


 Trimethoprim/


 Sulfamethoxazole


 10 ml/Dextrose  285 ml @ 


 190 mls/hr  EVERY 12  HOURS


 IV


   8/25/20 21:00


 9/1/20 20:59   


 








Allergies:  


Coded Allergies:  


     AMPICILLIN (Verified  Allergy, Unknown, hives, 8/21/20)


     PENICILLINS (Verified  Allergy, Unknown, hives, 8/21/20)


     TETRACYCLINES (Verified  Allergy, Unknown, hives, 8/21/20)


ROS Limited/Unobtainable:  Yes


Subjective


71 YO F with previous COVID 19 pos admitted with shortness of breath.  Now 

pneumonia and sepsis.  Cover for Int Med-DR Wahl.





Objective





Last Vital Signs








  Date Time  Temp Pulse Resp B/P (MAP) Pulse Ox O2 Delivery O2 Flow Rate FiO2


 


8/25/20 16:00 99.6 65 28 145/31 (69) 96   


 


8/25/20 09:00      Venturi Mask 8.0 


 


8/25/20 07:00        40











Laboratory Tests








Test


  8/25/20


05:00


 


White Blood Count


  20.9 K/UL


(4.8-10.8)  H


 


Red Blood Count


  2.68 M/UL


(4.20-5.40)  L


 


Hemoglobin


  8.4 G/DL


(12.0-16.0)  L


 


Hematocrit


  26.5 %


(37.0-47.0)  L


 


Mean Corpuscular Volume 99 FL (80-99)  


 


Mean Corpuscular Hemoglobin


  31.3 PG


(27.0-31.0)  H


 


Mean Corpuscular Hemoglobin


Concent 31.6 G/DL


(32.0-36.0)  L


 


Red Cell Distribution Width


  12.6 %


(11.6-14.8)


 


Platelet Count


  132 K/UL


(150-450)  L


 


Mean Platelet Volume


  8.0 FL


(6.5-10.1)


 


Neutrophils (%) (Auto)


  % (45.0-75.0)


 


 


Lymphocytes (%) (Auto)


  % (20.0-45.0)


 


 


Monocytes (%) (Auto)  % (1.0-10.0)  


 


Eosinophils (%) (Auto)  % (0.0-3.0)  


 


Basophils (%) (Auto)  % (0.0-2.0)  


 


Differential Total Cells


Counted 100  


 


 


Neutrophils % (Manual) 82 % (45-75)  H


 


Lymphocytes % (Manual) 16 % (20-45)  L


 


Monocytes % (Manual) 2 % (1-10)  


 


Eosinophils % (Manual) 0 % (0-3)  


 


Basophils % (Manual) 0 % (0-2)  


 


Band Neutrophils 0 % (0-8)  


 


Platelet Estimate Decreased  L


 


Platelet Morphology Normal  


 


Hypochromasia 2+  


 


Anisocytosis 1+  


 


Erythrocyte Sedimentation Rate


  134 MM/HR


(0-30)  H


 


Sodium Level


  164 MMOL/L


(136-145)  *H


 


Potassium Level


  4.1 MMOL/L


(3.5-5.1)


 


Chloride Level


  126 MMOL/L


()  H


 


Carbon Dioxide Level


  25 MMOL/L


(21-32)


 


Anion Gap


  14 mmol/L


(5-15)


 


Blood Urea Nitrogen


  48 mg/dL


(7-18)  H


 


Creatinine


  2.1 MG/DL


(0.55-1.30)  H


 


Estimat Glomerular Filtration


Rate 23.2 mL/min


(>60)


 


Glucose Level


  144 MG/DL


()  H


 


Uric Acid


  7.2 MG/DL


(2.6-7.2)


 


Calcium Level


  8.7 MG/DL


(8.5-10.1)


 


Phosphorus Level


  3.1 MG/DL


(2.5-4.9)


 


Magnesium Level


  2.5 MG/DL


(1.8-2.4)  H


 


Total Bilirubin


  0.3 MG/DL


(0.2-1.0)


 


Aspartate Amino Transf


(AST/SGOT) 24 U/L (15-37)


 


 


Alanine Aminotransferase


(ALT/SGPT) 25 U/L (12-78)


 


 


Alkaline Phosphatase


  97 U/L


()


 


C-Reactive Protein,


Quantitative 18.5 mg/dL


(0.00-0.90)  H


 


Pro-B-Type Natriuretic Peptide


  53034 pg/mL


(0-125)  H


 


Total Protein


  6.3 G/DL


(6.4-8.2)  L


 


Albumin


  2.0 G/DL


(3.4-5.0)  L


 


Globulin 4.3 g/dL  


 


Albumin/Globulin Ratio


  0.5 (1.0-2.7)


L











Microbiology








 Date/Time


Source Procedure


Growth Status


 


 


 8/22/20 20:30


Nasopharynx SARS-CoV-2 RdRp Gene Assay - Final Complete


 


 8/23/20 11:38


Urine,Clean Catch Urine Culture - Preliminary Resulted

















Intake and Output  


 


 8/24/20 8/25/20





 19:00 07:00


 


Intake Total 155 ml 860 ml


 


Output Total 2000 ml 1800 ml


 


Balance -1845 ml -940 ml


 


  


 


Intake Free Water 100 ml 200 ml


 


Tube Feeding 55 ml 660 ml


 


Output Urine Total 2000 ml 1800 ml


 


# Bowel Movements 1 2








Objective


General Appearance:  WD/WN, no apparent distress, lethargic


EENT:  PERRL/EOMI, normal ENT inspection


Neck:  non-tender, normal alignment, supple, normal inspection


Cardiovascular:  normal peripheral pulses, normal rate, regular rhythm, no 

gallop/murmur, no JVD


Respiratory/Chest:  chest wall non-tender, respiratory distress, crackles/rales

, rhonchi - bilaterally, expiratory wheezing


Abdomen:  normal bowel sounds, non tender, soft, no organomegaly, no mass


Extremities:  normal range of motion, non-tender


Neurologic:  CNs II-XII grossly normal, no motor/sensory deficits


Skin:  normal pigmentation, warm/dry





Assessment/Plan


Problem List:  


(1) UTI (urinary tract infection)


Assessment & Plan:  Vanco res enterococcus.  Cont zyvox per ID





(2) Sepsis


Assessment & Plan:  ID= Dr Penn.  Continue vanco and cefepime





(3) Hypernatremia


Assessment & Plan:  Nephrology=DR Dos Santos





(4) Severe dehydration


(5) ARF (acute renal failure)


Assessment & Plan:  Nephrology = Dr Dos Santos





(6) Pneumonia


Assessment & Plan:  Acenitobacter.  Cont minocycline and bactrim per ID=Dr Brownlee; pulmonary = Wing Alatorre MD Aug 25, 2020 18:14

## 2020-08-26 VITALS — SYSTOLIC BLOOD PRESSURE: 145 MMHG | DIASTOLIC BLOOD PRESSURE: 51 MMHG

## 2020-08-26 VITALS — DIASTOLIC BLOOD PRESSURE: 49 MMHG | SYSTOLIC BLOOD PRESSURE: 133 MMHG

## 2020-08-26 VITALS — SYSTOLIC BLOOD PRESSURE: 126 MMHG | DIASTOLIC BLOOD PRESSURE: 72 MMHG

## 2020-08-26 VITALS — DIASTOLIC BLOOD PRESSURE: 62 MMHG | SYSTOLIC BLOOD PRESSURE: 127 MMHG

## 2020-08-26 VITALS — DIASTOLIC BLOOD PRESSURE: 78 MMHG | SYSTOLIC BLOOD PRESSURE: 124 MMHG

## 2020-08-26 VITALS — SYSTOLIC BLOOD PRESSURE: 135 MMHG | DIASTOLIC BLOOD PRESSURE: 65 MMHG

## 2020-08-26 VITALS — DIASTOLIC BLOOD PRESSURE: 41 MMHG | SYSTOLIC BLOOD PRESSURE: 111 MMHG

## 2020-08-26 LAB
ADD MANUAL DIFF: YES
ALBUMIN SERPL-MCNC: 2.2 G/DL (ref 3.4–5)
ALBUMIN/GLOB SERPL: 0.5 {RATIO} (ref 1–2.7)
ALP SERPL-CCNC: 86 U/L (ref 46–116)
ALT SERPL-CCNC: 36 U/L (ref 12–78)
ANION GAP SERPL CALC-SCNC: 17 MMOL/L (ref 5–15)
AST SERPL-CCNC: 19 U/L (ref 15–37)
BILIRUB SERPL-MCNC: 0.3 MG/DL (ref 0.2–1)
BUN SERPL-MCNC: 41 MG/DL (ref 7–18)
CALCIUM SERPL-MCNC: 9 MG/DL (ref 8.5–10.1)
CHLORIDE SERPL-SCNC: 115 MMOL/L (ref 98–107)
CO2 SERPL-SCNC: 21 MMOL/L (ref 21–32)
CREAT SERPL-MCNC: 2.2 MG/DL (ref 0.55–1.3)
ERYTHROCYTE [DISTWIDTH] IN BLOOD BY AUTOMATED COUNT: 13.4 % (ref 11.6–14.8)
GLOBULIN SER-MCNC: 4.7 G/DL
HCT VFR BLD CALC: 28.1 % (ref 37–47)
HGB BLD-MCNC: 8.9 G/DL (ref 12–16)
MCV RBC AUTO: 101 FL (ref 80–99)
PHOSPHATE SERPL-MCNC: 3.9 MG/DL (ref 2.5–4.9)
PLATELET # BLD: 158 K/UL (ref 150–450)
POTASSIUM SERPL-SCNC: 4.6 MMOL/L (ref 3.5–5.1)
RBC # BLD AUTO: 2.78 M/UL (ref 4.2–5.4)
SODIUM SERPL-SCNC: 153 MMOL/L (ref 136–145)
WBC # BLD AUTO: 28.2 K/UL (ref 4.8–10.8)

## 2020-08-26 RX ADMIN — DOCUSATE SODIUM SCH MG: 50 LIQUID ORAL at 09:31

## 2020-08-26 RX ADMIN — ENOXAPARIN SODIUM SCH MG: 30 INJECTION SUBCUTANEOUS at 09:32

## 2020-08-26 RX ADMIN — DOCUSATE SODIUM SCH MG: 50 LIQUID ORAL at 13:35

## 2020-08-26 RX ADMIN — MINOCYCLINE HYDROCHLORIDE SCH MG: 50 CAPSULE ORAL at 21:20

## 2020-08-26 RX ADMIN — PANTOPRAZOLE SODIUM SCH MG: 40 INJECTION, POWDER, FOR SOLUTION INTRAVENOUS at 21:19

## 2020-08-26 RX ADMIN — MINOCYCLINE HYDROCHLORIDE SCH MG: 50 CAPSULE ORAL at 09:31

## 2020-08-26 RX ADMIN — POLYETHYLENE GLYCOL 3350 SCH GM: 17 POWDER, FOR SOLUTION ORAL at 21:19

## 2020-08-26 RX ADMIN — SULFAMETHOXAZOLE AND TRIMETHOPRIM SCH MLS/HR: 80; 16 INJECTION, SOLUTION, CONCENTRATE INTRAVENOUS at 21:19

## 2020-08-26 RX ADMIN — SULFAMETHOXAZOLE AND TRIMETHOPRIM SCH MLS/HR: 80; 16 INJECTION, SOLUTION, CONCENTRATE INTRAVENOUS at 09:31

## 2020-08-26 RX ADMIN — PANTOPRAZOLE SODIUM SCH MG: 40 INJECTION, POWDER, FOR SOLUTION INTRAVENOUS at 09:30

## 2020-08-26 RX ADMIN — DOCUSATE SODIUM SCH MG: 50 LIQUID ORAL at 17:57

## 2020-08-26 NOTE — INTERNAL MED PROGRESS NOTE
Subjective


Date of Service:  Aug 26, 2020


Physician Name


EmileeWing


Attending Physician


Umer Wahl MD





Current Medications








 Medications


  (Trade)  Dose


 Ordered  Sig/Raji


 Route


 PRN Reason  Start Time


 Stop Time Status Last Admin


Dose Admin


 


 Acetaminophen


  (Tylenol)  650 mg  Q6H  PRN


 ORAL


 For Headache  8/26/20 15:30


 9/21/20 15:29   


 


 


 Albuterol/


 Ipratropium


  (Albuterol/


 Ipratropium)  3 ml  Q4HRT  PRN


 HHN


 Shortness of Breath  8/26/20 15:30


 8/31/20 15:29   


 


 


 Clonidine HCl


  (Catapres Tab)  0.1 mg  Q4H  PRN


 ORAL


 bp over 160 syst  8/26/20 15:30


 11/22/20 15:29   


 


 


 Dexamethasone


 Sodium Phosphate


  (Decadron 10mg/


 ml Inj)  6 mg  DAILY


 IV


   8/27/20 09:00


 9/5/20 09:01   


 


 


 Dextrose  1,000 ml @ 


 75 mls/hr  [id number]


 IV


   8/26/20 16:00


 9/20/20 15:59   


 


 


 Docusate Sodium


  (Colace)  100 mg  TID


 GT


   8/26/20 18:00


 9/20/20 08:59  8/26/20 17:57


 


 


 Enoxaparin Sodium


  (Lovenox)  30 mg  DAILY


 SUBQ


   8/27/20 09:00


 11/19/20 08:59   


 


 


 Epoetin Jaswant


  (Epoetin


 Jaswant-EPBX(NON


 ESRD))  10,000 unit  MON-WED-FRI


 SUBQ


   8/26/20 21:00


 11/22/20 20:59   


 


 


 Linezolid  300 ml @ 


 300 mls/hr  Q12H


 IVPB


   8/27/20 02:00


 8/30/20 13:59   


 


 


 Minocycline HCl


  (Minocin)  200 mg  Q12HR


 ORAL


   8/26/20 21:00


 9/1/20 11:59   


 


 


 Pantoprazole


  (Protonix)  40 mg  EVERY 12  HOURS


 IVP


   8/26/20 21:00


 9/20/20 20:59   


 


 


 Polyethylene


 Glycol


  (Miralax)  17 gm  BEDTIME


 ORAL


   8/26/20 21:00


 9/20/20 20:59   


 


 


 Trimethoprim/


 Sulfamethoxazole


 10 ml/Dextrose  285 ml @ 


 190 mls/hr  EVERY 12  HOURS


 IV


   8/26/20 21:00


 9/1/20 20:59   


 








Allergies:  


Coded Allergies:  


     AMPICILLIN (Verified  Allergy, Unknown, hives, 8/21/20)


     PENICILLINS (Verified  Allergy, Unknown, hives, 8/21/20)


     TETRACYCLINES (Verified  Allergy, Unknown, hives, 8/21/20)


ROS Limited/Unobtainable:  Yes


Subjective


71 YO F with previous COVID 19 pos admitted with shortness of breath.  Now 

pneumonia and sepsis.  Cover for Int [facility]-DR Wahl.





Objective





Last Vital Signs








  Date Time  Temp Pulse Resp B/P (MAP) Pulse Ox O2 Delivery O2 Flow Rate FiO2


 


8/26/20 16:00  113      


 


8/26/20 16:00 97.7  35 135/65 (88) 98   


 


8/26/20 10:44      Venturi Mask 8.0 40











Laboratory Tests








Test


  8/26/20


05:20 8/26/20


05:26 8/26/20


13:02


 


Pro-B-Type Natriuretic Peptide


  74908 pg/mL


(0-125)  H 35354 pg/mL


(0-125)  H 


 


 


White Blood Count


  


  28.2 K/UL


(4.8-10.8)  *H 


 


 


Red Blood Count


  


  2.78 M/UL


(4.20-5.40)  L 


 


 


Hemoglobin


  


  8.9 G/DL


(12.0-16.0)  L 


 


 


Hematocrit


  


  28.1 %


(37.0-47.0)  L 


 


 


Mean Corpuscular Volume


  


  101 FL (80-99)


H 


 


 


Mean Corpuscular Hemoglobin


  


  32.1 PG


(27.0-31.0)  H 


 


 


Mean Corpuscular Hemoglobin


Concent 


  31.8 G/DL


(32.0-36.0)  L 


 


 


Red Cell Distribution Width


  


  13.4 %


(11.6-14.8) 


 


 


Platelet Count


  


  158 K/UL


(150-450) 


 


 


Mean Platelet Volume


  


  7.8 FL


(6.5-10.1) 


 


 


Neutrophils (%) (Auto)


  


  % (45.0-75.0)


  


 


 


Lymphocytes (%) (Auto)


  


  % (20.0-45.0)


  


 


 


Monocytes (%) (Auto)   % (1.0-10.0)   


 


Eosinophils (%) (Auto)   % (0.0-3.0)   


 


Basophils (%) (Auto)   % (0.0-2.0)   


 


Differential Total Cells


Counted 


  100  


  


 


 


Neutrophils % (Manual)  82 % (45-75)  H 


 


Lymphocytes % (Manual)  11 % (20-45)  L 


 


Monocytes % (Manual)  7 % (1-10)   


 


Eosinophils % (Manual)  0 % (0-3)   


 


Basophils % (Manual)  0 % (0-2)   


 


Band Neutrophils  0 % (0-8)   


 


Platelet Estimate  Adequate   


 


Platelet Morphology  Normal   


 


Hypochromasia  1+   


 


Macrocytosis  1+   


 


Sodium Level


  


  153 MMOL/L


(136-145)  H 


 


 


Potassium Level


  


  4.6 MMOL/L


(3.5-5.1) 


 


 


Chloride Level


  


  115 MMOL/L


()  H 


 


 


Carbon Dioxide Level


  


  21 MMOL/L


(21-32) 


 


 


Anion Gap


  


  17 mmol/L


(5-15)  H 


 


 


Blood Urea Nitrogen


  


  41 mg/dL


(7-18)  H 


 


 


Creatinine


  


  2.2 MG/DL


(0.55-1.30)  H 


 


 


Estimat Glomerular Filtration


Rate 


  22.0 mL/min


(>60) 


 


 


Glucose Level


  


  185 MG/DL


()  H 


 


 


Calcium Level


  


  9.0 MG/DL


(8.5-10.1) 


 


 


Phosphorus Level


  


  3.9 MG/DL


(2.5-4.9) 


 


 


Magnesium Level


  


  2.3 MG/DL


(1.8-2.4) 


 


 


Total Bilirubin


  


  0.3 MG/DL


(0.2-1.0) 


 


 


Aspartate Amino Transf


(AST/SGOT) 


  19 U/L (15-37)


  


 


 


Alanine Aminotransferase


(ALT/SGPT) 


  36 U/L (12-78)


  


 


 


Alkaline Phosphatase


  


  86 U/L


() 


 


 


C-Reactive Protein,


Quantitative 


  20.5 mg/dL


(0.00-0.90)  H 


 


 


Total Protein


  


  6.9 G/DL


(6.4-8.2) 


 


 


Albumin


  


  2.2 G/DL


(3.4-5.0)  L 


 


 


Globulin  4.7 g/dL   


 


Albumin/Globulin Ratio


  


  0.5 (1.0-2.7)


L 


 


 


Arterial Blood pH


  


  


  7.490


(7.350-7.450)


 


Arterial Blood Partial


Pressure CO2 


  


  26.5 mmHg


(35.0-45.0)  L


 


Arterial Blood Partial


Pressure O2 


  


  44.9 mmHg


(75.0-100.0)


 


Arterial Blood HCO3


  


  


  19.7 mmol/L


(22.0-26.0)  L


 


Arterial Blood Oxygen


Saturation 


  


  82.7 %


()  *L


 


Arterial Blood Base Excess   -2.9 (-2-2)  L


 


Rishi Test   Positive  

















Intake and Output  


 


 8/25/20 8/26/20





 19:00 07:00


 


Intake Total  55 ml


 


Output Total 1300 ml 


 


Balance -1300 ml 55 ml


 


  


 


Tube Feeding  55 ml


 


Output Urine Total 1300 ml 


 


# Voids  2


 


# Bowel Movements 2 1








Objective


General Appearance:  WD/WN, no apparent distress, lethargic


EENT:  PERRL/EOMI, normal ENT inspection


Neck:  non-tender, normal alignment, supple, normal inspection


Cardiovascular:  normal peripheral pulses, normal rate, regular rhythm, no 

gallop/murmur, no JVD


Respiratory/Chest:  Venturi mask; chest wall non-tender, respiratory distress, 

crackles/rales, rhonchi - bilaterally, expiratory wheezing


Abdomen:  normal bowel sounds, non tender, soft, no organomegaly, no mass


Extremities:  normal range of motion, non-tender


Neurologic:  CNs II-XII grossly normal, no motor/sensory deficits


Skin:  normal pigmentation, warm/dry





Assessment/Plan


Problem List:  


(1) UTI (urinary tract infection)


Assessment & Plan:  Vanco res enterococcus.  Cont zyvox per ID





(2) Sepsis


Assessment & Plan:  ID= Dr Brownlee.  Continue IV bactrim and minocycline





(3) Hypernatremia


Assessment & Plan:  Nephrology=DR Dos Santos





(4) Severe dehydration


(5) ARF (acute renal failure)


Assessment & Plan:  Nephrology = Dr Dos Santos





(6) Pneumonia


Assessment & Plan:  Acenitobacter.  Cont minocycline and IV bactrim per ID=Dr Brownlee; pulmonary = Wing Alatorre MD Aug 26, 2020 18:28

## 2020-08-26 NOTE — DIAGNOSTIC IMAGING REPORT
Indication: Dyspnea

 

Technique: One view of the chest

 

Comparison: 8/22/2020

 

Findings: There is elevation of the right hemidiaphragm again demonstrated. There is

obscuration of left hemidiaphragm, likely indicating some pleural fluid accumulation.

Interstitial congestion appears increased from the previous exam.

 

Impression: There are increased interstitial congestion and left pleural effusion,

since prior exam of 8/22/2020

## 2020-08-26 NOTE — NEPHROLOGY PROGRESS NOTE
Assessment/Plan


Problem List:  


(1) ARF (acute renal failure)


Assessment:  Underlying CKD





(2) Sepsis


(3) UTI (urinary tract infection)


(4) Severe dehydration


(5) Hypernatremia


(6) Acute urinary retention


(7) Anemia


Assessment





Acute renal failure


Possible underlying chronic renal insufficiency


Severe dehydration


Hypotension


Sepsis, UTI


History of psychiatric disease


Elevated troponin I


Patient full code


Plan


August 26: Serum sodium lower.  Renal parameters appear more stable.  

Nevertheless leukocytosis is worsened.  Continue per consultants.


August 25: Serum sodium higher.  Continue D5W 75 cc an hour.  Continue to 

monitor electrolytes.  Continue per consultants.  Serum creatinine down to 2.4 

from 5.1 on admission.  Leukocytosis persists.


August 24: Patient now on isolation for COVID-19.  Serum creatinine is 

plateauing.  Will decrease the IV fluid to 75 cc an hour.  Continue management 

per ID.  Will monitor electrolytes and renal parameters.


August 23: Discussed with MARTIN Carter.  Patient needs a Mitchell catheter.  Accurate 

intake and output.  Decrease  cc D5W an hour.  1 dose of IV Venofer and 

then subcu Epogen ordered for anemia.  Continue to monitor renal parameters


August 22: Renal parameters improving.  Continue D5W IV fluid.  Potassium 

supplement IV given.  Continue per consultants.





Previously:


Fluid challenge


Monitor renal parameters and electrolytes


Monitor intake and output


Mitchell catheter


Antibiotics


Avoid nephrotoxic's


Chest x-ray





Kidney ultrasound findings: 


Right kidney measures 8.9 cm in length. Left kidney measures 9 cm in


length. Both kidneys demonstrate markedly increased echogenicity. No 

hydronephrosis. 


There are bilateral renal cysts. Normal inferior vena cava. Bladder is empty,


contains a Mitchell catheter. 


 


Incidentally noted are gallstones.


 


Impression: Markedly echogenic kidneys, consistent with medical renal disease


Negative for hydronephrosis


Empty bladder with a Mitchell catheter


Incidental finding of cholelithiasis.





Subjective


ROS Limited/Unobtainable:  Yes





Objective


Objective





Last 24 Hour Vital Signs








  Date Time  Temp Pulse Resp B/P (MAP) Pulse Ox O2 Delivery O2 Flow Rate FiO2


 


8/26/20 08:00 99.7 114 25 133/49 (77) 94   


 


8/26/20 07:44  114      


 


8/26/20 04:00  64      


 


8/26/20 04:00 99.7 64 42 145/51 (82) 94   


 


8/26/20 00:00  111      


 


8/26/20 00:00 99.5 113 23 126/72 (90) 94   


 


8/25/20 21:00      Venturi Mask 8.0 


 


8/25/20 20:00 98.4 91 27 141/97 (112) 99   


 


8/25/20 20:00  102      


 


8/25/20 16:00 99.6 65 28 145/31 (69) 96   


 


8/25/20 16:00  117      


 


8/25/20 12:00 99.0 73 26 119/50 (73) 96   


 


8/25/20 12:00  111      

















Intake and Output  


 


 8/25/20 8/26/20





 19:00 07:00


 


Output Total 1300 ml 


 


Balance -1300 ml 


 


  


 


Output Urine Total 1300 ml 


 


# Voids  2


 


# Bowel Movements 2 1








Laboratory Tests


8/26/20 05:26: 


White Blood Count 28.2*H, Red Blood Count 2.78L, Hemoglobin 8.9L, Hematocrit 

28.1L, Mean Corpuscular Volume 101H, Mean Corpuscular Hemoglobin 32.1H, Mean 

Corpuscular Hemoglobin Concent 31.8L, Red Cell Distribution Width 13.4, 

Platelet Count 158, Mean Platelet Volume 7.8, Neutrophils (%) (Auto) , 

Lymphocytes (%) (Auto) , Monocytes (%) (Auto) , Eosinophils (%) (Auto) , 

Basophils (%) (Auto) , Differential Total Cells Counted 100, Neutrophils % (

Manual) 82H, Lymphocytes % (Manual) 11L, Monocytes % (Manual) 7, Eosinophils % (

Manual) 0, Basophils % (Manual) 0, Band Neutrophils 0, Platelet Estimate 

Adequate, Platelet Morphology Normal, Hypochromasia 1+, Macrocytosis 1+, Sodium 

Level 153H, Potassium Level 4.6, Chloride Level 115H, Carbon Dioxide Level 21, 

Anion Gap 17H, Blood Urea Nitrogen 41H, Creatinine 2.2H, Estimat Glomerular 

Filtration Rate 22.0, Glucose Level 185H, Calcium Level 9.0, Phosphorus Level 

3.9, Magnesium Level 2.3, Total Bilirubin 0.3, Aspartate Amino Transf (AST/SGOT

) 19, Alanine Aminotransferase (ALT/SGPT) 36, Alkaline Phosphatase 86, C-

Reactive Protein, Quantitative 20.5H, Pro-B-Type Natriuretic Peptide [id number], 

Total Protein 6.9, Albumin 2.2L, Globulin 4.7, Albumin/Globulin Ratio 0.5L


Height (Feet):  5


Height (Inches):  2.00


Weight (Pounds):  121


General Appearance:  no apparent distress


EENT:  other - On Venturi mask


Cardiovascular:  tachycardia


Respiratory/Chest:  decreased breath sounds


Abdomen:  distended











Derik Dos Santos MD Aug 26, 2020 11:07

## 2020-08-26 NOTE — INFECTIOUS DISEASES PROG NOTE
Assessment/Plan


71yo F with:





Sepsis


Fever; improving


Leukocytosis to 31, persistent


JASWINDER to 5.1m, improving 


Hypoxic resp failure, sp NRB mask >VM


VRE UTI


Hx of COVID-19 1 mo ago ( doubt recurrent COVID-19) 


HAP


            8/22 sp cx MDR ABC (S bactrim; I Colistin, Polymixin B, Minocycline)


   8/21 BCx NTD


   8/21 UA+, UCx >100k VRE (S Zyvox)


   8/21 CXR: Left retrocardiac mild atelectasis versus infiltrate.


   8/21 COVID rapid neg repeat COVID-19 +





BL arm rash, appears evolving, now small pustules with dry/crusting, querry 

resolving Shingles? Though odd to have on both arms


   8/21 HSV & VZV PCR ordered





Plan:


Continue IV bactrim #3 and PO Minocycline 200mg bid #2 (high dose) MDR ABC PNA 

based on cultures


 Zyvox #4/7 for VRE UTI


      -monitor Platelets, cr





Avoid acyclovir for possible Shingles for now given crusting of rash means that 

if Shingles it's resolving on its own and given severe JASWINDER want to avoid 

nephrotoxic acyclovir


Send HSV & VZV PCR swab from unroofed pustule, explained to MARTIN Hernandez how to 

obtain


       --8/24 SP Cefepime #4


       --8/23 SP IV Vancomcyin #4





F/u cx


Monitor CBC/BMP


Trend BUE rash


COVID isolation for now 


Bcx x2, Sp cx





Discussed with RN and pharmacist





Subjective


Allergies:  


Coded Allergies:  


     AMPICILLIN (Verified  Allergy, Unknown, hives, 8/21/20)


     PENICILLINS (Verified  Allergy, Unknown, hives, 8/21/20)


     TETRACYCLINES (Verified  Allergy, Unknown, hives, 8/21/20)


afebrile >24hrs


wbc increased


remains on VM 8L, 40%





Objective





Last 24 Hour Vital Signs








  Date Time  Temp Pulse Resp B/P (MAP) Pulse Ox O2 Delivery O2 Flow Rate FiO2


 


8/26/20 12:00 98.7 113 35 111/41 (64) 94   


 


8/26/20 11:41  111      


 


8/26/20 10:44  122 20  98 Venturi Mask 8.0 40





  120 20  94   


 


8/26/20 09:00      Venturi Mask 8.0 


 


8/26/20 08:00 99.7 114 25 133/49 (77) 94   


 


8/26/20 07:44  114      


 


8/26/20 07:00     94 Venturi Mask 8.0 40


 


8/26/20 04:00  64      


 


8/26/20 04:00 99.7 64 42 145/51 (82) 94   


 


8/26/20 00:00  111      


 


8/26/20 00:00 99.5 113 23 126/72 (90) 94   


 


8/25/20 21:00      Venturi Mask 8.0 


 


8/25/20 20:00 98.4 91 27 141/97 (112) 99   


 


8/25/20 20:00  102      


 


8/25/20 16:00 99.6 65 28 145/31 (69) 96   


 


8/25/20 16:00  117      








Height (Feet):  5


Height (Inches):  2.00


Weight (Pounds):  121


HEENT:  atraumatic


Respiratory/Chest:  other - mild Rsp distress


Abdomen:  no organomegaly





Laboratory Tests








Test


  8/26/20


05:20 8/26/20


05:26


 


Pro-B-Type Natriuretic Peptide


  Pending  


  77659 pg/mL


(0-125)  H


 


White Blood Count


  


  28.2 K/UL


(4.8-10.8)  *H


 


Red Blood Count


  


  2.78 M/UL


(4.20-5.40)  L


 


Hemoglobin


  


  8.9 G/DL


(12.0-16.0)  L


 


Hematocrit


  


  28.1 %


(37.0-47.0)  L


 


Mean Corpuscular Volume


  


  101 FL (80-99)


H


 


Mean Corpuscular Hemoglobin


  


  32.1 PG


(27.0-31.0)  H


 


Mean Corpuscular Hemoglobin


Concent 


  31.8 G/DL


(32.0-36.0)  L


 


Red Cell Distribution Width


  


  13.4 %


(11.6-14.8)


 


Platelet Count


  


  158 K/UL


(150-450)


 


Mean Platelet Volume


  


  7.8 FL


(6.5-10.1)


 


Neutrophils (%) (Auto)


  


  % (45.0-75.0)


 


 


Lymphocytes (%) (Auto)


  


  % (20.0-45.0)


 


 


Monocytes (%) (Auto)   % (1.0-10.0)  


 


Eosinophils (%) (Auto)   % (0.0-3.0)  


 


Basophils (%) (Auto)   % (0.0-2.0)  


 


Differential Total Cells


Counted 


  100  


 


 


Neutrophils % (Manual)  82 % (45-75)  H


 


Lymphocytes % (Manual)  11 % (20-45)  L


 


Monocytes % (Manual)  7 % (1-10)  


 


Eosinophils % (Manual)  0 % (0-3)  


 


Basophils % (Manual)  0 % (0-2)  


 


Band Neutrophils  0 % (0-8)  


 


Platelet Estimate  Adequate  


 


Platelet Morphology  Normal  


 


Hypochromasia  1+  


 


Macrocytosis  1+  


 


Sodium Level


  


  153 MMOL/L


(136-145)  H


 


Potassium Level


  


  4.6 MMOL/L


(3.5-5.1)


 


Chloride Level


  


  115 MMOL/L


()  H


 


Carbon Dioxide Level


  


  21 MMOL/L


(21-32)


 


Anion Gap


  


  17 mmol/L


(5-15)  H


 


Blood Urea Nitrogen


  


  41 mg/dL


(7-18)  H


 


Creatinine


  


  2.2 MG/DL


(0.55-1.30)  H


 


Estimat Glomerular Filtration


Rate 


  22.0 mL/min


(>60)


 


Glucose Level


  


  185 MG/DL


()  H


 


Calcium Level


  


  9.0 MG/DL


(8.5-10.1)


 


Phosphorus Level


  


  3.9 MG/DL


(2.5-4.9)


 


Magnesium Level


  


  2.3 MG/DL


(1.8-2.4)


 


Total Bilirubin


  


  0.3 MG/DL


(0.2-1.0)


 


Aspartate Amino Transf


(AST/SGOT) 


  19 U/L (15-37)


 


 


Alanine Aminotransferase


(ALT/SGPT) 


  36 U/L (12-78)


 


 


Alkaline Phosphatase


  


  86 U/L


()


 


C-Reactive Protein,


Quantitative 


  20.5 mg/dL


(0.00-0.90)  H


 


Total Protein


  


  6.9 G/DL


(6.4-8.2)


 


Albumin


  


  2.2 G/DL


(3.4-5.0)  L


 


Globulin  4.7 g/dL  


 


Albumin/Globulin Ratio


  


  0.5 (1.0-2.7)


L











Current Medications








 Medications


  (Trade)  Dose


 Ordered  Sig/Raji


 Route


 PRN Reason  Start Time


 Stop Time Status Last Admin


Dose Admin


 


 Acetaminophen


  (Tylenol)  650 mg  Q6H  PRN


 ORAL


 For Headache  8/22/20 15:45


 9/21/20 15:44  8/25/20 05:45


 


 


 Albuterol/


 Ipratropium


  (Albuterol/


 Ipratropium)  3 ml  Q4HRT  PRN


 HHN


 Shortness of Breath  8/26/20 09:00


 8/31/20 08:59  8/26/20 10:34


 


 


 Clonidine HCl


  (Catapres Tab)  0.1 mg  Q4H  PRN


 ORAL


 bp over 160 syst  8/24/20 10:15


 11/22/20 10:14   


 


 


 Dexamethasone


 Sodium Phosphate


  (Decadron 10mg/


 ml Inj)  6 mg  DAILY


 IV


   8/27/20 09:00


 11/25/20 08:59 UNV  


 


 


 Dextrose  1,000 ml @ 


 75 mls/hr  [id number]


 IV


   8/24/20 10:15


 9/20/20 10:14  8/26/20 02:01


 


 


 Docusate Sodium


  (Colace)  100 mg  TID


 GT


   8/21/20 13:00


 9/20/20 08:59  8/26/20 09:31


 


 


 Enoxaparin Sodium


  (Lovenox)  30 mg  DAILY


 SUBQ


   8/21/20 09:00


 11/19/20 08:59  8/26/20 09:32


 


 


 Epoetin Jaswant


  (Epoetin


 Jaswant-EPBX(NON


 ESRD))  10,000 unit  MON-WED-FRI


 SUBQ


   8/24/20 21:00


 11/22/20 20:59  8/24/20 21:20


 


 


 Linezolid  300 ml @ 


 300 mls/hr  Q12H


 IVPB


   8/23/20 14:00


 8/30/20 13:59  8/26/20 02:01


 


 


 Minocycline HCl


  (Minocin)  200 mg  Q12HR


 ORAL


   8/25/20 12:00


 9/1/20 11:59  8/26/20 09:31


 


 


 Pantoprazole


  (Protonix)  40 mg  EVERY 12  HOURS


 IVP


   8/21/20 21:00


 9/20/20 20:59  8/26/20 09:30


 


 


 Polyethylene


 Glycol


  (Miralax)  17 gm  BEDTIME


 ORAL


   8/21/20 21:00


 9/20/20 20:59  8/25/20 20:49


 


 


 Trimethoprim/


 Sulfamethoxazole


 10 ml/Dextrose  285 ml @ 


 190 mls/hr  EVERY 12  HOURS


 IV


   8/25/20 21:00


 9/1/20 20:59  8/26/20 09:31


 

















Tanisha Brownlee M.D. Aug 26, 2020 13:09

## 2020-08-26 NOTE — PULMONOLOGY PROGRESS NOTE
Subjective


ROS Limited/Unobtainable:  Yes


Interval Events:  more short of breath


Constitutional:  Reports: no symptoms


HEENT:  Repors: no symptoms


Respiratory:  Reports: no symptoms


Allergies:  


Coded Allergies:  


     AMPICILLIN (Verified  Allergy, Unknown, hives, 8/21/20)


     PENICILLINS (Verified  Allergy, Unknown, hives, 8/21/20)


     TETRACYCLINES (Verified  Allergy, Unknown, hives, 8/21/20)





Objective





Last 24 Hour Vital Signs








  Date Time  Temp Pulse Resp B/P (MAP) Pulse Ox O2 Delivery O2 Flow Rate FiO2


 


8/26/20 11:41  111      


 


8/26/20 10:44  122 20  98 Venturi Mask 8.0 40





  120 20  94   


 


8/26/20 08:00 99.7 114 25 133/49 (77) 94   


 


8/26/20 07:44  114      


 


8/26/20 07:00     94 Venturi Mask 8.0 40


 


8/26/20 04:00  64      


 


8/26/20 04:00 99.7 64 42 145/51 (82) 94   


 


8/26/20 00:00  111      


 


8/26/20 00:00 99.5 113 23 126/72 (90) 94   


 


8/25/20 21:00      Venturi Mask 8.0 


 


8/25/20 20:00 98.4 91 27 141/97 (112) 99   


 


8/25/20 20:00  102      


 


8/25/20 16:00 99.6 65 28 145/31 (69) 96   


 


8/25/20 16:00  117      

















Intake and Output  


 


 8/25/20 8/26/20





 19:00 07:00


 


Output Total 1300 ml 


 


Balance -1300 ml 


 


  


 


Output Urine Total 1300 ml 


 


# Voids  2


 


# Bowel Movements 2 1








General Appearance:  cachetic


HEENT:  normocephalic, atraumatic


Respiratory:  chest wall non-tender, lungs clear


Cardiovascular:  normal peripheral pulses, normal rate


Abdomen:  normal bowel sounds, soft, non tender


Genitourinary:  normal external genitalia


Skin:  no rash


Neurologic:  CNs II-XII grossly normal


Lymphatic:  no neck adenopathy


Laboratory Tests


8/26/20 05:26: 


White Blood Count 28.2*H, Red Blood Count 2.78L, Hemoglobin 8.9L, Hematocrit 

28.1L, Mean Corpuscular Volume 101H, Mean Corpuscular Hemoglobin 32.1H, Mean 

Corpuscular Hemoglobin Concent 31.8L, Red Cell Distribution Width 13.4, 

Platelet Count 158, Mean Platelet Volume 7.8, Neutrophils (%) (Auto) , 

Lymphocytes (%) (Auto) , Monocytes (%) (Auto) , Eosinophils (%) (Auto) , 

Basophils (%) (Auto) , Differential Total Cells Counted 100, Neutrophils % (

Manual) 82H, Lymphocytes % (Manual) 11L, Monocytes % (Manual) 7, Eosinophils % (

Manual) 0, Basophils % (Manual) 0, Band Neutrophils 0, Platelet Estimate 

Adequate, Platelet Morphology Normal, Hypochromasia 1+, Macrocytosis 1+, Sodium 

Level 153H, Potassium Level 4.6, Chloride Level 115H, Carbon Dioxide Level 21, 

Anion Gap 17H, Blood Urea Nitrogen 41H, Creatinine 2.2H, Estimat Glomerular 

Filtration Rate 22.0, Glucose Level 185H, Calcium Level 9.0, Phosphorus Level 

3.9, Magnesium Level 2.3, Total Bilirubin 0.3, Aspartate Amino Transf (AST/SGOT

) 19, Alanine Aminotransferase (ALT/SGPT) 36, Alkaline Phosphatase 86, C-

Reactive Protein, Quantitative 20.5H, Pro-B-Type Natriuretic Peptide 12506R, 

Total Protein 6.9, Albumin 2.2L, Globulin 4.7, Albumin/Globulin Ratio 0.5L





Current Medications








 Medications


  (Trade)  Dose


 Ordered  Sig/Raji


 Route


 PRN Reason  Start Time


 Stop Time Status Last Admin


Dose Admin


 


 Acetaminophen


  (Tylenol)  650 mg  Q6H  PRN


 ORAL


 For Headache  8/22/20 15:45


 9/21/20 15:44  8/25/20 05:45


 


 


 Albuterol/


 Ipratropium


  (Albuterol/


 Ipratropium)  3 ml  Q4HRT  PRN


 HHN


 Shortness of Breath  8/26/20 09:00


 8/31/20 08:59  8/26/20 10:34


 


 


 Clonidine HCl


  (Catapres Tab)  0.1 mg  Q4H  PRN


 ORAL


 bp over 160 syst  8/24/20 10:15


 11/22/20 10:14   


 


 


 Dextrose  1,000 ml @ 


 75 mls/hr  [id number]


 IV


   8/24/20 10:15


 9/20/20 10:14  8/26/20 02:01


 


 


 Docusate Sodium


  (Colace)  100 mg  TID


 GT


   8/21/20 13:00


 9/20/20 08:59  8/26/20 09:31


 


 


 Enoxaparin Sodium


  (Lovenox)  30 mg  DAILY


 SUBQ


   8/21/20 09:00


 11/19/20 08:59  8/26/20 09:32


 


 


 Epoetin Jaswant


  (Epoetin


 Jaswant-EPBX(NON


 ESRD))  10,000 unit  MON-WED-FRI


 SUBQ


   8/24/20 21:00


 11/22/20 20:59  8/24/20 21:20


 


 


 Linezolid  300 ml @ 


 300 mls/hr  Q12H


 IVPB


   8/23/20 14:00


 8/30/20 13:59  8/26/20 02:01


 


 


 Minocycline HCl


  (Minocin)  200 mg  Q12HR


 ORAL


   8/25/20 12:00


 9/1/20 11:59  8/26/20 09:31


 


 


 Pantoprazole


  (Protonix)  40 mg  EVERY 12  HOURS


 IVP


   8/21/20 21:00


 9/20/20 20:59  8/26/20 09:30


 


 


 Polyethylene


 Glycol


  (Miralax)  17 gm  BEDTIME


 ORAL


   8/21/20 21:00


 9/20/20 20:59  8/25/20 20:49


 


 


 Trimethoprim/


 Sulfamethoxazole


 10 ml/Dextrose  285 ml @ 


 190 mls/hr  EVERY 12  HOURS


 IV


   8/25/20 21:00


 9/1/20 20:59  8/26/20 09:31


 











Assessment/Plan


Problems:  


(1) 2019 novel coronavirus disease (COVID-19)


(2) Sepsis


(3) ARF (acute renal failure)


(4) Severe dehydration


(5) Hypernatremia


(6) Psychosis


Assessment/Plan


more short of breath today


Low grade fever


WBC still high


COVID positive


pan cultures


iv fluids 


f/u electrolytes


renal studies


urine electrolytes


dvt prophylaxis











Live Brambila MD Aug 26, 2020 12:31

## 2020-08-27 VITALS — SYSTOLIC BLOOD PRESSURE: 116 MMHG | DIASTOLIC BLOOD PRESSURE: 58 MMHG

## 2020-08-27 VITALS — SYSTOLIC BLOOD PRESSURE: 149 MMHG | DIASTOLIC BLOOD PRESSURE: 59 MMHG

## 2020-08-27 VITALS — SYSTOLIC BLOOD PRESSURE: 126 MMHG | DIASTOLIC BLOOD PRESSURE: 72 MMHG

## 2020-08-27 VITALS — DIASTOLIC BLOOD PRESSURE: 66 MMHG | SYSTOLIC BLOOD PRESSURE: 133 MMHG

## 2020-08-27 VITALS — SYSTOLIC BLOOD PRESSURE: 148 MMHG | DIASTOLIC BLOOD PRESSURE: 60 MMHG

## 2020-08-27 LAB
ADD MANUAL DIFF: YES
ANION GAP SERPL CALC-SCNC: 12 MMOL/L (ref 5–15)
BUN SERPL-MCNC: 41 MG/DL (ref 7–18)
CALCIUM SERPL-MCNC: 8.6 MG/DL (ref 8.5–10.1)
CHLORIDE SERPL-SCNC: 113 MMOL/L (ref 98–107)
CO2 SERPL-SCNC: 22 MMOL/L (ref 21–32)
CREAT SERPL-MCNC: 2 MG/DL (ref 0.55–1.3)
ERYTHROCYTE [DISTWIDTH] IN BLOOD BY AUTOMATED COUNT: 13.7 % (ref 11.6–14.8)
HCT VFR BLD CALC: 27 % (ref 37–47)
HGB BLD-MCNC: 8.6 G/DL (ref 12–16)
MCV RBC AUTO: 99 FL (ref 80–99)
PLATELET # BLD: 168 K/UL (ref 150–450)
POTASSIUM SERPL-SCNC: 5.3 MMOL/L (ref 3.5–5.1)
RBC # BLD AUTO: 2.72 M/UL (ref 4.2–5.4)
SODIUM SERPL-SCNC: 147 MMOL/L (ref 136–145)
WBC # BLD AUTO: 18.8 K/UL (ref 4.8–10.8)

## 2020-08-27 RX ADMIN — ENOXAPARIN SODIUM SCH MG: 30 INJECTION SUBCUTANEOUS at 08:36

## 2020-08-27 RX ADMIN — PANTOPRAZOLE SODIUM SCH MG: 40 INJECTION, POWDER, FOR SOLUTION INTRAVENOUS at 08:36

## 2020-08-27 RX ADMIN — PANTOPRAZOLE SODIUM SCH MG: 40 INJECTION, POWDER, FOR SOLUTION INTRAVENOUS at 20:27

## 2020-08-27 RX ADMIN — DOCUSATE SODIUM SCH MG: 50 LIQUID ORAL at 13:00

## 2020-08-27 RX ADMIN — MINOCYCLINE HYDROCHLORIDE SCH MG: 50 CAPSULE ORAL at 20:27

## 2020-08-27 RX ADMIN — SULFAMETHOXAZOLE AND TRIMETHOPRIM SCH MLS/HR: 80; 16 INJECTION, SOLUTION, CONCENTRATE INTRAVENOUS at 20:27

## 2020-08-27 RX ADMIN — DOCUSATE SODIUM SCH MG: 50 LIQUID ORAL at 17:35

## 2020-08-27 RX ADMIN — DEXAMETHASONE SODIUM PHOSPHATE SCH MG: 10 INJECTION INTRAMUSCULAR; INTRAVENOUS at 08:36

## 2020-08-27 RX ADMIN — DOCUSATE SODIUM SCH MG: 50 LIQUID ORAL at 08:36

## 2020-08-27 RX ADMIN — POLYETHYLENE GLYCOL 3350 SCH GM: 17 POWDER, FOR SOLUTION ORAL at 20:10

## 2020-08-27 RX ADMIN — MINOCYCLINE HYDROCHLORIDE SCH MG: 50 CAPSULE ORAL at 08:35

## 2020-08-27 RX ADMIN — SULFAMETHOXAZOLE AND TRIMETHOPRIM SCH MLS/HR: 80; 16 INJECTION, SOLUTION, CONCENTRATE INTRAVENOUS at 09:22

## 2020-08-27 NOTE — NEPHROLOGY PROGRESS NOTE
Assessment/Plan


Problem List:  


(1) ARF (acute renal failure)


Assessment:  Underlying CKD





(2) Sepsis


(3) UTI (urinary tract infection)


(4) Severe dehydration


(5) Hypernatremia


(6) Acute urinary retention


(7) Anemia


Assessment





Acute renal failure


Possible underlying chronic renal insufficiency


Severe dehydration


Hypotension


Sepsis, UTI


History of psychiatric disease


Elevated troponin I


Patient full code


Plan


August 27: Serum potassium 5.3.  Creatinine higher at 2.2.  Medication list 

reviewed.  Suggest to avoid nephrotoxic's like Bactrim if possible.  Continue 

to monitor renal parameters and electrolytes.  Continue per pulmonary to adjust 

pulmonary status.  May require BiPAP.


August 26: Serum sodium lower.  Renal parameters appear more stable.  

Nevertheless leukocytosis is worsened.  Continue per consultants.


August 25: Serum sodium higher.  Continue D5W 75 cc an hour.  Continue to 

monitor electrolytes.  Continue per consultants.  Serum creatinine down to 2.4 

from 5.1 on admission.  Leukocytosis persists.


August 24: Patient now on isolation for COVID-19.  Serum creatinine is 

plateauing.  Will decrease the IV fluid to 75 cc an hour.  Continue management 

per ID.  Will monitor electrolytes and renal parameters.


August 23: Discussed with MARTIN Carter.  Patient needs a Mitchell catheter.  Accurate 

intake and output.  Decrease  cc D5W an hour.  1 dose of IV Venofer and 

then subcu Epogen ordered for anemia.  Continue to monitor renal parameters


August 22: Renal parameters improving.  Continue D5W IV fluid.  Potassium 

supplement IV given.  Continue per consultants.





Previously:


Fluid challenge


Monitor renal parameters and electrolytes


Monitor intake and output


Mitchell catheter


Antibiotics


Avoid nephrotoxic's


Chest x-ray





Kidney ultrasound findings: 


Right kidney measures 8.9 cm in length. Left kidney measures 9 cm in


length. Both kidneys demonstrate markedly increased echogenicity. No 

hydronephrosis. 


There are bilateral renal cysts. Normal inferior vena cava. Bladder is empty,


contains a Mitchell catheter. 


 


Incidentally noted are gallstones.


 


Impression: Markedly echogenic kidneys, consistent with medical renal disease


Negative for hydronephrosis


Empty bladder with a Mitchell catheter


Incidental finding of cholelithiasis.





Subjective


ROS Limited/Unobtainable:  Yes





Objective


Objective





Last 24 Hour Vital Signs








  Date Time  Temp Pulse Resp B/P (MAP) Pulse Ox O2 Delivery O2 Flow Rate FiO2


 


8/27/20 12:00      Venturi Mask 8.0 


 


8/27/20 12:00 97.9 112 29 149/59 (89) 100   


 


8/27/20 12:00  107      


 


8/27/20 09:00      Venturi Mask 8.0 


 


8/27/20 08:00  107      


 


8/27/20 07:58 97.7 105 30 126/72 (90) 98   


 


8/27/20 05:54      Venturi Mask 8.0 


 


8/27/20 04:00 97.9 118 32 133/66 (88) 99   


 


8/27/20 03:44  115      


 


8/26/20 23:59 98.1 113 30 124/78 (93) 99   


 


8/26/20 23:28  103      


 


8/26/20 21:00      Venturi Mask 8.0 


 


8/26/20 20:00 98.1 109 26 127/62 (83) 98   


 


8/26/20 19:32     100 Venturi Mask 8.0 40


 


8/26/20 19:04  103      


 


8/26/20 16:00  113      


 


8/26/20 16:00 97.7 98 35 135/65 (88) 98   

















Intake and Output  


 


 8/26/20 8/27/20





 19:00 07:00


 


Intake Total 2250 ml 1895 ml


 


Output Total 800 ml 1500 ml


 


Balance 1450 ml 395 ml


 


  


 


Intake Free Water 400 ml 400 ml


 


IV Total 1185 ml 735 ml


 


Tube Feeding 605 ml 660 ml


 


Other 60 ml 100 ml


 


Output Urine Total 800 ml 1500 ml


 


# Bowel Movements  2








Laboratory Tests


8/27/20 05:30: 


White Blood Count 18.8H, Red Blood Count 2.72L, Hemoglobin 8.6L, Hematocrit 

27.0L, Mean Corpuscular Volume 99, Mean Corpuscular Hemoglobin 31.7H, Mean 

Corpuscular Hemoglobin Concent 31.9L, Red Cell Distribution Width 13.7, 

Platelet Count 168, Mean Platelet Volume 8.6, Neutrophils (%) (Auto) , 

Lymphocytes (%) (Auto) , Monocytes (%) (Auto) , Eosinophils (%) (Auto) , 

Basophils (%) (Auto) , Differential Total Cells Counted 100, Neutrophils % (

Manual) 77H, Lymphocytes % (Manual) 12L, Monocytes % (Manual) 8, Eosinophils % (

Manual) 3, Basophils % (Manual) 0, Band Neutrophils 0, Nucleated Red Blood 

Cells 1, Platelet Estimate Adequate, Platelet Morphology Normal, Hypochromasia 1

+, Macrocytosis 1+, Sodium Level 147H, Potassium Level 5.3H, Chloride Level 113H

, Carbon Dioxide Level 22, Anion Gap 12, Blood Urea Nitrogen 41H, Creatinine 

2.0H, Estimat Glomerular Filtration Rate 24.5, Glucose Level 113H, Calcium 

Level 8.6


Height (Feet):  5


Height (Inches):  2.00


Weight (Pounds):  118


General Appearance:  mild distress


EENT:  other - Venturi mask


Cardiovascular:  tachycardia


Respiratory/Chest:  decreased breath sounds


Abdomen:  distended











Derik Dos Santos MD Aug 27, 2020 14:08

## 2020-08-27 NOTE — INFECTIOUS DISEASES PROG NOTE
Assessment/Plan


71yo F with:





Sepsis


Fever; SP


Leukocytosis to 31, persistent; improving ( now on steroids)


JASWINDER to 5.1m, improving 


Hypoxic resp failure, sp NRB mask >VM


VRE UTI


Hx of COVID-19 1 mo ago ( doubt recurrent COVID-19) 


HAP


           8/26 CXR: There are increased interstitial congestion and left 

pleural effusion,since prior exam of 8/22/2020 8/22 sp cx MDR ABC (S bactrim; I Colistin, Polymixin B, Minocycline)


   8/21 BCx NTD


   8/21 UA+, UCx >100k VRE (S Zyvox)


   8/21 CXR: Left retrocardiac mild atelectasis versus infiltrate.


   8/21 COVID rapid neg; 8/22 repeat COVID-19 +





BL arm rash, appears evolving, now small pustules with dry/crusting, querry 

resolving Shingles? Though odd to have on both arms


   8/21 HSV & VZV PCR ordered





Plan:


Continue IV bactrim #4 and PO Minocycline 200mg bid #3 (high dose) MDR ABC PNA 

based on cultures


 Zyvox #5/7 for VRE UTI


      -monitor Platelets, cr





Avoid acyclovir for possible Shingles for now given crusting of rash means that 

if Shingles it's resolving on its own and given severe JASWINDER want to avoid 

nephrotoxic acyclovir





       --8/24 SP Cefepime #4


       --8/23 SP IV Vancomcyin #4





F/u cx


Monitor CBC/BMP


Trend BUE rash


COVID isolation for now 


f/u Bcx x2, Sp cx





Discussed with RN and pharmacist





Subjective


Allergies:  


Coded Allergies:  


     AMPICILLIN (Verified  Allergy, Unknown, hives, 8/21/20)


     PENICILLINS (Verified  Allergy, Unknown, hives, 8/21/20)


     TETRACYCLINES (Verified  Allergy, Unknown, hives, 8/21/20)


afebrile >48hrs


wbc and cr improving


remains on VM 8L, 40%





Objective





Last 24 Hour Vital Signs








  Date Time  Temp Pulse Resp B/P (MAP) Pulse Ox O2 Delivery O2 Flow Rate FiO2


 


8/27/20 12:00      Venturi Mask 8.0 


 


8/27/20 12:00 97.9 112 29 149/59 (89) 100   


 


8/27/20 12:00  107      


 


8/27/20 09:00      Venturi Mask 8.0 


 


8/27/20 08:00  107      


 


8/27/20 07:58 97.7 105 30 126/72 (90) 98   


 


8/27/20 05:54      Venturi Mask 8.0 


 


8/27/20 04:00 97.9 118 32 133/66 (88) 99   


 


8/27/20 03:44  115      


 


8/26/20 23:59 98.1 113 30 124/78 (93) 99   


 


8/26/20 23:28  103      


 


8/26/20 21:00      Venturi Mask 8.0 


 


8/26/20 20:00 98.1 109 26 127/62 (83) 98   


 


8/26/20 19:32     100 Venturi Mask 8.0 40


 


8/26/20 19:04  103      


 


8/26/20 16:00  113      


 


8/26/20 16:00 97.7 98 35 135/65 (88) 98   








Height (Feet):  5


Height (Inches):  2.00


Weight (Pounds):  118


HEENT:  atraumatic


Respiratory/Chest:  other - mild Rsp distress


Abdomen:  no organomegaly





Laboratory Tests








Test


  8/27/20


05:30


 


White Blood Count


  18.8 K/UL


(4.8-10.8)  H


 


Red Blood Count


  2.72 M/UL


(4.20-5.40)  L


 


Hemoglobin


  8.6 G/DL


(12.0-16.0)  L


 


Hematocrit


  27.0 %


(37.0-47.0)  L


 


Mean Corpuscular Volume 99 FL (80-99)  


 


Mean Corpuscular Hemoglobin


  31.7 PG


(27.0-31.0)  H


 


Mean Corpuscular Hemoglobin


Concent 31.9 G/DL


(32.0-36.0)  L


 


Red Cell Distribution Width


  13.7 %


(11.6-14.8)


 


Platelet Count


  168 K/UL


(150-450)


 


Mean Platelet Volume


  8.6 FL


(6.5-10.1)


 


Neutrophils (%) (Auto)


  % (45.0-75.0)


 


 


Lymphocytes (%) (Auto)


  % (20.0-45.0)


 


 


Monocytes (%) (Auto)  % (1.0-10.0)  


 


Eosinophils (%) (Auto)  % (0.0-3.0)  


 


Basophils (%) (Auto)  % (0.0-2.0)  


 


Differential Total Cells


Counted 100  


 


 


Neutrophils % (Manual) 77 % (45-75)  H


 


Lymphocytes % (Manual) 12 % (20-45)  L


 


Monocytes % (Manual) 8 % (1-10)  


 


Eosinophils % (Manual) 3 % (0-3)  


 


Basophils % (Manual) 0 % (0-2)  


 


Band Neutrophils 0 % (0-8)  


 


Nucleated Red Blood Cells 1 /100 WBC  


 


Platelet Estimate Adequate  


 


Platelet Morphology Normal  


 


Hypochromasia 1+  


 


Macrocytosis 1+  


 


Sodium Level


  147 MMOL/L


(136-145)  H


 


Potassium Level


  5.3 MMOL/L


(3.5-5.1)  H


 


Chloride Level


  113 MMOL/L


()  H


 


Carbon Dioxide Level


  22 MMOL/L


(21-32)


 


Anion Gap


  12 mmol/L


(5-15)


 


Blood Urea Nitrogen


  41 mg/dL


(7-18)  H


 


Creatinine


  2.0 MG/DL


(0.55-1.30)  H


 


Estimat Glomerular Filtration


Rate 24.5 mL/min


(>60)


 


Glucose Level


  113 MG/DL


()  H


 


Calcium Level


  8.6 MG/DL


(8.5-10.1)











Current Medications








 Medications


  (Trade)  Dose


 Ordered  Sig/Raji


 Route


 PRN Reason  Start Time


 Stop Time Status Last Admin


Dose Admin


 


 Acetaminophen


  (Tylenol)  650 mg  Q6H  PRN


 ORAL


 For Headache  8/26/20 15:30


 9/21/20 15:29   


 


 


 Albuterol/


 Ipratropium


  (Albuterol/


 Ipratropium)  3 ml  Q4HRT  PRN


 HHN


 Shortness of Breath  8/26/20 15:30


 8/31/20 15:29   


 


 


 Clonidine HCl


  (Catapres Tab)  0.1 mg  Q4H  PRN


 ORAL


 bp over 160 syst  8/26/20 15:30


 11/22/20 15:29   


 


 


 Dexamethasone


 Sodium Phosphate


  (Decadron 10mg/


 ml Inj)  6 mg  DAILY


 IV


   8/27/20 09:00


 9/5/20 09:01  8/27/20 08:36


 


 


 Dextrose  1,000 ml @ 


 75 mls/hr  [id number]


 IV


   8/26/20 16:00


 9/20/20 15:59  8/27/20 04:54


 


 


 Docusate Sodium


  (Colace)  100 mg  TID


 GT


   8/26/20 18:00


 9/20/20 08:59  8/27/20 08:36


 


 


 Enoxaparin Sodium


  (Lovenox)  30 mg  DAILY


 SUBQ


   8/27/20 09:00


 11/19/20 08:59  8/27/20 08:36


 


 


 Epoetin Jaswant


  (Epoetin


 Jaswant-EPBX(NON


 ESRD))  10,000 unit  MON-WED-FRI


 SUBQ


   8/26/20 21:00


 11/22/20 20:59  8/26/20 21:20


 


 


 Linezolid  300 ml @ 


 300 mls/hr  Q12H


 IVPB


   8/27/20 02:00


 8/30/20 13:59  8/27/20 01:14


 


 


 Minocycline HCl


  (Minocin)  200 mg  Q12HR


 ORAL


   8/26/20 21:00


 9/1/20 11:59  8/27/20 08:35


 


 


 Pantoprazole


  (Protonix)  40 mg  EVERY 12  HOURS


 IVP


   8/26/20 21:00


 9/20/20 20:59  8/27/20 08:36


 


 


 Polyethylene


 Glycol


  (Miralax)  17 gm  BEDTIME


 ORAL


   8/26/20 21:00


 9/20/20 20:59  8/26/20 21:19


 


 


 Trimethoprim/


 Sulfamethoxazole


 10 ml/Dextrose  285 ml @ 


 190 mls/hr  EVERY 12  HOURS


 IV


   8/26/20 21:00


 9/1/20 20:59  8/27/20 09:22


 

















Tanisha Brownlee M.D. Aug 27, 2020 13:17

## 2020-08-27 NOTE — PULMONOLGY CRITICAL CARE NOTE
Critical Care - Asmt/Plan


Problems:  


(1) Sepsis


(2) 2019 novel coronavirus disease (COVID-19)


(3) ARF (acute renal failure)


(4) Multiple drug resistant organism (MDRO) culture positive


(5) Psychosis


Respiratory:  monitor respiratory rate, adjust FIO2, CXR


Cardiac:  continue to monitor HR/BP


Renal:  F/U I&O, check electrolytes


Infectious Disease:  check cultures


Gastrointestinal:  continue feedings/current rate


Endocrine:  monitor blood sugar


Hematologic:  monitor H/H


Neurologic:  PRN Ativan, keep patient comfortable


Affect:  PRN ativan


Time Spent (Minutes):  40


Notes Reviewed:  internist


Discussed with:  nurses, 





Critical Care - Objective





Last 24 Hour Vital Signs








  Date Time  Temp Pulse Resp B/P (MAP) Pulse Ox O2 Delivery O2 Flow Rate FiO2


 


8/27/20 09:00      Venturi Mask 8.0 


 


8/27/20 08:00  107      


 


8/27/20 07:58 97.7 105 30 126/72 (90) 98   


 


8/27/20 05:54      Venturi Mask 8.0 


 


8/27/20 04:00 97.9 118 32 133/66 (88) 99   


 


8/27/20 03:44  115      


 


8/26/20 23:59 98.1 113 30 124/78 (93) 99   


 


8/26/20 23:28  103      


 


8/26/20 21:00      Venturi Mask 8.0 


 


8/26/20 20:00 98.1 109 26 127/62 (83) 98   


 


8/26/20 19:32     100 Venturi Mask 8.0 40


 


8/26/20 19:04  103      


 


8/26/20 16:00  113      


 


8/26/20 16:00 97.7 98 35 135/65 (88) 98   


 


8/26/20 12:00 98.7 113 35 111/41 (64) 94   








Status:  sedated, somnolent


Condition:  improving


HEENT:  atraumatic


Neck:  full ROM


Lungs:  clear


Heart:  HR/BP stable


Abdomen:  soft, active bowel sounds, feeding tube


Extremities:  no C/C/E





Critical Care - Subjective


ROS Limited/Unobtainable:  Yes


Condition:  critical


EKG Rhythm:  Sinus Rhythm


FI02:  40


Sputum Amount:  Small


Tube Feeding Amount:  55


I&O:











Intake and Output  


 


 8/26/20 8/27/20





 19:00 07:00


 


Intake Total 2250 ml 1895 ml


 


Output Total 800 ml 1500 ml


 


Balance 1450 ml 395 ml


 


  


 


Intake Free Water 400 ml 400 ml


 


IV Total 1185 ml 735 ml


 


Tube Feeding 605 ml 660 ml


 


Other 60 ml 100 ml


 


Output Urine Total 800 ml 1500 ml


 


# Bowel Movements  2








CXR:


HUNTER


Labs:





Laboratory Tests








Test


  8/26/20


13:02 8/27/20


05:30


 


Arterial Blood pH


  7.490


(7.350-7.450) 


 


 


Arterial Blood Partial


Pressure CO2 26.5 mmHg


(35.0-45.0)  L 


 


 


Arterial Blood Partial


Pressure O2 44.9 mmHg


(75.0-100.0) 


 


 


Arterial Blood HCO3


  19.7 mmol/L


(22.0-26.0)  L 


 


 


Arterial Blood Oxygen


Saturation 82.7 %


()  *L 


 


 


Arterial Blood Base Excess -2.9 (-2-2)  L 


 


Rishi Test Positive   


 


White Blood Count


  


  18.8 K/UL


(4.8-10.8)  H


 


Red Blood Count


  


  2.72 M/UL


(4.20-5.40)  L


 


Hemoglobin


  


  8.6 G/DL


(12.0-16.0)  L


 


Hematocrit


  


  27.0 %


(37.0-47.0)  L


 


Mean Corpuscular Volume  99 FL (80-99)  


 


Mean Corpuscular Hemoglobin


  


  31.7 PG


(27.0-31.0)  H


 


Mean Corpuscular Hemoglobin


Concent 


  31.9 G/DL


(32.0-36.0)  L


 


Red Cell Distribution Width


  


  13.7 %


(11.6-14.8)


 


Platelet Count


  


  168 K/UL


(150-450)


 


Mean Platelet Volume


  


  8.6 FL


(6.5-10.1)


 


Neutrophils (%) (Auto)


  


  % (45.0-75.0)


 


 


Lymphocytes (%) (Auto)


  


  % (20.0-45.0)


 


 


Monocytes (%) (Auto)   % (1.0-10.0)  


 


Eosinophils (%) (Auto)   % (0.0-3.0)  


 


Basophils (%) (Auto)   % (0.0-2.0)  


 


Differential Total Cells


Counted 


  100  


 


 


Neutrophils % (Manual)  77 % (45-75)  H


 


Lymphocytes % (Manual)  12 % (20-45)  L


 


Monocytes % (Manual)  8 % (1-10)  


 


Eosinophils % (Manual)  3 % (0-3)  


 


Basophils % (Manual)  0 % (0-2)  


 


Band Neutrophils  0 % (0-8)  


 


Nucleated Red Blood Cells  1 /100 WBC  


 


Platelet Estimate  Adequate  


 


Platelet Morphology  Normal  


 


Hypochromasia  1+  


 


Macrocytosis  1+  


 


Sodium Level


  


  147 MMOL/L


(136-145)  H


 


Potassium Level


  


  5.3 MMOL/L


(3.5-5.1)  H


 


Chloride Level


  


  113 MMOL/L


()  H


 


Carbon Dioxide Level


  


  22 MMOL/L


(21-32)


 


Anion Gap


  


  12 mmol/L


(5-15)


 


Blood Urea Nitrogen


  


  41 mg/dL


(7-18)  H


 


Creatinine


  


  2.0 MG/DL


(0.55-1.30)  H


 


Estimat Glomerular Filtration


Rate 


  24.5 mL/min


(>60)


 


Glucose Level


  


  113 MG/DL


()  H


 


Calcium Level


  


  8.6 MG/DL


(8.5-10.1)

















Live Brambila MD Aug 27, 2020 11:44

## 2020-08-27 NOTE — INTERNAL MED PROGRESS NOTE
Subjective


Date of Service:  Aug 27, 2020


Physician Name


Hebert,Wing


Attending Physician


Umer Wahl MD





Current Medications








 Medications


  (Trade)  Dose


 Ordered  Sig/Raji


 Route


 PRN Reason  Start Time


 Stop Time Status Last Admin


Dose Admin


 


 Acetaminophen


  (Tylenol)  650 mg  Q6H  PRN


 ORAL


 For Headache  8/26/20 15:30


 9/21/20 15:29   


 


 


 Albuterol/


 Ipratropium


  (Albuterol/


 Ipratropium)  3 ml  Q4HRT  PRN


 HHN


 Shortness of Breath  8/26/20 15:30


 8/31/20 15:29   


 


 


 Clonidine HCl


  (Catapres Tab)  0.1 mg  Q4H  PRN


 ORAL


 bp over 160 syst  8/26/20 15:30


 11/22/20 15:29   


 


 


 Dexamethasone


 Sodium Phosphate


  (Decadron 10mg/


 ml Inj)  6 mg  DAILY


 IV


   8/27/20 09:00


 9/5/20 09:01  8/27/20 08:36


 


 


 Dextrose  1,000 ml @ 


 75 mls/hr  [id number]


 IV


   8/26/20 16:00


 9/20/20 15:59  8/27/20 17:35


 


 


 Docusate Sodium


  (Colace)  100 mg  TID


 GT


   8/26/20 18:00


 9/20/20 08:59  8/27/20 08:36


 


 


 Enoxaparin Sodium


  (Lovenox)  30 mg  DAILY


 SUBQ


   8/27/20 09:00


 11/19/20 08:59  8/27/20 08:36


 


 


 Epoetin Jaswant


  (Epoetin


 Jaswant-EPBX(NON


 ESRD))  10,000 unit  MON-WED-FRI


 SUBQ


   8/26/20 21:00


 11/22/20 20:59  8/26/20 21:20


 


 


 Linezolid  300 ml @ 


 300 mls/hr  Q12H


 IVPB


   8/27/20 02:00


 8/30/20 13:59  8/27/20 13:12


 


 


 Minocycline HCl


  (Minocin)  200 mg  Q12HR


 ORAL


   8/26/20 21:00


 9/1/20 11:59  8/27/20 08:35


 


 


 Pantoprazole


  (Protonix)  40 mg  EVERY 12  HOURS


 IVP


   8/26/20 21:00


 9/20/20 20:59  8/27/20 08:36


 


 


 Polyethylene


 Glycol


  (Miralax)  17 gm  BEDTIME


 ORAL


   8/26/20 21:00


 9/20/20 20:59  8/26/20 21:19


 


 


 Trimethoprim/


 Sulfamethoxazole


 10 ml/Dextrose  285 ml @ 


 190 mls/hr  EVERY 12  HOURS


 IV


   8/26/20 21:00


 9/1/20 20:59  8/27/20 09:22


 








Allergies:  


Coded Allergies:  


     AMPICILLIN (Verified  Allergy, Unknown, hives, 8/21/20)


     PENICILLINS (Verified  Allergy, Unknown, hives, 8/21/20)


     TETRACYCLINES (Verified  Allergy, Unknown, hives, 8/21/20)


ROS Limited/Unobtainable:  Yes


Subjective


69 YO F with previous COVID 19 pos admitted with shortness of breath.  Now 

pneumonia and sepsis.  Cover for Int Med-DR Wahl.  Step down unit





Objective





Last Vital Signs








  Date Time  Temp Pulse Resp B/P (MAP) Pulse Ox O2 Delivery O2 Flow Rate FiO2


 


8/27/20 16:00 98.1 119 30 148/60 (89) 93   


 


8/27/20 16:00      Venturi Mask 8.0 


 


8/26/20 19:32        40











Laboratory Tests








Test


  8/27/20


05:30


 


White Blood Count


  18.8 K/UL


(4.8-10.8)  H


 


Red Blood Count


  2.72 M/UL


(4.20-5.40)  L


 


Hemoglobin


  8.6 G/DL


(12.0-16.0)  L


 


Hematocrit


  27.0 %


(37.0-47.0)  L


 


Mean Corpuscular Volume 99 FL (80-99)  


 


Mean Corpuscular Hemoglobin


  31.7 PG


(27.0-31.0)  H


 


Mean Corpuscular Hemoglobin


Concent 31.9 G/DL


(32.0-36.0)  L


 


Red Cell Distribution Width


  13.7 %


(11.6-14.8)


 


Platelet Count


  168 K/UL


(150-450)


 


Mean Platelet Volume


  8.6 FL


(6.5-10.1)


 


Neutrophils (%) (Auto)


  % (45.0-75.0)


 


 


Lymphocytes (%) (Auto)


  % (20.0-45.0)


 


 


Monocytes (%) (Auto)  % (1.0-10.0)  


 


Eosinophils (%) (Auto)  % (0.0-3.0)  


 


Basophils (%) (Auto)  % (0.0-2.0)  


 


Differential Total Cells


Counted 100  


 


 


Neutrophils % (Manual) 77 % (45-75)  H


 


Lymphocytes % (Manual) 12 % (20-45)  L


 


Monocytes % (Manual) 8 % (1-10)  


 


Eosinophils % (Manual) 3 % (0-3)  


 


Basophils % (Manual) 0 % (0-2)  


 


Band Neutrophils 0 % (0-8)  


 


Nucleated Red Blood Cells 1 /100 WBC  


 


Platelet Estimate Adequate  


 


Platelet Morphology Normal  


 


Hypochromasia 1+  


 


Macrocytosis 1+  


 


Sodium Level


  147 MMOL/L


(136-145)  H


 


Potassium Level


  5.3 MMOL/L


(3.5-5.1)  H


 


Chloride Level


  113 MMOL/L


()  H


 


Carbon Dioxide Level


  22 MMOL/L


(21-32)


 


Anion Gap


  12 mmol/L


(5-15)


 


Blood Urea Nitrogen


  41 mg/dL


(7-18)  H


 


Creatinine


  2.0 MG/DL


(0.55-1.30)  H


 


Estimat Glomerular Filtration


Rate 24.5 mL/min


(>60)


 


Glucose Level


  113 MG/DL


()  H


 


Calcium Level


  8.6 MG/DL


(8.5-10.1)

















Intake and Output  


 


 8/26/20 8/27/20





 19:00 07:00


 


Intake Total 2250 ml 1895 ml


 


Output Total 800 ml 1500 ml


 


Balance 1450 ml 395 ml


 


  


 


Intake Free Water 400 ml 400 ml


 


IV Total 1185 ml 735 ml


 


Tube Feeding 605 ml 660 ml


 


Other 60 ml 100 ml


 


Output Urine Total 800 ml 1500 ml


 


# Bowel Movements  2








Objective


General Appearance:  WD/WN, no apparent distress, lethargic


EENT:  PERRL/EOMI, normal ENT inspection


Neck:  non-tender, normal alignment, supple, normal inspection


Cardiovascular:  normal peripheral pulses, normal rate, regular rhythm, no 

gallop/murmur, no JVD


Respiratory/Chest:  Venturi mask; chest wall non-tender, respiratory distress, 

crackles/rales, rhonchi - bilaterally, expiratory wheezing


Abdomen:  normal bowel sounds, non tender, soft, no organomegaly, no mass


Extremities:  normal range of motion, non-tender


Neurologic:  CNs II-XII grossly normal, no motor/sensory deficits


Skin:  normal pigmentation, warm/dry





Assessment/Plan


Problem List:  


(1) UTI (urinary tract infection)


Assessment & Plan:  Vanco res enterococcus.  Cont zyvox per ID





(2) Sepsis


Assessment & Plan:  ID= Dr Brownlee.  Continue IV bactrim, linezolid and 

minocycline





(3) Hypernatremia


Assessment & Plan:  Nephrology=DR Dos Santos





(4) Severe dehydration


(5) ARF (acute renal failure)


Assessment & Plan:  Nephrology = Dr Dos Santos





(6) Pneumonia


Assessment & Plan:  Acenitobacter.  Cont minocycline, linezolid and IV bactrim 

per ID=Dr Brownlee; pulmonary = Wing Alatorre MD Aug 27, 2020 18:40

## 2020-08-28 VITALS — DIASTOLIC BLOOD PRESSURE: 53 MMHG | SYSTOLIC BLOOD PRESSURE: 113 MMHG

## 2020-08-28 VITALS — SYSTOLIC BLOOD PRESSURE: 103 MMHG | DIASTOLIC BLOOD PRESSURE: 52 MMHG

## 2020-08-28 VITALS — DIASTOLIC BLOOD PRESSURE: 55 MMHG | SYSTOLIC BLOOD PRESSURE: 102 MMHG

## 2020-08-28 VITALS — SYSTOLIC BLOOD PRESSURE: 130 MMHG | DIASTOLIC BLOOD PRESSURE: 47 MMHG

## 2020-08-28 VITALS — SYSTOLIC BLOOD PRESSURE: 117 MMHG | DIASTOLIC BLOOD PRESSURE: 60 MMHG

## 2020-08-28 VITALS — DIASTOLIC BLOOD PRESSURE: 51 MMHG | SYSTOLIC BLOOD PRESSURE: 92 MMHG

## 2020-08-28 LAB
ADD MANUAL DIFF: YES
ALBUMIN SERPL-MCNC: 2 G/DL (ref 3.4–5)
ALBUMIN/GLOB SERPL: 0.5 {RATIO} (ref 1–2.7)
ALP SERPL-CCNC: 80 U/L (ref 46–116)
ALT SERPL-CCNC: 34 U/L (ref 12–78)
ANION GAP SERPL CALC-SCNC: 13 MMOL/L (ref 5–15)
AST SERPL-CCNC: 25 U/L (ref 15–37)
BILIRUB SERPL-MCNC: 0.2 MG/DL (ref 0.2–1)
BUN SERPL-MCNC: 48 MG/DL (ref 7–18)
CALCIUM SERPL-MCNC: 8.6 MG/DL (ref 8.5–10.1)
CHLORIDE SERPL-SCNC: 112 MMOL/L (ref 98–107)
CO2 SERPL-SCNC: 19 MMOL/L (ref 21–32)
CREAT SERPL-MCNC: 2.2 MG/DL (ref 0.55–1.3)
ERYTHROCYTE [DISTWIDTH] IN BLOOD BY AUTOMATED COUNT: 13.3 % (ref 11.6–14.8)
GLOBULIN SER-MCNC: 4 G/DL
HCT VFR BLD CALC: 23.3 % (ref 37–47)
HGB BLD-MCNC: 7.6 G/DL (ref 12–16)
MCV RBC AUTO: 98 FL (ref 80–99)
PHOSPHATE SERPL-MCNC: 4.4 MG/DL (ref 2.5–4.9)
PLATELET # BLD: 157 K/UL (ref 150–450)
POTASSIUM SERPL-SCNC: 5.5 MMOL/L (ref 3.5–5.1)
RBC # BLD AUTO: 2.38 M/UL (ref 4.2–5.4)
SODIUM SERPL-SCNC: 144 MMOL/L (ref 136–145)
WBC # BLD AUTO: 18.2 K/UL (ref 4.8–10.8)

## 2020-08-28 RX ADMIN — SULFAMETHOXAZOLE AND TRIMETHOPRIM SCH MLS/HR: 80; 16 INJECTION, SOLUTION, CONCENTRATE INTRAVENOUS at 21:23

## 2020-08-28 RX ADMIN — DEXAMETHASONE SODIUM PHOSPHATE SCH MG: 10 INJECTION INTRAMUSCULAR; INTRAVENOUS at 08:46

## 2020-08-28 RX ADMIN — POLYETHYLENE GLYCOL 3350 SCH GM: 17 POWDER, FOR SOLUTION ORAL at 21:24

## 2020-08-28 RX ADMIN — DOCUSATE SODIUM SCH MG: 50 LIQUID ORAL at 08:01

## 2020-08-28 RX ADMIN — SULFAMETHOXAZOLE AND TRIMETHOPRIM SCH MLS/HR: 80; 16 INJECTION, SOLUTION, CONCENTRATE INTRAVENOUS at 09:00

## 2020-08-28 RX ADMIN — SULFAMETHOXAZOLE AND TRIMETHOPRIM SCH MLS/HR: 80; 16 INJECTION, SOLUTION, CONCENTRATE INTRAVENOUS at 08:45

## 2020-08-28 RX ADMIN — ENOXAPARIN SODIUM SCH MG: 30 INJECTION SUBCUTANEOUS at 08:01

## 2020-08-28 RX ADMIN — MINOCYCLINE HYDROCHLORIDE SCH MG: 50 CAPSULE ORAL at 21:24

## 2020-08-28 RX ADMIN — DOCUSATE SODIUM SCH MG: 50 LIQUID ORAL at 12:19

## 2020-08-28 RX ADMIN — DOCUSATE SODIUM SCH MG: 50 LIQUID ORAL at 17:54

## 2020-08-28 RX ADMIN — PANTOPRAZOLE SODIUM SCH MG: 40 INJECTION, POWDER, FOR SOLUTION INTRAVENOUS at 08:46

## 2020-08-28 RX ADMIN — PANTOPRAZOLE SODIUM SCH MG: 40 INJECTION, POWDER, FOR SOLUTION INTRAVENOUS at 21:23

## 2020-08-28 RX ADMIN — MINOCYCLINE HYDROCHLORIDE SCH MG: 50 CAPSULE ORAL at 08:46

## 2020-08-28 NOTE — NEPHROLOGY PROGRESS NOTE
Assessment/Plan


Problem List:  


(1) ARF (acute renal failure)


Assessment:  Underlying CKD





(2) Sepsis


(3) UTI (urinary tract infection)


(4) Severe dehydration


(5) Hypernatremia


(6) Acute urinary retention


(7) Anemia


Assessment





Acute renal failure


Possible underlying chronic renal insufficiency


Severe dehydration


Hypotension


Sepsis, UTI


History of psychiatric disease


Elevated troponin I


Patient full code


Plan


August 28: Serum potassium 5.5.Creatinine 2.2.  1 dose Kayexalate given.  

Remains of 75 cc IV fluid.  We will continue to monitor renal parameters.


August 27: Serum potassium 5.3.  Creatinine higher at 2.2.  Medication list 

reviewed.  Suggest to avoid nephrotoxic's like Bactrim if possible.  Continue 

to monitor renal parameters and electrolytes.  Continue per pulmonary to adjust 

pulmonary status.  May require BiPAP.


August 26: Serum sodium lower.  Renal parameters appear more stable.  

Nevertheless leukocytosis is worsened.  Continue per consultants.


August 25: Serum sodium higher.  Continue D5W 75 cc an hour.  Continue to 

monitor electrolytes.  Continue per consultants.  Serum creatinine down to 2.4 

from 5.1 on admission.  Leukocytosis persists.


August 24: Patient now on isolation for COVID-19.  Serum creatinine is 

plateauing.  Will decrease the IV fluid to 75 cc an hour.  Continue management 

per ID.  Will monitor electrolytes and renal parameters.


August 23: Discussed with MARTIN Carter.  Patient needs a Mitchell catheter.  Accurate 

intake and output.  Decrease  cc D5W an hour.  1 dose of IV Venofer and 

then subcu Epogen ordered for anemia.  Continue to monitor renal parameters


August 22: Renal parameters improving.  Continue D5W IV fluid.  Potassium 

supplement IV given.  Continue per consultants.





Previously:


Fluid challenge


Monitor renal parameters and electrolytes


Monitor intake and output


Mitchell catheter


Antibiotics


Avoid nephrotoxic's


Chest x-ray





Kidney ultrasound findings: 


Right kidney measures 8.9 cm in length. Left kidney measures 9 cm in


length. Both kidneys demonstrate markedly increased echogenicity. No 

hydronephrosis. 


There are bilateral renal cysts. Normal inferior vena cava. Bladder is empty,


contains a Mitchell catheter. 


 


Incidentally noted are gallstones.


 


Impression: Markedly echogenic kidneys, consistent with medical renal disease


Negative for hydronephrosis


Empty bladder with a Mitchell catheter


Incidental finding of cholelithiasis.





Subjective


ROS Limited/Unobtainable:  Yes





Objective


Objective





Last 24 Hour Vital Signs








  Date Time  Temp Pulse Resp B/P (MAP) Pulse Ox O2 Delivery O2 Flow Rate FiO2


 


8/28/20 12:00 98.4 99 25 113/53 (73) 98   


 


8/28/20 12:00      Venturi Mask 8.0 


 


8/28/20 08:00 98.4 112 35 103/52 (69) 98   


 


8/28/20 08:00  97      


 


8/28/20 08:00      Venturi Mask 8.0 


 


8/28/20 04:00      Venturi Mask 8.0 


 


8/28/20 04:00 98.1 115 30 92/51 (65) 98   


 


8/28/20 03:28  103      


 


8/28/20 00:00      Venturi Mask 8.0 


 


8/28/20 00:00 97.5 115 30 102/55 (71) 98   


 


8/27/20 23:39  116      


 


8/27/20 20:00      Venturi Mask 8.0 


 


8/27/20 20:00 97.0 115 30 116/58 (77) 98   


 


8/27/20 19:50  62      


 


8/27/20 19:35  118 25  100 Venturi Mask 8.0 40


 


8/27/20 19:26  113 28  100 Venturi Mask 8.0 40


 


8/27/20 19:25     100 Venturi Mask 8.0 40


 


8/27/20 19:25  112 28  100 Venturi Mask 8.0 40


 


8/27/20 16:00 98.1 119 30 148/60 (89) 93   


 


8/27/20 16:00  117      


 


8/27/20 16:00      Venturi Mask 8.0 

















Intake and Output  


 


 8/27/20 8/28/20





 19:00 07:00


 


Intake Total 1760 ml 1680 ml


 


Output Total 1600 ml 1400 ml


 


Balance 160 ml 280 ml


 


  


 


Intake Free Water 350 ml 120 ml


 


IV Total 750 ml 900 ml


 


Tube Feeding 660 ml 660 ml


 


Output Urine Total 1600 ml 1400 ml


 


# Bowel Movements 7 3








Laboratory Tests


8/28/20 03:30: 


White Blood Count 18.2H, Red Blood Count 2.38L, Hemoglobin 7.6L, Hematocrit 

23.3L, Mean Corpuscular Volume 98, Mean Corpuscular Hemoglobin 31.9H, Mean 

Corpuscular Hemoglobin Concent 32.6, Red Cell Distribution Width 13.3, Platelet 

Count 157, Mean Platelet Volume 7.4, Neutrophils (%) (Auto) , Lymphocytes (%) (

Auto) , Monocytes (%) (Auto) , Eosinophils (%) (Auto) , Basophils (%) (Auto) , 

Differential Total Cells Counted 100, Neutrophils % (Manual) 76H, Lymphocytes % 

(Manual) 13L, Monocytes % (Manual) 10, Eosinophils % (Manual) 1, Basophils % (

Manual) 0, Band Neutrophils 0, Nucleated Red Blood Cells 1, Platelet Estimate 

Adequate, Platelet Morphology Normal, Hypochromasia 1+, Macrocytosis 1+, 

Erythrocyte Sedimentation Rate 112H, Sodium Level 144, Potassium Level 5.5H, 

Chloride Level 112H, Carbon Dioxide Level 19L, Anion Gap 13, Blood Urea 

Nitrogen 48H, Creatinine 2.2H, Estimat Glomerular Filtration Rate 22.0, Glucose 

Level 93, Calcium Level 8.6, Phosphorus Level 4.4, Magnesium Level 1.9, Total 

Bilirubin 0.2, Aspartate Amino Transf (AST/SGOT) 25, Alanine Aminotransferase (

ALT/SGPT) 34, Alkaline Phosphatase 80, C-Reactive Protein, Quantitative 7.0H, 

Total Protein 6.0L, Albumin 2.0L, Globulin 4.0, Albumin/Globulin Ratio 0.5L


Height (Feet):  5


Height (Inches):  2.00


Weight (Pounds):  122


EENT:  other - On Venturi mask


Cardiovascular:  tachycardia


Respiratory/Chest:  decreased breath sounds


Abdomen:  distended











Derik Dos Santos MD Aug 28, 2020 12:28

## 2020-08-28 NOTE — INTERNAL MED PROGRESS NOTE
Subjective


Date of Service:  Aug 28, 2020


Physician Name


HebertWing


Attending Physician


Umer Wahl MD





Current Medications








 Medications


  (Trade)  Dose


 Ordered  Sig/Raji


 Route


 PRN Reason  Start Time


 Stop Time Status Last Admin


Dose Admin


 


 Acetaminophen


  (Tylenol)  650 mg  Q6H  PRN


 ORAL


 For Headache  8/26/20 15:30


 9/21/20 15:29   


 


 


 Albuterol/


 Ipratropium


  (Albuterol/


 Ipratropium)  3 ml  Q4HRT  PRN


 HHN


 Shortness of Breath  8/26/20 15:30


 8/31/20 15:29  8/27/20 19:25


 


 


 Clonidine HCl


  (Catapres Tab)  0.1 mg  Q4H  PRN


 ORAL


 bp over 160 syst  8/26/20 15:30


 11/22/20 15:29   


 


 


 Dexamethasone


 Sodium Phosphate


  (Decadron 10mg/


 ml Inj)  6 mg  DAILY


 IV


   8/27/20 09:00


 9/5/20 09:01  8/28/20 08:46


 


 


 Dextrose  1,000 ml @ 


 75 mls/hr  [id number]


 IV


   8/26/20 16:00


 9/20/20 15:59  8/28/20 06:02


 


 


 Docusate Sodium


  (Colace)  100 mg  TID


 GT


   8/26/20 18:00


 9/20/20 08:59  8/27/20 08:36


 


 


 Enoxaparin Sodium


  (Lovenox)  30 mg  DAILY


 SUBQ


   8/27/20 09:00


 11/19/20 08:59  8/27/20 08:36


 


 


 Epoetin Jaswant


  (Epoetin


 Jaswant-EPBX(NON


 ESRD))  10,000 unit  MON-WED-FRI


 SUBQ


   8/26/20 21:00


 11/22/20 20:59  8/26/20 21:20


 


 


 Linezolid  300 ml @ 


 300 mls/hr  Q12H


 IVPB


   8/27/20 02:00


 8/30/20 13:59  8/28/20 02:40


 


 


 Minocycline HCl


  (Minocin)  200 mg  Q12HR


 ORAL


   8/26/20 21:00


 9/1/20 11:59  8/28/20 08:46


 


 


 Pantoprazole


  (Protonix)  40 mg  EVERY 12  HOURS


 IVP


   8/26/20 21:00


 9/20/20 20:59  8/28/20 08:46


 


 


 Polyethylene


 Glycol


  (Miralax)  17 gm  BEDTIME


 ORAL


   8/26/20 21:00


 9/20/20 20:59  8/26/20 21:19


 


 


 Trimethoprim/


 Sulfamethoxazole


 10 ml/Dextrose  285 ml @ 


 190 mls/hr  EVERY 12  HOURS


 IV


   8/26/20 21:00


 9/1/20 20:59  8/28/20 09:00


 








Allergies:  


Coded Allergies:  


     AMPICILLIN (Verified  Allergy, Unknown, hives, 8/21/20)


     PENICILLINS (Verified  Allergy, Unknown, hives, 8/21/20)


     TETRACYCLINES (Verified  Allergy, Unknown, hives, 8/21/20)


ROS Limited/Unobtainable:  Yes


Subjective


69 YO F with previous COVID 19 pos admitted with shortness of breath.  Now 

pneumonia and sepsis.  Cover for Int Med-DR Wahl.  Step down unit





Objective





Last Vital Signs








  Date Time  Temp Pulse Resp B/P (MAP) Pulse Ox O2 Delivery O2 Flow Rate FiO2


 


8/28/20 12:00 98.4 99 25 113/53 (73) 98   


 


8/28/20 12:00      Venturi Mask 8.0 


 


8/27/20 19:35        40











Laboratory Tests








Test


  8/28/20


03:30


 


White Blood Count


  18.2 K/UL


(4.8-10.8)  H


 


Red Blood Count


  2.38 M/UL


(4.20-5.40)  L


 


Hemoglobin


  7.6 G/DL


(12.0-16.0)  L


 


Hematocrit


  23.3 %


(37.0-47.0)  L


 


Mean Corpuscular Volume 98 FL (80-99)  


 


Mean Corpuscular Hemoglobin


  31.9 PG


(27.0-31.0)  H


 


Mean Corpuscular Hemoglobin


Concent 32.6 G/DL


(32.0-36.0)


 


Red Cell Distribution Width


  13.3 %


(11.6-14.8)


 


Platelet Count


  157 K/UL


(150-450)


 


Mean Platelet Volume


  7.4 FL


(6.5-10.1)


 


Neutrophils (%) (Auto)


  % (45.0-75.0)


 


 


Lymphocytes (%) (Auto)


  % (20.0-45.0)


 


 


Monocytes (%) (Auto)  % (1.0-10.0)  


 


Eosinophils (%) (Auto)  % (0.0-3.0)  


 


Basophils (%) (Auto)  % (0.0-2.0)  


 


Differential Total Cells


Counted 100  


 


 


Neutrophils % (Manual) 76 % (45-75)  H


 


Lymphocytes % (Manual) 13 % (20-45)  L


 


Monocytes % (Manual) 10 % (1-10)  


 


Eosinophils % (Manual) 1 % (0-3)  


 


Basophils % (Manual) 0 % (0-2)  


 


Band Neutrophils 0 % (0-8)  


 


Nucleated Red Blood Cells 1 /100 WBC  


 


Platelet Estimate Adequate  


 


Platelet Morphology Normal  


 


Hypochromasia 1+  


 


Macrocytosis 1+  


 


Erythrocyte Sedimentation Rate


  112 MM/HR


(0-30)  H


 


Sodium Level


  144 MMOL/L


(136-145)


 


Potassium Level


  5.5 MMOL/L


(3.5-5.1)  H


 


Chloride Level


  112 MMOL/L


()  H


 


Carbon Dioxide Level


  19 MMOL/L


(21-32)  L


 


Anion Gap


  13 mmol/L


(5-15)


 


Blood Urea Nitrogen


  48 mg/dL


(7-18)  H


 


Creatinine


  2.2 MG/DL


(0.55-1.30)  H


 


Estimat Glomerular Filtration


Rate 22.0 mL/min


(>60)


 


Glucose Level


  93 MG/DL


()


 


Calcium Level


  8.6 MG/DL


(8.5-10.1)


 


Phosphorus Level


  4.4 MG/DL


(2.5-4.9)


 


Magnesium Level


  1.9 MG/DL


(1.8-2.4)


 


Total Bilirubin


  0.2 MG/DL


(0.2-1.0)


 


Aspartate Amino Transf


(AST/SGOT) 25 U/L (15-37)


 


 


Alanine Aminotransferase


(ALT/SGPT) 34 U/L (12-78)


 


 


Alkaline Phosphatase


  80 U/L


()


 


C-Reactive Protein,


Quantitative 7.0 mg/dL


(0.00-0.90)  H


 


Total Protein


  6.0 G/DL


(6.4-8.2)  L


 


Albumin


  2.0 G/DL


(3.4-5.0)  L


 


Globulin 4.0 g/dL  


 


Albumin/Globulin Ratio


  0.5 (1.0-2.7)


L











Microbiology








 Date/Time


Source Procedure


Growth Status


 


 


 8/26/20 20:00


Blood Blood Culture - Preliminary


NO GROWTH AFTER 24 HOURS Resulted


 


 8/26/20 19:40


Blood Blood Culture - Preliminary


NO GROWTH AFTER 24 HOURS Resulted


 


 8/27/20 09:00


Stool Clostridium difficile Toxin Assay - Final Complete

















Intake and Output  


 


 8/27/20 8/28/20





 19:00 07:00


 


Intake Total 1760 ml 1680 ml


 


Output Total 1600 ml 1400 ml


 


Balance 160 ml 280 ml


 


  


 


Intake Free Water 350 ml 120 ml


 


IV Total 750 ml 900 ml


 


Tube Feeding 660 ml 660 ml


 


Output Urine Total 1600 ml 1400 ml


 


# Bowel Movements 7 3








Objective


General Appearance:  WD/WN, no apparent distress, lethargic


EENT:  PERRL/EOMI, normal ENT inspection


Neck:  non-tender, normal alignment, supple, normal inspection


Cardiovascular:  normal peripheral pulses, normal rate, regular rhythm, no 

gallop/murmur, no JVD


Respiratory/Chest:  Venturi mask; chest wall non-tender, respiratory distress, 

crackles/rales, rhonchi - bilaterally, expiratory wheezing


Abdomen:  normal bowel sounds, non tender, soft, no organomegaly, no mass


Extremities:  normal range of motion, non-tender


Neurologic:  CNs II-XII grossly normal, no motor/sensory deficits


Skin:  normal pigmentation, warm/dry





Assessment/Plan


Problem List:  


(1) UTI (urinary tract infection)


Assessment & Plan:  Vanco res enterococcus.  Cont zyvox per ID





(2) Sepsis


Assessment & Plan:  ID= Dr Brownlee.  Continue IV bactrim, linezolid and 

minocycline





(3) Hypernatremia


Assessment & Plan:  Nephrology=DR Dos Santos





(4) Severe dehydration


(5) ARF (acute renal failure)


Assessment & Plan:  Nephrology = Dr Dos Santos





(6) Pneumonia


Assessment & Plan:  Acenitobacter.  Cont minocycline, linezolid and IV bactrim 

per ID=Dr Brownlee; pulmonary = Dr Brambila





(7) Hyperkalemia











Wing Hebert MD Aug 28, 2020 12:39

## 2020-08-28 NOTE — INFECTIOUS DISEASES PROG NOTE
Assessment/Plan


71yo F with:





Sepsis


Fever; SP


Leukocytosis to 31, persistent; improving ( now on steroids)


JASWINDER to 5.1m, improving 


Hypoxic resp failure, sp NRB mask >VM


VRE UTI


Hx of COVID-19 1 mo ago ( doubt recurrent COVID-19) 


HAP


          8/27 cdiff neg


           8/26 CXR: There are increased interstitial congestion and left 

pleural effusion,since prior exam of 8/22/2020


               Bcx NTD


            8/22 sp cx MDR ABC (S bactrim; I Colistin, Polymixin B, Minocycline)


   8/21 BCx NTD


   8/21 UA+, UCx >100k VRE (S Zyvox)


   8/21 CXR: Left retrocardiac mild atelectasis versus infiltrate.


   8/21 COVID rapid neg; 8/22 repeat COVID-19 +





BL arm rash, appears evolving, now small pustules with dry/crusting, querry 

resolving Shingles? Though odd to have on both arms


   8/21 HSV & VZV PCR ordered





Plan:


Continue IV bactrim #5 and PO Minocycline 200mg bid #4 (high dose) MDR ABC PNA 

based on cultures


 Zyvox #6/7 for VRE UTI


      -monitor Platelets, cr





Avoid acyclovir for possible Shingles for now given crusting of rash means that 

if Shingles it's resolving on its own and given severe JASWINDER want to avoid 

nephrotoxic acyclovir





       --8/24 SP Cefepime #4


       --8/23 SP IV Vancomcyin #4





F/u cx


Monitor CBC/BMP


Trend BUE rash


COVID isolation for now 


f/u Bcx x2, Sp cx





Discussed with RN and pharmacist





Subjective


Allergies:  


Coded Allergies:  


     AMPICILLIN (Verified  Allergy, Unknown, hives, 8/21/20)


     PENICILLINS (Verified  Allergy, Unknown, hives, 8/21/20)


     TETRACYCLINES (Verified  Allergy, Unknown, hives, 8/21/20)


afebrile >72hrs


wbc improved


remains on VM 8L, 40%


Bcx NTD





Objective





Last 24 Hour Vital Signs








  Date Time  Temp Pulse Resp B/P (MAP) Pulse Ox O2 Delivery O2 Flow Rate FiO2


 


8/28/20 12:00 98.4 99 25 113/53 (73) 98   


 


8/28/20 12:00      Venturi Mask 8.0 


 


8/28/20 08:00 98.4 112 35 103/52 (69) 98   


 


8/28/20 08:00  97      


 


8/28/20 08:00      Venturi Mask 8.0 


 


8/28/20 04:00      Venturi Mask 8.0 


 


8/28/20 04:00 98.1 115 30 92/51 (65) 98   


 


8/28/20 03:28  103      


 


8/28/20 00:00      Venturi Mask 8.0 


 


8/28/20 00:00 97.5 115 30 102/55 (71) 98   


 


8/27/20 23:39  116      


 


8/27/20 20:00      Venturi Mask 8.0 


 


8/27/20 20:00 97.0 115 30 116/58 (77) 98   


 


8/27/20 19:50  62      


 


8/27/20 19:35  118 25  100 Venturi Mask 8.0 40


 


8/27/20 19:26  113 28  100 Venturi Mask 8.0 40


 


8/27/20 19:25     100 Venturi Mask 8.0 40


 


8/27/20 19:25  112 28  100 Venturi Mask 8.0 40


 


8/27/20 16:00 98.1 119 30 148/60 (89) 93   


 


8/27/20 16:00  117      


 


8/27/20 16:00      Venturi Mask 8.0 








Height (Feet):  5


Height (Inches):  2.00


Weight (Pounds):  122


HEENT:  atraumatic


Respiratory/Chest:  other - mild Rsp distress


Abdomen:  no organomegaly





Microbiology








 Date/Time


Source Procedure


Growth Status


 


 


 8/26/20 20:00


Blood Blood Culture - Preliminary


NO GROWTH AFTER 24 HOURS Resulted


 


 8/26/20 19:40


Blood Blood Culture - Preliminary


NO GROWTH AFTER 24 HOURS Resulted


 


 8/27/20 09:00


Stool Clostridium difficile Toxin Assay - Final Complete











Laboratory Tests








Test


  8/28/20


03:30


 


White Blood Count


  18.2 K/UL


(4.8-10.8)  H


 


Red Blood Count


  2.38 M/UL


(4.20-5.40)  L


 


Hemoglobin


  7.6 G/DL


(12.0-16.0)  L


 


Hematocrit


  23.3 %


(37.0-47.0)  L


 


Mean Corpuscular Volume 98 FL (80-99)  


 


Mean Corpuscular Hemoglobin


  31.9 PG


(27.0-31.0)  H


 


Mean Corpuscular Hemoglobin


Concent 32.6 G/DL


(32.0-36.0)


 


Red Cell Distribution Width


  13.3 %


(11.6-14.8)


 


Platelet Count


  157 K/UL


(150-450)


 


Mean Platelet Volume


  7.4 FL


(6.5-10.1)


 


Neutrophils (%) (Auto)


  % (45.0-75.0)


 


 


Lymphocytes (%) (Auto)


  % (20.0-45.0)


 


 


Monocytes (%) (Auto)  % (1.0-10.0)  


 


Eosinophils (%) (Auto)  % (0.0-3.0)  


 


Basophils (%) (Auto)  % (0.0-2.0)  


 


Differential Total Cells


Counted 100  


 


 


Neutrophils % (Manual) 76 % (45-75)  H


 


Lymphocytes % (Manual) 13 % (20-45)  L


 


Monocytes % (Manual) 10 % (1-10)  


 


Eosinophils % (Manual) 1 % (0-3)  


 


Basophils % (Manual) 0 % (0-2)  


 


Band Neutrophils 0 % (0-8)  


 


Nucleated Red Blood Cells 1 /100 WBC  


 


Platelet Estimate Adequate  


 


Platelet Morphology Normal  


 


Hypochromasia 1+  


 


Macrocytosis 1+  


 


Erythrocyte Sedimentation Rate


  112 MM/HR


(0-30)  H


 


Sodium Level


  144 MMOL/L


(136-145)


 


Potassium Level


  5.5 MMOL/L


(3.5-5.1)  H


 


Chloride Level


  112 MMOL/L


()  H


 


Carbon Dioxide Level


  19 MMOL/L


(21-32)  L


 


Anion Gap


  13 mmol/L


(5-15)


 


Blood Urea Nitrogen


  48 mg/dL


(7-18)  H


 


Creatinine


  2.2 MG/DL


(0.55-1.30)  H


 


Estimat Glomerular Filtration


Rate 22.0 mL/min


(>60)


 


Glucose Level


  93 MG/DL


()


 


Calcium Level


  8.6 MG/DL


(8.5-10.1)


 


Phosphorus Level


  4.4 MG/DL


(2.5-4.9)


 


Magnesium Level


  1.9 MG/DL


(1.8-2.4)


 


Total Bilirubin


  0.2 MG/DL


(0.2-1.0)


 


Aspartate Amino Transf


(AST/SGOT) 25 U/L (15-37)


 


 


Alanine Aminotransferase


(ALT/SGPT) 34 U/L (12-78)


 


 


Alkaline Phosphatase


  80 U/L


()


 


C-Reactive Protein,


Quantitative 7.0 mg/dL


(0.00-0.90)  H


 


Total Protein


  6.0 G/DL


(6.4-8.2)  L


 


Albumin


  2.0 G/DL


(3.4-5.0)  L


 


Globulin 4.0 g/dL  


 


Albumin/Globulin Ratio


  0.5 (1.0-2.7)


L











Current Medications








 Medications


  (Trade)  Dose


 Ordered  Sig/Raji


 Route


 PRN Reason  Start Time


 Stop Time Status Last Admin


Dose Admin


 


 Acetaminophen


  (Tylenol)  650 mg  Q6H  PRN


 ORAL


 For Headache  8/26/20 15:30


 9/21/20 15:29   


 


 


 Albuterol/


 Ipratropium


  (Albuterol/


 Ipratropium)  3 ml  Q4HRT  PRN


 HHN


 Shortness of Breath  8/26/20 15:30


 8/31/20 15:29  8/27/20 19:25


 


 


 Clonidine HCl


  (Catapres Tab)  0.1 mg  Q4H  PRN


 ORAL


 bp over 160 syst  8/26/20 15:30


 11/22/20 15:29   


 


 


 Dexamethasone


 Sodium Phosphate


  (Decadron 10mg/


 ml Inj)  6 mg  DAILY


 IV


   8/27/20 09:00


 9/5/20 09:01  8/28/20 08:46


 


 


 Dextrose  1,000 ml @ 


 75 mls/hr  [id number]


 IV


   8/26/20 16:00


 9/20/20 15:59  8/28/20 06:02


 


 


 Docusate Sodium


  (Colace)  100 mg  TID


 GT


   8/26/20 18:00


 9/20/20 08:59  8/27/20 08:36


 


 


 Enoxaparin Sodium


  (Lovenox)  30 mg  DAILY


 SUBQ


   8/27/20 09:00


 11/19/20 08:59  8/27/20 08:36


 


 


 Epoetin Jaswant


  (Epoetin


 Jaswant-EPBX(NON


 ESRD))  10,000 unit  MON-WED-FRI


 SUBQ


   8/26/20 21:00


 11/22/20 20:59  8/26/20 21:20


 


 


 Linezolid  300 ml @ 


 300 mls/hr  Q12H


 IVPB


   8/27/20 02:00


 8/30/20 13:59  8/28/20 02:40


 


 


 Minocycline HCl


  (Minocin)  200 mg  Q12HR


 ORAL


   8/26/20 21:00


 9/1/20 11:59  8/28/20 08:46


 


 


 Pantoprazole


  (Protonix)  40 mg  EVERY 12  HOURS


 IVP


   8/26/20 21:00


 9/20/20 20:59  8/28/20 08:46


 


 


 Polyethylene


 Glycol


  (Miralax)  17 gm  BEDTIME


 ORAL


   8/26/20 21:00


 9/20/20 20:59  8/26/20 21:19


 


 


 Trimethoprim/


 Sulfamethoxazole


 10 ml/Dextrose  285 ml @ 


 190 mls/hr  EVERY 12  HOURS


 IV


   8/26/20 21:00


 9/1/20 20:59  8/28/20 09:00


 

















Tanisha Brownlee M.D. Aug 28, 2020 13:00

## 2020-08-28 NOTE — PULMONOLGY CRITICAL CARE NOTE
Critical Care - Asmt/Plan


Problems:  


(1) Sepsis


(2) 2019 novel coronavirus disease (COVID-19)


(3) ARF (acute renal failure)


(4) Multiple drug resistant organism (MDRO) culture positive


(5) Psychosis


Respiratory:  monitor respiratory rate, adjust FIO2, CXR


Cardiac:  continue pressors, continue to monitor HR/BP


Renal:  F/U I&O, check electrolytes


Infectious Disease:  check cultures


Gastrointestinal:  continue feedings/current rate


Endocrine:  monitor blood sugar


Hematologic:  monitor H/H, transfuse if hgb<8.5


Neurologic:  PRN Ativan, keep patient comfortable


Affect:  PRN ativan


Prophylaxis:  Protonix


Time Spent (Minutes):  40


Notes Reviewed:  internist, cardio, renal


Discussed with:  nurses, consultants, 





Critical Care - Objective





Last 24 Hour Vital Signs








  Date Time  Temp Pulse Resp B/P (MAP) Pulse Ox O2 Delivery O2 Flow Rate FiO2


 


8/28/20 08:00 98.4 112 35 103/52 (69) 98   


 


8/28/20 08:00  97      


 


8/28/20 08:00      Venturi Mask 8.0 


 


8/28/20 04:00      Venturi Mask 8.0 


 


8/28/20 04:00 98.1 115 30 92/51 (65) 98   


 


8/28/20 03:28  103      


 


8/28/20 00:00      Venturi Mask 8.0 


 


8/28/20 00:00 97.5 115 30 102/55 (71) 98   


 


8/27/20 23:39  116      


 


8/27/20 20:00      Venturi Mask 8.0 


 


8/27/20 20:00 97.0 115 30 116/58 (77) 98   


 


8/27/20 19:50  62      


 


8/27/20 19:35  118 25  100 Venturi Mask 8.0 40


 


8/27/20 19:26  113 28  100 Venturi Mask 8.0 40


 


8/27/20 19:25     100 Venturi Mask 8.0 40


 


8/27/20 19:25  112 28  100 Venturi Mask 8.0 40


 


8/27/20 16:00 98.1 119 30 148/60 (89) 93   


 


8/27/20 16:00  117      


 


8/27/20 16:00      Venturi Mask 8.0 


 


8/27/20 12:00      Venturi Mask 8.0 


 


8/27/20 12:00 97.9 112 29 149/59 (89) 100   


 


8/27/20 12:00  107      








Status:  awake


Condition:  critical


HEENT:  atraumatic


Lungs:  clear


Heart:  HR/BP stable


Abdomen:  soft, active bowel sounds


Extremities:  no C/C/E


Decubiti:  location


Micro:





Microbiology








 Date/Time


Source Procedure


Growth Status


 


 


 8/26/20 20:00


Blood Blood Culture - Preliminary


NO GROWTH AFTER 24 HOURS Resulted


 


 8/26/20 19:40


Blood Blood Culture - Preliminary


NO GROWTH AFTER 24 HOURS Resulted


 


 8/27/20 09:00


Stool Clostridium difficile Toxin Assay - Final Complete











Critical Care - Subjective


ROS Limited/Unobtainable:  Yes


Condition:  critical


EKG Rhythm:  Sinus Rhythm


FI02:  40


Sputum Amount:  None


Tube Feeding Amount:  55


I&O:











Intake and Output  


 


 8/27/20 8/28/20





 19:00 07:00


 


Intake Total 1760 ml 1680 ml


 


Output Total 1600 ml 1400 ml


 


Balance 160 ml 280 ml


 


  


 


Intake Free Water 350 ml 120 ml


 


IV Total 750 ml 900 ml


 


Tube Feeding 660 ml 660 ml


 


Output Urine Total 1600 ml 1400 ml


 


# Bowel Movements 7 3








CXR:


HUNTER


Labs:





Laboratory Tests








Test


  8/28/20


03:30


 


White Blood Count


  18.2 K/UL


(4.8-10.8)  H


 


Red Blood Count


  2.38 M/UL


(4.20-5.40)  L


 


Hemoglobin


  7.6 G/DL


(12.0-16.0)  L


 


Hematocrit


  23.3 %


(37.0-47.0)  L


 


Mean Corpuscular Volume 98 FL (80-99)  


 


Mean Corpuscular Hemoglobin


  31.9 PG


(27.0-31.0)  H


 


Mean Corpuscular Hemoglobin


Concent 32.6 G/DL


(32.0-36.0)


 


Red Cell Distribution Width


  13.3 %


(11.6-14.8)


 


Platelet Count


  157 K/UL


(150-450)


 


Mean Platelet Volume


  7.4 FL


(6.5-10.1)


 


Neutrophils (%) (Auto)


  % (45.0-75.0)


 


 


Lymphocytes (%) (Auto)


  % (20.0-45.0)


 


 


Monocytes (%) (Auto)  % (1.0-10.0)  


 


Eosinophils (%) (Auto)  % (0.0-3.0)  


 


Basophils (%) (Auto)  % (0.0-2.0)  


 


Differential Total Cells


Counted 100  


 


 


Neutrophils % (Manual) 76 % (45-75)  H


 


Lymphocytes % (Manual) 13 % (20-45)  L


 


Monocytes % (Manual) 10 % (1-10)  


 


Eosinophils % (Manual) 1 % (0-3)  


 


Basophils % (Manual) 0 % (0-2)  


 


Band Neutrophils 0 % (0-8)  


 


Nucleated Red Blood Cells 1 /100 WBC  


 


Platelet Estimate Adequate  


 


Platelet Morphology Normal  


 


Hypochromasia 1+  


 


Macrocytosis 1+  


 


Erythrocyte Sedimentation Rate


  112 MM/HR


(0-30)  H


 


Sodium Level


  144 MMOL/L


(136-145)


 


Potassium Level


  5.5 MMOL/L


(3.5-5.1)  H


 


Chloride Level


  112 MMOL/L


()  H


 


Carbon Dioxide Level


  19 MMOL/L


(21-32)  L


 


Anion Gap


  13 mmol/L


(5-15)


 


Blood Urea Nitrogen


  48 mg/dL


(7-18)  H


 


Creatinine


  2.2 MG/DL


(0.55-1.30)  H


 


Estimat Glomerular Filtration


Rate 22.0 mL/min


(>60)


 


Glucose Level


  93 MG/DL


()


 


Calcium Level


  8.6 MG/DL


(8.5-10.1)


 


Phosphorus Level


  4.4 MG/DL


(2.5-4.9)


 


Magnesium Level


  1.9 MG/DL


(1.8-2.4)


 


Total Bilirubin


  0.2 MG/DL


(0.2-1.0)


 


Aspartate Amino Transf


(AST/SGOT) 25 U/L (15-37)


 


 


Alanine Aminotransferase


(ALT/SGPT) 34 U/L (12-78)


 


 


Alkaline Phosphatase


  80 U/L


()


 


C-Reactive Protein,


Quantitative 7.0 mg/dL


(0.00-0.90)  H


 


Total Protein


  6.0 G/DL


(6.4-8.2)  L


 


Albumin


  2.0 G/DL


(3.4-5.0)  L


 


Globulin 4.0 g/dL  


 


Albumin/Globulin Ratio


  0.5 (1.0-2.7)


L

















Live Brambila MD Aug 28, 2020 11:37

## 2020-08-29 VITALS — SYSTOLIC BLOOD PRESSURE: 134 MMHG | DIASTOLIC BLOOD PRESSURE: 50 MMHG

## 2020-08-29 VITALS — DIASTOLIC BLOOD PRESSURE: 52 MMHG | SYSTOLIC BLOOD PRESSURE: 109 MMHG

## 2020-08-29 VITALS — SYSTOLIC BLOOD PRESSURE: 106 MMHG | DIASTOLIC BLOOD PRESSURE: 52 MMHG

## 2020-08-29 VITALS — DIASTOLIC BLOOD PRESSURE: 44 MMHG | SYSTOLIC BLOOD PRESSURE: 104 MMHG

## 2020-08-29 VITALS — DIASTOLIC BLOOD PRESSURE: 72 MMHG | SYSTOLIC BLOOD PRESSURE: 133 MMHG

## 2020-08-29 VITALS — SYSTOLIC BLOOD PRESSURE: 126 MMHG | DIASTOLIC BLOOD PRESSURE: 60 MMHG

## 2020-08-29 LAB
ADD MANUAL DIFF: YES
ALBUMIN SERPL-MCNC: 2.1 G/DL (ref 3.4–5)
ALBUMIN/GLOB SERPL: 0.5 {RATIO} (ref 1–2.7)
ALP SERPL-CCNC: 107 U/L (ref 46–116)
ALT SERPL-CCNC: 44 U/L (ref 12–78)
ANION GAP SERPL CALC-SCNC: 15 MMOL/L (ref 5–15)
AST SERPL-CCNC: 24 U/L (ref 15–37)
BILIRUB SERPL-MCNC: 0.2 MG/DL (ref 0.2–1)
BUN SERPL-MCNC: 51 MG/DL (ref 7–18)
CALCIUM SERPL-MCNC: 9.3 MG/DL (ref 8.5–10.1)
CHLORIDE SERPL-SCNC: 108 MMOL/L (ref 98–107)
CO2 SERPL-SCNC: 22 MMOL/L (ref 21–32)
CREAT SERPL-MCNC: 2.2 MG/DL (ref 0.55–1.3)
ERYTHROCYTE [DISTWIDTH] IN BLOOD BY AUTOMATED COUNT: 12.7 % (ref 11.6–14.8)
GLOBULIN SER-MCNC: 4 G/DL
HCT VFR BLD CALC: 28.8 % (ref 37–47)
HGB BLD-MCNC: 9.7 G/DL (ref 12–16)
MCV RBC AUTO: 96 FL (ref 80–99)
PHOSPHATE SERPL-MCNC: 5.8 MG/DL (ref 2.5–4.9)
PLATELET # BLD: 165 K/UL (ref 150–450)
POTASSIUM SERPL-SCNC: 3.1 MMOL/L (ref 3.5–5.1)
RBC # BLD AUTO: 3 M/UL (ref 4.2–5.4)
SODIUM SERPL-SCNC: 145 MMOL/L (ref 136–145)
WBC # BLD AUTO: 18.3 K/UL (ref 4.8–10.8)

## 2020-08-29 RX ADMIN — LORAZEPAM PRN MG: 2 INJECTION, SOLUTION INTRAMUSCULAR; INTRAVENOUS at 22:35

## 2020-08-29 RX ADMIN — POLYETHYLENE GLYCOL 3350 SCH GM: 17 POWDER, FOR SOLUTION ORAL at 20:16

## 2020-08-29 RX ADMIN — DEXAMETHASONE SODIUM PHOSPHATE SCH MG: 10 INJECTION INTRAMUSCULAR; INTRAVENOUS at 08:31

## 2020-08-29 RX ADMIN — DOCUSATE SODIUM SCH MG: 50 LIQUID ORAL at 17:10

## 2020-08-29 RX ADMIN — SULFAMETHOXAZOLE AND TRIMETHOPRIM SCH MLS/HR: 80; 16 INJECTION, SOLUTION, CONCENTRATE INTRAVENOUS at 08:31

## 2020-08-29 RX ADMIN — ENOXAPARIN SODIUM SCH MG: 30 INJECTION SUBCUTANEOUS at 08:31

## 2020-08-29 RX ADMIN — MINOCYCLINE HYDROCHLORIDE SCH MG: 50 CAPSULE ORAL at 20:15

## 2020-08-29 RX ADMIN — DOCUSATE SODIUM SCH MG: 50 LIQUID ORAL at 08:31

## 2020-08-29 RX ADMIN — DOCUSATE SODIUM SCH MG: 50 LIQUID ORAL at 12:17

## 2020-08-29 RX ADMIN — MINOCYCLINE HYDROCHLORIDE SCH MG: 50 CAPSULE ORAL at 08:31

## 2020-08-29 RX ADMIN — PANTOPRAZOLE SODIUM SCH MG: 40 INJECTION, POWDER, FOR SOLUTION INTRAVENOUS at 20:13

## 2020-08-29 RX ADMIN — SULFAMETHOXAZOLE AND TRIMETHOPRIM SCH MLS/HR: 80; 16 INJECTION, SOLUTION, CONCENTRATE INTRAVENOUS at 21:42

## 2020-08-29 RX ADMIN — PANTOPRAZOLE SODIUM SCH MG: 40 INJECTION, POWDER, FOR SOLUTION INTRAVENOUS at 08:31

## 2020-08-29 RX ADMIN — LORAZEPAM PRN MG: 2 INJECTION, SOLUTION INTRAMUSCULAR; INTRAVENOUS at 13:54

## 2020-08-29 NOTE — PULMONOLOGY PROGRESS NOTE
Subjective


ROS Limited/Unobtainable:  Yes


Allergies:  


Coded Allergies:  


     AMPICILLIN (Verified  Allergy, Unknown, hives, 8/21/20)


     PENICILLINS (Verified  Allergy, Unknown, hives, 8/21/20)


     TETRACYCLINES (Verified  Allergy, Unknown, hives, 8/21/20)


Subjective


remains on 40% VM with pulse ox 100%


no fevers, persistent leucocytosis 


CXR this am with improved findings   


K-3.1 today, 


creat down to baseline 


HH improved after blood transfusion





Objective





Last 24 Hour Vital Signs








  Date Time  Temp Pulse Resp B/P (MAP) Pulse Ox O2 Delivery O2 Flow Rate FiO2


 


8/29/20 08:00      Venturi Mask 8.0 


 


8/29/20 08:00 97.7 98 22 134/50 (78) 99   


 


8/29/20 04:00 97.8 69 19 133/72 (92) 99   


 


8/29/20 04:00  63      


 


8/29/20 04:00      Venturi Mask 8.0 


 


8/29/20 00:00  97      


 


8/29/20 00:00      Venturi Mask 8.0 


 


8/29/20 00:00 98.1 97 19 126/60 (82) 99   


 


8/28/20 20:00 98.0 90 17 130/47 (74) 99   


 


8/28/20 20:00      Venturi Mask 8.0 


 


8/28/20 20:00  69      


 


8/28/20 19:00     98 Venturi Mask 8.0 40


 


8/28/20 19:00  105 28  98 Venturi Mask 8.0 40


 


8/28/20 16:00  106      


 


8/28/20 16:00 98.2 96 17 117/60 (79) 98   


 


8/28/20 16:00      Venturi Mask 8.0 


 


8/28/20 12:00 98.4 99 25 113/53 (73) 98   


 


8/28/20 12:00      Venturi Mask 8.0 


 


8/28/20 12:00  101      

















Intake and Output  


 


 8/28/20 8/29/20





 19:00 07:00


 


Intake Total 2190 ml 1995 ml


 


Output Total 1800 ml 1200 ml


 


Balance 390 ml 795 ml


 


  


 


Intake Free Water 200 ml 200 ml


 


IV Total 1430 ml 1410 ml


 


Tube Feeding 560 ml 385 ml


 


Output Urine Total 1800 ml 1200 ml


 


# Bowel Movements 5 1








General Appearance:  no acute distress, other - chronically ill looking 

bedridden female


HEENT:  normocephalic, atraumatic, other - VM 40%


Respiratory:  chest wall non-tender, rhonchi - bilaterally - scattered


Cardiovascular:  normal peripheral pulses, normal rate - SR on tele


Abdomen:  normal bowel sounds, soft, non tender


Genitourinary:  normal external genitalia


Skin:  other - BUE crusting lesions


Neurologic:  CNs II-XII grossly normal, abnormal gait - bedridden


Lymphatic:  no neck adenopathy





Microbiology








 Date/Time


Source Procedure


Growth Status


 


 


 8/26/20 20:00


Blood Blood Culture - Preliminary


NO GROWTH AFTER 48 HOURS Resulted


 


 8/26/20 19:40


Blood Blood Culture - Preliminary


NO GROWTH AFTER 48 HOURS Resulted


 


 8/27/20 09:00


Stool Clostridium difficile Toxin Assay - Final Complete








Laboratory Tests


8/29/20 03:25: 


White Blood Count 18.3H, Red Blood Count 3.00L, Hemoglobin 9.7L, Hematocrit 

28.8L, Mean Corpuscular Volume 96, Mean Corpuscular Hemoglobin 32.3H, Mean 

Corpuscular Hemoglobin Concent 33.6, Red Cell Distribution Width 12.7, Platelet 

Count 165, Mean Platelet Volume 8.1, Neutrophils (%) (Auto) , Lymphocytes (%) (

Auto) , Monocytes (%) (Auto) , Eosinophils (%) (Auto) , Basophils (%) (Auto) , 

Neutrophils % (Manual) [Pending], Lymphocytes % (Manual) [Pending], Platelet 

Estimate [Pending], Platelet Morphology [Pending], Erythrocyte Sedimentation 

Rate 106H, Sodium Level 145, Potassium Level 3.1L, Chloride Level 108H, Carbon 

Dioxide Level 22, Anion Gap 15, Blood Urea Nitrogen 51H, Creatinine 2.2H, 

Estimat Glomerular Filtration Rate 22.0, Glucose Level 106, Calcium Level 9.3, 

Phosphorus Level 5.8H, Magnesium Level 2.0, Total Bilirubin 0.2, Aspartate 

Amino Transf (AST/SGOT) 24, Alanine Aminotransferase (ALT/SGPT) 44, Alkaline 

Phosphatase 107, C-Reactive Protein, Quantitative 5.8H, Total Protein 6.1L, 

Albumin 2.1L, Globulin 4.0, Albumin/Globulin Ratio 0.5L





Current Medications








 Medications


  (Trade)  Dose


 Ordered  Sig/Raji


 Route


 PRN Reason  Start Time


 Stop Time Status Last Admin


Dose Admin


 


 Acetaminophen


  (Tylenol)  650 mg  Q6H  PRN


 ORAL


 For Headache  8/26/20 15:30


 9/21/20 15:29   


 


 


 Albuterol/


 Ipratropium


  (Albuterol/


 Ipratropium)  3 ml  Q4HRT  PRN


 HHN


 Shortness of Breath  8/26/20 15:30


 8/31/20 15:29  8/27/20 19:25


 


 


 Clonidine HCl


  (Catapres Tab)  0.1 mg  Q4H  PRN


 ORAL


 bp over 160 syst  8/26/20 15:30


 11/22/20 15:29   


 


 


 Dexamethasone


 Sodium Phosphate


  (Decadron 10mg/


 ml Inj)  6 mg  DAILY


 IV


   8/27/20 09:00


 9/5/20 09:01  8/29/20 08:31


 


 


 Dextrose  1,000 ml @ 


 75 mls/hr  [id number]


 IV


   8/26/20 16:00


 9/20/20 15:59  8/28/20 21:25


 


 


 Docusate Sodium


  (Colace)  100 mg  TID


 GT


   8/26/20 18:00


 9/20/20 08:59  8/27/20 08:36


 


 


 Enoxaparin Sodium


  (Lovenox)  30 mg  DAILY


 SUBQ


   8/27/20 09:00


 11/19/20 08:59  8/29/20 08:31


 


 


 Epoetin Jaswant


  (Epoetin


 Jaswant-EPBX(NON


 ESRD))  6,000 unit  MON-WED-FRI


 SUBQ


   8/28/20 21:00


 11/26/20 20:59  8/28/20 21:26


 


 


 Epoetin Jaswant


  (Epoetin


 Jaswant-EPBX(NON


 ESRD))  10,000 unit  MON-WED-FRI


 SUBQ


   8/31/20 21:00


 11/29/20 20:59   


 


 


 Linezolid  300 ml @ 


 300 mls/hr  Q12H


 IVPB


   8/27/20 02:00


 8/30/20 13:59  8/29/20 02:16


 


 


 Minocycline HCl


  (Minocin)  200 mg  Q12HR


 ORAL


   8/26/20 21:00


 9/1/20 11:59  8/29/20 08:31


 


 


 Pantoprazole


  (Protonix)  40 mg  EVERY 12  HOURS


 IVP


   8/26/20 21:00


 9/20/20 20:59  8/29/20 08:31


 


 


 Polyethylene


 Glycol


  (Miralax)  17 gm  BEDTIME


 ORAL


   8/26/20 21:00


 9/20/20 20:59  8/28/20 21:24


 


 


 Potassium Chloride  100 ml @ 


 100 mls/hr  Q1HR


 IVPB


   8/29/20 09:00


 8/29/20 10:59   


 


 


 Trimethoprim/


 Sulfamethoxazole


 10 ml/Dextrose  285 ml @ 


 190 mls/hr  EVERY 12  HOURS


 IV


   8/26/20 21:00


 9/1/20 20:59  8/29/20 08:31


 











Assessment/Plan


Assessment/Plan


ASSESSMENT


Acute hypoxemic respiratory failure requiring  NRM,  now on VM


Sepsis


UTI


ARF 2   to dehydration -improved


Hyper Na- resolved


Recent history of COVID pneumonia


HAP


Anemia of chronic kidney disease


BUE rash


Cholelithiasis


Psychiatric disorder 





PLAN of CARE


MAINOR


O2 titrate to keep sat above 92%


CXR 8/29 -> Improving left lower lobe infiltrate and left pleural effusion.  

Probable  a small right pleural effusion.


IV steroids


DVT prophylaxis


Venous duplex BLE





ABX as per ID: Bactrim and Zyvox


BCX NGTD x2; 


UCX+ VRE


SCX+ ACB MDR 


stool C. dif NGT


COVID-19 8/21NGT;   8/22 positive





IVF


JASWINDER likely due to dehydration  -improving


HyperNa -resolved


renal US  with markedly echogenic kidneys c/w medical renal disease; incidental 

cholelithiasis noted 


on EPO


GI prophylaxis


monitor  BUE of rash, no  Acyclovir as per ID, fup with HZV serology  








case discussed and evaluated by supervising physician











Mary Pedersen NP Aug 29, 2020 09:30

## 2020-08-29 NOTE — NEPHROLOGY PROGRESS NOTE
Assessment/Plan


Problem List:  


(1) ARF (acute renal failure)


Assessment:  Underlying CKD





(2) Sepsis


(3) UTI (urinary tract infection)


(4) Severe dehydration


(5) Hypernatremia


(6) Acute urinary retention


(7) Anemia


Assessment





Acute renal failure


Possible underlying chronic renal insufficiency


Severe dehydration


Hypotension


Sepsis, UTI


History of psychiatric disease


Elevated troponin I


Patient full code


Plan


August 29: Labs reviewed.  Creatinine 2.2 stable.  Potassium 3.1.  20 M EQ KCl 

given.  Continue her consultants.


August 28: Serum potassium 5.5.Creatinine 2.2.  1 dose Kayexalate given.  

Remains of 75 cc IV fluid.  We will continue to monitor renal parameters.


August 27: Serum potassium 5.3.  Creatinine higher at 2.2.  Medication list 

reviewed.  Suggest to avoid nephrotoxic's like Bactrim if possible.  Continue 

to monitor renal parameters and electrolytes.  Continue per pulmonary to adjust 

pulmonary status.  May require BiPAP.


August 26: Serum sodium lower.  Renal parameters appear more stable.  

Nevertheless leukocytosis is worsened.  Continue per consultants.


August 25: Serum sodium higher.  Continue D5W 75 cc an hour.  Continue to 

monitor electrolytes.  Continue per consultants.  Serum creatinine down to 2.4 

from 5.1 on admission.  Leukocytosis persists.


August 24: Patient now on isolation for COVID-19.  Serum creatinine is 

plateauing.  Will decrease the IV fluid to 75 cc an hour.  Continue management 

per ID.  Will monitor electrolytes and renal parameters.


August 23: Discussed with MARTIN Carter.  Patient needs a Mitchell catheter.  Accurate 

intake and output.  Decrease  cc D5W an hour.  1 dose of IV Venofer and 

then subcu Epogen ordered for anemia.  Continue to monitor renal parameters


August 22: Renal parameters improving.  Continue D5W IV fluid.  Potassium 

supplement IV given.  Continue per consultants.





Previously:


Fluid challenge


Monitor renal parameters and electrolytes


Monitor intake and output


Mitchell catheter


Antibiotics


Avoid nephrotoxic's


Chest x-ray





Kidney ultrasound findings: 


Right kidney measures 8.9 cm in length. Left kidney measures 9 cm in


length. Both kidneys demonstrate markedly increased echogenicity. No 

hydronephrosis. 


There are bilateral renal cysts. Normal inferior vena cava. Bladder is empty,


contains a Mitchell catheter. 


 


Incidentally noted are gallstones.


 


Impression: Markedly echogenic kidneys, consistent with medical renal disease


Negative for hydronephrosis


Empty bladder with a Mitchell catheter


Incidental finding of cholelithiasis.





Subjective


ROS Limited/Unobtainable:  Yes





Objective


Objective





Last 24 Hour Vital Signs








  Date Time  Temp Pulse Resp B/P (MAP) Pulse Ox O2 Delivery O2 Flow Rate FiO2


 


8/29/20 08:00  89      


 


8/29/20 08:00      Venturi Mask 8.0 


 


8/29/20 08:00 97.7 98 22 134/50 (78) 99   


 


8/29/20 08:00       2.0 24


 


8/29/20 04:00 97.8 69 19 133/72 (92) 99   


 


8/29/20 04:00  63      


 


8/29/20 04:00      Venturi Mask 8.0 


 


8/29/20 00:00  97      


 


8/29/20 00:00      Venturi Mask 8.0 


 


8/29/20 00:00 98.1 97 19 126/60 (82) 99   


 


8/28/20 20:00 98.0 90 17 130/47 (74) 99   


 


8/28/20 20:00      Venturi Mask 8.0 


 


8/28/20 20:00  69      


 


8/28/20 19:00     98 Venturi Mask 8.0 40


 


8/28/20 19:00  105 28  98 Venturi Mask 8.0 40


 


8/28/20 16:00  106      


 


8/28/20 16:00 98.2 96 17 117/60 (79) 98   


 


8/28/20 16:00      Venturi Mask 8.0 


 


8/28/20 12:00 98.4 99 25 113/53 (73) 98   


 


8/28/20 12:00      Venturi Mask 8.0 


 


8/28/20 12:00  101      

















Intake and Output  


 


 8/28/20 8/29/20





 19:00 07:00


 


Intake Total 2190 ml 1995 ml


 


Output Total 1800 ml 1200 ml


 


Balance 390 ml 795 ml


 


  


 


Intake Free Water 200 ml 200 ml


 


IV Total 1430 ml 1410 ml


 


Tube Feeding 560 ml 385 ml


 


Output Urine Total 1800 ml 1200 ml


 


# Bowel Movements 5 1











Current Medications








 Medications


  (Trade)  Dose


 Ordered  Sig/Raji


 Route


 PRN Reason  Start Time


 Stop Time Status Last Admin


Dose Admin


 


 Acetaminophen


  (Tylenol)  650 mg  Q6H  PRN


 ORAL


 For Headache  8/26/20 15:30


 9/21/20 15:29   


 


 


 Albuterol/


 Ipratropium


  (Albuterol/


 Ipratropium)  3 ml  Q4HRT  PRN


 HHN


 Shortness of Breath  8/26/20 15:30


 8/31/20 15:29  8/27/20 19:25


 


 


 Clonidine HCl


  (Catapres Tab)  0.1 mg  Q4H  PRN


 ORAL


 bp over 160 syst  8/26/20 15:30


 11/22/20 15:29   


 


 


 Dexamethasone


 Sodium Phosphate


  (Decadron 10mg/


 ml Inj)  6 mg  DAILY


 IV


   8/27/20 09:00


 9/5/20 09:01  8/29/20 08:31


 


 


 Dextrose  1,000 ml @ 


 75 mls/hr  [id number]


 IV


   8/26/20 16:00


 9/20/20 15:59  8/29/20 10:30


 


 


 Docusate Sodium


  (Colace)  100 mg  TID


 GT


   8/26/20 18:00


 9/20/20 08:59  8/27/20 08:36


 


 


 Enoxaparin Sodium


  (Lovenox)  30 mg  DAILY


 SUBQ


   8/27/20 09:00


 11/19/20 08:59  8/29/20 08:31


 


 


 Epoetin Jaswant


  (Epoetin


 Jaswant-EPBX(NON


 ESRD))  6,000 unit  MON-WED-FRI


 SUBQ


   8/28/20 21:00


 11/26/20 20:59  8/28/20 21:26


 


 


 Epoetin Jaswant


  (Epoetin


 Jaswant-EPBX(NON


 ESRD))  10,000 unit  MON-WED-FRI


 SUBQ


   8/31/20 21:00


 11/29/20 20:59   


 


 


 Linezolid  300 ml @ 


 300 mls/hr  Q12H


 IVPB


   8/27/20 02:00


 8/30/20 13:59  8/29/20 02:16


 


 


 Lorazepam


  (Ativan 2mg/ml


 1ml)  1 mg  Q4H  PRN


 IV


 For Anxiety  8/29/20 10:45


 9/5/20 10:44 UNV  


 


 


 Minocycline HCl


  (Minocin)  200 mg  Q12HR


 ORAL


   8/26/20 21:00


 9/1/20 11:59  8/29/20 08:31


 


 


 Pantoprazole


  (Protonix)  40 mg  EVERY 12  HOURS


 IVP


   8/26/20 21:00


 9/20/20 20:59  8/29/20 08:31


 


 


 Polyethylene


 Glycol


  (Miralax)  17 gm  BEDTIME


 ORAL


   8/26/20 21:00


 9/20/20 20:59  8/28/20 21:24


 


 


 Potassium Chloride


  (K-Dur)  20 meq  ONCE


 GT


   8/29/20 10:15


 8/29/20 11:15  8/29/20 10:30


 


 


 Trimethoprim/


 Sulfamethoxazole


 10 ml/Dextrose  285 ml @ 


 190 mls/hr  EVERY 12  HOURS


 IV


   8/26/20 21:00


 9/1/20 20:59  8/29/20 08:31


 





Laboratory Tests


8/29/20 03:25: 


White Blood Count 18.3H, Red Blood Count 3.00L, Hemoglobin 9.7L, Hematocrit 

28.8L, Mean Corpuscular Volume 96, Mean Corpuscular Hemoglobin 32.3H, Mean 

Corpuscular Hemoglobin Concent 33.6, Red Cell Distribution Width 12.7, Platelet 

Count 165, Mean Platelet Volume 8.1, Neutrophils (%) (Auto) , Lymphocytes (%) (

Auto) , Monocytes (%) (Auto) , Eosinophils (%) (Auto) , Basophils (%) (Auto) , 

Differential Total Cells Counted 100, Neutrophils % (Manual) 85H, Lymphocytes % 

(Manual) 10L, Monocytes % (Manual) 5, Eosinophils % (Manual) 0, Basophils % (

Manual) 0, Band Neutrophils 0, Platelet Estimate Adequate, Platelet Morphology 

Normal, Hypochromasia 2+, Erythrocyte Sedimentation Rate 106H, Sodium Level 145

, Potassium Level 3.1L, Chloride Level 108H, Carbon Dioxide Level 22, Anion Gap 

15, Blood Urea Nitrogen 51H, Creatinine 2.2H, Estimat Glomerular Filtration 

Rate 22.0, Glucose Level 106, Calcium Level 9.3, Phosphorus Level 5.8H, 

Magnesium Level 2.0, Total Bilirubin 0.2, Aspartate Amino Transf (AST/SGOT) 24, 

Alanine Aminotransferase (ALT/SGPT) 44, Alkaline Phosphatase 107, C-Reactive 

Protein, Quantitative 5.8H, Total Protein 6.1L, Albumin 2.1L, Globulin 4.0, 

Albumin/Globulin Ratio 0.5L


Height (Feet):  5


Height (Inches):  2.00


Weight (Pounds):  122


General Appearance:  mild distress


EENT:  other - On Venturi mask


Cardiovascular:  tachycardia


Respiratory/Chest:  decreased breath sounds


Abdomen:  distended











Derik Dos Santos MD Aug 29, 2020 10:47

## 2020-08-29 NOTE — INTERNAL MED PROGRESS NOTE
Subjective


Date of Service:  Aug 29, 2020


Physician Name


HebertWing


Attending Physician


Umer Wahl MD





Current Medications








 Medications


  (Trade)  Dose


 Ordered  Sig/Raji


 Route


 PRN Reason  Start Time


 Stop Time Status Last Admin


Dose Admin


 


 Acetaminophen


  (Tylenol)  650 mg  Q6H  PRN


 ORAL


 For Headache  8/26/20 15:30


 9/21/20 15:29   


 


 


 Albuterol/


 Ipratropium


  (Albuterol/


 Ipratropium)  3 ml  Q4HRT  PRN


 HHN


 Shortness of Breath  8/26/20 15:30


 8/31/20 15:29  8/27/20 19:25


 


 


 Clonidine HCl


  (Catapres Tab)  0.1 mg  Q4H  PRN


 ORAL


 bp over 160 syst  8/26/20 15:30


 11/22/20 15:29   


 


 


 Dexamethasone


 Sodium Phosphate


  (Decadron 10mg/


 ml Inj)  6 mg  DAILY


 IV


   8/27/20 09:00


 9/5/20 09:01  8/29/20 08:31


 


 


 Dextrose  1,000 ml @ 


 75 mls/hr  [id number]


 IV


   8/26/20 16:00


 9/20/20 15:59  8/29/20 10:30


 


 


 Docusate Sodium


  (Colace)  100 mg  TID


 GT


   8/26/20 18:00


 9/20/20 08:59  8/27/20 08:36


 


 


 Enoxaparin Sodium


  (Lovenox)  30 mg  DAILY


 SUBQ


   8/27/20 09:00


 11/19/20 08:59  8/29/20 08:31


 


 


 Epoetin Jaswant


  (Epoetin


 Jaswant-EPBX(NON


 ESRD))  6,000 unit  MON-WED-FRI


 SUBQ


   8/28/20 21:00


 11/26/20 20:59  8/28/20 21:26


 


 


 Epoetin Jaswant


  (Epoetin


 Jaswant-EPBX(NON


 ESRD))  10,000 unit  MON-WED-FRI


 SUBQ


   8/31/20 21:00


 11/29/20 20:59   


 


 


 Lorazepam


  (Ativan 2mg/ml


 1ml)  1 mg  Q4H  PRN


 IV


 For Anxiety  8/29/20 10:45


 9/5/20 10:44  8/29/20 13:54


 


 


 Minocycline HCl


  (Minocin)  200 mg  Q12HR


 ORAL


   8/26/20 21:00


 9/1/20 11:59  8/29/20 08:31


 


 


 Pantoprazole


  (Protonix)  40 mg  EVERY 12  HOURS


 IVP


   8/26/20 21:00


 9/20/20 20:59  8/29/20 08:31


 


 


 Polyethylene


 Glycol


  (Miralax)  17 gm  BEDTIME


 ORAL


   8/26/20 21:00


 9/20/20 20:59  8/28/20 21:24


 


 


 Trimethoprim/


 Sulfamethoxazole


 10 ml/Dextrose  285 ml @ 


 190 mls/hr  EVERY 12  HOURS


 IV


   8/26/20 21:00


 9/1/20 20:59  8/29/20 08:31


 








Allergies:  


Coded Allergies:  


     AMPICILLIN (Verified  Allergy, Unknown, hives, 8/21/20)


     PENICILLINS (Verified  Allergy, Unknown, hives, 8/21/20)


     TETRACYCLINES (Verified  Allergy, Unknown, hives, 8/21/20)


ROS Limited/Unobtainable:  Yes


Subjective


69 YO F with previous COVID 19 pos admitted with shortness of breath.  Now 

pneumonia and sepsis.  Cover for Int Med-DR Wahl.  Step down unit





Objective





Last Vital Signs








  Date Time  Temp Pulse Resp B/P (MAP) Pulse Ox O2 Delivery O2 Flow Rate FiO2


 


8/29/20 13:54  114 22 145/72 92   


 


8/29/20 12:00      Venturi Mask 8.0 


 


8/29/20 12:00 97.4       


 


8/29/20 08:00        24











Laboratory Tests








Test


  8/29/20


03:25


 


White Blood Count


  18.3 K/UL


(4.8-10.8)  H


 


Red Blood Count


  3.00 M/UL


(4.20-5.40)  L


 


Hemoglobin


  9.7 G/DL


(12.0-16.0)  L


 


Hematocrit


  28.8 %


(37.0-47.0)  L


 


Mean Corpuscular Volume 96 FL (80-99)  


 


Mean Corpuscular Hemoglobin


  32.3 PG


(27.0-31.0)  H


 


Mean Corpuscular Hemoglobin


Concent 33.6 G/DL


(32.0-36.0)


 


Red Cell Distribution Width


  12.7 %


(11.6-14.8)


 


Platelet Count


  165 K/UL


(150-450)


 


Mean Platelet Volume


  8.1 FL


(6.5-10.1)


 


Neutrophils (%) (Auto)


  % (45.0-75.0)


 


 


Lymphocytes (%) (Auto)


  % (20.0-45.0)


 


 


Monocytes (%) (Auto)  % (1.0-10.0)  


 


Eosinophils (%) (Auto)  % (0.0-3.0)  


 


Basophils (%) (Auto)  % (0.0-2.0)  


 


Differential Total Cells


Counted 100  


 


 


Neutrophils % (Manual) 85 % (45-75)  H


 


Lymphocytes % (Manual) 10 % (20-45)  L


 


Monocytes % (Manual) 5 % (1-10)  


 


Eosinophils % (Manual) 0 % (0-3)  


 


Basophils % (Manual) 0 % (0-2)  


 


Band Neutrophils 0 % (0-8)  


 


Platelet Estimate Adequate  


 


Platelet Morphology Normal  


 


Hypochromasia 2+  


 


Erythrocyte Sedimentation Rate


  106 MM/HR


(0-30)  H


 


Sodium Level


  145 MMOL/L


(136-145)


 


Potassium Level


  3.1 MMOL/L


(3.5-5.1)  L


 


Chloride Level


  108 MMOL/L


()  H


 


Carbon Dioxide Level


  22 MMOL/L


(21-32)


 


Anion Gap


  15 mmol/L


(5-15)


 


Blood Urea Nitrogen


  51 mg/dL


(7-18)  H


 


Creatinine


  2.2 MG/DL


(0.55-1.30)  H


 


Estimat Glomerular Filtration


Rate 22.0 mL/min


(>60)


 


Glucose Level


  106 MG/DL


()


 


Calcium Level


  9.3 MG/DL


(8.5-10.1)


 


Phosphorus Level


  5.8 MG/DL


(2.5-4.9)  H


 


Magnesium Level


  2.0 MG/DL


(1.8-2.4)


 


Total Bilirubin


  0.2 MG/DL


(0.2-1.0)


 


Aspartate Amino Transf


(AST/SGOT) 24 U/L (15-37)


 


 


Alanine Aminotransferase


(ALT/SGPT) 44 U/L (12-78)


 


 


Alkaline Phosphatase


  107 U/L


()


 


C-Reactive Protein,


Quantitative 5.8 mg/dL


(0.00-0.90)  H


 


Total Protein


  6.1 G/DL


(6.4-8.2)  L


 


Albumin


  2.1 G/DL


(3.4-5.0)  L


 


Globulin 4.0 g/dL  


 


Albumin/Globulin Ratio


  0.5 (1.0-2.7)


L











Microbiology








 Date/Time


Source Procedure


Growth Status


 


 


 8/26/20 20:00


Blood Blood Culture - Preliminary


NO GROWTH AFTER 48 HOURS Resulted


 


 8/26/20 19:40


Blood Blood Culture - Preliminary


NO GROWTH AFTER 48 HOURS Resulted


 


 8/27/20 09:00


Stool Clostridium difficile Toxin Assay - Final Complete

















Intake and Output  


 


 8/28/20 8/29/20





 19:00 07:00


 


Intake Total 2190 ml 1995 ml


 


Output Total 1800 ml 1200 ml


 


Balance 390 ml 795 ml


 


  


 


Intake Free Water 200 ml 200 ml


 


IV Total 1430 ml 1410 ml


 


Tube Feeding 560 ml 385 ml


 


Output Urine Total 1800 ml 1200 ml


 


# Bowel Movements 5 1








Objective


General Appearance:  WD/WN, no apparent distress, lethargic


EENT:  PERRL/EOMI, normal ENT inspection


Neck:  non-tender, normal alignment, supple, normal inspection


Cardiovascular:  normal peripheral pulses, normal rate, regular rhythm, no 

gallop/murmur, no JVD


Respiratory/Chest:  Venturi mask; chest wall non-tender, respiratory distress, 

crackles/rales, rhonchi - bilaterally, expiratory wheezing


Abdomen:  normal bowel sounds, non tender, soft, no organomegaly, no mass


Extremities:  normal range of motion, non-tender


Neurologic:  CNs II-XII grossly normal, no motor/sensory deficits


Skin:  normal pigmentation, warm/dry





Assessment/Plan


Problem List:  


(1) UTI (urinary tract infection)


Assessment & Plan:  Vanco res enterococcus.  Cont zyvox per ID





(2) Sepsis


Assessment & Plan:  ID= Dr Brownlee.  Continue IV bactrim, linezolid and 

minocycline





(3) Hypernatremia


Assessment & Plan:  Nephrology=DR Dos Santos





(4) Severe dehydration


(5) ARF (acute renal failure)


Assessment & Plan:  Nephrology = Dr Dos Santos





(6) Pneumonia


Assessment & Plan:  Acenitobacter.  Cont minocycline, linezolid and IV bactrim 

per ID=Dr Brownlee; pulmonary = Dr Brambila





(7) Hyperkalemia











Wing Hebert MD Aug 29, 2020 14:57

## 2020-08-29 NOTE — INFECTIOUS DISEASES PROG NOTE
Assessment/Plan


71yo F with:





Sepsis


Fever; SP


Leukocytosis to 31, persistent; improving ( now on steroids)


JASWINDER to 5.1, improving 


Hypoxic resp failure, sp NRB mask >VM


VRE UTI


Hx of COVID-19 1 mo ago ( doubt recurrent COVID-19) 


HAP


   8/29 CXR:   Improving left lower lobe infiltrate and left pleural effusion. 

There is a probable small right pleural effusion.


   8/27 cdiff neg


           8/26 CXR: There are increased interstitial congestion and left 

pleural effusion,since prior exam of 8/22/2020


               Bcx NTD


            8/22 sp cx MDR ABC (S bactrim; I Colistin, Polymixin B, Minocycline)


   8/21 BCx NTD


   8/21 UA+, UCx >100k VRE (S Zyvox)


   8/21 CXR: Left retrocardiac mild atelectasis versus infiltrate.


   8/21 COVID rapid neg; 8/22 repeat COVID-19 +








BL arm rash, appears evolving, now small pustules with dry/crusting, querry 

resolving Shingles? Though odd to have on both arms


   8/21 HSV & VZV PCR ordered





Plan:


Continue IV bactrim #6 and PO Minocycline 200mg bid #5 (high dose) MDR ABC PNA 

based on cultures


Cont Zyvox #7/7 for VRE UTI - stop after doses today


      -monitor Platelets, cr





Repeat CXR - shows Improving left lower lobe infiltrate and left pleural 

effusion. There is a probable small right pleural effusion.





Avoid acyclovir for possible Shingles for now given crusting of rash means that 

if Shingles it's resolving on its own and given severe JASWINDER want to avoid 

nephrotoxic acyclovir





       --8/24 SP Cefepime #4


       --8/23 SP IV Vancomcyin #4





F/u cx


Monitor CBC/BMP


Trend BUE rash


COVID isolation for now 


f/u Bcx x2, Sp cx





Discussed with RN





Subjective


Allergies:  


Coded Allergies:  


     AMPICILLIN (Verified  Allergy, Unknown, hives, 8/21/20)


     PENICILLINS (Verified  Allergy, Unknown, hives, 8/21/20)


     TETRACYCLINES (Verified  Allergy, Unknown, hives, 8/21/20)


AF


On venturi mask, downtitrating


WBC 18, on steroids





Objective





Last 24 Hour Vital Signs








  Date Time  Temp Pulse Resp B/P (MAP) Pulse Ox O2 Delivery O2 Flow Rate FiO2


 


8/29/20 04:00 97.8 69 19 133/72 (92) 99   


 


8/29/20 04:00  63      


 


8/29/20 04:00      Venturi Mask 8.0 


 


8/29/20 00:00  97      


 


8/29/20 00:00      Venturi Mask 8.0 


 


8/29/20 00:00 98.1 97 19 126/60 (82) 99   


 


8/28/20 20:00 98.0 90 17 130/47 (74) 99   


 


8/28/20 20:00      Venturi Mask 8.0 


 


8/28/20 20:00  69      


 


8/28/20 19:00     98 Venturi Mask 8.0 40


 


8/28/20 19:00  105 28  98 Venturi Mask 8.0 40


 


8/28/20 16:00  106      


 


8/28/20 16:00 98.2 96 17 117/60 (79) 98   


 


8/28/20 16:00      Venturi Mask 8.0 


 


8/28/20 12:00 98.4 99 25 113/53 (73) 98   


 


8/28/20 12:00      Venturi Mask 8.0 


 


8/28/20 12:00  101      


 


8/28/20 08:00 98.4 112 35 103/52 (69) 98   


 


8/28/20 08:00  97      


 


8/28/20 08:00      Venturi Mask 8.0 








Height (Feet):  5


Height (Inches):  2.00


Weight (Pounds):  122





Gen: NAD


CV: RRR


Pulm: CTAB


Abd: Soft, NTND





Microbiology








 Date/Time


Source Procedure


Growth Status


 


 


 8/26/20 20:00


Blood Blood Culture - Preliminary


NO GROWTH AFTER 48 HOURS Resulted


 


 8/26/20 19:40


Blood Blood Culture - Preliminary


NO GROWTH AFTER 48 HOURS Resulted


 


 8/27/20 09:00


Stool Clostridium difficile Toxin Assay - Final Complete











Laboratory Tests








Test


  8/29/20


03:25


 


White Blood Count


  18.3 K/UL


(4.8-10.8)  H


 


Red Blood Count


  3.00 M/UL


(4.20-5.40)  L


 


Hemoglobin


  9.7 G/DL


(12.0-16.0)  L


 


Hematocrit


  28.8 %


(37.0-47.0)  L


 


Mean Corpuscular Volume 96 FL (80-99)  


 


Mean Corpuscular Hemoglobin


  32.3 PG


(27.0-31.0)  H


 


Mean Corpuscular Hemoglobin


Concent 33.6 G/DL


(32.0-36.0)


 


Red Cell Distribution Width


  12.7 %


(11.6-14.8)


 


Platelet Count


  165 K/UL


(150-450)


 


Mean Platelet Volume


  8.1 FL


(6.5-10.1)


 


Neutrophils (%) (Auto)


  % (45.0-75.0)


 


 


Lymphocytes (%) (Auto)


  % (20.0-45.0)


 


 


Monocytes (%) (Auto)  % (1.0-10.0)  


 


Eosinophils (%) (Auto)  % (0.0-3.0)  


 


Basophils (%) (Auto)  % (0.0-2.0)  


 


Neutrophils % (Manual) Pending  


 


Lymphocytes % (Manual) Pending  


 


Platelet Estimate Pending  


 


Platelet Morphology Pending  


 


Erythrocyte Sedimentation Rate


  106 MM/HR


(0-30)  H


 


Sodium Level


  145 MMOL/L


(136-145)


 


Potassium Level


  3.1 MMOL/L


(3.5-5.1)  L


 


Chloride Level


  108 MMOL/L


()  H


 


Carbon Dioxide Level


  22 MMOL/L


(21-32)


 


Anion Gap


  15 mmol/L


(5-15)


 


Blood Urea Nitrogen


  51 mg/dL


(7-18)  H


 


Creatinine


  2.2 MG/DL


(0.55-1.30)  H


 


Estimat Glomerular Filtration


Rate 22.0 mL/min


(>60)


 


Glucose Level


  106 MG/DL


()


 


Calcium Level


  9.3 MG/DL


(8.5-10.1)


 


Phosphorus Level


  5.8 MG/DL


(2.5-4.9)  H


 


Magnesium Level


  2.0 MG/DL


(1.8-2.4)


 


Total Bilirubin


  0.2 MG/DL


(0.2-1.0)


 


Aspartate Amino Transf


(AST/SGOT) 24 U/L (15-37)


 


 


Alanine Aminotransferase


(ALT/SGPT) 44 U/L (12-78)


 


 


Alkaline Phosphatase


  107 U/L


()


 


C-Reactive Protein,


Quantitative 5.8 mg/dL


(0.00-0.90)  H


 


Total Protein


  6.1 G/DL


(6.4-8.2)  L


 


Albumin


  2.1 G/DL


(3.4-5.0)  L


 


Globulin 4.0 g/dL  


 


Albumin/Globulin Ratio


  0.5 (1.0-2.7)


L











Current Medications








 Medications


  (Trade)  Dose


 Ordered  Sig/Raji


 Route


 PRN Reason  Start Time


 Stop Time Status Last Admin


Dose Admin


 


 Acetaminophen


  (Tylenol)  650 mg  Q6H  PRN


 ORAL


 For Headache  8/26/20 15:30


 9/21/20 15:29   


 


 


 Albuterol/


 Ipratropium


  (Albuterol/


 Ipratropium)  3 ml  Q4HRT  PRN


 HHN


 Shortness of Breath  8/26/20 15:30


 8/31/20 15:29  8/27/20 19:25


 


 


 Clonidine HCl


  (Catapres Tab)  0.1 mg  Q4H  PRN


 ORAL


 bp over 160 syst  8/26/20 15:30


 11/22/20 15:29   


 


 


 Dexamethasone


 Sodium Phosphate


  (Decadron 10mg/


 ml Inj)  6 mg  DAILY


 IV


   8/27/20 09:00


 9/5/20 09:01  8/28/20 08:46


 


 


 Dextrose  1,000 ml @ 


 75 mls/hr  [id number]


 IV


   8/26/20 16:00


 9/20/20 15:59  8/28/20 21:25


 


 


 Docusate Sodium


  (Colace)  100 mg  TID


 GT


   8/26/20 18:00


 9/20/20 08:59  8/27/20 08:36


 


 


 Enoxaparin Sodium


  (Lovenox)  30 mg  DAILY


 SUBQ


   8/27/20 09:00


 11/19/20 08:59  8/27/20 08:36


 


 


 Epoetin Jaswant


  (Epoetin


 Jaswant-EPBX(NON


 ESRD))  6,000 unit  MON-WED-FRI


 SUBQ


   8/28/20 21:00


 11/26/20 20:59  8/28/20 21:26


 


 


 Epoetin Jaswant


  (Epoetin


 Jaswant-EPBX(NON


 ESRD))  10,000 unit  MON-WED-FRI


 SUBQ


   8/31/20 21:00


 11/29/20 20:59   


 


 


 Linezolid  300 ml @ 


 300 mls/hr  Q12H


 IVPB


   8/27/20 02:00


 8/30/20 13:59  8/29/20 02:16


 


 


 Minocycline HCl


  (Minocin)  200 mg  Q12HR


 ORAL


   8/26/20 21:00


 9/1/20 11:59  8/28/20 21:24


 


 


 Pantoprazole


  (Protonix)  40 mg  EVERY 12  HOURS


 IVP


   8/26/20 21:00


 9/20/20 20:59  8/28/20 21:23


 


 


 Polyethylene


 Glycol


  (Miralax)  17 gm  BEDTIME


 ORAL


   8/26/20 21:00


 9/20/20 20:59  8/28/20 21:24


 


 


 Trimethoprim/


 Sulfamethoxazole


 10 ml/Dextrose  285 ml @ 


 190 mls/hr  EVERY 12  HOURS


 IV


   8/26/20 21:00


 9/1/20 20:59  8/28/20 21:23


 

















Chiquita Penn M.D. Aug 29, 2020 07:56

## 2020-08-29 NOTE — DIAGNOSTIC IMAGING REPORT
EXAM:

  XR Chest, 1 View

 

CLINICAL HISTORY:

  PLEFF

 

TECHNIQUE:

  Frontal view of the chest.

 

COMPARISON:

  8/26/20

 

FINDINGS:

  Lungs:  There is an improving left basilar infiltrates without evidence 

of new infiltrate.

  Pleural space:  There has been near-complete resolution of small left 

pleural effusion.  There is probable trace right pleural effusion.  No 

pneumothorax.

  Heart:  Unremarkable.  No cardiomegaly.

  Mediastinum:  Unremarkable.

  Bones/joints:  Unremarkable.

 

IMPRESSION:     

  Improving left lower lobe infiltrate and left pleural effusion.  There 

is a probable small right pleural effusion.

## 2020-08-29 NOTE — DIAGNOSTIC IMAGING REPORT
EXAM:

  US Duplex Bilateral Lower Extremities Veins

 

CLINICAL HISTORY:

  DVT

 

TECHNIQUE:

  Real-time duplex ultrasound scan of the bilateral lower extremity veins 

integrating B-mode two-dimensional vascular structure, Doppler spectral 

analysis, color flow Doppler imaging and compression.

 

COMPARISON:

  No relevant prior studies available.

 

FINDINGS:

  Right deep veins:  Unremarkable.  No DVT in the right common femoral, 

femoral, proximal deep femoral or popliteal veins.  The veins demonstrate 

normal color flow, are normally compressible, with normal phasic flow 

and/or augmentation response.

  Right superficial veins:  Unremarkable.  No thrombus in the visualized 

right great saphenous vein.

 

  Left deep veins:  Unremarkable.  No DVT in the left common femoral, 

femoral, proximal deep femoral or popliteal veins.  The veins demonstrate 

normal color flow, are normally compressible, with normal phasic flow 

and/or augmentation response.

  Left superficial veins:  Unremarkable.  No thrombus in the visualized 

left great saphenous vein.

 

  Soft tissues:  No acute findings.  No popliteal cyst.

 

IMPRESSION:     

  Normal bilateral lower extremity duplex venous ultrasound.

## 2020-08-30 VITALS — SYSTOLIC BLOOD PRESSURE: 103 MMHG | DIASTOLIC BLOOD PRESSURE: 61 MMHG

## 2020-08-30 VITALS — SYSTOLIC BLOOD PRESSURE: 103 MMHG | DIASTOLIC BLOOD PRESSURE: 45 MMHG

## 2020-08-30 VITALS — DIASTOLIC BLOOD PRESSURE: 65 MMHG | SYSTOLIC BLOOD PRESSURE: 115 MMHG

## 2020-08-30 VITALS — SYSTOLIC BLOOD PRESSURE: 137 MMHG | DIASTOLIC BLOOD PRESSURE: 65 MMHG

## 2020-08-30 VITALS — DIASTOLIC BLOOD PRESSURE: 61 MMHG | SYSTOLIC BLOOD PRESSURE: 158 MMHG

## 2020-08-30 VITALS — DIASTOLIC BLOOD PRESSURE: 42 MMHG | SYSTOLIC BLOOD PRESSURE: 115 MMHG

## 2020-08-30 LAB
ADD MANUAL DIFF: NO
ANION GAP SERPL CALC-SCNC: 13 MMOL/L (ref 5–15)
BASOPHILS NFR BLD AUTO: 0.4 % (ref 0–2)
BUN SERPL-MCNC: 49 MG/DL (ref 7–18)
CALCIUM SERPL-MCNC: 8.6 MG/DL (ref 8.5–10.1)
CHLORIDE SERPL-SCNC: 108 MMOL/L (ref 98–107)
CO2 SERPL-SCNC: 21 MMOL/L (ref 21–32)
CREAT SERPL-MCNC: 2 MG/DL (ref 0.55–1.3)
EOSINOPHIL NFR BLD AUTO: 0.9 % (ref 0–3)
ERYTHROCYTE [DISTWIDTH] IN BLOOD BY AUTOMATED COUNT: 13.7 % (ref 11.6–14.8)
HCT VFR BLD CALC: 29.8 % (ref 37–47)
HGB BLD-MCNC: 9.7 G/DL (ref 12–16)
LYMPHOCYTES NFR BLD AUTO: 20.1 % (ref 20–45)
MCV RBC AUTO: 99 FL (ref 80–99)
MONOCYTES NFR BLD AUTO: 3.7 % (ref 1–10)
NEUTROPHILS NFR BLD AUTO: 74.9 % (ref 45–75)
PLATELET # BLD: 133 K/UL (ref 150–450)
POTASSIUM SERPL-SCNC: 3.7 MMOL/L (ref 3.5–5.1)
RBC # BLD AUTO: 3.02 M/UL (ref 4.2–5.4)
SODIUM SERPL-SCNC: 141 MMOL/L (ref 136–145)
WBC # BLD AUTO: 16.1 K/UL (ref 4.8–10.8)

## 2020-08-30 RX ADMIN — DOCUSATE SODIUM SCH MG: 50 LIQUID ORAL at 21:40

## 2020-08-30 RX ADMIN — DOCUSATE SODIUM SCH MG: 50 LIQUID ORAL at 08:24

## 2020-08-30 RX ADMIN — POLYETHYLENE GLYCOL 3350 SCH GM: 17 POWDER, FOR SOLUTION ORAL at 20:18

## 2020-08-30 RX ADMIN — ENOXAPARIN SODIUM SCH MG: 30 INJECTION SUBCUTANEOUS at 09:00

## 2020-08-30 RX ADMIN — SULFAMETHOXAZOLE AND TRIMETHOPRIM SCH MLS/HR: 80; 16 INJECTION, SOLUTION, CONCENTRATE INTRAVENOUS at 20:17

## 2020-08-30 RX ADMIN — LORAZEPAM PRN MG: 2 INJECTION, SOLUTION INTRAMUSCULAR; INTRAVENOUS at 18:19

## 2020-08-30 RX ADMIN — DOCUSATE SODIUM SCH MG: 50 LIQUID ORAL at 13:51

## 2020-08-30 RX ADMIN — SULFAMETHOXAZOLE AND TRIMETHOPRIM SCH MLS/HR: 80; 16 INJECTION, SOLUTION, CONCENTRATE INTRAVENOUS at 08:30

## 2020-08-30 RX ADMIN — PANTOPRAZOLE SODIUM SCH MG: 40 INJECTION, POWDER, FOR SOLUTION INTRAVENOUS at 08:24

## 2020-08-30 RX ADMIN — MINOCYCLINE HYDROCHLORIDE SCH MG: 50 CAPSULE ORAL at 20:18

## 2020-08-30 RX ADMIN — DEXAMETHASONE SODIUM PHOSPHATE SCH MG: 10 INJECTION INTRAMUSCULAR; INTRAVENOUS at 08:24

## 2020-08-30 RX ADMIN — MINOCYCLINE HYDROCHLORIDE SCH MG: 50 CAPSULE ORAL at 08:24

## 2020-08-30 RX ADMIN — PANTOPRAZOLE SODIUM SCH MG: 40 INJECTION, POWDER, FOR SOLUTION INTRAVENOUS at 20:18

## 2020-08-30 NOTE — INTERNAL MED PROGRESS NOTE
Subjective


Date of Service:  Aug 30, 2020


Physician Name


HebertWing


Attending Physician


Umer Wahl MD





Current Medications








 Medications


  (Trade)  Dose


 Ordered  Sig/Raji


 Route


 PRN Reason  Start Time


 Stop Time Status Last Admin


Dose Admin


 


 Acetaminophen


  (Tylenol)  650 mg  Q6H  PRN


 ORAL


 For Headache  8/26/20 15:30


 9/21/20 15:29   


 


 


 Albuterol/


 Ipratropium


  (Albuterol/


 Ipratropium)  3 ml  Q4HRT  PRN


 HHN


 Shortness of Breath  8/26/20 15:30


 8/31/20 15:29  8/27/20 19:25


 


 


 Clonidine HCl


  (Catapres Tab)  0.1 mg  Q4H  PRN


 ORAL


 bp over 160 syst  8/26/20 15:30


 11/22/20 15:29   


 


 


 Dexamethasone


 Sodium Phosphate


  (Decadron 10mg/


 ml Inj)  6 mg  DAILY


 IV


   8/27/20 09:00


 9/5/20 09:01  8/30/20 08:24


 


 


 Dextrose  1,000 ml @ 


 75 mls/hr  [id number]


 IV


   8/26/20 16:00


 9/20/20 15:59  8/30/20 12:58


 


 


 Docusate Sodium


  (Colace)  100 mg  EVERY 8  HOURS


 GT


   8/30/20 14:00


 9/20/20 08:59   


 


 


 Enoxaparin Sodium


  (Lovenox)  30 mg  DAILY


 SUBQ


   8/27/20 09:00


 11/19/20 08:59  8/30/20 09:00


 


 


 Epoetin Jaswant


  (Epoetin


 Jaswant-EPBX(NON


 ESRD))  6,000 unit  MON-WED-FRI


 SUBQ


   8/28/20 21:00


 11/26/20 20:59  8/28/20 21:26


 


 


 Epoetin Jaswant


  (Epoetin


 Jaswant-EPBX(NON


 ESRD))  10,000 unit  MON-WED-FRI


 SUBQ


   8/31/20 21:00


 11/29/20 20:59   


 


 


 Lorazepam


  (Ativan 2mg/ml


 1ml)  1 mg  Q4H  PRN


 IV


 For Anxiety  8/29/20 10:45


 9/5/20 10:44  8/29/20 22:35


 


 


 Minocycline HCl


  (Minocin)  200 mg  Q12HR


 ORAL


   8/26/20 21:00


 9/1/20 11:59  8/30/20 08:24


 


 


 Pantoprazole


  (Protonix)  40 mg  EVERY 12  HOURS


 IVP


   8/26/20 21:00


 9/20/20 20:59  8/30/20 08:24


 


 


 Polyethylene


 Glycol


  (Miralax)  17 gm  BEDTIME


 ORAL


   8/26/20 21:00


 9/20/20 20:59  8/29/20 20:16


 


 


 Trimethoprim/


 Sulfamethoxazole


 10 ml/Dextrose  285 ml @ 


 190 mls/hr  EVERY 12  HOURS


 IV


   8/26/20 21:00


 9/1/20 20:59  8/30/20 08:30


 








Allergies:  


Coded Allergies:  


     AMPICILLIN (Verified  Allergy, Unknown, hives, 8/21/20)


     PENICILLINS (Verified  Allergy, Unknown, hives, 8/21/20)


     TETRACYCLINES (Verified  Allergy, Unknown, hives, 8/21/20)


ROS Limited/Unobtainable:  Yes


Subjective


69 YO F with previous COVID 19 pos admitted with shortness of breath.  Now 

pneumonia and sepsis.  Cover for Int Med-DR Wahl.  Step down unit





Objective





Last Vital Signs








  Date Time  Temp Pulse Resp B/P (MAP) Pulse Ox O2 Delivery O2 Flow Rate FiO2


 


8/30/20 12:00      Venturi Mask 2.0 


 


8/30/20 12:00 98.2 105 38 137/65 (89) 94   


 


8/30/20 12:00        24











Laboratory Tests








Test


  8/30/20


04:18


 


White Blood Count


  16.1 K/UL


(4.8-10.8)  H


 


Red Blood Count


  3.02 M/UL


(4.20-5.40)  L


 


Hemoglobin


  9.7 G/DL


(12.0-16.0)  L


 


Hematocrit


  29.8 %


(37.0-47.0)  L


 


Mean Corpuscular Volume 99 FL (80-99)  


 


Mean Corpuscular Hemoglobin


  32.0 PG


(27.0-31.0)  H


 


Mean Corpuscular Hemoglobin


Concent 32.5 G/DL


(32.0-36.0)


 


Red Cell Distribution Width


  13.7 %


(11.6-14.8)


 


Platelet Count


  133 K/UL


(150-450)  L


 


Mean Platelet Volume


  6.1 FL


(6.5-10.1)  L


 


Neutrophils (%) (Auto)


  74.9 %


(45.0-75.0)


 


Lymphocytes (%) (Auto)


  20.1 %


(20.0-45.0)


 


Monocytes (%) (Auto)


  3.7 %


(1.0-10.0)


 


Eosinophils (%) (Auto)


  0.9 %


(0.0-3.0)


 


Basophils (%) (Auto)


  0.4 %


(0.0-2.0)


 


Sodium Level


  141 MMOL/L


(136-145)


 


Potassium Level


  3.7 MMOL/L


(3.5-5.1)


 


Chloride Level


  108 MMOL/L


()  H


 


Carbon Dioxide Level


  21 MMOL/L


(21-32)


 


Anion Gap


  13 mmol/L


(5-15)


 


Blood Urea Nitrogen


  49 mg/dL


(7-18)  H


 


Creatinine


  2.0 MG/DL


(0.55-1.30)  H


 


Estimat Glomerular Filtration


Rate 24.5 mL/min


(>60)


 


Glucose Level


  77 MG/DL


()


 


Calcium Level


  8.6 MG/DL


(8.5-10.1)

















Intake and Output  


 


 8/29/20 8/30/20





 19:00 07:00


 


Intake Total 1900 ml 1930 ml


 


Output Total 1100 ml 1600 ml


 


Balance 800 ml 330 ml


 


  


 


Intake Free Water 200 ml 400 ml


 


IV Total 1280 ml 1110 ml


 


Tube Feeding 420 ml 420 ml


 


Output Urine Total 1100 ml 1600 ml


 


# Bowel Movements 1 3








Objective


General Appearance:  WD/WN, no apparent distress, lethargic


EENT:  PERRL/EOMI, normal ENT inspection


Neck:  non-tender, normal alignment, supple, normal inspection


Cardiovascular:  normal peripheral pulses, normal rate, regular rhythm, no 

gallop/murmur, no JVD


Respiratory/Chest:  Venturi mask; chest wall non-tender, respiratory distress, 

crackles/rales, rhonchi - bilaterally, expiratory wheezing


Abdomen:  normal bowel sounds, non tender, soft, no organomegaly, no mass


Extremities:  normal range of motion, non-tender


Neurologic:  CNs II-XII grossly normal, no motor/sensory deficits


Skin:  normal pigmentation, warm/dry





Assessment/Plan


Problem List:  


(1) UTI (urinary tract infection)


Assessment & Plan:  Vanco res enterococcus.  Cont zyvox per ID





(2) Sepsis


Assessment & Plan:  ID= Dr Brownlee.  Continue IV bactrim, linezolid and 

minocycline





(3) Hypernatremia


Assessment & Plan:  Nephrology=DR Dos Santos





(4) Severe dehydration


(5) ARF (acute renal failure)


Assessment & Plan:  Nephrology = Dr Dos Santos





(6) Pneumonia


Assessment & Plan:  Acenitobacter.  Cont minocycline, linezolid and IV bactrim 

per ID=Dr Brownlee; pulmonary = Dr Brambila





(7) Hyperkalemia











Wing Hebert MD Aug 30, 2020 15:56

## 2020-08-30 NOTE — PULMONOLOGY PROGRESS NOTE
Subjective


ROS Limited/Unobtainable:  Yes


Allergies:  


Coded Allergies:  


     AMPICILLIN (Verified  Allergy, Unknown, hives, 8/21/20)


     PENICILLINS (Verified  Allergy, Unknown, hives, 8/21/20)


     TETRACYCLINES (Verified  Allergy, Unknown, hives, 8/21/20)


Subjective


remains on  VM  


no fevers, leucocytosis  with small trend down


mild tachycardia , tachypneic 


CXR  8/29  with improved findings   


creat down to baseline 


HH improved after blood transfusion





Objective





Last 24 Hour Vital Signs








  Date Time  Temp Pulse Resp B/P (MAP) Pulse Ox O2 Delivery O2 Flow Rate FiO2


 


8/30/20 08:00       2.0 24


 


8/30/20 08:00 98.5 95 32 115/42 (66) 97   


 


8/30/20 08:00      Venturi Mask 2.0 


 


8/30/20 07:49  96      


 


8/30/20 04:00      Venturi Mask 2.0 


 


8/30/20 04:00 97.5 102 30 103/61 (75) 98   


 


8/30/20 04:00  103      


 


8/30/20 04:00       2.0 24


 


8/30/20 00:00       2.0 24


 


8/30/20 00:00  101      


 


8/30/20 00:00      Venturi Mask 2.0 


 


8/30/20 00:00 98.2 105 26 103/45 (64) 98   


 


8/29/20 23:05  90 30 124/71 98   


 


8/29/20 22:35  101 36 154/62 97   


 


8/29/20 20:00  91      


 


8/29/20 20:00       2.0 24


 


8/29/20 20:00      Venturi Mask 2.0 


 


8/29/20 20:00 97.5 90 22 104/44 (64) 98   


 


8/29/20 19:24     97 Venturi Mask 2.0 24


 


8/29/20 19:24  111 28  97 Venturi Mask 2.0 24


 


8/29/20 16:00  98      


 


8/29/20 16:00       2.0 24


 


8/29/20 16:00      Venturi Mask 8.0 


 


8/29/20 16:00 97.7 95 22 106/52 (70) 98   


 


8/29/20 13:54  114 22 145/72 92   


 


8/29/20 12:00      Venturi Mask 8.0 


 


8/29/20 12:00  67      


 


8/29/20 12:00 97.4 94 22 109/52 (71) 95   

















Intake and Output  


 


 8/29/20 8/30/20





 19:00 07:00


 


Intake Total 1900 ml 1820 ml


 


Output Total 1100 ml 1600 ml


 


Balance 800 ml 220 ml


 


  


 


Intake Free Water 200 ml 400 ml


 


IV Total 1280 ml 1035 ml


 


Tube Feeding 420 ml 385 ml


 


Output Urine Total 1100 ml 1600 ml


 


# Bowel Movements 1 3








General Appearance:  no acute distress, other - chronically ill looking 

bedridden female


HEENT:  normocephalic, atraumatic, other - VM


Respiratory:  chest wall non-tender, rhonchi - bilaterally - scattered


Cardiovascular:  normal peripheral pulses, normal rate - SR on tele


Abdomen:  normal bowel sounds, soft, non tender


Genitourinary:  normal external genitalia


Skin:  other - BUE crusting lesions


Neurologic:  CNs II-XII grossly normal, abnormal gait - bedridden


Lymphatic:  no neck adenopathy


Laboratory Tests


8/30/20 04:18: 


White Blood Count 16.1H, Red Blood Count 3.02L, Hemoglobin 9.7L, Hematocrit 

29.8L, Mean Corpuscular Volume 99, Mean Corpuscular Hemoglobin 32.0H, Mean 

Corpuscular Hemoglobin Concent 32.5, Red Cell Distribution Width 13.7, Platelet 

Count 133L, Mean Platelet Volume 6.1L, Neutrophils (%) (Auto) 74.9, Lymphocytes 

(%) (Auto) 20.1, Monocytes (%) (Auto) 3.7, Eosinophils (%) (Auto) 0.9, 

Basophils (%) (Auto) 0.4, Sodium Level 141, Potassium Level 3.7, Chloride Level 

108H, Carbon Dioxide Level 21, Anion Gap 13, Blood Urea Nitrogen 49H, 

Creatinine 2.0H, Estimat Glomerular Filtration Rate 24.5, Glucose Level 77, 

Calcium Level 8.6





Current Medications








 Medications


  (Trade)  Dose


 Ordered  Sig/Raji


 Route


 PRN Reason  Start Time


 Stop Time Status Last Admin


Dose Admin


 


 Acetaminophen


  (Tylenol)  650 mg  Q6H  PRN


 ORAL


 For Headache  8/26/20 15:30


 9/21/20 15:29   


 


 


 Albuterol/


 Ipratropium


  (Albuterol/


 Ipratropium)  3 ml  Q4HRT  PRN


 HHN


 Shortness of Breath  8/26/20 15:30


 8/31/20 15:29  8/27/20 19:25


 


 


 Clonidine HCl


  (Catapres Tab)  0.1 mg  Q4H  PRN


 ORAL


 bp over 160 syst  8/26/20 15:30


 11/22/20 15:29   


 


 


 Dexamethasone


 Sodium Phosphate


  (Decadron 10mg/


 ml Inj)  6 mg  DAILY


 IV


   8/27/20 09:00


 9/5/20 09:01  8/30/20 08:24


 


 


 Dextrose  1,000 ml @ 


 75 mls/hr  [id number]


 IV


   8/26/20 16:00


 9/20/20 15:59  8/29/20 23:07


 


 


 Docusate Sodium


  (Colace)  100 mg  EVERY 8  HOURS


 GT


   8/30/20 14:00


 9/20/20 08:59   


 


 


 Enoxaparin Sodium


  (Lovenox)  30 mg  DAILY


 SUBQ


   8/27/20 09:00


 11/19/20 08:59  8/30/20 09:00


 


 


 Epoetin Jaswant


  (Epoetin


 Jaswant-EPBX(NON


 ESRD))  6,000 unit  MON-WED-FRI


 SUBQ


   8/28/20 21:00


 11/26/20 20:59  8/28/20 21:26


 


 


 Epoetin Jaswant


  (Epoetin


 Jaswant-EPBX(NON


 ESRD))  10,000 unit  MON-WED-FRI


 SUBQ


   8/31/20 21:00


 11/29/20 20:59   


 


 


 Lorazepam


  (Ativan 2mg/ml


 1ml)  1 mg  Q4H  PRN


 IV


 For Anxiety  8/29/20 10:45


 9/5/20 10:44  8/29/20 22:35


 


 


 Minocycline HCl


  (Minocin)  200 mg  Q12HR


 ORAL


   8/26/20 21:00


 9/1/20 11:59  8/30/20 08:24


 


 


 Pantoprazole


  (Protonix)  40 mg  EVERY 12  HOURS


 IVP


   8/26/20 21:00


 9/20/20 20:59  8/30/20 08:24


 


 


 Polyethylene


 Glycol


  (Miralax)  17 gm  BEDTIME


 ORAL


   8/26/20 21:00


 9/20/20 20:59  8/29/20 20:16


 


 


 Trimethoprim/


 Sulfamethoxazole


 10 ml/Dextrose  285 ml @ 


 190 mls/hr  EVERY 12  HOURS


 IV


   8/26/20 21:00


 9/1/20 20:59  8/30/20 08:30


 











Assessment/Plan


Assessment/Plan


ASSESSMENT


Acute hypoxemic respiratory failure requiring  NRM,  now on VM


Sepsis


UTI


ARF 2   to dehydration -improved


Hyper Na- resolved


Recent history of COVID pneumonia


HAP


Anemia of chronic kidney disease


BUE rash


Cholelithiasis


Psychiatric disorder 





PLAN of CARE


MAINOR


O2 titrate to keep sat above 92%


CXR 8/29 -> Improving left lower lobe infiltrate and left pleural effusion.  

Probable  a small right pleural effusion.


fup with CXR in am 


IV steroids


DVT prophylaxis


Venous duplex BLE 8/29 -NGT





ABX as per ID: Bactrim and Zyvox


BCX NGTD x2; 


UCX+ VRE


SCX+ ACB MDR 


stool C. dif NGT


COVID-19 8/21NGT;   8/22 positive





IVF


JASWINDER likely due to dehydration  -improving


HyperNa -resolved


renal US  with markedly echogenic kidneys c/w medical renal disease; incidental 

cholelithiasis noted 


on EPO


GI prophylaxis


monitor  BUE of rash, no  Acyclovir as per ID, fup with HZV serology  








case discussed and evaluated by supervising physician











Mary Pedersen NP Aug 30, 2020 10:58

## 2020-08-30 NOTE — NEPHROLOGY PROGRESS NOTE
Assessment/Plan


Problem List:  


(1) ARF (acute renal failure)


Assessment:  Underlying CKD





(2) Sepsis


(3) UTI (urinary tract infection)


(4) Severe dehydration


(5) Hypernatremia


(6) Acute urinary retention


(7) Anemia


Assessment





Acute renal failure


Possible underlying chronic renal insufficiency


Severe dehydration


Hypotension


Sepsis, UTI


History of psychiatric disease


Elevated troponin I


Patient full code


Plan


August 30: Status quo.  Creatinine lower at 2.  Other electrolytes within 

normal limits.  Continue per consultants.


August 29: Labs reviewed.  Creatinine 2.2 stable.  Potassium 3.1.  20 M EQ KCl 

given.  Continue her consultants.


August 28: Serum potassium 5.5.Creatinine 2.2.  1 dose Kayexalate given.  

Remains of 75 cc IV fluid.  We will continue to monitor renal parameters.


August 27: Serum potassium 5.3.  Creatinine higher at 2.2.  Medication list 

reviewed.  Suggest to avoid nephrotoxic's like Bactrim if possible.  Continue 

to monitor renal parameters and electrolytes.  Continue per pulmonary to adjust 

pulmonary status.  May require BiPAP.


August 26: Serum sodium lower.  Renal parameters appear more stable.  

Nevertheless leukocytosis is worsened.  Continue per consultants.


August 25: Serum sodium higher.  Continue D5W 75 cc an hour.  Continue to 

monitor electrolytes.  Continue per consultants.  Serum creatinine down to 2.4 

from 5.1 on admission.  Leukocytosis persists.


August 24: Patient now on isolation for COVID-19.  Serum creatinine is 

plateauing.  Will decrease the IV fluid to 75 cc an hour.  Continue management 

per ID.  Will monitor electrolytes and renal parameters.


August 23: Discussed with MARTIN Carter.  Patient needs a Mitchell catheter.  Accurate 

intake and output.  Decrease  cc D5W an hour.  1 dose of IV Venofer and 

then subcu Epogen ordered for anemia.  Continue to monitor renal parameters


August 22: Renal parameters improving.  Continue D5W IV fluid.  Potassium 

supplement IV given.  Continue per consultants.





Previously:


Fluid challenge


Monitor renal parameters and electrolytes


Monitor intake and output


Mitchell catheter


Antibiotics


Avoid nephrotoxic's


Chest x-ray





Kidney ultrasound findings: 


Right kidney measures 8.9 cm in length. Left kidney measures 9 cm in


length. Both kidneys demonstrate markedly increased echogenicity. No 

hydronephrosis. 


There are bilateral renal cysts. Normal inferior vena cava. Bladder is empty,


contains a Mitchell catheter. 


 


Incidentally noted are gallstones.


 


Impression: Markedly echogenic kidneys, consistent with medical renal disease


Negative for hydronephrosis


Empty bladder with a Mitchell catheter


Incidental finding of cholelithiasis.





Subjective


ROS Limited/Unobtainable:  Yes





Objective


Objective





Last 24 Hour Vital Signs








  Date Time  Temp Pulse Resp B/P (MAP) Pulse Ox O2 Delivery O2 Flow Rate FiO2


 


8/30/20 08:00       2.0 24


 


8/30/20 08:00 98.5 95 32 115/42 (66) 97   


 


8/30/20 08:00      Venturi Mask 2.0 


 


8/30/20 07:49  96      


 


8/30/20 04:00      Venturi Mask 2.0 


 


8/30/20 04:00 97.5 102 30 103/61 (75) 98   


 


8/30/20 04:00  103      


 


8/30/20 04:00       2.0 24


 


8/30/20 00:00       2.0 24


 


8/30/20 00:00  101      


 


8/30/20 00:00      Venturi Mask 2.0 


 


8/30/20 00:00 98.2 105 26 103/45 (64) 98   


 


8/29/20 23:05  90 30 124/71 98   


 


8/29/20 22:35  101 36 154/62 97   


 


8/29/20 20:00  91      


 


8/29/20 20:00       2.0 24


 


8/29/20 20:00      Venturi Mask 2.0 


 


8/29/20 20:00 97.5 90 22 104/44 (64) 98   


 


8/29/20 19:24     97 Venturi Mask 2.0 24


 


8/29/20 19:24  111 28  97 Venturi Mask 2.0 24


 


8/29/20 16:00  98      


 


8/29/20 16:00       2.0 24


 


8/29/20 16:00      Venturi Mask 8.0 


 


8/29/20 16:00 97.7 95 22 106/52 (70) 98   


 


8/29/20 13:54  114 22 145/72 92   


 


8/29/20 12:00      Venturi Mask 8.0 


 


8/29/20 12:00  67      


 


8/29/20 12:00 97.4 94 22 109/52 (71) 95   

















Intake and Output  


 


 8/29/20 8/30/20





 19:00 07:00


 


Intake Total 1900 ml 1820 ml


 


Output Total 1100 ml 1600 ml


 


Balance 800 ml 220 ml


 


  


 


Intake Free Water 200 ml 400 ml


 


IV Total 1280 ml 1035 ml


 


Tube Feeding 420 ml 385 ml


 


Output Urine Total 1100 ml 1600 ml


 


# Bowel Movements 1 3








Laboratory Tests


8/30/20 04:18: 


White Blood Count 16.1H, Red Blood Count 3.02L, Hemoglobin 9.7L, Hematocrit 

29.8L, Mean Corpuscular Volume 99, Mean Corpuscular Hemoglobin 32.0H, Mean 

Corpuscular Hemoglobin Concent 32.5, Red Cell Distribution Width 13.7, Platelet 

Count 133L, Mean Platelet Volume 6.1L, Neutrophils (%) (Auto) 74.9, Lymphocytes 

(%) (Auto) 20.1, Monocytes (%) (Auto) 3.7, Eosinophils (%) (Auto) 0.9, 

Basophils (%) (Auto) 0.4, Sodium Level 141, Potassium Level 3.7, Chloride Level 

108H, Carbon Dioxide Level 21, Anion Gap 13, Blood Urea Nitrogen 49H, 

Creatinine 2.0H, Estimat Glomerular Filtration Rate 24.5, Glucose Level 77, 

Calcium Level 8.6


Height (Feet):  5


Height (Inches):  2.00


Weight (Pounds):  123


General Appearance:  mild distress


EENT:  other - On Venturi mask


Cardiovascular:  tachycardia


Respiratory/Chest:  decreased breath sounds


Abdomen:  distended











Derik Dos Santos MD Aug 30, 2020 09:38

## 2020-08-31 VITALS — DIASTOLIC BLOOD PRESSURE: 52 MMHG | SYSTOLIC BLOOD PRESSURE: 117 MMHG

## 2020-08-31 VITALS — SYSTOLIC BLOOD PRESSURE: 127 MMHG | DIASTOLIC BLOOD PRESSURE: 44 MMHG

## 2020-08-31 VITALS — SYSTOLIC BLOOD PRESSURE: 116 MMHG | DIASTOLIC BLOOD PRESSURE: 52 MMHG

## 2020-08-31 VITALS — SYSTOLIC BLOOD PRESSURE: 124 MMHG | DIASTOLIC BLOOD PRESSURE: 48 MMHG

## 2020-08-31 VITALS — SYSTOLIC BLOOD PRESSURE: 111 MMHG | DIASTOLIC BLOOD PRESSURE: 52 MMHG

## 2020-08-31 VITALS — SYSTOLIC BLOOD PRESSURE: 125 MMHG | DIASTOLIC BLOOD PRESSURE: 70 MMHG

## 2020-08-31 LAB
ADD MANUAL DIFF: NO
ANION GAP SERPL CALC-SCNC: 12 MMOL/L (ref 5–15)
BASOPHILS NFR BLD AUTO: 0.4 % (ref 0–2)
BUN SERPL-MCNC: 45 MG/DL (ref 7–18)
CALCIUM SERPL-MCNC: 8.6 MG/DL (ref 8.5–10.1)
CHLORIDE SERPL-SCNC: 111 MMOL/L (ref 98–107)
CO2 SERPL-SCNC: 24 MMOL/L (ref 21–32)
CREAT SERPL-MCNC: 1.8 MG/DL (ref 0.55–1.3)
EOSINOPHIL NFR BLD AUTO: 0.5 % (ref 0–3)
ERYTHROCYTE [DISTWIDTH] IN BLOOD BY AUTOMATED COUNT: 13.9 % (ref 11.6–14.8)
HCT VFR BLD CALC: 28.6 % (ref 37–47)
HGB BLD-MCNC: 9.4 G/DL (ref 12–16)
LYMPHOCYTES NFR BLD AUTO: 18.1 % (ref 20–45)
MCV RBC AUTO: 98 FL (ref 80–99)
MONOCYTES NFR BLD AUTO: 4 % (ref 1–10)
NEUTROPHILS NFR BLD AUTO: 76.9 % (ref 45–75)
PLATELET # BLD: 169 K/UL (ref 150–450)
POTASSIUM SERPL-SCNC: 3.3 MMOL/L (ref 3.5–5.1)
RBC # BLD AUTO: 2.93 M/UL (ref 4.2–5.4)
SODIUM SERPL-SCNC: 147 MMOL/L (ref 136–145)
WBC # BLD AUTO: 17 K/UL (ref 4.8–10.8)

## 2020-08-31 RX ADMIN — PANTOPRAZOLE SODIUM SCH MG: 40 INJECTION, POWDER, FOR SOLUTION INTRAVENOUS at 21:39

## 2020-08-31 RX ADMIN — SULFAMETHOXAZOLE AND TRIMETHOPRIM SCH MLS/HR: 80; 16 INJECTION, SOLUTION, CONCENTRATE INTRAVENOUS at 21:38

## 2020-08-31 RX ADMIN — MINOCYCLINE HYDROCHLORIDE SCH MG: 50 CAPSULE ORAL at 21:39

## 2020-08-31 RX ADMIN — ENOXAPARIN SODIUM SCH MG: 30 INJECTION SUBCUTANEOUS at 09:01

## 2020-08-31 RX ADMIN — DOCUSATE SODIUM SCH MG: 50 LIQUID ORAL at 12:55

## 2020-08-31 RX ADMIN — PANTOPRAZOLE SODIUM SCH MG: 40 INJECTION, POWDER, FOR SOLUTION INTRAVENOUS at 08:59

## 2020-08-31 RX ADMIN — DOCUSATE SODIUM SCH MG: 50 LIQUID ORAL at 06:00

## 2020-08-31 RX ADMIN — SULFAMETHOXAZOLE AND TRIMETHOPRIM SCH MLS/HR: 80; 16 INJECTION, SOLUTION, CONCENTRATE INTRAVENOUS at 09:36

## 2020-08-31 RX ADMIN — MINOCYCLINE HYDROCHLORIDE SCH MG: 50 CAPSULE ORAL at 09:00

## 2020-08-31 RX ADMIN — DOCUSATE SODIUM SCH MG: 50 LIQUID ORAL at 21:44

## 2020-08-31 RX ADMIN — EPOETIN ALFA-EPBX SCH UNIT: 10000 INJECTION, SOLUTION INTRAVENOUS; SUBCUTANEOUS at 21:39

## 2020-08-31 RX ADMIN — DEXAMETHASONE SODIUM PHOSPHATE SCH MG: 10 INJECTION INTRAMUSCULAR; INTRAVENOUS at 09:00

## 2020-08-31 RX ADMIN — ACETAMINOPHEN PRN MG: 160 SOLUTION ORAL at 17:02

## 2020-08-31 RX ADMIN — POLYETHYLENE GLYCOL 3350 SCH GM: 17 POWDER, FOR SOLUTION ORAL at 21:00

## 2020-08-31 NOTE — NEPHROLOGY PROGRESS NOTE
Assessment/Plan


Problem List:  


(1) ARF (acute renal failure)


Assessment:  Underlying CKD





(2) Sepsis


(3) UTI (urinary tract infection)


(4) Severe dehydration


(5) Hypernatremia


(6) Acute urinary retention


(7) Anemia


Assessment





Acute renal failure


Possible underlying chronic renal insufficiency


Severe dehydration


Hypotension


Sepsis, UTI


History of psychiatric disease


Elevated troponin I


Patient full code


Plan


August 31: On Venturi mask.  Serum creatinine lowered to 1.8.  Continue per 

consultants.  Medication list reviewed.  Potassium supplement given.


August 30: Status quo.  Creatinine lower at 2.  Other electrolytes within 

normal limits.  Continue per consultants.


August 29: Labs reviewed.  Creatinine 2.2 stable.  Potassium 3.1.  20 M EQ KCl 

given.  Continue her consultants.


August 28: Serum potassium 5.5.Creatinine 2.2.  1 dose Kayexalate given.  

Remains of 75 cc IV fluid.  We will continue to monitor renal parameters.


August 27: Serum potassium 5.3.  Creatinine higher at 2.2.  Medication list 

reviewed.  Suggest to avoid nephrotoxic's like Bactrim if possible.  Continue 

to monitor renal parameters and electrolytes.  Continue per pulmonary to adjust 

pulmonary status.  May require BiPAP.


August 26: Serum sodium lower.  Renal parameters appear more stable.  

Nevertheless leukocytosis is worsened.  Continue per consultants.


August 25: Serum sodium higher.  Continue D5W 75 cc an hour.  Continue to 

monitor electrolytes.  Continue per consultants.  Serum creatinine down to 2.4 

from 5.1 on admission.  Leukocytosis persists.


August 24: Patient now on isolation for COVID-19.  Serum creatinine is 

plateauing.  Will decrease the IV fluid to 75 cc an hour.  Continue management 

per ID.  Will monitor electrolytes and renal parameters.


August 23: Discussed with MARTIN Carter.  Patient needs a Mitchell catheter.  Accurate 

intake and output.  Decrease  cc D5W an hour.  1 dose of IV Venofer and 

then subcu Epogen ordered for anemia.  Continue to monitor renal parameters


August 22: Renal parameters improving.  Continue D5W IV fluid.  Potassium 

supplement IV given.  Continue per consultants.





Previously:


Fluid challenge


Monitor renal parameters and electrolytes


Monitor intake and output


Mitchell catheter


Antibiotics


Avoid nephrotoxic's


Chest x-ray





Kidney ultrasound findings: 


Right kidney measures 8.9 cm in length. Left kidney measures 9 cm in


length. Both kidneys demonstrate markedly increased echogenicity. No 

hydronephrosis. 


There are bilateral renal cysts. Normal inferior vena cava. Bladder is empty,


contains a Mitchell catheter. 


 


Incidentally noted are gallstones.


 


Impression: Markedly echogenic kidneys, consistent with medical renal disease


Negative for hydronephrosis


Empty bladder with a Mitchell catheter


Incidental finding of cholelithiasis.





Subjective


ROS Limited/Unobtainable:  Yes





Objective


Objective





Last 24 Hour Vital Signs








  Date Time  Temp Pulse Resp B/P (MAP) Pulse Ox O2 Delivery O2 Flow Rate FiO2


 


8/31/20 08:00  94      


 


8/31/20 07:40     95 Venturi Mask 2.0 24


 


8/31/20 07:40  106 28  95 Venturi Mask 2.0 24


 


8/31/20 04:10  96      


 


8/31/20 04:00 97.9 96 35 125/70 (88) 93   


 


8/31/20 04:00       8.0 40


 


8/31/20 04:00      Venturi Mask 2.0 


 


8/31/20 00:00 99.2 105 40 127/44 (71) 93   


 


8/31/20 00:00      Venturi Mask 2.0 


 


8/31/20 00:00  110      


 


8/31/20 00:00       8.0 40


 


8/30/20 20:00      Venturi Mask 2.0 


 


8/30/20 20:00       8.0 40


 


8/30/20 20:00 98.9 110 40 115/65 (82) 93   


 


8/30/20 19:31  106      


 


8/30/20 19:00  104 28  93 Venturi Mask 2.0 24


 


8/30/20 19:00     97 Venturi Mask 2.0 24


 


8/30/20 18:49  120 40 156/61 93   


 


8/30/20 18:19  120 40 156/61 93   


 


8/30/20 16:00       2.0 24


 


8/30/20 16:00      Venturi Mask 2.0 


 


8/30/20 16:00 98.2 117 40 158/61 (93) 93   


 


8/30/20 15:20  93      


 


8/30/20 12:00      Venturi Mask 2.0 


 


8/30/20 12:00 98.2 105 38 137/65 (89) 94   


 


8/30/20 12:00       2.0 24


 


8/30/20 11:42  101      

















Intake and Output  


 


 8/30/20 8/31/20





 19:00 07:00


 


Intake Total 1535 ml 1769 ml


 


Output Total 1500 ml 1500 ml


 


Balance 35 ml 269 ml


 


  


 


Intake Free Water 230 ml 200 ml


 


IV Total 885 ml 1184 ml


 


Tube Feeding 420 ml 385 ml


 


Output Urine Total 1500 ml 1500 ml


 


# Bowel Movements 4 2








Laboratory Tests


8/31/20 03:47: 


White Blood Count 17.0H, Red Blood Count 2.93L, Hemoglobin 9.4L, Hematocrit 

28.6L, Mean Corpuscular Volume 98, Mean Corpuscular Hemoglobin 32.0H, Mean 

Corpuscular Hemoglobin Concent 32.8, Red Cell Distribution Width 13.9, Platelet 

Count 169, Mean Platelet Volume 7.2, Neutrophils (%) (Auto) 76.9H, Lymphocytes (

%) (Auto) 18.1L, Monocytes (%) (Auto) 4.0, Eosinophils (%) (Auto) 0.5, 

Basophils (%) (Auto) 0.4, Sodium Level 147H, Potassium Level 3.3L, Chloride 

Level 111H, Carbon Dioxide Level 24, Anion Gap 12, Blood Urea Nitrogen 45H, 

Creatinine 1.8H, Estimat Glomerular Filtration Rate 27.7, Glucose Level 88, 

Calcium Level 8.6


Height (Feet):  5


Height (Inches):  2.00


Weight (Pounds):  124


General Appearance:  no apparent distress, lethargic


EENT:  other - On Venturi mask


Cardiovascular:  tachycardia


Respiratory/Chest:  decreased breath sounds


Abdomen:  soft











Derik Dos Santos MD Aug 31, 2020 10:27

## 2020-08-31 NOTE — INFECTIOUS DISEASES PROG NOTE
Assessment/Plan


69yo F with:





Sepsis


Fever; SP


Leukocytosis to 31, persistent; improving ( now on steroids)


JASWINDER to 5.1, improving 


Hypoxic resp failure, sp NRB mask >VM


VRE UTI


Hx of COVID-19 1 mo ago ( doubt recurrent COVID-19) 


HAP


   8/29 CXR:   Improving left lower lobe infiltrate and left pleural effusion. 

There is a probable small right pleural effusion.


   8/27 cdiff neg


           8/26 CXR: There are increased interstitial congestion and left 

pleural effusion,since prior exam of 8/22/2020


               Bcx NTD


            8/22 sp cx MDR ABC (S bactrim; I Colistin, Polymixin B, Minocycline)


   8/21 BCx NTD


   8/21 UA+, UCx >100k VRE (S Zyvox)


   8/21 CXR: Left retrocardiac mild atelectasis versus infiltrate.


   8/21 COVID rapid neg; 8/22 repeat COVID-19 +








BL arm rash, appears evolving, now small pustules with dry/crusting, querry 

resolving Shingles? Though odd to have on both arms


   8/21 HSV & VZV PCR ordered





Plan:


Continue IV bactrim #8/10-14 and PO Minocycline 200mg bid #7/10 (high dose) MDR 

ABC PNA based on cultures


   -8/29 SP ZYvox #7





       --8/24 SP Cefepime #4


       --8/23 SP IV Vancomcyin #4





F/u cx


Monitor CBC/BMP


Trend BUE rash


COVID isolation for now 


f/u Bcx x2, Sp cx





Discussed with RN





Subjective


Allergies:  


Coded Allergies:  


     AMPICILLIN (Verified  Allergy, Unknown, hives, 8/21/20)


     PENICILLINS (Verified  Allergy, Unknown, hives, 8/21/20)


     TETRACYCLINES (Verified  Allergy, Unknown, hives, 8/21/20)


afebrile


wbc and cr improving


on VM 2l , fio2 24%





Objective





Last 24 Hour Vital Signs








  Date Time  Temp Pulse Resp B/P (MAP) Pulse Ox O2 Delivery O2 Flow Rate FiO2


 


8/31/20 08:00  94      


 


8/31/20 07:40     95 Venturi Mask 2.0 24


 


8/31/20 07:40  106 28  95 Venturi Mask 2.0 24


 


8/31/20 04:10  96      


 


8/31/20 04:00 97.9 96 35 125/70 (88) 93   


 


8/31/20 04:00       8.0 40


 


8/31/20 04:00      Venturi Mask 2.0 


 


8/31/20 00:00 99.2 105 40 127/44 (71) 93   


 


8/31/20 00:00      Venturi Mask 2.0 


 


8/31/20 00:00  110      


 


8/31/20 00:00       8.0 40


 


8/30/20 20:00      Venturi Mask 2.0 


 


8/30/20 20:00       8.0 40


 


8/30/20 20:00 98.9 110 40 115/65 (82) 93   


 


8/30/20 19:31  106      


 


8/30/20 19:00  104 28  93 Venturi Mask 2.0 24


 


8/30/20 19:00     97 Venturi Mask 2.0 24


 


8/30/20 18:49  120 40 156/61 93   


 


8/30/20 18:19  120 40 156/61 93   


 


8/30/20 16:00       2.0 24


 


8/30/20 16:00      Venturi Mask 2.0 


 


8/30/20 16:00 98.2 117 40 158/61 (93) 93   


 


8/30/20 15:20  93      


 


8/30/20 12:00      Venturi Mask 2.0 


 


8/30/20 12:00 98.2 105 38 137/65 (89) 94   


 


8/30/20 12:00       2.0 24








Height (Feet):  5


Height (Inches):  2.00


Weight (Pounds):  124


HEENT:  atraumatic


Respiratory/Chest:  other - mild Rsp distress


Abdomen:  no organomegaly





Laboratory Tests








Test


  8/31/20


03:47


 


White Blood Count


  17.0 K/UL


(4.8-10.8)  H


 


Red Blood Count


  2.93 M/UL


(4.20-5.40)  L


 


Hemoglobin


  9.4 G/DL


(12.0-16.0)  L


 


Hematocrit


  28.6 %


(37.0-47.0)  L


 


Mean Corpuscular Volume 98 FL (80-99)  


 


Mean Corpuscular Hemoglobin


  32.0 PG


(27.0-31.0)  H


 


Mean Corpuscular Hemoglobin


Concent 32.8 G/DL


(32.0-36.0)


 


Red Cell Distribution Width


  13.9 %


(11.6-14.8)


 


Platelet Count


  169 K/UL


(150-450)


 


Mean Platelet Volume


  7.2 FL


(6.5-10.1)


 


Neutrophils (%) (Auto)


  76.9 %


(45.0-75.0)  H


 


Lymphocytes (%) (Auto)


  18.1 %


(20.0-45.0)  L


 


Monocytes (%) (Auto)


  4.0 %


(1.0-10.0)


 


Eosinophils (%) (Auto)


  0.5 %


(0.0-3.0)


 


Basophils (%) (Auto)


  0.4 %


(0.0-2.0)


 


Sodium Level


  147 MMOL/L


(136-145)  H


 


Potassium Level


  3.3 MMOL/L


(3.5-5.1)  L


 


Chloride Level


  111 MMOL/L


()  H


 


Carbon Dioxide Level


  24 MMOL/L


(21-32)


 


Anion Gap


  12 mmol/L


(5-15)


 


Blood Urea Nitrogen


  45 mg/dL


(7-18)  H


 


Creatinine


  1.8 MG/DL


(0.55-1.30)  H


 


Estimat Glomerular Filtration


Rate 27.7 mL/min


(>60)


 


Glucose Level


  88 MG/DL


()


 


Calcium Level


  8.6 MG/DL


(8.5-10.1)











Current Medications








 Medications


  (Trade)  Dose


 Ordered  Sig/Raji


 Route


 PRN Reason  Start Time


 Stop Time Status Last Admin


Dose Admin


 


 Acetaminophen


  (Tylenol)  650 mg  Q6H  PRN


 ORAL


 For Headache  8/26/20 15:30


 9/21/20 15:29   


 


 


 Albuterol/


 Ipratropium


  (Albuterol/


 Ipratropium)  3 ml  Q4HRT  PRN


 HHN


 Shortness of Breath  8/26/20 15:30


 8/31/20 15:29  8/27/20 19:25


 


 


 Clonidine HCl


  (Catapres Tab)  0.1 mg  Q4H  PRN


 ORAL


 bp over 160 syst  8/26/20 15:30


 11/22/20 15:29   


 


 


 Dexamethasone


 Sodium Phosphate


  (Decadron 10mg/


 ml Inj)  6 mg  DAILY


 IV


   8/27/20 09:00


 9/5/20 09:01  8/31/20 09:00


 


 


 Dextrose  1,000 ml @ 


 75 mls/hr  [id number]


 IV


   8/26/20 16:00


 9/20/20 15:59  8/31/20 02:13


 


 


 Docusate Sodium


  (Colace)  100 mg  EVERY 8  HOURS


 GT


   8/30/20 14:00


 9/20/20 08:59   


 


 


 Enoxaparin Sodium


  (Lovenox)  30 mg  DAILY


 SUBQ


   8/27/20 09:00


 11/19/20 08:59  8/31/20 09:01


 


 


 Epoetin Jaswant


  (Epoetin


 Jaswant-EPBX(NON


 ESRD))  10,000 unit  MON-WED-FRI


 SUBQ


   8/31/20 21:00


 11/29/20 20:59   


 


 


 Lorazepam


  (Ativan 2mg/ml


 1ml)  1 mg  Q4H  PRN


 IV


 For Anxiety  8/29/20 10:45


 9/5/20 10:44  8/30/20 18:19


 


 


 Minocycline HCl


  (Minocin)  200 mg  Q12HR


 ORAL


   8/26/20 21:00


 9/5/20 20:59  8/31/20 09:00


 


 


 Pantoprazole


  (Protonix)  40 mg  EVERY 12  HOURS


 IVP


   8/26/20 21:00


 9/20/20 20:59  8/31/20 08:59


 


 


 Polyethylene


 Glycol


  (Miralax)  17 gm  BEDTIME


 ORAL


   8/26/20 21:00


 9/20/20 20:59  8/29/20 20:16


 


 


 Trimethoprim/


 Sulfamethoxazole


 10 ml/Dextrose  285 ml @ 


 190 mls/hr  EVERY 12  HOURS


 IV


   8/26/20 21:00


 9/5/20 20:59  8/31/20 09:36


 

















Tanisha Brownlee M.D. Aug 31, 2020 11:50

## 2020-08-31 NOTE — PULMONOLGY CRITICAL CARE NOTE
Critical Care - Asmt/Plan


Problems:  


(1) Sepsis


(2) 2019 novel coronavirus disease (COVID-19)


(3) ARF (acute renal failure)


(4) Multiple drug resistant organism (MDRO) culture positive


(5) Psychosis


Respiratory:  monitor respiratory rate, adjust FIO2, ABG


Cardiac:  continue to monitor HR/BP


Renal:  F/U I&O, keep IV fluid, check electrolytes


Infectious Disease:  check cultures, continue antibiotics


Gastrointestinal:  continue feedings/current rate


Endocrine:  monitor blood sugar


Hematologic:  monitor H/H, transfuse if hgb<8.5


Neurologic:  PRN Ativan, keep patient comfortable


Time Spent (Minutes):  40


Notes Reviewed:  internist, cardio, renal


Discussed with:  consultants, 





Critical Care - Objective





Last 24 Hour Vital Signs








  Date Time  Temp Pulse Resp B/P (MAP) Pulse Ox O2 Delivery O2 Flow Rate FiO2


 


8/31/20 08:00  94      


 


8/31/20 07:40     95 Venturi Mask 2.0 24


 


8/31/20 07:40  106 28  95 Venturi Mask 2.0 24


 


8/31/20 04:10  96      


 


8/31/20 04:00 97.9 96 35 125/70 (88) 93   


 


8/31/20 04:00       8.0 40


 


8/31/20 04:00      Venturi Mask 2.0 


 


8/31/20 00:00 99.2 105 40 127/44 (71) 93   


 


8/31/20 00:00      Venturi Mask 2.0 


 


8/31/20 00:00  110      


 


8/31/20 00:00       8.0 40


 


8/30/20 20:00      Venturi Mask 2.0 


 


8/30/20 20:00       8.0 40


 


8/30/20 20:00 98.9 110 40 115/65 (82) 93   


 


8/30/20 19:31  106      


 


8/30/20 19:00  104 28  93 Venturi Mask 2.0 24


 


8/30/20 19:00     97 Venturi Mask 2.0 24


 


8/30/20 18:49  120 40 156/61 93   


 


8/30/20 18:19  120 40 156/61 93   


 


8/30/20 16:00       2.0 24


 


8/30/20 16:00      Venturi Mask 2.0 


 


8/30/20 16:00 98.2 117 40 158/61 (93) 93   


 


8/30/20 15:20  93      


 


8/30/20 12:00      Venturi Mask 2.0 


 


8/30/20 12:00 98.2 105 38 137/65 (89) 94   


 


8/30/20 12:00       2.0 24


 


8/30/20 11:42  101      








Status:  awake


Condition:  critical


HEENT:  atraumatic


Neck:  full ROM


Lungs:  rales, rhonchi


Heart:  HR/BP stable, HR/BP unstable


Abdomen:  soft, non-tender


Extremities:  no C/C/E





Critical Care - Subjective


ROS Limited/Unobtainable:  Yes


Condition:  critical


EKG Rhythm:  Sinus Rhythm


FI02:  24


Sputum Amount:  None


Tube Feeding Amount:  35


I&O:











Intake and Output  


 


 8/30/20 8/31/20





 19:00 07:00


 


Intake Total 1535 ml 1769 ml


 


Output Total 1500 ml 1500 ml


 


Balance 35 ml 269 ml


 


  


 


Intake Free Water 230 ml 200 ml


 


IV Total 885 ml 1184 ml


 


Tube Feeding 420 ml 385 ml


 


Output Urine Total 1500 ml 1500 ml


 


# Bowel Movements 4 2








Labs:





Laboratory Tests








Test


  8/31/20


03:47


 


White Blood Count


  17.0 K/UL


(4.8-10.8)  H


 


Red Blood Count


  2.93 M/UL


(4.20-5.40)  L


 


Hemoglobin


  9.4 G/DL


(12.0-16.0)  L


 


Hematocrit


  28.6 %


(37.0-47.0)  L


 


Mean Corpuscular Volume 98 FL (80-99)  


 


Mean Corpuscular Hemoglobin


  32.0 PG


(27.0-31.0)  H


 


Mean Corpuscular Hemoglobin


Concent 32.8 G/DL


(32.0-36.0)


 


Red Cell Distribution Width


  13.9 %


(11.6-14.8)


 


Platelet Count


  169 K/UL


(150-450)


 


Mean Platelet Volume


  7.2 FL


(6.5-10.1)


 


Neutrophils (%) (Auto)


  76.9 %


(45.0-75.0)  H


 


Lymphocytes (%) (Auto)


  18.1 %


(20.0-45.0)  L


 


Monocytes (%) (Auto)


  4.0 %


(1.0-10.0)


 


Eosinophils (%) (Auto)


  0.5 %


(0.0-3.0)


 


Basophils (%) (Auto)


  0.4 %


(0.0-2.0)


 


Sodium Level


  147 MMOL/L


(136-145)  H


 


Potassium Level


  3.3 MMOL/L


(3.5-5.1)  L


 


Chloride Level


  111 MMOL/L


()  H


 


Carbon Dioxide Level


  24 MMOL/L


(21-32)


 


Anion Gap


  12 mmol/L


(5-15)


 


Blood Urea Nitrogen


  45 mg/dL


(7-18)  H


 


Creatinine


  1.8 MG/DL


(0.55-1.30)  H


 


Estimat Glomerular Filtration


Rate 27.7 mL/min


(>60)


 


Glucose Level


  88 MG/DL


()


 


Calcium Level


  8.6 MG/DL


(8.5-10.1)

















Live Brambila MD Aug 31, 2020 11:26

## 2020-08-31 NOTE — INTERNAL MED PROGRESS NOTE
Subjective


Date of Service:  Aug 31, 2020


Physician Name


EmileeWing


Attending Physician


Umer Wahl MD





Current Medications








 Medications


  (Trade)  Dose


 Ordered  Sig/Raji


 Route


 PRN Reason  Start Time


 Stop Time Status Last Admin


Dose Admin


 


 Acetaminophen


  (Tylenol)  650 mg  Q6H  PRN


 GT


 Mild Pain (Pain Scale 1-3)  8/31/20 16:25


 9/30/20 16:24  8/31/20 17:02


 


 


 Clonidine HCl


  (Catapres Tab)  0.1 mg  Q4H  PRN


 ORAL


 bp over 160 syst  8/26/20 15:30


 11/22/20 15:29   


 


 


 Dexamethasone


 Sodium Phosphate


  (Decadron 10mg/


 ml Inj)  6 mg  DAILY


 IV


   8/27/20 09:00


 9/5/20 09:01  8/31/20 09:00


 


 


 Dextrose  1,000 ml @ 


 75 mls/hr  [id number]


 IV


   8/26/20 16:00


 9/20/20 15:59  8/31/20 15:51


 


 


 Docusate Sodium


  (Colace)  100 mg  EVERY 8  HOURS


 GT


   8/30/20 14:00


 9/20/20 08:59   


 


 


 Enoxaparin Sodium


  (Lovenox)  30 mg  DAILY


 SUBQ


   8/27/20 09:00


 11/19/20 08:59  8/31/20 09:01


 


 


 Epoetin Jaswant


  (Epoetin


 Jaswant-EPBX(NON


 ESRD))  10,000 unit  MON-WED-FRI


 SUBQ


   8/31/20 21:00


 11/29/20 20:59   


 


 


 Lorazepam


  (Ativan 2mg/ml


 1ml)  1 mg  Q4H  PRN


 IV


 For Anxiety  8/29/20 10:45


 9/5/20 10:44  8/30/20 18:19


 


 


 Minocycline HCl


  (Minocin)  200 mg  Q12HR


 ORAL


   8/26/20 21:00


 9/5/20 20:59  8/31/20 09:00


 


 


 Pantoprazole


  (Protonix)  40 mg  EVERY 12  HOURS


 IVP


   8/26/20 21:00


 9/20/20 20:59  8/31/20 08:59


 


 


 Polyethylene


 Glycol


  (Miralax)  17 gm  BEDTIME


 ORAL


   8/26/20 21:00


 9/20/20 20:59  8/29/20 20:16


 


 


 Trimethoprim/


 Sulfamethoxazole


 10 ml/Dextrose  285 ml @ 


 190 mls/hr  EVERY 12  HOURS


 IV


   8/26/20 21:00


 9/5/20 20:59  8/31/20 09:36


 








Allergies:  


Coded Allergies:  


     AMPICILLIN (Verified  Allergy, Unknown, hives, 8/21/20)


     PENICILLINS (Verified  Allergy, Unknown, hives, 8/21/20)


     TETRACYCLINES (Verified  Allergy, Unknown, hives, 8/21/20)


ROS Limited/Unobtainable:  Yes


Subjective


69 YO F with previous COVID 19 pos admitted with shortness of breath.  Now 

pneumonia and sepsis.  Cover for Int Med-DR Wahl.  Step down unit





Objective





Last Vital Signs








  Date Time  Temp Pulse Resp B/P (MAP) Pulse Ox O2 Delivery O2 Flow Rate FiO2


 


8/31/20 16:00       8.0 40


 


8/31/20 16:00 99.2 102 26 117/52 (73) 97   


 


8/31/20 16:00      Venturi Mask  











Laboratory Tests








Test


  8/31/20


03:47


 


White Blood Count


  17.0 K/UL


(4.8-10.8)  H


 


Red Blood Count


  2.93 M/UL


(4.20-5.40)  L


 


Hemoglobin


  9.4 G/DL


(12.0-16.0)  L


 


Hematocrit


  28.6 %


(37.0-47.0)  L


 


Mean Corpuscular Volume 98 FL (80-99)  


 


Mean Corpuscular Hemoglobin


  32.0 PG


(27.0-31.0)  H


 


Mean Corpuscular Hemoglobin


Concent 32.8 G/DL


(32.0-36.0)


 


Red Cell Distribution Width


  13.9 %


(11.6-14.8)


 


Platelet Count


  169 K/UL


(150-450)


 


Mean Platelet Volume


  7.2 FL


(6.5-10.1)


 


Neutrophils (%) (Auto)


  76.9 %


(45.0-75.0)  H


 


Lymphocytes (%) (Auto)


  18.1 %


(20.0-45.0)  L


 


Monocytes (%) (Auto)


  4.0 %


(1.0-10.0)


 


Eosinophils (%) (Auto)


  0.5 %


(0.0-3.0)


 


Basophils (%) (Auto)


  0.4 %


(0.0-2.0)


 


Sodium Level


  147 MMOL/L


(136-145)  H


 


Potassium Level


  3.3 MMOL/L


(3.5-5.1)  L


 


Chloride Level


  111 MMOL/L


()  H


 


Carbon Dioxide Level


  24 MMOL/L


(21-32)


 


Anion Gap


  12 mmol/L


(5-15)


 


Blood Urea Nitrogen


  45 mg/dL


(7-18)  H


 


Creatinine


  1.8 MG/DL


(0.55-1.30)  H


 


Estimat Glomerular Filtration


Rate 27.7 mL/min


(>60)


 


Glucose Level


  88 MG/DL


()


 


Calcium Level


  8.6 MG/DL


(8.5-10.1)

















Intake and Output  


 


 8/30/20 8/31/20





 19:00 07:00


 


Intake Total 1535 ml 2004 ml


 


Output Total 1500 ml 1500 ml


 


Balance 35 ml 504 ml


 


  


 


Intake Free Water 230 ml 400 ml


 


IV Total 885 ml 1184 ml


 


Tube Feeding 420 ml 420 ml


 


Output Urine Total 1500 ml 1500 ml


 


# Bowel Movements 4 2








Objective


General Appearance:  WD/WN, no apparent distress, lethargic


EENT:  PERRL/EOMI, normal ENT inspection


Neck:  non-tender, normal alignment, supple, normal inspection


Cardiovascular:  normal peripheral pulses, normal rate, regular rhythm, no 

gallop/murmur, no JVD


Respiratory/Chest:  Venturi mask; chest wall non-tender, respiratory distress, 

crackles/rales, rhonchi - bilaterally, expiratory wheezing


Abdomen:  normal bowel sounds, non tender, soft, no organomegaly, no mass


Extremities:  normal range of motion, non-tender


Neurologic:  CNs II-XII grossly normal, no motor/sensory deficits


Skin:  normal pigmentation, warm/dry





Assessment/Plan


Problem List:  


(1) UTI (urinary tract infection)


Assessment & Plan:  Vanco res enterococcus.  Cont zyvox per ID





(2) Sepsis


Assessment & Plan:  ID= Dr Brownlee.  Continue IV bactrim, linezolid and 

minocycline





(3) Hypernatremia


Assessment & Plan:  Nephrology=DR Dos Santos





(4) Severe dehydration


(5) ARF (acute renal failure)


Assessment & Plan:  Nephrology = Dr Dos Santos





(6) Pneumonia


Assessment & Plan:  Acenitobacter.  Cont minocycline, linezolid and IV bactrim 

per ID=Dr Brownlee; pulmonary = Dr Brambila





(7) Hyperkalemia











Wing Hebert MD Aug 31, 2020 19:26

## 2020-08-31 NOTE — DIAGNOSTIC IMAGING REPORT
Indication: Shortness of breath

 

Technique: One view of the chest

 

Comparison: 8/29/2020

 

Findings: The right hemidiaphragm is elevated. There is interim new or increased left

pleural effusion. There may be some parenchymal disease of the left lung base. There

is also slight increased interstitial prominence, may reflect developing interstitial

congestion. The heart size is normal. Right chest pacemaker is again demonstrated.

 

Impression: New or increased left pleural effusion and possibly retrocardiac

consolidation

 

Suspect slight new or increased interstitial congestion

## 2020-09-01 VITALS — SYSTOLIC BLOOD PRESSURE: 118 MMHG | DIASTOLIC BLOOD PRESSURE: 52 MMHG

## 2020-09-01 VITALS — DIASTOLIC BLOOD PRESSURE: 73 MMHG | SYSTOLIC BLOOD PRESSURE: 122 MMHG

## 2020-09-01 VITALS — DIASTOLIC BLOOD PRESSURE: 54 MMHG | SYSTOLIC BLOOD PRESSURE: 138 MMHG

## 2020-09-01 VITALS — DIASTOLIC BLOOD PRESSURE: 68 MMHG | SYSTOLIC BLOOD PRESSURE: 130 MMHG

## 2020-09-01 VITALS — SYSTOLIC BLOOD PRESSURE: 98 MMHG | DIASTOLIC BLOOD PRESSURE: 42 MMHG

## 2020-09-01 VITALS — DIASTOLIC BLOOD PRESSURE: 76 MMHG | SYSTOLIC BLOOD PRESSURE: 127 MMHG

## 2020-09-01 LAB
ADD MANUAL DIFF: YES
ALBUMIN SERPL-MCNC: 2.5 G/DL (ref 3.4–5)
ALBUMIN/GLOB SERPL: 0.6 {RATIO} (ref 1–2.7)
ALP SERPL-CCNC: 119 U/L (ref 46–116)
ALT SERPL-CCNC: 14 U/L (ref 12–78)
ANION GAP SERPL CALC-SCNC: 9 MMOL/L (ref 5–15)
AST SERPL-CCNC: 10 U/L (ref 15–37)
BILIRUB SERPL-MCNC: 0.3 MG/DL (ref 0.2–1)
BUN SERPL-MCNC: 43 MG/DL (ref 7–18)
CALCIUM SERPL-MCNC: 8.9 MG/DL (ref 8.5–10.1)
CHLORIDE SERPL-SCNC: 109 MMOL/L (ref 98–107)
CO2 SERPL-SCNC: 26 MMOL/L (ref 21–32)
CREAT SERPL-MCNC: 1.8 MG/DL (ref 0.55–1.3)
ERYTHROCYTE [DISTWIDTH] IN BLOOD BY AUTOMATED COUNT: 14.6 % (ref 11.6–14.8)
GLOBULIN SER-MCNC: 3.9 G/DL
HCT VFR BLD CALC: 30.2 % (ref 37–47)
HGB BLD-MCNC: 9.9 G/DL (ref 12–16)
MCV RBC AUTO: 99 FL (ref 80–99)
PLATELET # BLD: 158 K/UL (ref 150–450)
POTASSIUM SERPL-SCNC: 3.6 MMOL/L (ref 3.5–5.1)
RBC # BLD AUTO: 3.04 M/UL (ref 4.2–5.4)
SODIUM SERPL-SCNC: 144 MMOL/L (ref 136–145)
WBC # BLD AUTO: 18.1 K/UL (ref 4.8–10.8)

## 2020-09-01 RX ADMIN — LORAZEPAM PRN MG: 2 INJECTION, SOLUTION INTRAMUSCULAR; INTRAVENOUS at 04:56

## 2020-09-01 RX ADMIN — SULFAMETHOXAZOLE AND TRIMETHOPRIM SCH MLS/HR: 80; 16 INJECTION, SOLUTION, CONCENTRATE INTRAVENOUS at 20:21

## 2020-09-01 RX ADMIN — MINOCYCLINE HYDROCHLORIDE SCH MG: 50 CAPSULE ORAL at 08:58

## 2020-09-01 RX ADMIN — PANTOPRAZOLE SODIUM SCH MG: 40 INJECTION, POWDER, FOR SOLUTION INTRAVENOUS at 20:21

## 2020-09-01 RX ADMIN — SULFAMETHOXAZOLE AND TRIMETHOPRIM SCH MLS/HR: 80; 16 INJECTION, SOLUTION, CONCENTRATE INTRAVENOUS at 08:56

## 2020-09-01 RX ADMIN — PANTOPRAZOLE SODIUM SCH MG: 40 INJECTION, POWDER, FOR SOLUTION INTRAVENOUS at 08:58

## 2020-09-01 RX ADMIN — DOCUSATE SODIUM SCH MG: 50 LIQUID ORAL at 09:40

## 2020-09-01 RX ADMIN — POLYETHYLENE GLYCOL 3350 SCH GM: 17 POWDER, FOR SOLUTION ORAL at 20:21

## 2020-09-01 RX ADMIN — DOCUSATE SODIUM SCH MG: 50 LIQUID ORAL at 06:00

## 2020-09-01 RX ADMIN — ENOXAPARIN SODIUM SCH MG: 30 INJECTION SUBCUTANEOUS at 08:59

## 2020-09-01 RX ADMIN — DOCUSATE SODIUM SCH MG: 50 LIQUID ORAL at 22:00

## 2020-09-01 RX ADMIN — MINOCYCLINE HYDROCHLORIDE SCH MG: 50 CAPSULE ORAL at 20:21

## 2020-09-01 RX ADMIN — DEXAMETHASONE SODIUM PHOSPHATE SCH MG: 10 INJECTION INTRAMUSCULAR; INTRAVENOUS at 08:57

## 2020-09-01 NOTE — PULMONOLGY CRITICAL CARE NOTE
Critical Care - Asmt/Plan


Problems:  


(1) Sepsis


(2) 2019 novel coronavirus disease (COVID-19)


(3) ARF (acute renal failure)


(4) Multiple drug resistant organism (MDRO) culture positive


(5) Psychosis


Respiratory:  monitor respiratory rate, adjust FIO2, CXR


Cardiac:  continue pressors, continue to monitor HR/BP


Renal:  F/U I&O, keep IV fluid, check electrolytes


Infectious Disease:  check cultures


Gastrointestinal:  continue feedings/current rate


Endocrine:  monitor blood sugar, check TSH, check HgA1C


Hematologic:  monitor H/H


Neurologic:  PRN Morphine


Disposition:  keep in ICU


Time Spent (Minutes):  40


Notes Reviewed:  cardio


Discussed with:  nurses, 





Critical Care - Objective





Last 24 Hour Vital Signs








  Date Time  Temp Pulse Resp B/P (MAP) Pulse Ox O2 Delivery O2 Flow Rate FiO2


 


9/1/20 08:00       8.0 40


 


9/1/20 08:00 98.8 104 29 122/73 (89) 95   


 


9/1/20 08:00      Venturi Mask 4.0 


 


9/1/20 07:45  101      


 


9/1/20 07:20     97 Venturi Mask 4.0 31


 


9/1/20 07:20  101 32  97 Venturi Mask 4.0 31


 


9/1/20 05:26  104 33 155/71 95   


 


9/1/20 04:56  102 51 129/59 98   


 


9/1/20 04:00      Venturi Mask 4.0 


 


9/1/20 04:00 99.0 108 55 130/68 (88) 95   


 


9/1/20 03:38  108      


 


9/1/20 03:31       8.0 40


 


9/1/20 00:00 98.8 101 38 118/52 (74) 98   


 


9/1/20 00:00       8.0 40


 


9/1/20 00:00      Venturi Mask 4.0 


 


8/31/20 23:31  102      


 


8/31/20 20:00      Venturi Mask 8.0 


 


8/31/20 20:00 98.1 95 38 116/52 (73) 98   


 


8/31/20 20:00  102 20  96 Venturi Mask 4.0 31


 


8/31/20 20:00     96 Venturi Mask 4.0 31


 


8/31/20 20:00  78      


 


8/31/20 20:00       8.0 40


 


8/31/20 16:00       8.0 40


 


8/31/20 16:00 99.2 102 26 117/52 (73) 97   


 


8/31/20 16:00  105      


 


8/31/20 16:00      Venturi Mask 8.0 


 


8/31/20 12:00       8.0 40


 


8/31/20 12:00 98.9 96 34 111/52 (71) 98   


 


8/31/20 12:00      Venturi Mask 8.0 


 


8/31/20 11:32  85      








Status:  sedated


HEENT:  atraumatic


Lungs:  clear


Abdomen:  soft, active bowel sounds


Extremities:  edema





Critical Care - Subjective


ROS Limited/Unobtainable:  Yes


Condition:  critical


EKG Rhythm:  Sinus Rhythm


FI02:  40


Sputum Amount:  None


Tube Feeding Amount:  35


I&O:











Intake and Output  


 


 8/31/20 9/1/20





 19:00 07:00


 


Intake Total 2005 ml 855 ml


 


Output Total 1550 ml 1700 ml


 


Balance 455 ml -845 ml


 


  


 


Intake Free Water 200 ml 


 


IV Total 1385 ml 435 ml


 


Tube Feeding 420 ml 420 ml


 


Output Urine Total 1550 ml 1700 ml


 


# Bowel Movements 2 2








Labs:





Laboratory Tests








Test


  9/1/20


03:50 9/1/20


04:01


 


White Blood Count


  18.1 K/UL


(4.8-10.8)  H 


 


 


Red Blood Count


  3.04 M/UL


(4.20-5.40)  L 


 


 


Hemoglobin


  9.9 G/DL


(12.0-16.0)  L 


 


 


Hematocrit


  30.2 %


(37.0-47.0)  L 


 


 


Mean Corpuscular Volume 99 FL (80-99)   


 


Mean Corpuscular Hemoglobin


  32.5 PG


(27.0-31.0)  H 


 


 


Mean Corpuscular Hemoglobin


Concent 32.8 G/DL


(32.0-36.0) 


 


 


Red Cell Distribution Width


  14.6 %


(11.6-14.8) 


 


 


Platelet Count


  158 K/UL


(150-450) 


 


 


Mean Platelet Volume


  6.8 FL


(6.5-10.1) 


 


 


Neutrophils (%) (Auto)


  % (45.0-75.0)


  


 


 


Lymphocytes (%) (Auto)


  % (20.0-45.0)


  


 


 


Monocytes (%) (Auto)  % (1.0-10.0)   


 


Eosinophils (%) (Auto)  % (0.0-3.0)   


 


Basophils (%) (Auto)  % (0.0-2.0)   


 


Differential Total Cells


Counted 100  


  


 


 


Neutrophils % (Manual) 74 % (45-75)   


 


Lymphocytes % (Manual) 21 % (20-45)   


 


Monocytes % (Manual) 5 % (1-10)   


 


Eosinophils % (Manual) 0 % (0-3)   


 


Basophils % (Manual) 0 % (0-2)   


 


Band Neutrophils 0 % (0-8)   


 


Platelet Estimate Adequate   


 


Platelet Morphology Normal   


 


Anisocytosis 1+   


 


Macrocytosis 1+   


 


Sodium Level


  144 MMOL/L


(136-145) 


 


 


Potassium Level


  3.6 MMOL/L


(3.5-5.1) 


 


 


Chloride Level


  109 MMOL/L


()  H 


 


 


Carbon Dioxide Level


  26 MMOL/L


(21-32) 


 


 


Anion Gap


  9 mmol/L


(5-15) 


 


 


Blood Urea Nitrogen


  43 mg/dL


(7-18)  H 


 


 


Creatinine


  1.8 MG/DL


(0.55-1.30)  H 


 


 


Estimat Glomerular Filtration


Rate 27.7 mL/min


(>60) 


 


 


Glucose Level


  99 MG/DL


() 


 


 


Calcium Level


  8.9 MG/DL


(8.5-10.1) 


 


 


Total Bilirubin


  0.3 MG/DL


(0.2-1.0) 


 


 


Aspartate Amino Transf


(AST/SGOT) 10 U/L (15-37)


L 


 


 


Alanine Aminotransferase


(ALT/SGPT) 14 U/L (12-78)


  


 


 


Alkaline Phosphatase


  119 U/L


()  H 


 


 


Pro-B-Type Natriuretic Peptide


  4391 pg/mL


(0-125)  H 


 


 


Total Protein


  6.4 G/DL


(6.4-8.2) 


 


 


Albumin


  2.5 G/DL


(3.4-5.0)  L 


 


 


Globulin 3.9 g/dL   


 


Albumin/Globulin Ratio


  0.6 (1.0-2.7)


L 


 


 


Arterial Blood pH


  


  7.426


(7.350-7.450)


 


Arterial Blood Partial


Pressure CO2 


  32.5 mmHg


(35.0-45.0)  L


 


Arterial Blood Partial


Pressure O2 


  76.4 mmHg


(75.0-100.0)


 


Arterial Blood HCO3


  


  20.9 mmol/L


(22.0-26.0)  L


 


Arterial Blood Oxygen


Saturation 


  94.8 %


()  L


 


Arterial Blood Base Excess  -2.8 (-2-2)  L


 


Rishi Test  Positive  

















Live Brambila MD Sep 1, 2020 11:11

## 2020-09-01 NOTE — DIAGNOSTIC IMAGING REPORT
Indication: Dyspnea

 

Technique: One view of the chest

 

Comparison: none

 

Findings: Markedly elevated right hemidiaphragm is unchanged. Left pleural fluid and

possibly basilar consolidation is unchanged. Right chest pacemaker again demonstrated

 

Impression:  Unchanged, over one day, findings as above.

## 2020-09-01 NOTE — INFECTIOUS DISEASES PROG NOTE
Assessment/Plan


71yo F with:





Sepsis


Fever; SP


Leukocytosis to 31, persistent; improving ( now on steroids)


JASWINDER to 5.1, improving 


Hypoxic resp failure, sp NRB mask >VM> 4l NC 9/1


VRE UTI


Hx of COVID-19 1 mo ago ( doubt recurrent COVID-19) 


HAP


   8/29 CXR:   Improving left lower lobe infiltrate and left pleural effusion. 

There is a probable small right pleural effusion.


   8/27 cdiff neg


           8/26 CXR: There are increased interstitial congestion and left 

pleural effusion,since prior exam of 8/22/2020


               Bcx NTD


            8/22 sp cx MDR ABC (S bactrim; I Colistin, Polymixin B, Minocycline)


   8/21 BCx NTD


   8/21 UA+, UCx >100k VRE (S Zyvox)


   8/21 CXR: Left retrocardiac mild atelectasis versus infiltrate.


   8/21 COVID rapid neg; 8/22 repeat COVID-19 +








BL arm rash, appears evolving, now small pustules with dry/crusting, querry 

resolving Shingles? Though odd to have on both arms


   8/21 HSV & VZV PCR ordered





Plan:


Continue IV bactrim #9/10-14 and PO Minocycline 200mg bid #8/10 (high dose) MDR 

ABC PNA based on cultures


   -8/29 SP ZYvox #7





       --8/24 SP Cefepime #4


       --8/23 SP IV Vancomcyin #4





F/u cx


Monitor CBC/BMP


Trend BUE rash


COVID isolation for now 


f/u Bcx x2, Sp cx





Discussed with RN





Subjective


Allergies:  


Coded Allergies:  


     AMPICILLIN (Verified  Allergy, Unknown, hives, 8/21/20)


     PENICILLINS (Verified  Allergy, Unknown, hives, 8/21/20)


     TETRACYCLINES (Verified  Allergy, Unknown, hives, 8/21/20)


afebrile


wbc remains bt 16-18


now at 4l NC





Objective





Last 24 Hour Vital Signs








  Date Time  Temp Pulse Resp B/P (MAP) Pulse Ox O2 Delivery O2 Flow Rate FiO2


 


9/1/20 12:00  91      


 


9/1/20 12:00 99.1 91 34 127/76 (93) 96   


 


9/1/20 12:00      Nasal Cannula 4.0 


 


9/1/20 08:00       8.0 40


 


9/1/20 08:00 98.8 104 29 122/73 (89) 95   


 


9/1/20 08:00      Venturi Mask 4.0 


 


9/1/20 07:45  101      


 


9/1/20 07:20     97 Venturi Mask 4.0 31


 


9/1/20 07:20  101 32  97 Venturi Mask 4.0 31


 


9/1/20 05:26  104 33 155/71 95   


 


9/1/20 04:56  102 51 129/59 98   


 


9/1/20 04:00      Venturi Mask 4.0 


 


9/1/20 04:00 99.0 108 55 130/68 (88) 95   


 


9/1/20 03:38  108      


 


9/1/20 03:31       8.0 40


 


9/1/20 00:00 98.8 101 38 118/52 (74) 98   


 


9/1/20 00:00       8.0 40


 


9/1/20 00:00      Venturi Mask 4.0 


 


8/31/20 23:31  102      


 


8/31/20 20:00      Venturi Mask 8.0 


 


8/31/20 20:00 98.1 95 38 116/52 (73) 98   


 


8/31/20 20:00  102 20  96 Venturi Mask 4.0 31


 


8/31/20 20:00     96 Venturi Mask 4.0 31


 


8/31/20 20:00  78      


 


8/31/20 20:00       8.0 40


 


8/31/20 16:00       8.0 40


 


8/31/20 16:00 99.2 102 26 117/52 (73) 97   


 


8/31/20 16:00  105      


 


8/31/20 16:00      Venturi Mask 8.0 








Height (Feet):  5


Height (Inches):  2.00


Weight (Pounds):  123


HEENT:  atraumatic


Respiratory/Chest:  other - mild Rsp distress


Abdomen:  no organomegaly





Laboratory Tests








Test


  9/1/20


03:50 9/1/20


04:01


 


White Blood Count


  18.1 K/UL


(4.8-10.8)  H 


 


 


Red Blood Count


  3.04 M/UL


(4.20-5.40)  L 


 


 


Hemoglobin


  9.9 G/DL


(12.0-16.0)  L 


 


 


Hematocrit


  30.2 %


(37.0-47.0)  L 


 


 


Mean Corpuscular Volume 99 FL (80-99)   


 


Mean Corpuscular Hemoglobin


  32.5 PG


(27.0-31.0)  H 


 


 


Mean Corpuscular Hemoglobin


Concent 32.8 G/DL


(32.0-36.0) 


 


 


Red Cell Distribution Width


  14.6 %


(11.6-14.8) 


 


 


Platelet Count


  158 K/UL


(150-450) 


 


 


Mean Platelet Volume


  6.8 FL


(6.5-10.1) 


 


 


Neutrophils (%) (Auto)


  % (45.0-75.0)


  


 


 


Lymphocytes (%) (Auto)


  % (20.0-45.0)


  


 


 


Monocytes (%) (Auto)  % (1.0-10.0)   


 


Eosinophils (%) (Auto)  % (0.0-3.0)   


 


Basophils (%) (Auto)  % (0.0-2.0)   


 


Differential Total Cells


Counted 100  


  


 


 


Neutrophils % (Manual) 74 % (45-75)   


 


Lymphocytes % (Manual) 21 % (20-45)   


 


Monocytes % (Manual) 5 % (1-10)   


 


Eosinophils % (Manual) 0 % (0-3)   


 


Basophils % (Manual) 0 % (0-2)   


 


Band Neutrophils 0 % (0-8)   


 


Platelet Estimate Adequate   


 


Platelet Morphology Normal   


 


Anisocytosis 1+   


 


Macrocytosis 1+   


 


Sodium Level


  144 MMOL/L


(136-145) 


 


 


Potassium Level


  3.6 MMOL/L


(3.5-5.1) 


 


 


Chloride Level


  109 MMOL/L


()  H 


 


 


Carbon Dioxide Level


  26 MMOL/L


(21-32) 


 


 


Anion Gap


  9 mmol/L


(5-15) 


 


 


Blood Urea Nitrogen


  43 mg/dL


(7-18)  H 


 


 


Creatinine


  1.8 MG/DL


(0.55-1.30)  H 


 


 


Estimat Glomerular Filtration


Rate 27.7 mL/min


(>60) 


 


 


Glucose Level


  99 MG/DL


() 


 


 


Calcium Level


  8.9 MG/DL


(8.5-10.1) 


 


 


Total Bilirubin


  0.3 MG/DL


(0.2-1.0) 


 


 


Aspartate Amino Transf


(AST/SGOT) 10 U/L (15-37)


L 


 


 


Alanine Aminotransferase


(ALT/SGPT) 14 U/L (12-78)


  


 


 


Alkaline Phosphatase


  119 U/L


()  H 


 


 


Pro-B-Type Natriuretic Peptide


  4391 pg/mL


(0-125)  H 


 


 


Total Protein


  6.4 G/DL


(6.4-8.2) 


 


 


Albumin


  2.5 G/DL


(3.4-5.0)  L 


 


 


Globulin 3.9 g/dL   


 


Albumin/Globulin Ratio


  0.6 (1.0-2.7)


L 


 


 


Arterial Blood pH


  


  7.426


(7.350-7.450)


 


Arterial Blood Partial


Pressure CO2 


  32.5 mmHg


(35.0-45.0)  L


 


Arterial Blood Partial


Pressure O2 


  76.4 mmHg


(75.0-100.0)


 


Arterial Blood HCO3


  


  20.9 mmol/L


(22.0-26.0)  L


 


Arterial Blood Oxygen


Saturation 


  94.8 %


()  L


 


Arterial Blood Base Excess  -2.8 (-2-2)  L


 


Rishi Test  Positive  











Current Medications








 Medications


  (Trade)  Dose


 Ordered  Sig/Raji


 Route


 PRN Reason  Start Time


 Stop Time Status Last Admin


Dose Admin


 


 Acetaminophen


  (Tylenol)  650 mg  Q6H  PRN


 GT


 Mild Pain (Pain Scale 1-3)  8/31/20 16:25


 9/30/20 16:24  8/31/20 17:02


 


 


 Clonidine HCl


  (Catapres Tab)  0.1 mg  Q4H  PRN


 ORAL


 bp over 160 syst  8/26/20 15:30


 11/22/20 15:29   


 


 


 Dexamethasone


 Sodium Phosphate


  (Decadron 10mg/


 ml Inj)  6 mg  DAILY


 IV


   8/27/20 09:00


 9/5/20 09:01  9/1/20 08:57


 


 


 Dextrose  1,000 ml @ 


 75 mls/hr  [id number]


 IV


   8/26/20 16:00


 9/20/20 15:59  9/1/20 05:35


 


 


 Docusate Sodium


  (Colace)  100 mg  EVERY 8  HOURS


 GT


   8/30/20 14:00


 9/20/20 08:59   


 


 


 Enoxaparin Sodium


  (Lovenox)  30 mg  DAILY


 SUBQ


   8/27/20 09:00


 11/19/20 08:59  9/1/20 08:59


 


 


 Epoetin Jaswant


  (Epoetin


 Jaswant-EPBX(NON


 ESRD))  10,000 unit  MON-WED-FRI


 SUBQ


   8/31/20 21:00


 11/29/20 20:59  8/31/20 21:39


 


 


 Lorazepam


  (Ativan 2mg/ml


 1ml)  1 mg  Q4H  PRN


 IV


 For Anxiety  8/29/20 10:45


 9/5/20 10:44  9/1/20 04:56


 


 


 Minocycline HCl


  (Minocin)  200 mg  Q12HR


 ORAL


   8/26/20 21:00


 9/5/20 20:59  9/1/20 08:58


 


 


 Pantoprazole


  (Protonix)  40 mg  EVERY 12  HOURS


 IVP


   8/26/20 21:00


 9/20/20 20:59  9/1/20 08:58


 


 


 Polyethylene


 Glycol


  (Miralax)  17 gm  BEDTIME


 ORAL


   8/26/20 21:00


 9/20/20 20:59  8/29/20 20:16


 


 


 Trimethoprim/


 Sulfamethoxazole


 10 ml/Dextrose  285 ml @ 


 190 mls/hr  EVERY 12  HOURS


 IV


   8/26/20 21:00


 9/5/20 20:59  9/1/20 08:56


 

















Tanisha Brownlee M.D. Sep 1, 2020 14:34

## 2020-09-01 NOTE — NEPHROLOGY PROGRESS NOTE
Assessment/Plan


Problem List:  


(1) ARF (acute renal failure)


Assessment:  Underlying CKD





(2) Sepsis


(3) UTI (urinary tract infection)


(4) Severe dehydration


(5) Hypernatremia


(6) Acute urinary retention


(7) Anemia


Assessment





Acute renal failure


Possible underlying chronic renal insufficiency


Severe dehydration


Hypotension


Sepsis, UTI


History of psychiatric disease


Elevated troponin I


Patient full code


Plan


September 1: Serum creatinine 1.8 stable.  Continue per pulmonary.  Continue to 

monitor renal parameters.


August 31: On Venturi mask.  Serum creatinine lowered to 1.8.  Continue per 

consultants.  Medication list reviewed.  Potassium supplement given.


August 30: Status quo.  Creatinine lower at 2.  Other electrolytes within 

normal limits.  Continue per consultants.


August 29: Labs reviewed.  Creatinine 2.2 stable.  Potassium 3.1.  20 M EQ KCl 

given.  Continue her consultants.


August 28: Serum potassium 5.5.Creatinine 2.2.  1 dose Kayexalate given.  

Remains of 75 cc IV fluid.  We will continue to monitor renal parameters.


August 27: Serum potassium 5.3.  Creatinine higher at 2.2.  Medication list 

reviewed.  Suggest to avoid nephrotoxic's like Bactrim if possible.  Continue 

to monitor renal parameters and electrolytes.  Continue per pulmonary to adjust 

pulmonary status.  May require BiPAP.


August 26: Serum sodium lower.  Renal parameters appear more stable.  

Nevertheless leukocytosis is worsened.  Continue per consultants.


August 25: Serum sodium higher.  Continue D5W 75 cc an hour.  Continue to 

monitor electrolytes.  Continue per consultants.  Serum creatinine down to 2.4 

from 5.1 on admission.  Leukocytosis persists.


August 24: Patient now on isolation for COVID-19.  Serum creatinine is 

plateauing.  Will decrease the IV fluid to 75 cc an hour.  Continue management 

per ID.  Will monitor electrolytes and renal parameters.


August 23: Discussed with MARTIN Carter.  Patient needs a Mitchell catheter.  Accurate 

intake and output.  Decrease  cc D5W an hour.  1 dose of IV Venofer and 

then subcu Epogen ordered for anemia.  Continue to monitor renal parameters


August 22: Renal parameters improving.  Continue D5W IV fluid.  Potassium 

supplement IV given.  Continue per consultants.





Previously:


Fluid challenge


Monitor renal parameters and electrolytes


Monitor intake and output


Mitchell catheter


Antibiotics


Avoid nephrotoxic's


Chest x-ray





Kidney ultrasound findings: 


Right kidney measures 8.9 cm in length. Left kidney measures 9 cm in


length. Both kidneys demonstrate markedly increased echogenicity. No 

hydronephrosis. 


There are bilateral renal cysts. Normal inferior vena cava. Bladder is empty,


contains a Mitchell catheter. 


 


Incidentally noted are gallstones.


 


Impression: Markedly echogenic kidneys, consistent with medical renal disease


Negative for hydronephrosis


Empty bladder with a Mitchell catheter


Incidental finding of cholelithiasis.





Subjective


ROS Limited/Unobtainable:  Yes





Objective


Objective





Last 24 Hour Vital Signs








  Date Time  Temp Pulse Resp B/P (MAP) Pulse Ox O2 Delivery O2 Flow Rate FiO2


 


9/1/20 15:32  89      


 


9/1/20 12:00  91      


 


9/1/20 12:00 99.1 91 34 127/76 (93) 96   


 


9/1/20 12:00      Nasal Cannula 4.0 


 


9/1/20 08:00       8.0 40


 


9/1/20 08:00 98.8 104 29 122/73 (89) 95   


 


9/1/20 08:00      Venturi Mask 4.0 


 


9/1/20 07:45  101      


 


9/1/20 07:20     97 Venturi Mask 4.0 31


 


9/1/20 07:20  101 32  97 Venturi Mask 4.0 31


 


9/1/20 05:26  104 33 155/71 95   


 


9/1/20 04:56  102 51 129/59 98   


 


9/1/20 04:00      Venturi Mask 4.0 


 


9/1/20 04:00 99.0 108 55 130/68 (88) 95   


 


9/1/20 03:38  108      


 


9/1/20 03:31       8.0 40


 


9/1/20 00:00 98.8 101 38 118/52 (74) 98   


 


9/1/20 00:00       8.0 40


 


9/1/20 00:00      Venturi Mask 4.0 


 


8/31/20 23:31  102      


 


8/31/20 20:00      Venturi Mask 8.0 


 


8/31/20 20:00 98.1 95 38 116/52 (73) 98   


 


8/31/20 20:00  102 20  96 Venturi Mask 4.0 31


 


8/31/20 20:00     96 Venturi Mask 4.0 31


 


8/31/20 20:00  78      


 


8/31/20 20:00       8.0 40

















Intake and Output  


 


 8/31/20 9/1/20





 19:00 07:00


 


Intake Total 2005 ml 855 ml


 


Output Total 1550 ml 1700 ml


 


Balance 455 ml -845 ml


 


  


 


Intake Free Water 200 ml 


 


IV Total 1385 ml 435 ml


 


Tube Feeding 420 ml 420 ml


 


Output Urine Total 1550 ml 1700 ml


 


# Bowel Movements 2 2








Laboratory Tests


9/1/20 03:50: 


White Blood Count 18.1H, Red Blood Count 3.04L, Hemoglobin 9.9L, Hematocrit 

30.2L, Mean Corpuscular Volume 99, Mean Corpuscular Hemoglobin 32.5H, Mean 

Corpuscular Hemoglobin Concent 32.8, Red Cell Distribution Width 14.6, Platelet 

Count 158, Mean Platelet Volume 6.8, Neutrophils (%) (Auto) , Lymphocytes (%) (

Auto) , Monocytes (%) (Auto) , Eosinophils (%) (Auto) , Basophils (%) (Auto) , 

Differential Total Cells Counted 100, Neutrophils % (Manual) 74, Lymphocytes % (

Manual) 21, Monocytes % (Manual) 5, Eosinophils % (Manual) 0, Basophils % (

Manual) 0, Band Neutrophils 0, Platelet Estimate Adequate, Platelet Morphology 

Normal, Anisocytosis 1+, Macrocytosis 1+, Sodium Level 144, Potassium Level 3.6

, Chloride Level 109H, Carbon Dioxide Level 26, Anion Gap 9, Blood Urea 

Nitrogen 43H, Creatinine 1.8H, Estimat Glomerular Filtration Rate 27.7, Glucose 

Level 99, Calcium Level 8.9, Total Bilirubin 0.3, Aspartate Amino Transf (AST/

SGOT) 10L, Alanine Aminotransferase (ALT/SGPT) 14, Alkaline Phosphatase 119H, 

Pro-B-Type Natriuretic Peptide 4391H, Total Protein 6.4, Albumin 2.5L, Globulin 

3.9, Albumin/Globulin Ratio 0.6L


9/1/20 04:01: 


Arterial Blood pH 7.426, Arterial Blood Partial Pressure CO2 32.5L, Arterial 

Blood Partial Pressure O2 76.4, Arterial Blood HCO3 20.9L, Arterial Blood 

Oxygen Saturation 94.8L, Arterial Blood Base Excess -2.8L, Rishi Test Positive


Height (Feet):  5


Height (Inches):  2.00


Weight (Pounds):  123


General Appearance:  mild distress


EENT:  other - On Venturi mask and periodically on nasal cannula


Cardiovascular:  tachycardia


Respiratory/Chest:  decreased breath sounds


Abdomen:  distended











Derik Dos Santos MD Sep 1, 2020 16:07

## 2020-09-01 NOTE — CONSULTATION
History of Present Illness


General


Date patient seen:  Sep 1, 2020


Reason for Hospitalization:  Dyspnea/Respdistress





Present Illness


HPI


70-year-old female multiple medical comorbidities nursing home patient 

presented with shortness of breath respiratory insufficiency admitted [facility] for further care and management.  Patient unable to provide 

significant history and chart reviewed EMR review performed.  Patient history 

of COVID positive approximately a month ago has been recovering.  Noted to have 

abnormal labs malnutrition decubitus skin ulcers deep tissue injury.  Surgery 

called to evaluate and assist with care.  Patient seen, patient evaluated, 

chart reviewed


Allergies:  


Coded Allergies:  


     AMPICILLIN (Verified  Allergy, Unknown, hives, 8/21/20)


     PENICILLINS (Verified  Allergy, Unknown, hives, 8/21/20)


     TETRACYCLINES (Verified  Allergy, Unknown, hives, 8/21/20)





COVID-19 Screening


Contact w/high risk pt:  No


Experienced COVID-19 symptoms?:  Yes


COVID-19 symptoms experienced:  Shortness of Breath





Medication History


Scheduled


Aripiprazole* (Abilify*), 20 MG ORAL DAILY, (Reported)


Bethanechol Chl* (Urecholine*), 25 MG ORAL THREE TIMES A DAY, (Reported)


Bisacodyl* (Dulcolax*), 5 MG ORAL DAILY, (Reported)


Ciprofloxacin* (Ciprofloxacin*), 250 MG PO BID, (Reported)


Clonazepam* (Klonopin*), 0.5 MG ORAL Q6H, (Reported)


Clonidine Hcl* (Catapres*), 0.1 MG ORAL EVERY 6 HOURS, (Reported)


Docusate Sodium (Docusate Sodium), 100 MG GT DAILY, (Reported)


Enoxaparin* (Lovenox*), 30 MG SUBQ DAILY, (Reported)


Famotidine* (Pepcid 20mg tablet*), 20 MG ORAL DAILY, (Reported)


Ferrous Sulfate* (Ferrous Sulfate*), 325 MG ORAL DAILY, (Reported)


Latanoprost* (Xalatan*), 1 DROP BOTH EYES BEDTIME, (Reported)





Scheduled PRN


Acetaminophen 160MG/5ML* (Acetaminophen*), 5 ML ORAL THREE TIMES A DAY PRN for 

Fever/Headache/Mild Pain, (Reported)





Miscellaneous Medications


Metoclopramide Hcl (Metoclopramide Hcl*), 5 MG IJ, (Reported)


Sennosides/Docusate Sodium (Senna Plus 8.6-50 mg Tablet), 1 EACH PO, (Reported)


Vancomycin/Water For Inj (Peg) (Vancomycin 1 Gram/200 ml Bag), 1 GM IV, (

Reported)





Patient History


Limited by:  medical condition


History Provided By:  Medical Record, PMD


Healthcare decision maker





Resuscitation status





Advanced Directive on File








Past Medical/Surgical History


Past Medical/Surgical History:  


(1) Anemia


(2) 2019 novel coronavirus disease (COVID-19)


(3) Pneumonia


(4) Multiple drug resistant organism (MDRO) culture positive


(5) Hyperkalemia


(6) Hypernatremia


(7) Severe dehydration


(8) Acute urinary retention


(9) Sepsis


(10) UTI (urinary tract infection)


(11) ARF (acute renal failure)


(12) Psychosis





Review of Systems


Review of Symptoms


General ROS: no weight loss or fever


Psychological ROS: no depression or mood changes, no memory loss


Ophthalmic ROS: no visual changes or eye irritation


ENT ROS: no nasal congestion, hearing loss, dizziness


Allergy and Immunology ROS: no allergic symptoms or urticaria


Hematological and Lymphatic ROS: no swollen glands, unusual bleeding or bruising


Endocrine ROS: no polyuria, polydipsia, weight changes, temperature intolerance


Respiratory ROS: no cough, shortness of breath, or wheezing


Cardiovascular ROS: no chest pain or dyspnea on exertion


Gastrointestinal ROS: denies abdominal pain, bright red blood in stool.


Musculoskeletal ROS: no myalgias or arthralgias


Neurological ROS: no TIA or stroke symptoms


Dermatological ROS: no new or changing skin lesions, rashes or pruritis


limited given medical condition





Physical Exam


Physical Exam


General appearance:  alert, cooperative, no distress, appears stated age


Head:  Normocephalic, without obvious abnormality, atraumatic


Eyes:  conjunctivae/corneas clear. PERRL, EOM's intact. Fundi benign


Throat:  Lips, mucosa, and tongue normal. Teeth and gums normal


Neck:  supple, symmetrical, trachea midline, no adenopathy, thyroid: not 

enlarged, symmetric, no tenderness/mass/nodules, no carotid bruit and no JVD


Lungs:  clear to auscultation bilaterally


Heart:  regular rate and rhythm, S1, S2 normal, no murmur, click, rub or gallop


Abdomen:  soft, non-tender. Bowel sounds normal. No masses,  no organomegaly


Extremities:  extremities normal, atraumatic, no cyanosis or edema


Pulses:  2+ and symmetric


Skin:  Skin as below 


Neurologic:  Grossly normal





Last 24 Hour Vital Signs








  Date Time  Temp Pulse Resp B/P (MAP) Pulse Ox O2 Delivery O2 Flow Rate FiO2


 


9/1/20 12:00  91      


 


9/1/20 12:00 99.1 91 34 127/76 (93) 96   


 


9/1/20 12:00      Nasal Cannula 4.0 


 


9/1/20 08:00       8.0 40


 


9/1/20 08:00 98.8 104 29 122/73 (89) 95   


 


9/1/20 08:00      Venturi Mask 4.0 


 


9/1/20 07:45  101      


 


9/1/20 07:20     97 Venturi Mask 4.0 31


 


9/1/20 07:20  101 32  97 Venturi Mask 4.0 31


 


9/1/20 05:26  104 33 155/71 95   


 


9/1/20 04:56  102 51 129/59 98   


 


9/1/20 04:00      Venturi Mask 4.0 


 


9/1/20 04:00 99.0 108 55 130/68 (88) 95   


 


9/1/20 03:38  108      


 


9/1/20 03:31       8.0 40


 


9/1/20 00:00 98.8 101 38 118/52 (74) 98   


 


9/1/20 00:00       8.0 40


 


9/1/20 00:00      Venturi Mask 4.0 


 


8/31/20 23:31  102      


 


8/31/20 20:00      Venturi Mask 8.0 


 


8/31/20 20:00 98.1 95 38 116/52 (73) 98   


 


8/31/20 20:00  102 20  96 Venturi Mask 4.0 31


 


8/31/20 20:00     96 Venturi Mask 4.0 31


 


8/31/20 20:00  78      


 


8/31/20 20:00       8.0 40


 


8/31/20 16:00       8.0 40


 


8/31/20 16:00 99.2 102 26 117/52 (73) 97   


 


8/31/20 16:00  105      


 


8/31/20 16:00      Venturi Mask 8.0 

















Intake and Output  


 


 8/31/20 9/1/20





 19:00 07:00


 


Intake Total 2005 ml 855 ml


 


Output Total 1550 ml 1700 ml


 


Balance 455 ml -845 ml


 


  


 


Intake Free Water 200 ml 


 


IV Total 1385 ml 435 ml


 


Tube Feeding 420 ml 420 ml


 


Output Urine Total 1550 ml 1700 ml


 


# Bowel Movements 2 2











Laboratory Tests








Test


  9/1/20


03:50 9/1/20


04:01


 


White Blood Count


  18.1 K/UL


(4.8-10.8)  H 


 


 


Red Blood Count


  3.04 M/UL


(4.20-5.40)  L 


 


 


Hemoglobin


  9.9 G/DL


(12.0-16.0)  L 


 


 


Hematocrit


  30.2 %


(37.0-47.0)  L 


 


 


Mean Corpuscular Volume 99 FL (80-99)   


 


Mean Corpuscular Hemoglobin


  32.5 PG


(27.0-31.0)  H 


 


 


Mean Corpuscular Hemoglobin


Concent 32.8 G/DL


(32.0-36.0) 


 


 


Red Cell Distribution Width


  14.6 %


(11.6-14.8) 


 


 


Platelet Count


  158 K/UL


(150-450) 


 


 


Mean Platelet Volume


  6.8 FL


(6.5-10.1) 


 


 


Neutrophils (%) (Auto)


  % (45.0-75.0)


  


 


 


Lymphocytes (%) (Auto)


  % (20.0-45.0)


  


 


 


Monocytes (%) (Auto)  % (1.0-10.0)   


 


Eosinophils (%) (Auto)  % (0.0-3.0)   


 


Basophils (%) (Auto)  % (0.0-2.0)   


 


Differential Total Cells


Counted 100  


  


 


 


Neutrophils % (Manual) 74 % (45-75)   


 


Lymphocytes % (Manual) 21 % (20-45)   


 


Monocytes % (Manual) 5 % (1-10)   


 


Eosinophils % (Manual) 0 % (0-3)   


 


Basophils % (Manual) 0 % (0-2)   


 


Band Neutrophils 0 % (0-8)   


 


Platelet Estimate Adequate   


 


Platelet Morphology Normal   


 


Anisocytosis 1+   


 


Macrocytosis 1+   


 


Sodium Level


  144 MMOL/L


(136-145) 


 


 


Potassium Level


  3.6 MMOL/L


(3.5-5.1) 


 


 


Chloride Level


  109 MMOL/L


()  H 


 


 


Carbon Dioxide Level


  26 MMOL/L


(21-32) 


 


 


Anion Gap


  9 mmol/L


(5-15) 


 


 


Blood Urea Nitrogen


  43 mg/dL


(7-18)  H 


 


 


Creatinine


  1.8 MG/DL


(0.55-1.30)  H 


 


 


Estimat Glomerular Filtration


Rate 27.7 mL/min


(>60) 


 


 


Glucose Level


  99 MG/DL


() 


 


 


Calcium Level


  8.9 MG/DL


(8.5-10.1) 


 


 


Total Bilirubin


  0.3 MG/DL


(0.2-1.0) 


 


 


Aspartate Amino Transf


(AST/SGOT) 10 U/L (15-37)


L 


 


 


Alanine Aminotransferase


(ALT/SGPT) 14 U/L (12-78)


  


 


 


Alkaline Phosphatase


  119 U/L


()  H 


 


 


Pro-B-Type Natriuretic Peptide


  4391 pg/mL


(0-125)  H 


 


 


Total Protein


  6.4 G/DL


(6.4-8.2) 


 


 


Albumin


  2.5 G/DL


(3.4-5.0)  L 


 


 


Globulin 3.9 g/dL   


 


Albumin/Globulin Ratio


  0.6 (1.0-2.7)


L 


 


 


Arterial Blood pH


  


  7.426


(7.350-7.450)


 


Arterial Blood Partial


Pressure CO2 


  32.5 mmHg


(35.0-45.0)  L


 


Arterial Blood Partial


Pressure O2 


  76.4 mmHg


(75.0-100.0)


 


Arterial Blood HCO3


  


  20.9 mmol/L


(22.0-26.0)  L


 


Arterial Blood Oxygen


Saturation 


  94.8 %


()  L


 


Arterial Blood Base Excess  -2.8 (-2-2)  L


 


Rishi Test  Positive  








Height (Feet):  5


Height (Inches):  2.00


Weight (Pounds):  123


Medications





Current Medications








 Medications


  (Trade)  Dose


 Ordered  Sig/Raji


 Route


 PRN Reason  Start Time


 Stop Time Status Last Admin


Dose Admin


 


 Acetaminophen


  (Tylenol)  650 mg  Q6H  PRN


 GT


 Mild Pain (Pain Scale 1-3)  8/31/20 16:25


 9/30/20 16:24  8/31/20 17:02


 


 


 Clonidine HCl


  (Catapres Tab)  0.1 mg  Q4H  PRN


 ORAL


 bp over 160 syst  8/26/20 15:30


 11/22/20 15:29   


 


 


 Dexamethasone


 Sodium Phosphate


  (Decadron 10mg/


 ml Inj)  6 mg  DAILY


 IV


   8/27/20 09:00


 9/5/20 09:01  9/1/20 08:57


 


 


 Dextrose  1,000 ml @ 


 75 mls/hr  [id number]


 IV


   8/26/20 16:00


 9/20/20 15:59  9/1/20 05:35


 


 


 Docusate Sodium


  (Colace)  100 mg  EVERY 8  HOURS


 GT


   8/30/20 14:00


 9/20/20 08:59   


 


 


 Enoxaparin Sodium


  (Lovenox)  30 mg  DAILY


 SUBQ


   8/27/20 09:00


 11/19/20 08:59  9/1/20 08:59


 


 


 Epoetin Jaswant


  (Epoetin


 Jaswant-EPBX(NON


 ESRD))  10,000 unit  MON-WED-FRI


 SUBQ


   8/31/20 21:00


 11/29/20 20:59  8/31/20 21:39


 


 


 Lorazepam


  (Ativan 2mg/ml


 1ml)  1 mg  Q4H  PRN


 IV


 For Anxiety  8/29/20 10:45


 9/5/20 10:44  9/1/20 04:56


 


 


 Minocycline HCl


  (Minocin)  200 mg  Q12HR


 ORAL


   8/26/20 21:00


 9/5/20 20:59  9/1/20 08:58


 


 


 Pantoprazole


  (Protonix)  40 mg  EVERY 12  HOURS


 IVP


   8/26/20 21:00


 9/20/20 20:59  9/1/20 08:58


 


 


 Polyethylene


 Glycol


  (Miralax)  17 gm  BEDTIME


 ORAL


   8/26/20 21:00


 9/20/20 20:59  8/29/20 20:16


 


 


 Trimethoprim/


 Sulfamethoxazole


 10 ml/Dextrose  285 ml @ 


 190 mls/hr  EVERY 12  HOURS


 IV


   8/26/20 21:00


 9/5/20 20:59  9/1/20 08:56


 











Assessment/Plan


Problem List:  


(1) Anemia


ICD Codes:  D64.9 - Anemia, unspecified


SNOMED:  094164830


(2) 2019 novel coronavirus disease (COVID-19)


ICD Codes:  U07.1 - COVID-19


SNOMED:  559677897


(3) Pneumonia


ICD Codes:  J18.9 - Pneumonia, unspecified organism


SNOMED:  508804080


(4) Multiple drug resistant organism (MDRO) culture positive


ICD Codes:  Z16.24 - Resistance to multiple antibiotics


SNOMED:  852755914


(5) Hyperkalemia


ICD Codes:  E87.5 - Hyperkalemia


SNOMED:  58524238


(6) Hypernatremia


ICD Codes:  E87.0 - Hyperosmolality and hypernatremia


SNOMED:  430408096


(7) Severe dehydration


ICD Codes:  E86.0 - Dehydration


SNOMED:  016894621


(8) Acute urinary retention


ICD Codes:  R33.8 - Other retention of urine


SNOMED:  063907843


(9) Sepsis


Assessment & Plan:  Pt presented on admission with multiple Pressure injuries. 

Pt noted to have band of  Blotchy red rash  with open and closed Blisters 

affecting  R Brachial to R forearm. Lateral R back /R flank is also blotchy 

erythematous with clusters of serous filled blisters. On Lateral L Back to L 

flank is Blotchy, erythematous with clusters of serous filled blisters.  


Reabsorbing DTPI that is partially opened Sacrum(L) 6cm x (W)5.5cm.  Base of 

wound is Maroon with with scattered areas that are maryellen and loose peeling soft 

black cap.Surrounding Non-Blanching erythema without induration.


Non-Blanching erythema that is indurated with delineated margins R trochanter(L)

5.5cm x (W)6.5cm.


No evidence of skin breakdown L trochanter.


Non-Blanching erythema noted to R and L ischial tuberosities.


DTPI medial R heel (L)3.3cm x (W)3.4cm. Base of injury is, fluctuant, maroon 

with delineated margins. surrounding red,  boggy heel.


DTPI Lateral R Heel(L)1.5cm x (W)2.5cm. Base of injury is indurated, maroon 

with purpuric center.


Non-Blanching erythema without induration /fluctuance lateral R malleolus(L)2cm 

x (W)1.5cm.





Tx.Plan:


Apply Moisture Barrier Paste to Sacrum. Cover with Optifoam drsg. Change every 

3 days and prn.


           


Apply Cavilon Skin Barrier to R and L trochanter. Cover each site with Optifoam 

drsgs. Change every 7 days and prn.


           


Apply Moisture Barrier Paste to R and L Ischial tuberosities with each 

Incontinence care.


           


Apply Cavilon Skin Barrier to Medial and Lateral R Heel. Cover with Optifoam 

drsg. Change every7 days and prn.


           


Apply Cavilon Skin Barrier to R lateral ,and medial Malleoli. Cover each site 

with Optifoam drsgs. Change every 7 days and prn.


           


Apply Cavilon Skin Barrier to L Heel and Malleoli. Cover each site with 

Optifoam drsgs. Change every 7 days and prn.


           


Cover open blisters as needed with Optifoam drsgs. 


           


Reposition at least every 2 hours or as tolerated.


           


Off-load heels with pillow.


           


Nutritional optimization





DAILY ESTIMATED NEEDS:


Needs based on Sepsis, renal, wound 50.9kg  


25-35  kcals/kg 


9021-4738  total kcals


1.25-1.5  g protein/kg


64-76  g total protein 


25-30  mL/kg


5973-4097  total fluid mLs





NUTRITION DIAGNOSIS:


1. Swallowing difficulty r/t dysphagia as evidenced by pt is GT dep.


2. Altered nutrition related lab values r/t sepsis and severe dehydration,


pre-diabetes as evidenced by Elev WBC (31.6 ->18.2), elev Na (179-> WNL),


elev BUN (131->48), elev Creat (5.1->2.2), elev K (5.5-> wnl->5.5, 5.3),


elev BGs (93, 113, 185), A1C of 6.4.





CURRENT TF: Nepro @35  








ENTERAL NUTRITION RECOMMENDATIONS:


LOWER GOAL RATE -> NEPRO @ 35ML/HR X 24 HRS  to provide 840ml, 1512kcal, 68g 

prot, 610ml free water 





- LOWER GOAL RATE: meets 100% est kcal/prot needs


- Flush per MD, DIAN over 30 degrees.








ADDITIONAL RECOMMENDATIONS:


1) Monitor lytes and renal status-> TF change to Nepro (8/28), elev K 


2) Per SNF wt of 112# 


3) NISS for BG control: A1C 6.4, TF at goal + added D5+ Decadron 


4) Wound healing: TF @ goal provides 100% RDI 


    add Vit C 500mg QD + José Manuel BID via GT


ICD Codes:  A41.9 - Sepsis, unspecified organism


SNOMED:  54658543


Qualifiers:  


   Qualified Codes:  A41.9 - Sepsis, unspecified organism; R65.20 - Severe 

sepsis without septic shock; N17.9 - Acute kidney failure, unspecified


(10) UTI (urinary tract infection)


ICD Codes:  N39.0 - Urinary tract infection, site not specified


SNOMED:  75804989


Qualifiers:  


   Qualified Codes:  N30.00 - Acute cystitis without hematuria


(11) ARF (acute renal failure)


ICD Codes:  N17.9 - Acute kidney failure, unspecified


SNOMED:  87412441


Qualifiers:  


   Qualified Codes:  N17.9 - Acute kidney failure, unspecified


(12) Psychosis


ICD Codes:  F29 - Unspecified psychosis not due to a substance or known 

physiological condition


SNOMED:  57701724











Alex Nicholas Sep 1, 2020 14:00

## 2020-09-01 NOTE — INTERNAL MED PROGRESS NOTE
Subjective


Date of Service:  Sep 1, 2020


Physician Name


Wing Hebert


Attending Physician


Umer Wahl MD





Current Medications








 Medications


  (Trade)  Dose


 Ordered  Sig/Raji


 Route


 PRN Reason  Start Time


 Stop Time Status Last Admin


Dose Admin


 


 Acetaminophen


  (Tylenol)  650 mg  Q6H  PRN


 GT


 Mild Pain (Pain Scale 1-3)  8/31/20 16:25


 9/30/20 16:24  8/31/20 17:02


 


 


 Clonidine HCl


  (Catapres Tab)  0.1 mg  Q4H  PRN


 ORAL


 bp over 160 syst  8/26/20 15:30


 11/22/20 15:29   


 


 


 Dexamethasone


 Sodium Phosphate


  (Decadron 10mg/


 ml Inj)  6 mg  DAILY


 IV


   8/27/20 09:00


 9/5/20 09:01  9/1/20 08:57


 


 


 Dextrose  1,000 ml @ 


 75 mls/hr  [id number]


 IV


   8/26/20 16:00


 9/20/20 15:59  9/1/20 17:56


 


 


 Docusate Sodium


  (Colace)  100 mg  EVERY 8  HOURS


 GT


   8/30/20 14:00


 9/20/20 08:59   


 


 


 Enoxaparin Sodium


  (Lovenox)  30 mg  DAILY


 SUBQ


   8/27/20 09:00


 11/19/20 08:59  9/1/20 08:59


 


 


 Epoetin Jaswant


  (Epoetin


 Jaswant-EPBX(NON


 ESRD))  10,000 unit  MON-WED-FRI


 SUBQ


   8/31/20 21:00


 11/29/20 20:59  8/31/20 21:39


 


 


 Lorazepam


  (Ativan 2mg/ml


 1ml)  1 mg  Q4H  PRN


 IV


 For Anxiety  8/29/20 10:45


 9/5/20 10:44  9/1/20 04:56


 


 


 Minocycline HCl


  (Minocin)  200 mg  Q12HR


 ORAL


   8/26/20 21:00


 9/5/20 20:59  9/1/20 08:58


 


 


 Pantoprazole


  (Protonix)  40 mg  EVERY 12  HOURS


 IVP


   8/26/20 21:00


 9/20/20 20:59  9/1/20 08:58


 


 


 Polyethylene


 Glycol


  (Miralax)  17 gm  BEDTIME


 ORAL


   8/26/20 21:00


 9/20/20 20:59  8/29/20 20:16


 


 


 Trimethoprim/


 Sulfamethoxazole


 10 ml/Dextrose  285 ml @ 


 190 mls/hr  EVERY 12  HOURS


 IV


   8/26/20 21:00


 9/5/20 20:59  9/1/20 08:56


 








Allergies:  


Coded Allergies:  


     AMPICILLIN (Verified  Allergy, Unknown, hives, 8/21/20)


     PENICILLINS (Verified  Allergy, Unknown, hives, 8/21/20)


     TETRACYCLINES (Verified  Allergy, Unknown, hives, 8/21/20)


ROS Limited/Unobtainable:  Yes


Subjective


71 YO F with previous COVID 19 pos admitted with shortness of breath.  Now 

pneumonia and sepsis.  Cover for Int Med-DR Wahl.  Step down unit





Objective





Last Vital Signs








  Date Time  Temp Pulse Resp B/P (MAP) Pulse Ox O2 Delivery O2 Flow Rate FiO2


 


9/1/20 16:00 98.1 88 18 138/54 (82) 100   


 


9/1/20 16:00      Nasal Cannula 4.0 


 


9/1/20 08:00        40











Laboratory Tests








Test


  9/1/20


03:50 9/1/20


04:01


 


White Blood Count


  18.1 K/UL


(4.8-10.8)  H 


 


 


Red Blood Count


  3.04 M/UL


(4.20-5.40)  L 


 


 


Hemoglobin


  9.9 G/DL


(12.0-16.0)  L 


 


 


Hematocrit


  30.2 %


(37.0-47.0)  L 


 


 


Mean Corpuscular Volume 99 FL (80-99)   


 


Mean Corpuscular Hemoglobin


  32.5 PG


(27.0-31.0)  H 


 


 


Mean Corpuscular Hemoglobin


Concent 32.8 G/DL


(32.0-36.0) 


 


 


Red Cell Distribution Width


  14.6 %


(11.6-14.8) 


 


 


Platelet Count


  158 K/UL


(150-450) 


 


 


Mean Platelet Volume


  6.8 FL


(6.5-10.1) 


 


 


Neutrophils (%) (Auto)


  % (45.0-75.0)


  


 


 


Lymphocytes (%) (Auto)


  % (20.0-45.0)


  


 


 


Monocytes (%) (Auto)  % (1.0-10.0)   


 


Eosinophils (%) (Auto)  % (0.0-3.0)   


 


Basophils (%) (Auto)  % (0.0-2.0)   


 


Differential Total Cells


Counted 100  


  


 


 


Neutrophils % (Manual) 74 % (45-75)   


 


Lymphocytes % (Manual) 21 % (20-45)   


 


Monocytes % (Manual) 5 % (1-10)   


 


Eosinophils % (Manual) 0 % (0-3)   


 


Basophils % (Manual) 0 % (0-2)   


 


Band Neutrophils 0 % (0-8)   


 


Platelet Estimate Adequate   


 


Platelet Morphology Normal   


 


Anisocytosis 1+   


 


Macrocytosis 1+   


 


Sodium Level


  144 MMOL/L


(136-145) 


 


 


Potassium Level


  3.6 MMOL/L


(3.5-5.1) 


 


 


Chloride Level


  109 MMOL/L


()  H 


 


 


Carbon Dioxide Level


  26 MMOL/L


(21-32) 


 


 


Anion Gap


  9 mmol/L


(5-15) 


 


 


Blood Urea Nitrogen


  43 mg/dL


(7-18)  H 


 


 


Creatinine


  1.8 MG/DL


(0.55-1.30)  H 


 


 


Estimat Glomerular Filtration


Rate 27.7 mL/min


(>60) 


 


 


Glucose Level


  99 MG/DL


() 


 


 


Calcium Level


  8.9 MG/DL


(8.5-10.1) 


 


 


Total Bilirubin


  0.3 MG/DL


(0.2-1.0) 


 


 


Aspartate Amino Transf


(AST/SGOT) 10 U/L (15-37)


L 


 


 


Alanine Aminotransferase


(ALT/SGPT) 14 U/L (12-78)


  


 


 


Alkaline Phosphatase


  119 U/L


()  H 


 


 


Pro-B-Type Natriuretic Peptide


  4391 pg/mL


(0-125)  H 


 


 


Total Protein


  6.4 G/DL


(6.4-8.2) 


 


 


Albumin


  2.5 G/DL


(3.4-5.0)  L 


 


 


Globulin 3.9 g/dL   


 


Albumin/Globulin Ratio


  0.6 (1.0-2.7)


L 


 


 


Arterial Blood pH


  


  7.426


(7.350-7.450)


 


Arterial Blood Partial


Pressure CO2 


  32.5 mmHg


(35.0-45.0)  L


 


Arterial Blood Partial


Pressure O2 


  76.4 mmHg


(75.0-100.0)


 


Arterial Blood HCO3


  


  20.9 mmol/L


(22.0-26.0)  L


 


Arterial Blood Oxygen


Saturation 


  94.8 %


()  L


 


Arterial Blood Base Excess  -2.8 (-2-2)  L


 


Rishi Test  Positive  











Microbiology








 Date/Time


Source Procedure


Growth Status


 


 


 9/1/20 16:05


Nasopharynx SARS-CoV-2 RdRp Gene Assay - Final Complete

















Intake and Output  


 


 8/31/20 9/1/20





 19:00 07:00


 


Intake Total 2005 ml 855 ml


 


Output Total 1550 ml 1700 ml


 


Balance 455 ml -845 ml


 


  


 


Intake Free Water 200 ml 


 


IV Total 1385 ml 435 ml


 


Tube Feeding 420 ml 420 ml


 


Output Urine Total 1550 ml 1700 ml


 


# Bowel Movements 2 2








Objective


General Appearance:  WD/WN, no apparent distress, lethargic


EENT:  PERRL/EOMI, normal ENT inspection


Neck:  non-tender, normal alignment, supple, normal inspection


Cardiovascular:  normal peripheral pulses, normal rate, regular rhythm, no 

gallop/murmur, no JVD


Respiratory/Chest:  Venturi mask; chest wall non-tender, respiratory distress, 

crackles/rales, rhonchi - bilaterally, expiratory wheezing


Abdomen:  normal bowel sounds, non tender, soft, no organomegaly, no mass


Extremities:  normal range of motion, non-tender


Neurologic:  CNs II-XII grossly normal, no motor/sensory deficits


Skin:  normal pigmentation, warm/dry





Assessment/Plan


Problem List:  


(1) UTI (urinary tract infection)


Assessment & Plan:  Vanco res enterococcus.  Cont zyvox per ID





(2) Sepsis


Assessment & Plan:  ID= Dr Brownlee.  Continue IV bactrim, linezolid and 

minocycline





(3) Hypernatremia


Assessment & Plan:  Nephrology=DR Dos Santos





(4) Severe dehydration


(5) ARF (acute renal failure)


Assessment & Plan:  Nephrology = Dr Dos Santos





(6) Pneumonia


Assessment & Plan:  Acenitobacter.  Cont minocycline, linezolid and IV bactrim 

per ID=Dr Brownlee; pulmonary = Dr Brambila





(7) Hyperkalemia











Wing Hebert MD Sep 1, 2020 18:27

## 2020-09-02 VITALS — SYSTOLIC BLOOD PRESSURE: 123 MMHG | DIASTOLIC BLOOD PRESSURE: 52 MMHG

## 2020-09-02 VITALS — SYSTOLIC BLOOD PRESSURE: 125 MMHG | DIASTOLIC BLOOD PRESSURE: 53 MMHG

## 2020-09-02 VITALS — DIASTOLIC BLOOD PRESSURE: 66 MMHG | SYSTOLIC BLOOD PRESSURE: 131 MMHG

## 2020-09-02 VITALS — DIASTOLIC BLOOD PRESSURE: 50 MMHG | SYSTOLIC BLOOD PRESSURE: 100 MMHG

## 2020-09-02 VITALS — SYSTOLIC BLOOD PRESSURE: 112 MMHG | DIASTOLIC BLOOD PRESSURE: 51 MMHG

## 2020-09-02 VITALS — SYSTOLIC BLOOD PRESSURE: 93 MMHG | DIASTOLIC BLOOD PRESSURE: 34 MMHG

## 2020-09-02 LAB
ADD MANUAL DIFF: YES
ALBUMIN SERPL-MCNC: 2.4 G/DL (ref 3.4–5)
ALBUMIN/GLOB SERPL: 0.7 {RATIO} (ref 1–2.7)
ALP SERPL-CCNC: 116 U/L (ref 46–116)
ALT SERPL-CCNC: 29 U/L (ref 12–78)
ANION GAP SERPL CALC-SCNC: 10 MMOL/L (ref 5–15)
AST SERPL-CCNC: 19 U/L (ref 15–37)
BILIRUB SERPL-MCNC: 0.3 MG/DL (ref 0.2–1)
BUN SERPL-MCNC: 35 MG/DL (ref 7–18)
CALCIUM SERPL-MCNC: 8.7 MG/DL (ref 8.5–10.1)
CHLORIDE SERPL-SCNC: 110 MMOL/L (ref 98–107)
CO2 SERPL-SCNC: 25 MMOL/L (ref 21–32)
CREAT SERPL-MCNC: 1.8 MG/DL (ref 0.55–1.3)
ERYTHROCYTE [DISTWIDTH] IN BLOOD BY AUTOMATED COUNT: 14.6 % (ref 11.6–14.8)
GLOBULIN SER-MCNC: 3.4 G/DL
HCT VFR BLD CALC: 28.5 % (ref 37–47)
HGB BLD-MCNC: 9.5 G/DL (ref 12–16)
MCV RBC AUTO: 98 FL (ref 80–99)
PHOSPHATE SERPL-MCNC: 1.5 MG/DL (ref 2.5–4.9)
PLATELET # BLD: 158 K/UL (ref 150–450)
POTASSIUM SERPL-SCNC: 3.7 MMOL/L (ref 3.5–5.1)
RBC # BLD AUTO: 2.92 M/UL (ref 4.2–5.4)
SODIUM SERPL-SCNC: 145 MMOL/L (ref 136–145)
WBC # BLD AUTO: 21.5 K/UL (ref 4.8–10.8)

## 2020-09-02 RX ADMIN — POLYETHYLENE GLYCOL 3350 SCH GM: 17 POWDER, FOR SOLUTION ORAL at 21:06

## 2020-09-02 RX ADMIN — EPOETIN ALFA-EPBX SCH UNIT: 10000 INJECTION, SOLUTION INTRAVENOUS; SUBCUTANEOUS at 21:06

## 2020-09-02 RX ADMIN — MINOCYCLINE HYDROCHLORIDE SCH MG: 50 CAPSULE ORAL at 09:01

## 2020-09-02 RX ADMIN — PANTOPRAZOLE SODIUM SCH MG: 40 INJECTION, POWDER, FOR SOLUTION INTRAVENOUS at 09:02

## 2020-09-02 RX ADMIN — DOCUSATE SODIUM SCH MG: 50 LIQUID ORAL at 05:26

## 2020-09-02 RX ADMIN — ENOXAPARIN SODIUM SCH MG: 30 INJECTION SUBCUTANEOUS at 09:04

## 2020-09-02 RX ADMIN — ACETAMINOPHEN PRN MG: 160 SOLUTION ORAL at 21:07

## 2020-09-02 RX ADMIN — SULFAMETHOXAZOLE AND TRIMETHOPRIM SCH MLS/HR: 80; 16 INJECTION, SOLUTION, CONCENTRATE INTRAVENOUS at 09:01

## 2020-09-02 RX ADMIN — DOCUSATE SODIUM SCH MG: 50 LIQUID ORAL at 21:41

## 2020-09-02 RX ADMIN — DEXAMETHASONE SODIUM PHOSPHATE SCH MG: 10 INJECTION INTRAMUSCULAR; INTRAVENOUS at 09:02

## 2020-09-02 RX ADMIN — DOCUSATE SODIUM SCH MG: 50 LIQUID ORAL at 15:07

## 2020-09-02 RX ADMIN — SULFAMETHOXAZOLE AND TRIMETHOPRIM SCH MLS/HR: 80; 16 INJECTION, SOLUTION, CONCENTRATE INTRAVENOUS at 21:07

## 2020-09-02 RX ADMIN — PANTOPRAZOLE SODIUM SCH MG: 40 INJECTION, POWDER, FOR SOLUTION INTRAVENOUS at 21:06

## 2020-09-02 RX ADMIN — MINOCYCLINE HYDROCHLORIDE SCH MG: 50 CAPSULE ORAL at 21:06

## 2020-09-02 NOTE — PULMONOLOGY PROGRESS NOTE
Subjective


ROS Limited/Unobtainable:  Yes


Constitutional:  Reports: no symptoms


HEENT:  Repors: no symptoms


Respiratory:  Reports: no symptoms


Allergies:  


Coded Allergies:  


     AMPICILLIN (Verified  Allergy, Unknown, hives, 8/21/20)


     PENICILLINS (Verified  Allergy, Unknown, hives, 8/21/20)


     TETRACYCLINES (Verified  Allergy, Unknown, hives, 8/21/20)





Objective





Last 24 Hour Vital Signs








  Date Time  Temp Pulse Resp B/P (MAP) Pulse Ox O2 Delivery O2 Flow Rate FiO2


 


9/2/20 11:25 99.5 89 22 125/53 (77) 99   


 


9/2/20 08:00      Nasal Cannula 4.0 


 


9/2/20 08:00 99.8 91 20 100/50 (67) 99   


 


9/2/20 07:52  91      


 


9/2/20 04:00      Nasal Cannula 4.0 


 


9/2/20 04:00  98      


 


9/2/20 04:00 98.9 95 20 123/52 (75) 99   


 


9/2/20 00:00 98.9 93 24 93/34 (53) 98   


 


9/2/20 00:00      Nasal Cannula 4.0 


 


9/2/20 00:00  93      


 


9/1/20 20:00      Nasal Cannula 4.0 


 


9/1/20 20:00  86      


 


9/1/20 20:00     98 Nasal Cannula 4.0 36


 


9/1/20 20:00 98.9 76 23 98/42 (60) 100   


 


9/1/20 16:00 98.1 88 18 138/54 (82) 100   


 


9/1/20 16:00      Nasal Cannula 4.0 


 


9/1/20 15:32  89      


 


9/1/20 12:00  91      


 


9/1/20 12:00 99.1 91 34 127/76 (93) 96   


 


9/1/20 12:00      Nasal Cannula 4.0 

















Intake and Output  


 


 9/1/20 9/2/20





 19:00 07:00


 


Intake Total 2100 ml 1410 ml


 


Output Total 1300 ml 1250 ml


 


Balance 800 ml 160 ml


 


  


 


Intake Free Water 400 ml 200 ml


 


IV Total 1280 ml 825 ml


 


Tube Feeding 420 ml 385 ml


 


Output Urine Total 1300 ml 1250 ml


 


# Bowel Movements 3 








General Appearance:  no acute distress, other - chronically ill looking 

bedridden female


HEENT:  normocephalic, atraumatic, other - VM


Respiratory:  chest wall non-tender, rhonchi - bilaterally - scattered


Cardiovascular:  normal peripheral pulses, normal rate - SR on tele


Abdomen:  normal bowel sounds, soft, non tender


Genitourinary:  normal external genitalia


Skin:  other - BUE crusting lesions


Neurologic:  CNs II-XII grossly normal, abnormal gait - bedridden


Lymphatic:  no neck adenopathy





Microbiology








 Date/Time


Source Procedure


Growth Status


 


 


 9/1/20 16:05


Nasopharynx SARS-CoV-2 RdRp Gene Assay - Final Complete








Laboratory Tests


9/2/20 03:25: 


White Blood Count 21.5H, Red Blood Count 2.92L, Hemoglobin 9.5L, Hematocrit 

28.5L, Mean Corpuscular Volume 98, Mean Corpuscular Hemoglobin 32.6H, Mean 

Corpuscular Hemoglobin Concent 33.3, Red Cell Distribution Width 14.6, Platelet 

Count 158, Mean Platelet Volume 7.5, Neutrophils (%) (Auto) , Lymphocytes (%) (

Auto) , Monocytes (%) (Auto) , Eosinophils (%) (Auto) , Basophils (%) (Auto) , 

Differential Total Cells Counted 100, Neutrophils % (Manual) 74, Lymphocytes % (

Manual) 21, Monocytes % (Manual) 4, Eosinophils % (Manual) 1, Basophils % (

Manual) 0, Band Neutrophils 0, Nucleated Red Blood Cells 4, Platelet Estimate 

Adequate, Platelet Morphology Normal, Hypochromasia 2+, Anisocytosis 1+, 

Erythrocyte Sedimentation Rate 68H, Sodium Level 145, Potassium Level 3.7, 

Chloride Level 110H, Carbon Dioxide Level 25, Anion Gap 10, Blood Urea Nitrogen 

35H, Creatinine 1.8H, Estimat Glomerular Filtration Rate 27.7, Glucose Level 92

, Calcium Level 8.7, Phosphorus Level 1.5L, Magnesium Level 1.7L, Total 

Bilirubin 0.3, Aspartate Amino Transf (AST/SGOT) 19, Alanine Aminotransferase (

ALT/SGPT) 29, Alkaline Phosphatase 116, C-Reactive Protein, Quantitative 1.6H, 

Total Protein 5.8L, Albumin 2.4L, Globulin 3.4, Albumin/Globulin Ratio 0.7L





Current Medications








 Medications


  (Trade)  Dose


 Ordered  Sig/Raji


 Route


 PRN Reason  Start Time


 Stop Time Status Last Admin


Dose Admin


 


 Acetaminophen


  (Tylenol)  650 mg  Q6H  PRN


 GT


 Mild Pain (Pain Scale 1-3)  8/31/20 16:25


 9/30/20 16:24  8/31/20 17:02


 


 


 Clonidine HCl


  (Catapres Tab)  0.1 mg  Q4H  PRN


 ORAL


 bp over 160 syst  8/26/20 15:30


 11/22/20 15:29   


 


 


 Dexamethasone


 Sodium Phosphate


  (Decadron 10mg/


 ml Inj)  6 mg  DAILY


 IV


   8/27/20 09:00


 9/5/20 09:01  9/2/20 09:02


 


 


 Dextrose  1,000 ml @ 


 75 mls/hr  [id number]


 IV


   8/26/20 16:00


 9/20/20 15:59  9/2/20 09:02


 


 


 Docusate Sodium


  (Colace)  100 mg  EVERY 8  HOURS


 GT


   8/30/20 14:00


 9/20/20 08:59   


 


 


 Enoxaparin Sodium


  (Lovenox)  30 mg  DAILY


 SUBQ


   8/27/20 09:00


 11/19/20 08:59  9/2/20 09:04


 


 


 Epoetin Jaswant


  (Epoetin


 Jaswant-EPBX(NON


 ESRD))  10,000 unit  MON-WED-FRI


 SUBQ


   8/31/20 21:00


 11/29/20 20:59  8/31/20 21:39


 


 


 Lorazepam


  (Ativan 2mg/ml


 1ml)  1 mg  Q4H  PRN


 IV


 For Anxiety  8/29/20 10:45


 9/5/20 10:44  9/1/20 04:56


 


 


 Magnesium Sulfate  100 ml @ 


 100 mls/hr  Q1H


 IVPB


   9/2/20 10:00


 9/2/20 11:59  9/2/20 11:20


 


 


 Minocycline HCl


  (Minocin)  200 mg  Q12HR


 ORAL


   8/26/20 21:00


 9/5/20 20:59  9/2/20 09:01


 


 


 Pantoprazole


  (Protonix)  40 mg  EVERY 12  HOURS


 IVP


   8/26/20 21:00


 9/20/20 20:59  9/2/20 09:02


 


 


 Polyethylene


 Glycol


  (Miralax)  17 gm  BEDTIME


 ORAL


   8/26/20 21:00


 9/20/20 20:59  8/29/20 20:16


 


 


 Potassium


 Phosphate 20 mm/


 Sodium Chloride  281.6667


 ml @ 


 46.944 m...  ONCE  ONCE


 IV


   9/2/20 12:00


 9/2/20 17:59   


 


 


 Trimethoprim/


 Sulfamethoxazole


 10 ml/Dextrose  285 ml @ 


 190 mls/hr  EVERY 12  HOURS


 IV


   8/26/20 21:00


 9/5/20 20:59  9/2/20 09:01


 











Assessment/Plan


Problems:  


(1) 2019 novel coronavirus disease (COVID-19)


(2) Sepsis


(3) ARF (acute renal failure)


(4) Severe dehydration


(5) Hypernatremia


(6) Psychosis


Assessment/Plan


tolerating nasal cannula now





Low grade fever


WBC still high


COVID positive


pan cultures


iv fluids 


f/u electrolytes


renal studies


urine electrolytes


dvt prophylaxis











Live Brambila MD Sep 2, 2020 11:43

## 2020-09-02 NOTE — INFECTIOUS DISEASES PROG NOTE
Assessment/Plan


71yo F with:





Sepsis


Fever; SP


Leukocytosis; worsening( now on steroids)


JASWINDER to 5.1, improving 


Hypoxic resp failure, sp NRB mask >VM> 4l NC 9/1


VRE UTI


Hx of COVID-19 1 mo ago ( doubt recurrent COVID-19) 


HAP


            9/1 rapid COVID PCR neg


   8/29 CXR:   Improving left lower lobe infiltrate and left pleural effusion. 

There is a probable small right pleural effusion.


   8/27 cdiff neg


           8/26 CXR: There are increased interstitial congestion and left 

pleural effusion,since prior exam of 8/22/2020


               Bcx NTD


            8/22 sp cx MDR ABC (S bactrim; I Colistin, Polymixin B, Minocycline)


   8/21 BCx NTD


   8/21 UA+, UCx >100k VRE (S Zyvox)


   8/21 CXR: Left retrocardiac mild atelectasis versus infiltrate.


   8/21 COVID rapid neg; 8/22 repeat COVID-19 +








BL arm rash, appears evolving, now small pustules with dry/crusting, querry 

resolving Shingles? Though odd to have on both arms


   8/21 HSV & VZV PCR ordered





Plan:


Continue IV bactrim #10/14 and PO Minocycline 200mg bid #9/10-14 (high dose) 

MDR ABC PNA based on cultures


   -8/29 SP ZYvox #7





       --8/24 SP Cefepime #4


       --8/23 SP IV Vancomcyin #4





F/u cx


Monitor CBC/BMP


Trend BUE rash


Dc covid isolation- has been over 1 month from initial diagnosis, afebrile >

24hrs and clinical improvement; repaet covid neg


f/u Bcx x2, Sp cx


Consider dc'ing steroids





Discussed with RN





Subjective


Allergies:  


Coded Allergies:  


     AMPICILLIN (Verified  Allergy, Unknown, hives, 8/21/20)


     PENICILLINS (Verified  Allergy, Unknown, hives, 8/21/20)


     TETRACYCLINES (Verified  Allergy, Unknown, hives, 8/21/20)


afebrile


wbc increased, on steroids





Objective





Last 24 Hour Vital Signs








  Date Time  Temp Pulse Resp B/P (MAP) Pulse Ox O2 Delivery O2 Flow Rate FiO2


 


9/2/20 11:45      Nasal Cannula 4.0 


 


9/2/20 11:25 99.5 89 22 125/53 (77) 99   


 


9/2/20 08:00      Nasal Cannula 4.0 


 


9/2/20 08:00 99.8 91 20 100/50 (67) 99   


 


9/2/20 07:52  91      


 


9/2/20 04:00      Nasal Cannula 4.0 


 


9/2/20 04:00  98      


 


9/2/20 04:00 98.9 95 20 123/52 (75) 99   


 


9/2/20 00:00 98.9 93 24 93/34 (53) 98   


 


9/2/20 00:00      Nasal Cannula 4.0 


 


9/2/20 00:00  93      


 


9/1/20 20:00      Nasal Cannula 4.0 


 


9/1/20 20:00  86      


 


9/1/20 20:00     98 Nasal Cannula 4.0 36


 


9/1/20 20:00 98.9 76 23 98/42 (60) 100   


 


9/1/20 16:00 98.1 88 18 138/54 (82) 100   


 


9/1/20 16:00      Nasal Cannula 4.0 


 


9/1/20 15:32  89      








Height (Feet):  5


Height (Inches):  2.00


Weight (Pounds):  123


HEENT:  atraumatic


Respiratory/Chest:  other - mild Rsp distress


Abdomen:  no organomegaly





Microbiology








 Date/Time


Source Procedure


Growth Status


 


 


 9/1/20 16:05


Nasopharynx SARS-CoV-2 RdRp Gene Assay - Final Complete











Laboratory Tests








Test


  9/2/20


03:25


 


White Blood Count


  21.5 K/UL


(4.8-10.8)  H


 


Red Blood Count


  2.92 M/UL


(4.20-5.40)  L


 


Hemoglobin


  9.5 G/DL


(12.0-16.0)  L


 


Hematocrit


  28.5 %


(37.0-47.0)  L


 


Mean Corpuscular Volume 98 FL (80-99)  


 


Mean Corpuscular Hemoglobin


  32.6 PG


(27.0-31.0)  H


 


Mean Corpuscular Hemoglobin


Concent 33.3 G/DL


(32.0-36.0)


 


Red Cell Distribution Width


  14.6 %


(11.6-14.8)


 


Platelet Count


  158 K/UL


(150-450)


 


Mean Platelet Volume


  7.5 FL


(6.5-10.1)


 


Neutrophils (%) (Auto)


  % (45.0-75.0)


 


 


Lymphocytes (%) (Auto)


  % (20.0-45.0)


 


 


Monocytes (%) (Auto)  % (1.0-10.0)  


 


Eosinophils (%) (Auto)  % (0.0-3.0)  


 


Basophils (%) (Auto)  % (0.0-2.0)  


 


Differential Total Cells


Counted 100  


 


 


Neutrophils % (Manual) 74 % (45-75)  


 


Lymphocytes % (Manual) 21 % (20-45)  


 


Monocytes % (Manual) 4 % (1-10)  


 


Eosinophils % (Manual) 1 % (0-3)  


 


Basophils % (Manual) 0 % (0-2)  


 


Band Neutrophils 0 % (0-8)  


 


Nucleated Red Blood Cells 4 /100 WBC  


 


Platelet Estimate Adequate  


 


Platelet Morphology Normal  


 


Hypochromasia 2+  


 


Anisocytosis 1+  


 


Erythrocyte Sedimentation Rate


  68 MM/HR


(0-30)  H


 


Sodium Level


  145 MMOL/L


(136-145)


 


Potassium Level


  3.7 MMOL/L


(3.5-5.1)


 


Chloride Level


  110 MMOL/L


()  H


 


Carbon Dioxide Level


  25 MMOL/L


(21-32)


 


Anion Gap


  10 mmol/L


(5-15)


 


Blood Urea Nitrogen


  35 mg/dL


(7-18)  H


 


Creatinine


  1.8 MG/DL


(0.55-1.30)  H


 


Estimat Glomerular Filtration


Rate 27.7 mL/min


(>60)


 


Glucose Level


  92 MG/DL


()


 


Calcium Level


  8.7 MG/DL


(8.5-10.1)


 


Phosphorus Level


  1.5 MG/DL


(2.5-4.9)  L


 


Magnesium Level


  1.7 MG/DL


(1.8-2.4)  L


 


Total Bilirubin


  0.3 MG/DL


(0.2-1.0)


 


Aspartate Amino Transf


(AST/SGOT) 19 U/L (15-37)


 


 


Alanine Aminotransferase


(ALT/SGPT) 29 U/L (12-78)


 


 


Alkaline Phosphatase


  116 U/L


()


 


C-Reactive Protein,


Quantitative 1.6 mg/dL


(0.00-0.90)  H


 


Total Protein


  5.8 G/DL


(6.4-8.2)  L


 


Albumin


  2.4 G/DL


(3.4-5.0)  L


 


Globulin 3.4 g/dL  


 


Albumin/Globulin Ratio


  0.7 (1.0-2.7)


L











Current Medications








 Medications


  (Trade)  Dose


 Ordered  Sig/Raji


 Route


 PRN Reason  Start Time


 Stop Time Status Last Admin


Dose Admin


 


 Acetaminophen


  (Tylenol)  650 mg  Q6H  PRN


 GT


 Mild Pain (Pain Scale 1-3)  8/31/20 16:25


 9/30/20 16:24  8/31/20 17:02


 


 


 Clonidine HCl


  (Catapres Tab)  0.1 mg  Q4H  PRN


 ORAL


 bp over 160 syst  8/26/20 15:30


 11/22/20 15:29   


 


 


 Dexamethasone


 Sodium Phosphate


  (Decadron 10mg/


 ml Inj)  6 mg  DAILY


 IV


   8/27/20 09:00


 9/5/20 09:01  9/2/20 09:02


 


 


 Dextrose  1,000 ml @ 


 75 mls/hr  [id number]


 IV


   8/26/20 16:00


 9/20/20 15:59  9/2/20 09:02


 


 


 Docusate Sodium


  (Colace)  100 mg  EVERY 8  HOURS


 GT


   8/30/20 14:00


 9/20/20 08:59   


 


 


 Enoxaparin Sodium


  (Lovenox)  30 mg  DAILY


 SUBQ


   8/27/20 09:00


 11/19/20 08:59  9/2/20 09:04


 


 


 Epoetin Jaswant


  (Epoetin


 Jaswant-EPBX(NON


 ESRD))  10,000 unit  MON-WED-FRI


 SUBQ


   8/31/20 21:00


 11/29/20 20:59  8/31/20 21:39


 


 


 Lorazepam


  (Ativan 2mg/ml


 1ml)  1 mg  Q4H  PRN


 IV


 For Anxiety  8/29/20 10:45


 9/5/20 10:44  9/1/20 04:56


 


 


 Minocycline HCl


  (Minocin)  200 mg  Q12HR


 ORAL


   8/26/20 21:00


 9/5/20 20:59  9/2/20 09:01


 


 


 Pantoprazole


  (Protonix)  40 mg  EVERY 12  HOURS


 IVP


   8/26/20 21:00


 9/20/20 20:59  9/2/20 09:02


 


 


 Polyethylene


 Glycol


  (Miralax)  17 gm  BEDTIME


 ORAL


   8/26/20 21:00


 9/20/20 20:59  8/29/20 20:16


 


 


 Potassium


 Phosphate 20 mm/


 Sodium Chloride  281.6667


 ml @ 


 46.944 m...  ONCE  ONCE


 IV


   9/2/20 12:00


 9/2/20 17:59  9/2/20 12:25


 


 


 Trimethoprim/


 Sulfamethoxazole


 10 ml/Dextrose  285 ml @ 


 190 mls/hr  EVERY 12  HOURS


 IV


   8/26/20 21:00


 9/5/20 20:59  9/2/20 09:01


 

















Tanisha Brownlee M.D. Sep 2, 2020 13:05

## 2020-09-02 NOTE — NEPHROLOGY PROGRESS NOTE
Assessment/Plan


Problem List:  


(1) ARF (acute renal failure)


Assessment:  Underlying CKD





(2) Sepsis


(3) UTI (urinary tract infection)


(4) Severe dehydration


(5) Hypernatremia


(6) Acute urinary retention


(7) Anemia


Assessment





Acute renal failure


Possible underlying chronic renal insufficiency


Severe dehydration


Hypotension


Sepsis, UTI


History of psychiatric disease


Elevated troponin I


Patient full code


Plan


September 2: Low magnesium and low phosphorus corrected.  Continue per 

consultants.  On nasal cannula now.  Serum creatinine 1.8.


September 1: Serum creatinine 1.8 stable.  Continue per pulmonary.  Continue to 

monitor renal parameters.


August 31: On Venturi mask.  Serum creatinine lowered to 1.8.  Continue per 

consultants.  Medication list reviewed.  Potassium supplement given.


August 30: Status quo.  Creatinine lower at 2.  Other electrolytes within 

normal limits.  Continue per consultants.


August 29: Labs reviewed.  Creatinine 2.2 stable.  Potassium 3.1.  20 M EQ KCl 

given.  Continue her consultants.


August 28: Serum potassium 5.5.Creatinine 2.2.  1 dose Kayexalate given.  

Remains of 75 cc IV fluid.  We will continue to monitor renal parameters.


August 27: Serum potassium 5.3.  Creatinine higher at 2.2.  Medication list 

reviewed.  Suggest to avoid nephrotoxic's like Bactrim if possible.  Continue 

to monitor renal parameters and electrolytes.  Continue per pulmonary to adjust 

pulmonary status.  May require BiPAP.


August 26: Serum sodium lower.  Renal parameters appear more stable.  

Nevertheless leukocytosis is worsened.  Continue per consultants.


August 25: Serum sodium higher.  Continue D5W 75 cc an hour.  Continue to 

monitor electrolytes.  Continue per consultants.  Serum creatinine down to 2.4 

from 5.1 on admission.  Leukocytosis persists.


August 24: Patient now on isolation for COVID-19.  Serum creatinine is 

plateauing.  Will decrease the IV fluid to 75 cc an hour.  Continue management 

per ID.  Will monitor electrolytes and renal parameters.


August 23: Discussed with MARTIN Carter.  Patient needs a Mitchell catheter.  Accurate 

intake and output.  Decrease  cc D5W an hour.  1 dose of IV Venofer and 

then subcu Epogen ordered for anemia.  Continue to monitor renal parameters


August 22: Renal parameters improving.  Continue D5W IV fluid.  Potassium 

supplement IV given.  Continue per consultants.





Previously:


Fluid challenge


Monitor renal parameters and electrolytes


Monitor intake and output


Mitchell catheter


Antibiotics


Avoid nephrotoxic's


Chest x-ray





Kidney ultrasound findings: 


Right kidney measures 8.9 cm in length. Left kidney measures 9 cm in


length. Both kidneys demonstrate markedly increased echogenicity. No 

hydronephrosis. 


There are bilateral renal cysts. Normal inferior vena cava. Bladder is empty,


contains a Mitchell catheter. 


 


Incidentally noted are gallstones.


 


Impression: Markedly echogenic kidneys, consistent with medical renal disease


Negative for hydronephrosis


Empty bladder with a Mitchell catheter


Incidental finding of cholelithiasis.





Subjective


ROS Limited/Unobtainable:  Yes





Objective


Objective





Last 24 Hour Vital Signs








  Date Time  Temp Pulse Resp B/P (MAP) Pulse Ox O2 Delivery O2 Flow Rate FiO2


 


9/2/20 08:00      Nasal Cannula 4.0 


 


9/2/20 08:00 99.8 91 20 100/50 (67) 99   


 


9/2/20 07:52  91      


 


9/2/20 04:00      Nasal Cannula 4.0 


 


9/2/20 04:00  98      


 


9/2/20 04:00 98.9 95 20 123/52 (75) 99   


 


9/2/20 00:00 98.9 93 24 93/34 (53) 98   


 


9/2/20 00:00      Nasal Cannula 4.0 


 


9/2/20 00:00  93      


 


9/1/20 20:00      Nasal Cannula 4.0 


 


9/1/20 20:00  86      


 


9/1/20 20:00     98 Nasal Cannula 4.0 36


 


9/1/20 20:00 98.9 76 23 98/42 (60) 100   


 


9/1/20 16:00 98.1 88 18 138/54 (82) 100   


 


9/1/20 16:00      Nasal Cannula 4.0 


 


9/1/20 15:32  89      


 


9/1/20 12:00  91      


 


9/1/20 12:00 99.1 91 34 127/76 (93) 96   


 


9/1/20 12:00      Nasal Cannula 4.0 

















Intake and Output  


 


 9/1/20 9/2/20





 19:00 07:00


 


Intake Total 2100 ml 1410 ml


 


Output Total 1300 ml 1250 ml


 


Balance 800 ml 160 ml


 


  


 


Intake Free Water 400 ml 200 ml


 


IV Total 1280 ml 825 ml


 


Tube Feeding 420 ml 385 ml


 


Output Urine Total 1300 ml 1250 ml


 


# Bowel Movements 3 











Current Medications








 Medications


  (Trade)  Dose


 Ordered  Sig/Raji


 Route


 PRN Reason  Start Time


 Stop Time Status Last Admin


Dose Admin


 


 Acetaminophen


  (Tylenol)  650 mg  Q6H  PRN


 GT


 Mild Pain (Pain Scale 1-3)  8/31/20 16:25


 9/30/20 16:24  8/31/20 17:02


 


 


 Clonidine HCl


  (Catapres Tab)  0.1 mg  Q4H  PRN


 ORAL


 bp over 160 syst  8/26/20 15:30


 11/22/20 15:29   


 


 


 Dexamethasone


 Sodium Phosphate


  (Decadron 10mg/


 ml Inj)  6 mg  DAILY


 IV


   8/27/20 09:00


 9/5/20 09:01  9/2/20 09:02


 


 


 Dextrose  1,000 ml @ 


 75 mls/hr  [id number]


 IV


   8/26/20 16:00


 9/20/20 15:59  9/2/20 09:02


 


 


 Docusate Sodium


  (Colace)  100 mg  EVERY 8  HOURS


 GT


   8/30/20 14:00


 9/20/20 08:59   


 


 


 Enoxaparin Sodium


  (Lovenox)  30 mg  DAILY


 SUBQ


   8/27/20 09:00


 11/19/20 08:59  9/2/20 09:04


 


 


 Epoetin Jaswant


  (Epoetin


 Jaswant-EPBX(NON


 ESRD))  10,000 unit  MON-WED-FRI


 SUBQ


   8/31/20 21:00


 11/29/20 20:59  8/31/20 21:39


 


 


 Lorazepam


  (Ativan 2mg/ml


 1ml)  1 mg  Q4H  PRN


 IV


 For Anxiety  8/29/20 10:45


 9/5/20 10:44  9/1/20 04:56


 


 


 Minocycline HCl


  (Minocin)  200 mg  Q12HR


 ORAL


   8/26/20 21:00


 9/5/20 20:59  9/2/20 09:01


 


 


 Pantoprazole


  (Protonix)  40 mg  EVERY 12  HOURS


 IVP


   8/26/20 21:00


 9/20/20 20:59  9/2/20 09:02


 


 


 Polyethylene


 Glycol


  (Miralax)  17 gm  BEDTIME


 ORAL


   8/26/20 21:00


 9/20/20 20:59  8/29/20 20:16


 


 


 Trimethoprim/


 Sulfamethoxazole


 10 ml/Dextrose  285 ml @ 


 190 mls/hr  EVERY 12  HOURS


 IV


   8/26/20 21:00


 9/5/20 20:59  9/2/20 09:01


 





Laboratory Tests


9/2/20 03:25: 


White Blood Count 21.5H, Red Blood Count 2.92L, Hemoglobin 9.5L, Hematocrit 

28.5L, Mean Corpuscular Volume 98, Mean Corpuscular Hemoglobin 32.6H, Mean 

Corpuscular Hemoglobin Concent 33.3, Red Cell Distribution Width 14.6, Platelet 

Count 158, Mean Platelet Volume 7.5, Neutrophils (%) (Auto) , Lymphocytes (%) (

Auto) , Monocytes (%) (Auto) , Eosinophils (%) (Auto) , Basophils (%) (Auto) , 

Differential Total Cells Counted 100, Neutrophils % (Manual) 74, Lymphocytes % (

Manual) 21, Monocytes % (Manual) 4, Eosinophils % (Manual) 1, Basophils % (

Manual) 0, Band Neutrophils 0, Nucleated Red Blood Cells 4, Platelet Estimate 

Adequate, Platelet Morphology Normal, Hypochromasia 2+, Anisocytosis 1+, 

Erythrocyte Sedimentation Rate 68H, Sodium Level 145, Potassium Level 3.7, 

Chloride Level 110H, Carbon Dioxide Level 25, Anion Gap 10, Blood Urea Nitrogen 

35H, Creatinine 1.8H, Estimat Glomerular Filtration Rate 27.7, Glucose Level 92

, Calcium Level 8.7, Phosphorus Level 1.5L, Magnesium Level 1.7L, Total 

Bilirubin 0.3, Aspartate Amino Transf (AST/SGOT) 19, Alanine Aminotransferase (

ALT/SGPT) 29, Alkaline Phosphatase 116, C-Reactive Protein, Quantitative 1.6H, 

Total Protein 5.8L, Albumin 2.4L, Globulin 3.4, Albumin/Globulin Ratio 0.7L


Height (Feet):  5


Height (Inches):  2.00


Weight (Pounds):  123


General Appearance:  mild distress


EENT:  other - Nasal cannula


Cardiovascular:  tachycardia


Respiratory/Chest:  decreased breath sounds


Abdomen:  distended











Derik Dos Santos MD Sep 2, 2020 09:53

## 2020-09-02 NOTE — SURGERY PROGRESS NOTE
Surgery Progress Note


Subjective


Symptoms:  tolerating diet, passing flatus, BM, pain decreased





Objective





Last 24 Hour Vital Signs








  Date Time  Temp Pulse Resp B/P (MAP) Pulse Ox O2 Delivery O2 Flow Rate FiO2


 


9/2/20 16:00 99.4 89 22 112/51 (71) 99   


 


9/2/20 16:00      Nasal Cannula 4.0 


 


9/2/20 15:00  87      


 


9/2/20 11:45      Nasal Cannula 4.0 


 


9/2/20 11:39  83      


 


9/2/20 11:25 99.5 89 22 125/53 (77) 99   


 


9/2/20 08:00      Nasal Cannula 4.0 


 


9/2/20 08:00 99.8 91 20 100/50 (67) 99   


 


9/2/20 07:52  91      


 


9/2/20 04:00      Nasal Cannula 4.0 


 


9/2/20 04:00  98      


 


9/2/20 04:00 98.9 95 20 123/52 (75) 99   


 


9/2/20 00:00 98.9 93 24 93/34 (53) 98   


 


9/2/20 00:00      Nasal Cannula 4.0 


 


9/2/20 00:00  93      


 


9/1/20 20:00      Nasal Cannula 4.0 


 


9/1/20 20:00  86      


 


9/1/20 20:00     98 Nasal Cannula 4.0 36


 


9/1/20 20:00 98.9 76 23 98/42 (60) 100   








I&O











Intake and Output  


 


 9/1/20 9/2/20





 19:00 07:00


 


Intake Total 2100 ml 1720 ml


 


Output Total 1300 ml 1250 ml


 


Balance 800 ml 470 ml


 


  


 


Intake Free Water 400 ml 400 ml


 


IV Total 1280 ml 900 ml


 


Tube Feeding 420 ml 420 ml


 


Output Urine Total 1300 ml 1250 ml


 


# Bowel Movements 3 








Dressing:  saturated


Cardiovascular:  RSR


Respiratory:  decreased breath sounds


Abdomen:  non-tender, present bowel sounds


Extremities:  no edema, no tenderness





Laboratory Tests








Test


  9/2/20


03:25


 


White Blood Count


  21.5 K/UL


(4.8-10.8)  H


 


Red Blood Count


  2.92 M/UL


(4.20-5.40)  L


 


Hemoglobin


  9.5 G/DL


(12.0-16.0)  L


 


Hematocrit


  28.5 %


(37.0-47.0)  L


 


Mean Corpuscular Volume 98 FL (80-99)  


 


Mean Corpuscular Hemoglobin


  32.6 PG


(27.0-31.0)  H


 


Mean Corpuscular Hemoglobin


Concent 33.3 G/DL


(32.0-36.0)


 


Red Cell Distribution Width


  14.6 %


(11.6-14.8)


 


Platelet Count


  158 K/UL


(150-450)


 


Mean Platelet Volume


  7.5 FL


(6.5-10.1)


 


Neutrophils (%) (Auto)


  % (45.0-75.0)


 


 


Lymphocytes (%) (Auto)


  % (20.0-45.0)


 


 


Monocytes (%) (Auto)  % (1.0-10.0)  


 


Eosinophils (%) (Auto)  % (0.0-3.0)  


 


Basophils (%) (Auto)  % (0.0-2.0)  


 


Differential Total Cells


Counted 100  


 


 


Neutrophils % (Manual) 74 % (45-75)  


 


Lymphocytes % (Manual) 21 % (20-45)  


 


Monocytes % (Manual) 4 % (1-10)  


 


Eosinophils % (Manual) 1 % (0-3)  


 


Basophils % (Manual) 0 % (0-2)  


 


Band Neutrophils 0 % (0-8)  


 


Nucleated Red Blood Cells 4 /100 WBC  


 


Platelet Estimate Adequate  


 


Platelet Morphology Normal  


 


Hypochromasia 2+  


 


Anisocytosis 1+  


 


Erythrocyte Sedimentation Rate


  68 MM/HR


(0-30)  H


 


Sodium Level


  145 MMOL/L


(136-145)


 


Potassium Level


  3.7 MMOL/L


(3.5-5.1)


 


Chloride Level


  110 MMOL/L


()  H


 


Carbon Dioxide Level


  25 MMOL/L


(21-32)


 


Anion Gap


  10 mmol/L


(5-15)


 


Blood Urea Nitrogen


  35 mg/dL


(7-18)  H


 


Creatinine


  1.8 MG/DL


(0.55-1.30)  H


 


Estimat Glomerular Filtration


Rate 27.7 mL/min


(>60)


 


Glucose Level


  92 MG/DL


()


 


Calcium Level


  8.7 MG/DL


(8.5-10.1)


 


Phosphorus Level


  1.5 MG/DL


(2.5-4.9)  L


 


Magnesium Level


  1.7 MG/DL


(1.8-2.4)  L


 


Total Bilirubin


  0.3 MG/DL


(0.2-1.0)


 


Aspartate Amino Transf


(AST/SGOT) 19 U/L (15-37)


 


 


Alanine Aminotransferase


(ALT/SGPT) 29 U/L (12-78)


 


 


Alkaline Phosphatase


  116 U/L


()


 


C-Reactive Protein,


Quantitative 1.6 mg/dL


(0.00-0.90)  H


 


Total Protein


  5.8 G/DL


(6.4-8.2)  L


 


Albumin


  2.4 G/DL


(3.4-5.0)  L


 


Globulin 3.4 g/dL  


 


Albumin/Globulin Ratio


  0.7 (1.0-2.7)


L











Plan


Problems:  


(1) Anemia


(2) 2019 novel coronavirus disease (COVID-19)


(3) Pneumonia


(4) Multiple drug resistant organism (MDRO) culture positive


(5) Hyperkalemia


(6) Hypernatremia


(7) Severe dehydration


(8) Acute urinary retention


(9) Sepsis


Assessment & Plan:  Pt presented on admission with multiple Pressure injuries. 

Pt noted to have band of  Blotchy red rash  with open and closed Blisters 

affecting  R Brachial to R forearm. Lateral R back /R flank is also blotchy 

erythematous with clusters of serous filled blisters. On Lateral L Back to L 

flank is Blotchy, erythematous with clusters of serous filled blisters.  


Reabsorbing DTPI that is partially opened Sacrum(L) 6cm x (W)5.5cm.  Base of 

wound is Maroon with with scattered areas that are maryellen and loose peeling soft 

black cap.Surrounding Non-Blanching erythema without induration.


Non-Blanching erythema that is indurated with delineated margins R trochanter(L)

5.5cm x (W)6.5cm.


No evidence of skin breakdown L trochanter.


Non-Blanching erythema noted to R and L ischial tuberosities.


DTPI medial R heel (L)3.3cm x (W)3.4cm. Base of injury is, fluctuant, maroon 

with delineated margins. surrounding red,  boggy heel.


DTPI Lateral R Heel(L)1.5cm x (W)2.5cm. Base of injury is indurated, maroon 

with purpuric center.


Non-Blanching erythema without induration /fluctuance lateral R malleolus(L)2cm 

x (W)1.5cm.





Tx.Plan:


Apply Moisture Barrier Paste to Sacrum. Cover with Optifoam drsg. Change every 

3 days and prn.


           


Apply Cavilon Skin Barrier to R and L trochanter. Cover each site with Optifoam 

drsgs. Change every 7 days and prn.


           


Apply Moisture Barrier Paste to R and L Ischial tuberosities with each 

Incontinence care.


           


Apply Cavilon Skin Barrier to Medial and Lateral R Heel. Cover with Optifoam 

drsg. Change every7 days and prn.


           


Apply Cavilon Skin Barrier to R lateral ,and medial Malleoli. Cover each site 

with Optifoam drsgs. Change every 7 days and prn.


           


Apply Cavilon Skin Barrier to L Heel and Malleoli. Cover each site with 

Optifoam drsgs. Change every 7 days and prn.


           


Cover open blisters as needed with Optifoam drsgs. 


           


Reposition at least every 2 hours or as tolerated.


           


Off-load heels with pillow.


           


Nutritional optimization





DAILY ESTIMATED NEEDS:


Needs based on Sepsis, renal, wound 50.9kg  


25-35  kcals/kg 


7332-7699  total kcals


1.25-1.5  g protein/kg


64-76  g total protein 


25-30  mL/kg


7244-1430  total fluid mLs





NUTRITION DIAGNOSIS:


1. Swallowing difficulty r/t dysphagia as evidenced by pt is GT dep.


2. Altered nutrition related lab values r/t sepsis and severe dehydration,


pre-diabetes as evidenced by Elev WBC (31.6 ->18.2), elev Na (179-> WNL),


elev BUN (131->48), elev Creat (5.1->2.2), elev K (5.5-> wnl->5.5, 5.3),


elev BGs (93, 113, 185), A1C of 6.4.





CURRENT TF: Nepro @35  








ENTERAL NUTRITION RECOMMENDATIONS:


LOWER GOAL RATE -> NEPRO @ 35ML/HR X 24 HRS  to provide 840ml, 1512kcal, 68g 

prot, 610ml free water 





- LOWER GOAL RATE: meets 100% est kcal/prot needs


- Flush per MD, HOB over 30 degrees.








ADDITIONAL RECOMMENDATIONS:


1) Monitor lytes and renal status-> TF change to Nepro (8/28), elev K 


2) Per SNF wt of 112# 


3) NISS for BG control: A1C 6.4, TF at goal + added D5+ Decadron 


4) Wound healing: TF @ goal provides 100% RDI 


    add Vit C 500mg QD + José Manuel BID via GT  





(10) UTI (urinary tract infection)


(11) ARF (acute renal failure)


(12) Psychosis











Alex Nicholas Sep 2, 2020 18:16

## 2020-09-02 NOTE — INTERNAL MED PROGRESS NOTE
Subjective


Physician Name


Umer Wahl


Attending Physician


Umer Wahl MD





Current Medications








 Medications


  (Trade)  Dose


 Ordered  Sig/Raji


 Route


 PRN Reason  Start Time


 Stop Time Status Last Admin


Dose Admin


 


 Acetaminophen


  (Tylenol)  650 mg  Q6H  PRN


 GT


 Mild Pain (Pain Scale 1-3)  8/31/20 16:25


 9/30/20 16:24  8/31/20 17:02


 


 


 Clonidine HCl


  (Catapres Tab)  0.1 mg  Q4H  PRN


 ORAL


 bp over 160 syst  8/26/20 15:30


 11/22/20 15:29   


 


 


 Dexamethasone


 Sodium Phosphate


  (Decadron 10mg/


 ml Inj)  6 mg  DAILY


 IV


   8/27/20 09:00


 9/5/20 09:01  9/2/20 09:02


 


 


 Dextrose  1,000 ml @ 


 75 mls/hr  [id number]


 IV


   8/26/20 16:00


 9/20/20 15:59  9/2/20 09:02


 


 


 Docusate Sodium


  (Colace)  100 mg  EVERY 8  HOURS


 GT


   8/30/20 14:00


 9/20/20 08:59  9/2/20 15:07


 


 


 Enoxaparin Sodium


  (Lovenox)  30 mg  DAILY


 SUBQ


   8/27/20 09:00


 11/19/20 08:59  9/2/20 09:04


 


 


 Epoetin Jaswant


  (Epoetin


 Jaswant-EPBX(NON


 ESRD))  10,000 unit  MON-WED-FRI


 SUBQ


   8/31/20 21:00


 11/29/20 20:59  8/31/20 21:39


 


 


 Lorazepam


  (Ativan 2mg/ml


 1ml)  1 mg  Q4H  PRN


 IV


 For Anxiety  8/29/20 10:45


 9/5/20 10:44  9/1/20 04:56


 


 


 Minocycline HCl


  (Minocin)  200 mg  Q12HR


 ORAL


   8/26/20 21:00


 9/5/20 20:59  9/2/20 09:01


 


 


 Pantoprazole


  (Protonix)  40 mg  EVERY 12  HOURS


 IVP


   8/26/20 21:00


 9/20/20 20:59  9/2/20 09:02


 


 


 Polyethylene


 Glycol


  (Miralax)  17 gm  BEDTIME


 ORAL


   8/26/20 21:00


 9/20/20 20:59  8/29/20 20:16


 


 


 Potassium


 Phosphate 20 mm/


 Sodium Chloride  281.6667


 ml @ 


 46.944 m...  ONCE  ONCE


 IV


   9/2/20 12:00


 9/2/20 17:59  9/2/20 12:25


 


 


 Trimethoprim/


 Sulfamethoxazole


 10 ml/Dextrose  285 ml @ 


 190 mls/hr  EVERY 12  HOURS


 IV


   8/26/20 21:00


 9/5/20 20:59  9/2/20 09:01


 








Allergies:  


Coded Allergies:  


     AMPICILLIN (Verified  Allergy, Unknown, hives, 8/21/20)


     PENICILLINS (Verified  Allergy, Unknown, hives, 8/21/20)


     TETRACYCLINES (Verified  Allergy, Unknown, hives, 8/21/20)


Subjective


awake, less  responsive, open her eyes. WBC: 21.5.





Objective





Last Vital Signs








  Date Time  Temp Pulse Resp B/P (MAP) Pulse Ox O2 Delivery O2 Flow Rate FiO2


 


9/2/20 16:00 99.4 89 22 112/51 (71) 99   


 


9/2/20 16:00      Nasal Cannula 4.0 


 


9/1/20 20:00        36











Laboratory Tests








Test


  9/2/20


03:25


 


White Blood Count


  21.5 K/UL


(4.8-10.8)  H


 


Red Blood Count


  2.92 M/UL


(4.20-5.40)  L


 


Hemoglobin


  9.5 G/DL


(12.0-16.0)  L


 


Hematocrit


  28.5 %


(37.0-47.0)  L


 


Mean Corpuscular Volume 98 FL (80-99)  


 


Mean Corpuscular Hemoglobin


  32.6 PG


(27.0-31.0)  H


 


Mean Corpuscular Hemoglobin


Concent 33.3 G/DL


(32.0-36.0)


 


Red Cell Distribution Width


  14.6 %


(11.6-14.8)


 


Platelet Count


  158 K/UL


(150-450)


 


Mean Platelet Volume


  7.5 FL


(6.5-10.1)


 


Neutrophils (%) (Auto)


  % (45.0-75.0)


 


 


Lymphocytes (%) (Auto)


  % (20.0-45.0)


 


 


Monocytes (%) (Auto)  % (1.0-10.0)  


 


Eosinophils (%) (Auto)  % (0.0-3.0)  


 


Basophils (%) (Auto)  % (0.0-2.0)  


 


Differential Total Cells


Counted 100  


 


 


Neutrophils % (Manual) 74 % (45-75)  


 


Lymphocytes % (Manual) 21 % (20-45)  


 


Monocytes % (Manual) 4 % (1-10)  


 


Eosinophils % (Manual) 1 % (0-3)  


 


Basophils % (Manual) 0 % (0-2)  


 


Band Neutrophils 0 % (0-8)  


 


Nucleated Red Blood Cells 4 /100 WBC  


 


Platelet Estimate Adequate  


 


Platelet Morphology Normal  


 


Hypochromasia 2+  


 


Anisocytosis 1+  


 


Erythrocyte Sedimentation Rate


  68 MM/HR


(0-30)  H


 


Sodium Level


  145 MMOL/L


(136-145)


 


Potassium Level


  3.7 MMOL/L


(3.5-5.1)


 


Chloride Level


  110 MMOL/L


()  H


 


Carbon Dioxide Level


  25 MMOL/L


(21-32)


 


Anion Gap


  10 mmol/L


(5-15)


 


Blood Urea Nitrogen


  35 mg/dL


(7-18)  H


 


Creatinine


  1.8 MG/DL


(0.55-1.30)  H


 


Estimat Glomerular Filtration


Rate 27.7 mL/min


(>60)


 


Glucose Level


  92 MG/DL


()


 


Calcium Level


  8.7 MG/DL


(8.5-10.1)


 


Phosphorus Level


  1.5 MG/DL


(2.5-4.9)  L


 


Magnesium Level


  1.7 MG/DL


(1.8-2.4)  L


 


Total Bilirubin


  0.3 MG/DL


(0.2-1.0)


 


Aspartate Amino Transf


(AST/SGOT) 19 U/L (15-37)


 


 


Alanine Aminotransferase


(ALT/SGPT) 29 U/L (12-78)


 


 


Alkaline Phosphatase


  116 U/L


()


 


C-Reactive Protein,


Quantitative 1.6 mg/dL


(0.00-0.90)  H


 


Total Protein


  5.8 G/DL


(6.4-8.2)  L


 


Albumin


  2.4 G/DL


(3.4-5.0)  L


 


Globulin 3.4 g/dL  


 


Albumin/Globulin Ratio


  0.7 (1.0-2.7)


L











Microbiology








 Date/Time


Source Procedure


Growth Status


 


 


 9/1/20 16:05


Nasopharynx SARS-CoV-2 RdRp Gene Assay - Final Complete

















Intake and Output  


 


 9/1/20 9/2/20





 19:00 07:00


 


Intake Total 2100 ml 1720 ml


 


Output Total 1300 ml 1250 ml


 


Balance 800 ml 470 ml


 


  


 


Intake Free Water 400 ml 400 ml


 


IV Total 1280 ml 900 ml


 


Tube Feeding 420 ml 420 ml


 


Output Urine Total 1300 ml 1250 ml


 


# Bowel Movements 3 








Objective


General: No acute distress, awake, not responsive, open her eyes.


HEENT: NCAT, sclera anicteric, PERRL, 


Neck: Supple, no significant jugular venous distention, 


Lungs: decreased at the bases, no Wheeze or Rales.


Heart: Regular rate and rhythm, normal S1/S2, no murmurs/


Abdomen: soft, nontender, nondistended. Normoactive bowel sounds.


Extremities: No Cyanosis , clubbing or edema. 


Neuro: limited secondary to  patient status, contracted lower extremities.


Skin: warm, rash at the chest wall.





Assessment/Plan


Assessment/Plan


(1) UTI (urinary tract infection)


(2) Sepsis


(3) Hypernatremia


(4) Severe dehydration


(5) ARF (acute renal failure) on CKD.


(6) Pneumonia


(7) Hyperkalemia


(8) History of COVID-19 infection last month.





Plan:


Abx: Continue IV bactrim #10/14 and PO Minocycline 200mg bid #9/10-14 (high dose

) MDR ABC PNA based on cultures


DVT prophylaxis: Lovenox injection


CODE STATUS: Full code.


Monitor laboratory as  well as cultures.











Umer Wahl MD Sep 2, 2020 17:28

## 2020-09-03 VITALS — DIASTOLIC BLOOD PRESSURE: 55 MMHG | SYSTOLIC BLOOD PRESSURE: 118 MMHG

## 2020-09-03 VITALS — DIASTOLIC BLOOD PRESSURE: 58 MMHG | SYSTOLIC BLOOD PRESSURE: 106 MMHG

## 2020-09-03 VITALS — DIASTOLIC BLOOD PRESSURE: 54 MMHG | SYSTOLIC BLOOD PRESSURE: 102 MMHG

## 2020-09-03 VITALS — DIASTOLIC BLOOD PRESSURE: 58 MMHG | SYSTOLIC BLOOD PRESSURE: 110 MMHG

## 2020-09-03 VITALS — SYSTOLIC BLOOD PRESSURE: 126 MMHG | DIASTOLIC BLOOD PRESSURE: 61 MMHG

## 2020-09-03 LAB
ADD MANUAL DIFF: YES
ALBUMIN SERPL-MCNC: 2.3 G/DL (ref 3.4–5)
ALBUMIN/GLOB SERPL: 0.7 {RATIO} (ref 1–2.7)
ALP SERPL-CCNC: 124 U/L (ref 46–116)
ALT SERPL-CCNC: 29 U/L (ref 12–78)
ANION GAP SERPL CALC-SCNC: 9 MMOL/L (ref 5–15)
AST SERPL-CCNC: 26 U/L (ref 15–37)
BILIRUB SERPL-MCNC: 0.2 MG/DL (ref 0.2–1)
BUN SERPL-MCNC: 42 MG/DL (ref 7–18)
CALCIUM SERPL-MCNC: 8.6 MG/DL (ref 8.5–10.1)
CHLORIDE SERPL-SCNC: 106 MMOL/L (ref 98–107)
CO2 SERPL-SCNC: 26 MMOL/L (ref 21–32)
CREAT SERPL-MCNC: 1.8 MG/DL (ref 0.55–1.3)
ERYTHROCYTE [DISTWIDTH] IN BLOOD BY AUTOMATED COUNT: 14.5 % (ref 11.6–14.8)
GLOBULIN SER-MCNC: 3.5 G/DL
HCT VFR BLD CALC: 29.3 % (ref 37–47)
HGB BLD-MCNC: 9.7 G/DL (ref 12–16)
MCV RBC AUTO: 98 FL (ref 80–99)
PHOSPHATE SERPL-MCNC: 3.4 MG/DL (ref 2.5–4.9)
PLATELET # BLD: 156 K/UL (ref 150–450)
POTASSIUM SERPL-SCNC: 4.1 MMOL/L (ref 3.5–5.1)
RBC # BLD AUTO: 2.99 M/UL (ref 4.2–5.4)
SODIUM SERPL-SCNC: 141 MMOL/L (ref 136–145)
WBC # BLD AUTO: 23.4 K/UL (ref 4.8–10.8)

## 2020-09-03 RX ADMIN — POLYETHYLENE GLYCOL 3350 SCH GM: 17 POWDER, FOR SOLUTION ORAL at 21:00

## 2020-09-03 RX ADMIN — PANTOPRAZOLE SODIUM SCH MG: 40 INJECTION, POWDER, FOR SOLUTION INTRAVENOUS at 21:14

## 2020-09-03 RX ADMIN — PANTOPRAZOLE SODIUM SCH MG: 40 INJECTION, POWDER, FOR SOLUTION INTRAVENOUS at 09:27

## 2020-09-03 RX ADMIN — SULFAMETHOXAZOLE AND TRIMETHOPRIM SCH MLS/HR: 80; 16 INJECTION, SOLUTION, CONCENTRATE INTRAVENOUS at 21:13

## 2020-09-03 RX ADMIN — SULFAMETHOXAZOLE AND TRIMETHOPRIM SCH MLS/HR: 80; 16 INJECTION, SOLUTION, CONCENTRATE INTRAVENOUS at 09:27

## 2020-09-03 RX ADMIN — DOCUSATE SODIUM SCH MG: 50 LIQUID ORAL at 05:51

## 2020-09-03 RX ADMIN — DOCUSATE SODIUM SCH MG: 50 LIQUID ORAL at 14:00

## 2020-09-03 RX ADMIN — DOCUSATE SODIUM SCH MG: 50 LIQUID ORAL at 21:14

## 2020-09-03 RX ADMIN — MINOCYCLINE HYDROCHLORIDE SCH MG: 50 CAPSULE ORAL at 21:14

## 2020-09-03 RX ADMIN — ENOXAPARIN SODIUM SCH MG: 40 INJECTION SUBCUTANEOUS at 09:28

## 2020-09-03 RX ADMIN — MINOCYCLINE HYDROCHLORIDE SCH MG: 50 CAPSULE ORAL at 09:27

## 2020-09-03 NOTE — INFECTIOUS DISEASES PROG NOTE
Assessment/Plan


69yo F with:





Sepsis


Fever; SP


Leukocytosis; worsening (sp steroids)


JASWINDER to 5.1, improving 


Hypoxic resp failure, sp NRB mask >VM> 4l NC 9/1


VRE UTI


Hx of COVID-19 1 mo ago ( doubt recurrent COVID-19) 


HAP


            9/1 rapid COVID PCR neg


   8/29 CXR:   Improving left lower lobe infiltrate and left pleural effusion. 

There is a probable small right pleural effusion.


   8/27 cdiff neg


           8/26 CXR: There are increased interstitial congestion and left 

pleural effusion,since prior exam of 8/22/2020


               Bcx NTD


            8/22 sp cx MDR ABC (S bactrim; I Colistin, Polymixin B, Minocycline)


   8/21 BCx NTD


   8/21 UA+, UCx >100k VRE (S Zyvox)


   8/21 CXR: Left retrocardiac mild atelectasis versus infiltrate.


   8/21 COVID rapid neg; 8/22 repeat COVID-19 +








BL arm rash, appears evolving, now small pustules with dry/crusting, querry 

resolving Shingles? Though odd to have on both arms


   8/21 HSV & VZV PCR ordered





Plan:


Continue IV bactrim #11/14 and PO Minocycline 200mg bid #10/10-14 (high dose) 

MDR ABC PNA based on cultures


   -8/29 SP ZYvox #7





       --8/24 SP Cefepime #4


       --8/23 SP IV Vancomcyin #4





F/u cx


Monitor CBC/BMP


Trend BUE rash


Dc covid isolation- has been over 1 month from initial diagnosis, afebrile >

24hrs and clinical improvement; repaet covid neg


f/u Bcx x2, Sp cx


f/u flow cytometry


repeat cultures


amylase, lipase am





Discussed with RN





Subjective


Allergies:  


Coded Allergies:  


     AMPICILLIN (Verified  Allergy, Unknown, hives, 8/21/20)


     PENICILLINS (Verified  Allergy, Unknown, hives, 8/21/20)


     TETRACYCLINES (Verified  Allergy, Unknown, hives, 8/21/20)


afebrile


wbc increased


now off steroids





Objective





Last 24 Hour Vital Signs








  Date Time  Temp Pulse Resp B/P (MAP) Pulse Ox O2 Delivery O2 Flow Rate FiO2


 


9/3/20 08:00      Nasal Cannula 4.0 


 


9/3/20 08:00  84      


 


9/3/20 08:00 97.9 92 24 102/54 (70) 100   


 


9/3/20 04:00 99.5 97 17 118/55 (76) 99   


 


9/3/20 04:00  95      


 


9/3/20 04:00      Nasal Cannula 4.0 


 


9/3/20 00:00      Nasal Cannula 4.0 


 


9/3/20 00:00  87      


 


9/3/20 00:00 99.1 85 19 126/61 (82) 99   


 


9/2/20 20:00  97      


 


9/2/20 20:00 99.4 99 24 131/66 (87) 100   


 


9/2/20 19:47      Nasal Cannula 4.0 


 


9/2/20 19:34     100 Nasal Cannula 4.0 36


 


9/2/20 16:00 99.4 89 22 112/51 (71) 99   


 


9/2/20 16:00      Nasal Cannula 4.0 


 


9/2/20 15:00  87      








Height (Feet):  5


Height (Inches):  2.00


Weight (Pounds):  123


HEENT:  atraumatic


Respiratory/Chest:  other - mild Rsp distress


Abdomen:  no organomegaly





Microbiology








 Date/Time


Source Procedure


Growth Status


 


 


 9/1/20 16:05


Nasopharynx SARS-CoV-2 RdRp Gene Assay - Final Complete











Laboratory Tests








Test


  9/3/20


03:35


 


White Blood Count


  23.4 K/UL


(4.8-10.8)  *H


 


Red Blood Count


  2.99 M/UL


(4.20-5.40)  L


 


Hemoglobin


  9.7 G/DL


(12.0-16.0)  L


 


Hematocrit


  29.3 %


(37.0-47.0)  L


 


Mean Corpuscular Volume 98 FL (80-99)  


 


Mean Corpuscular Hemoglobin


  32.5 PG


(27.0-31.0)  H


 


Mean Corpuscular Hemoglobin


Concent 33.2 G/DL


(32.0-36.0)


 


Red Cell Distribution Width


  14.5 %


(11.6-14.8)


 


Platelet Count


  156 K/UL


(150-450)


 


Mean Platelet Volume


  7.6 FL


(6.5-10.1)


 


Neutrophils (%) (Auto)


  % (45.0-75.0)


 


 


Lymphocytes (%) (Auto)


  % (20.0-45.0)


 


 


Monocytes (%) (Auto)  % (1.0-10.0)  


 


Eosinophils (%) (Auto)  % (0.0-3.0)  


 


Basophils (%) (Auto)  % (0.0-2.0)  


 


Differential Total Cells


Counted 100  


 


 


Neutrophils % (Manual) 62 % (45-75)  


 


Lymphocytes % (Manual) 21 % (20-45)  


 


Monocytes % (Manual) 16 % (1-10)  H


 


Eosinophils % (Manual) 1 % (0-3)  


 


Basophils % (Manual) 0 % (0-2)  


 


Band Neutrophils 0 % (0-8)  


 


Nucleated Red Blood Cells 3 /100 WBC  


 


Platelet Estimate Adequate  


 


Platelet Morphology Normal  


 


Hypochromasia 2+  


 


Anisocytosis 1+  


 


Sodium Level


  141 MMOL/L


(136-145)


 


Potassium Level


  4.1 MMOL/L


(3.5-5.1)


 


Chloride Level


  106 MMOL/L


()


 


Carbon Dioxide Level


  26 MMOL/L


(21-32)


 


Anion Gap


  9 mmol/L


(5-15)


 


Blood Urea Nitrogen


  42 mg/dL


(7-18)  H


 


Creatinine


  1.8 MG/DL


(0.55-1.30)  H


 


Estimat Glomerular Filtration


Rate 27.7 mL/min


(>60)


 


Glucose Level


  98 MG/DL


()


 


Calcium Level


  8.6 MG/DL


(8.5-10.1)


 


Phosphorus Level


  3.4 MG/DL


(2.5-4.9)


 


Magnesium Level


  2.2 MG/DL


(1.8-2.4)


 


Total Bilirubin


  0.2 MG/DL


(0.2-1.0)


 


Aspartate Amino Transf


(AST/SGOT) 26 U/L (15-37)


 


 


Alanine Aminotransferase


(ALT/SGPT) 29 U/L (12-78)


 


 


Alkaline Phosphatase


  124 U/L


()  H


 


Total Protein


  5.8 G/DL


(6.4-8.2)  L


 


Albumin


  2.3 G/DL


(3.4-5.0)  L


 


Globulin 3.5 g/dL  


 


Albumin/Globulin Ratio


  0.7 (1.0-2.7)


L











Current Medications








 Medications


  (Trade)  Dose


 Ordered  Sig/Raji


 Route


 PRN Reason  Start Time


 Stop Time Status Last Admin


Dose Admin


 


 Acetaminophen


  (Tylenol)  650 mg  Q6H  PRN


 GT


 Mild Pain (Pain Scale 1-3)  8/31/20 16:25


 9/30/20 16:24  9/2/20 21:07


 


 


 Ascorbic Acid


  (Vitamin C)  500 mg  DAILY


 GT


   9/4/20 09:00


 10/4/20 08:59   


 


 


 Clonidine HCl


  (Catapres Tab)  0.1 mg  Q4H  PRN


 ORAL


 bp over 160 syst  8/26/20 15:30


 11/22/20 15:29   


 


 


 Dextrose  1,000 ml @ 


 75 mls/hr  [id number]


 IV


   8/26/20 16:00


 9/20/20 15:59  9/3/20 10:42


 


 


 Docusate Sodium


  (Colace)  100 mg  EVERY 8  HOURS


 GT


   8/30/20 14:00


 9/20/20 08:59  9/2/20 15:07


 


 


 Enoxaparin Sodium


  (Lovenox)  40 mg  DAILY


 SUBQ


   9/3/20 09:00


 11/19/20 08:59  9/3/20 09:28


 


 


 Epoetin Jaswant


  (Epoetin


 Jaswant-EPBX(NON


 ESRD))  10,000 unit  MON-WED-FRI


 SUBQ


   8/31/20 21:00


 11/29/20 20:59  9/2/20 21:06


 


 


 Lorazepam


  (Ativan 2mg/ml


 1ml)  1 mg  Q4H  PRN


 IV


 For Anxiety  8/29/20 10:45


 9/5/20 10:44  9/1/20 04:56


 


 


 Minocycline HCl


  (Minocin)  200 mg  Q12HR


 ORAL


   8/26/20 21:00


 9/5/20 20:59  9/3/20 09:27


 


 


 Pantoprazole


  (Protonix)  40 mg  EVERY 12  HOURS


 IVP


   8/26/20 21:00


 9/20/20 20:59  9/3/20 09:27


 


 


 Polyethylene


 Glycol


  (Miralax)  17 gm  BEDTIME


 ORAL


   8/26/20 21:00


 9/20/20 20:59  9/2/20 21:06


 


 


 Trimethoprim/


 Sulfamethoxazole


 10 ml/Dextrose  285 ml @ 


 190 mls/hr  EVERY 12  HOURS


 IV


   8/26/20 21:00


 9/5/20 20:59  9/3/20 09:27


 

















Tanisha Brownlee M.D. Sep 3, 2020 12:24

## 2020-09-03 NOTE — PULMONOLOGY PROGRESS NOTE
Subjective


ROS Limited/Unobtainable:  No


Constitutional:  Reports: no symptoms


HEENT:  Repors: no symptoms


Respiratory:  Reports: no symptoms


Allergies:  


Coded Allergies:  


     AMPICILLIN (Verified  Allergy, Unknown, hives, 8/21/20)


     PENICILLINS (Verified  Allergy, Unknown, hives, 8/21/20)


     TETRACYCLINES (Verified  Allergy, Unknown, hives, 8/21/20)





Objective





Last 24 Hour Vital Signs








  Date Time  Temp Pulse Resp B/P (MAP) Pulse Ox O2 Delivery O2 Flow Rate FiO2


 


9/3/20 16:00 98.1 91 21 106/58 (74) 100   


 


9/3/20 16:00      Nasal Cannula 4.0 


 


9/3/20 16:00  90      


 


9/3/20 12:00 97.9 85 21 109/54 (72) 100   


 


9/3/20 12:00      Nasal Cannula 4.0 


 


9/3/20 11:58  84      


 


9/3/20 08:00      Nasal Cannula 4.0 


 


9/3/20 08:00  84      


 


9/3/20 08:00 97.9 92 24 102/54 (70) 100   


 


9/3/20 04:00 99.5 97 17 118/55 (76) 99   


 


9/3/20 04:00  95      


 


9/3/20 04:00      Nasal Cannula 4.0 


 


9/3/20 00:00      Nasal Cannula 4.0 


 


9/3/20 00:00  87      


 


9/3/20 00:00 99.1 85 19 126/61 (82) 99   


 


9/2/20 20:00  97      


 


9/2/20 20:00 99.4 99 24 131/66 (87) 100   


 


9/2/20 19:47      Nasal Cannula 4.0 

















Intake and Output  


 


 9/2/20 9/3/20





 19:00 07:00


 


Intake Total 1827.776 ml 1360 ml


 


Output Total 1600 ml 850 ml


 


Balance 227.776 ml 510 ml


 


  


 


Intake Free Water 200 ml 190 ml


 


IV Total 1242.776 ml 750 ml


 


Tube Feeding 385 ml 420 ml


 


Output Urine Total 1600 ml 850 ml








General Appearance:  no acute distress, other - chronically ill looking 

bedridden female


HEENT:  normocephalic, atraumatic, other - VM


Respiratory:  chest wall non-tender, rhonchi - bilaterally - scattered


Cardiovascular:  normal peripheral pulses, normal rate - SR on tele


Abdomen:  normal bowel sounds, soft, non tender


Genitourinary:  normal external genitalia


Skin:  other - BUE crusting lesions


Neurologic:  CNs II-XII grossly normal, abnormal gait - bedridden


Lymphatic:  no neck adenopathy





Microbiology








 Date/Time


Source Procedure


Growth Status


 


 


 9/1/20 16:05


Nasopharynx SARS-CoV-2 RdRp Gene Assay - Final Complete








Laboratory Tests


9/3/20 03:35: 


White Blood Count 23.4*H, Red Blood Count 2.99L, Hemoglobin 9.7L, Hematocrit 

29.3L, Mean Corpuscular Volume 98, Mean Corpuscular Hemoglobin 32.5H, Mean 

Corpuscular Hemoglobin Concent 33.2, Red Cell Distribution Width 14.5, Platelet 

Count 156, Mean Platelet Volume 7.6, Neutrophils (%) (Auto) , Lymphocytes (%) (

Auto) , Monocytes (%) (Auto) , Eosinophils (%) (Auto) , Basophils (%) (Auto) , 

Differential Total Cells Counted 100, Neutrophils % (Manual) 62, Lymphocytes % (

Manual) 21, Monocytes % (Manual) 16H, Eosinophils % (Manual) 1, Basophils % (

Manual) 0, Band Neutrophils 0, Nucleated Red Blood Cells 3, Platelet Estimate 

Adequate, Platelet Morphology Normal, Hypochromasia 2+, Anisocytosis 1+, Sodium 

Level 141, Potassium Level 4.1, Chloride Level 106, Carbon Dioxide Level 26, 

Anion Gap 9, Blood Urea Nitrogen 42H, Creatinine 1.8H, Estimat Glomerular 

Filtration Rate 27.7, Glucose Level 98, Calcium Level 8.6, Phosphorus Level 3.4

, Magnesium Level 2.2, Total Bilirubin 0.2, Aspartate Amino Transf (AST/SGOT) 26

, Alanine Aminotransferase (ALT/SGPT) 29, Alkaline Phosphatase 124H, Total 

Protein 5.8L, Albumin 2.3L, Globulin 3.5, Albumin/Globulin Ratio 0.7L





Current Medications








 Medications


  (Trade)  Dose


 Ordered  Sig/Raji


 Route


 PRN Reason  Start Time


 Stop Time Status Last Admin


Dose Admin


 


 Acetaminophen


  (Tylenol)  650 mg  Q6H  PRN


 GT


 Mild Pain (Pain Scale 1-3)  8/31/20 16:25


 9/30/20 16:24  9/2/20 21:07


 


 


 Ascorbic Acid


  (Vitamin C)  500 mg  DAILY


 GT


   9/4/20 09:00


 10/4/20 08:59   


 


 


 Clonidine HCl


  (Catapres Tab)  0.1 mg  Q4H  PRN


 ORAL


 bp over 160 syst  8/26/20 15:30


 11/22/20 15:29   


 


 


 Dextrose  1,000 ml @ 


 75 mls/hr  [id number]


 IV


   8/26/20 16:00


 9/20/20 15:59  9/3/20 10:42


 


 


 Docusate Sodium


  (Colace)  100 mg  EVERY 8  HOURS


 GT


   8/30/20 14:00


 9/20/20 08:59  9/2/20 15:07


 


 


 Enoxaparin Sodium


  (Lovenox)  40 mg  DAILY


 SUBQ


   9/3/20 09:00


 11/19/20 08:59  9/3/20 09:28


 


 


 Epoetin Jaswant


  (Epoetin


 Jaswant-EPBX(NON


 ESRD))  10,000 unit  MON-WED-FRI


 SUBQ


   8/31/20 21:00


 11/29/20 20:59  9/2/20 21:06


 


 


 Lorazepam


  (Ativan 2mg/ml


 1ml)  1 mg  Q4H  PRN


 IV


 For Anxiety  8/29/20 10:45


 9/5/20 10:44  9/1/20 04:56


 


 


 Minocycline HCl


  (Minocin)  200 mg  Q12HR


 ORAL


   8/26/20 21:00


 9/5/20 20:59  9/3/20 09:27


 


 


 Pantoprazole


  (Protonix)  40 mg  EVERY 12  HOURS


 IVP


   8/26/20 21:00


 9/20/20 20:59  9/3/20 09:27


 


 


 Polyethylene


 Glycol


  (Miralax)  17 gm  BEDTIME


 ORAL


   8/26/20 21:00


 9/20/20 20:59  9/2/20 21:06


 


 


 Trimethoprim/


 Sulfamethoxazole


 10 ml/Dextrose  285 ml @ 


 190 mls/hr  EVERY 12  HOURS


 IV


   8/26/20 21:00


 9/5/20 20:59  9/3/20 09:27


 











Assessment/Plan


Problems:  


(1) 2019 novel coronavirus disease (COVID-19)


(2) Sepsis


(3) ARF (acute renal failure)


(4) Severe dehydration


(5) Hypernatremia


(6) Psychosis


Assessment/Plan


wbc higher





Low grade fever


WBC still high


COVID positive


pan cultures


iv fluids 


f/u electrolytes


renal studies


urine electrolytes


dvt prophylaxis











Live Brambila MD Sep 3, 2020 19:39

## 2020-09-03 NOTE — NEPHROLOGY PROGRESS NOTE
Assessment/Plan


Problem List:  


(1) ARF (acute renal failure)


Assessment:  Underlying CKD





(2) Sepsis


(3) UTI (urinary tract infection)


(4) Severe dehydration


(5) Hypernatremia


(6) Acute urinary retention


(7) Anemia


Assessment





Acute renal failure


Possible underlying chronic renal insufficiency


Severe dehydration


Hypotension


Sepsis, UTI


History of psychiatric disease


Elevated troponin I


Patient full code


Plan


September 3:Late note entry due to system problem at the [facility] today. Labs 

reviewed.  Electrolytes within normal limit.  Leukocytosis worsened.  Continue 

per consultants.  Serum creatinine 1.8 unchanged.


September 2: Low magnesium and low phosphorus corrected.  Continue per 

consultants.  On nasal cannula now.  Serum creatinine 1.8.


September 1: Serum creatinine 1.8 stable.  Continue per pulmonary.  Continue to 

monitor renal parameters.


August 31: On Venturi mask.  Serum creatinine lowered to 1.8.  Continue per 

consultants.  Medication list reviewed.  Potassium supplement given.


August 30: Status quo.  Creatinine lower at 2.  Other electrolytes within 

normal limits.  Continue per consultants.


August 29: Labs reviewed.  Creatinine 2.2 stable.  Potassium 3.1.  20 M EQ KCl 

given.  Continue her consultants.


August 28: Serum potassium 5.5.Creatinine 2.2.  1 dose Kayexalate given.  

Remains of 75 cc IV fluid.  We will continue to monitor renal parameters.


August 27: Serum potassium 5.3.  Creatinine higher at 2.2.  Medication list 

reviewed.  Suggest to avoid nephrotoxic's like Bactrim if possible.  Continue 

to monitor renal parameters and electrolytes.  Continue per pulmonary to adjust 

pulmonary status.  May require BiPAP.


August 26: Serum sodium lower.  Renal parameters appear more stable.  

Nevertheless leukocytosis is worsened.  Continue per consultants.


August 25: Serum sodium higher.  Continue D5W 75 cc an hour.  Continue to 

monitor electrolytes.  Continue per consultants.  Serum creatinine down to 2.4 

from 5.1 on admission.  Leukocytosis persists.


August 24: Patient now on isolation for COVID-19.  Serum creatinine is 

plateauing.  Will decrease the IV fluid to 75 cc an hour.  Continue management 

per ID.  Will monitor electrolytes and renal parameters.


August 23: Discussed with MARTIN Carter.  Patient needs a Mitchell catheter.  Accurate 

intake and output.  Decrease  cc D5W an hour.  1 dose of IV Venofer and 

then subcu Epogen ordered for anemia.  Continue to monitor renal parameters


August 22: Renal parameters improving.  Continue D5W IV fluid.  Potassium 

supplement IV given.  Continue per consultants.





Previously:


Fluid challenge


Monitor renal parameters and electrolytes


Monitor intake and output


Mitchell catheter


Antibiotics


Avoid nephrotoxic's


Chest x-ray





Kidney ultrasound findings: 


Right kidney measures 8.9 cm in length. Left kidney measures 9 cm in


length. Both kidneys demonstrate markedly increased echogenicity. No 

hydronephrosis. 


There are bilateral renal cysts. Normal inferior vena cava. Bladder is empty,


contains a Mitchell catheter. 


 


Incidentally noted are gallstones.


 


Impression: Markedly echogenic kidneys, consistent with medical renal disease


Negative for hydronephrosis


Empty bladder with a Mitchell catheter


Incidental finding of cholelithiasis.





Subjective


ROS Limited/Unobtainable:  Yes





Objective


Objective





Last 24 Hour Vital Signs








  Date Time  Temp Pulse Resp B/P (MAP) Pulse Ox O2 Delivery O2 Flow Rate FiO2


 


9/3/20 16:00 98.1 91 21 106/58 (74) 100   


 


9/3/20 16:00      Nasal Cannula 4.0 


 


9/3/20 16:00  90      


 


9/3/20 12:00 97.9 85 21 109/54 (72) 100   


 


9/3/20 12:00      Nasal Cannula 4.0 


 


9/3/20 11:58  84      


 


9/3/20 08:00      Nasal Cannula 4.0 


 


9/3/20 08:00  84      


 


9/3/20 08:00 97.9 92 24 102/54 (70) 100   


 


9/3/20 04:00 99.5 97 17 118/55 (76) 99   


 


9/3/20 04:00  95      


 


9/3/20 04:00      Nasal Cannula 4.0 


 


9/3/20 00:00      Nasal Cannula 4.0 


 


9/3/20 00:00  87      


 


9/3/20 00:00 99.1 85 19 126/61 (82) 99   


 


9/2/20 20:00  97      


 


9/2/20 20:00 99.4 99 24 131/66 (87) 100   


 


9/2/20 19:47      Nasal Cannula 4.0 


 


9/2/20 19:34     100 Nasal Cannula 4.0 36

















Intake and Output  


 


 9/2/20 9/3/20





 19:00 07:00


 


Intake Total 1827.776 ml 1360 ml


 


Output Total 1600 ml 850 ml


 


Balance 227.776 ml 510 ml


 


  


 


Intake Free Water 200 ml 190 ml


 


IV Total 1242.776 ml 750 ml


 


Tube Feeding 385 ml 420 ml


 


Output Urine Total 1600 ml 850 ml








Laboratory Tests


9/3/20 03:35: 


White Blood Count 23.4*H, Red Blood Count 2.99L, Hemoglobin 9.7L, Hematocrit 

29.3L, Mean Corpuscular Volume 98, Mean Corpuscular Hemoglobin 32.5H, Mean 

Corpuscular Hemoglobin Concent 33.2, Red Cell Distribution Width 14.5, Platelet 

Count 156, Mean Platelet Volume 7.6, Neutrophils (%) (Auto) , Lymphocytes (%) (

Auto) , Monocytes (%) (Auto) , Eosinophils (%) (Auto) , Basophils (%) (Auto) , 

Differential Total Cells Counted 100, Neutrophils % (Manual) 62, Lymphocytes % (

Manual) 21, Monocytes % (Manual) 16H, Eosinophils % (Manual) 1, Basophils % (

Manual) 0, Band Neutrophils 0, Nucleated Red Blood Cells 3, Platelet Estimate 

Adequate, Platelet Morphology Normal, Hypochromasia 2+, Anisocytosis 1+, Sodium 

Level 141, Potassium Level 4.1, Chloride Level 106, Carbon Dioxide Level 26, 

Anion Gap 9, Blood Urea Nitrogen 42H, Creatinine 1.8H, Estimat Glomerular 

Filtration Rate 27.7, Glucose Level 98, Calcium Level 8.6, Phosphorus Level 3.4

, Magnesium Level 2.2, Total Bilirubin 0.2, Aspartate Amino Transf (AST/SGOT) 26

, Alanine Aminotransferase (ALT/SGPT) 29, Alkaline Phosphatase 124H, Total 

Protein 5.8L, Albumin 2.3L, Globulin 3.5, Albumin/Globulin Ratio 0.7L


Height (Feet):  5


Height (Inches):  2.00


Weight (Pounds):  123


General Appearance:  lethargic, mild distress


EENT:  other - On nasal cannula


Cardiovascular:  bradycardia


Respiratory/Chest:  decreased breath sounds


Abdomen:  distended











Derik Dos Santos MD Sep 3, 2020 19:32

## 2020-09-03 NOTE — SURGERY PROGRESS NOTE
Surgery Progress Note


Subjective


Additional Comments


leukocytosis worse


low grade fevers


micro reviewed


covid negative


c diff negative


bc ngtd





Objective





Last 24 Hour Vital Signs








  Date Time  Temp Pulse Resp B/P (MAP) Pulse Ox O2 Delivery O2 Flow Rate FiO2


 


9/3/20 08:00 97.9 92 24 102/54 (70) 100   


 


9/3/20 04:00 99.5 97 17 118/55 (76) 99   


 


9/3/20 04:00  95      


 


9/3/20 04:00      Nasal Cannula 4.0 


 


9/3/20 00:00      Nasal Cannula 4.0 


 


9/3/20 00:00  87      


 


9/3/20 00:00 99.1 85 19 126/61 (82) 99   


 


9/2/20 20:00  97      


 


9/2/20 20:00 99.4 99 24 131/66 (87) 100   


 


9/2/20 19:47      Nasal Cannula 4.0 


 


9/2/20 19:34     100 Nasal Cannula 4.0 36


 


9/2/20 16:00 99.4 89 22 112/51 (71) 99   


 


9/2/20 16:00      Nasal Cannula 4.0 


 


9/2/20 15:00  87      


 


9/2/20 11:45      Nasal Cannula 4.0 


 


9/2/20 11:39  83      


 


9/2/20 11:25 99.5 89 22 125/53 (77) 99   








I&O











Intake and Output  


 


 9/2/20 9/3/20





 19:00 07:00


 


Intake Total 1827.776 ml 575 ml


 


Output Total 1600 ml 850 ml


 


Balance 227.776 ml -275 ml


 


  


 


Intake Free Water 200 ml 190 ml


 


IV Total 1242.776 ml 


 


Tube Feeding 385 ml 385 ml


 


Output Urine Total 1600 ml 850 ml








Dressing:  other


Cardiovascular:  RSR


Respiratory:  decreased breath sounds


Abdomen:  soft, non-tender, present bowel sounds


Extremities:  no cyanosis





Laboratory Tests








Test


  9/3/20


03:35


 


White Blood Count


  23.4 K/UL


(4.8-10.8)  *H


 


Red Blood Count


  2.99 M/UL


(4.20-5.40)  L


 


Hemoglobin


  9.7 G/DL


(12.0-16.0)  L


 


Hematocrit


  29.3 %


(37.0-47.0)  L


 


Mean Corpuscular Volume 98 FL (80-99)  


 


Mean Corpuscular Hemoglobin


  32.5 PG


(27.0-31.0)  H


 


Mean Corpuscular Hemoglobin


Concent 33.2 G/DL


(32.0-36.0)


 


Red Cell Distribution Width


  14.5 %


(11.6-14.8)


 


Platelet Count


  156 K/UL


(150-450)


 


Mean Platelet Volume


  7.6 FL


(6.5-10.1)


 


Neutrophils (%) (Auto)


  % (45.0-75.0)


 


 


Lymphocytes (%) (Auto)


  % (20.0-45.0)


 


 


Monocytes (%) (Auto)  % (1.0-10.0)  


 


Eosinophils (%) (Auto)  % (0.0-3.0)  


 


Basophils (%) (Auto)  % (0.0-2.0)  


 


Neutrophils % (Manual) Pending  


 


Lymphocytes % (Manual) Pending  


 


Platelet Estimate Pending  


 


Platelet Morphology Pending  


 


Sodium Level


  141 MMOL/L


(136-145)


 


Potassium Level


  4.1 MMOL/L


(3.5-5.1)


 


Chloride Level


  106 MMOL/L


()


 


Carbon Dioxide Level


  26 MMOL/L


(21-32)


 


Anion Gap


  9 mmol/L


(5-15)


 


Blood Urea Nitrogen


  42 mg/dL


(7-18)  H


 


Creatinine


  1.8 MG/DL


(0.55-1.30)  H


 


Estimat Glomerular Filtration


Rate 27.7 mL/min


(>60)


 


Glucose Level


  98 MG/DL


()


 


Calcium Level


  8.6 MG/DL


(8.5-10.1)


 


Phosphorus Level


  3.4 MG/DL


(2.5-4.9)


 


Magnesium Level


  2.2 MG/DL


(1.8-2.4)


 


Total Bilirubin


  0.2 MG/DL


(0.2-1.0)


 


Aspartate Amino Transf


(AST/SGOT) 26 U/L (15-37)


 


 


Alanine Aminotransferase


(ALT/SGPT) 29 U/L (12-78)


 


 


Alkaline Phosphatase


  124 U/L


()  H


 


Total Protein


  5.8 G/DL


(6.4-8.2)  L


 


Albumin


  2.3 G/DL


(3.4-5.0)  L


 


Globulin 3.5 g/dL  


 


Albumin/Globulin Ratio


  0.7 (1.0-2.7)


L











Plan


Problems:  


(1) Anemia


(2) 2019 novel coronavirus disease (COVID-19)


(3) Pneumonia


(4) Multiple drug resistant organism (MDRO) culture positive


(5) Hyperkalemia


(6) Hypernatremia


(7) Severe dehydration


(8) Acute urinary retention


(9) Sepsis


Assessment & Plan:  Pt presented on admission with multiple Pressure injuries. 

Pt noted to have band of  Blotchy red rash  with open and closed Blisters 

affecting  R Brachial to R forearm. Lateral R back /R flank is also blotchy 

erythematous with clusters of serous filled blisters. On Lateral L Back to L 

flank is Blotchy, erythematous with clusters of serous filled blisters.  


Reabsorbing DTPI that is partially opened Sacrum(L) 6cm x (W)5.5cm.  Base of 

wound is Maroon with with scattered areas that are maryellen and loose peeling soft 

black cap.Surrounding Non-Blanching erythema without induration.


Non-Blanching erythema that is indurated with delineated margins R trochanter(L)

5.5cm x (W)6.5cm.


No evidence of skin breakdown L trochanter.


Non-Blanching erythema noted to R and L ischial tuberosities.


DTPI medial R heel (L)3.3cm x (W)3.4cm. Base of injury is, fluctuant, maroon 

with delineated margins. surrounding red,  boggy heel.


DTPI Lateral R Heel(L)1.5cm x (W)2.5cm. Base of injury is indurated, maroon 

with purpuric center.


Non-Blanching erythema without induration /fluctuance lateral R malleolus(L)2cm 

x (W)1.5cm.





Tx.Plan:


Apply Moisture Barrier Paste to Sacrum. Cover with Optifoam drsg. Change every 

3 days and prn.


           


Apply Cavilon Skin Barrier to R and L trochanter. Cover each site with Optifoam 

drsgs. Change every 7 days and prn.


           


Apply Moisture Barrier Paste to R and L Ischial tuberosities with each 

Incontinence care.


           


Apply Cavilon Skin Barrier to Medial and Lateral R Heel. Cover with Optifoam 

drsg. Change every7 days and prn.


           


Apply Cavilon Skin Barrier to R lateral ,and medial Malleoli. Cover each site 

with Optifoam drsgs. Change every 7 days and prn.


           


Apply Cavilon Skin Barrier to L Heel and Malleoli. Cover each site with 

Optifoam drsgs. Change every 7 days and prn.


           


Cover open blisters as needed with Optifoam drsgs. 


           


Reposition at least every 2 hours or as tolerated.


           


Off-load heels with pillow.


           


Nutritional optimization





DAILY ESTIMATED NEEDS:


Needs based on Sepsis, renal, wound 50.9kg  


25-35  kcals/kg 


6852-1854  total kcals


1.25-1.5  g protein/kg


64-76  g total protein 


25-30  mL/kg


4187-6638  total fluid mLs





NUTRITION DIAGNOSIS:


1. Swallowing difficulty r/t dysphagia as evidenced by pt is GT dep.


2. Altered nutrition related lab values r/t sepsis and severe dehydration,


pre-diabetes as evidenced by Elev WBC (31.6 ->18.2), elev Na (179-> WNL),


elev BUN (131->48), elev Creat (5.1->2.2), elev K (5.5-> wnl->5.5, 5.3),


elev BGs (93, 113, 185), A1C of 6.4.





CURRENT TF: Nepro @35  








ENTERAL NUTRITION RECOMMENDATIONS:


LOWER GOAL RATE -> NEPRO @ 35ML/HR X 24 HRS  to provide 840ml, 1512kcal, 68g 

prot, 610ml free water 





- LOWER GOAL RATE: meets 100% est kcal/prot needs


- Flush per MD, DIAN over 30 degrees.








ADDITIONAL RECOMMENDATIONS:


1) Monitor lytes and renal status-> TF change to Nepro (8/28), elev K 


2) Per SNF wt of 112# 


3) NISS for BG control: A1C 6.4, TF at goal + added D5+ Decadron 


4) Wound healing: TF @ goal provides 100% RDI 


    add Vit C 500mg QD + José Manuel BID via GT  





micro reviewed


covid negative


c diff negative


bc ngtd 





(10) UTI (urinary tract infection)


(11) ARF (acute renal failure)


(12) Psychosis











Alex Nicholas Sep 3, 2020 09:42

## 2020-09-03 NOTE — INTERNAL MED PROGRESS NOTE
Subjective


Physician Name


Umer Wahl


Attending Physician


Umer Wahl MD





Current Medications








 Medications


  (Trade)  Dose


 Ordered  Sig/Raji


 Route


 PRN Reason  Start Time


 Stop Time Status Last Admin


Dose Admin


 


 Acetaminophen


  (Tylenol)  650 mg  Q6H  PRN


 GT


 Mild Pain (Pain Scale 1-3)  8/31/20 16:25


 9/30/20 16:24  9/2/20 21:07


 


 


 Ascorbic Acid


  (Vitamin C)  500 mg  DAILY


 GT


   9/4/20 09:00


 10/4/20 08:59   


 


 


 Clonidine HCl


  (Catapres Tab)  0.1 mg  Q4H  PRN


 ORAL


 bp over 160 syst  8/26/20 15:30


 11/22/20 15:29   


 


 


 Dextrose  1,000 ml @ 


 75 mls/hr  [id number]


 IV


   8/26/20 16:00


 9/20/20 15:59  9/3/20 10:42


 


 


 Docusate Sodium


  (Colace)  100 mg  EVERY 8  HOURS


 GT


   8/30/20 14:00


 9/20/20 08:59  9/2/20 15:07


 


 


 Enoxaparin Sodium


  (Lovenox)  40 mg  DAILY


 SUBQ


   9/3/20 09:00


 11/19/20 08:59  9/3/20 09:28


 


 


 Epoetin Jaswant


  (Epoetin


 Jaswant-EPBX(NON


 ESRD))  10,000 unit  MON-WED-FRI


 SUBQ


   8/31/20 21:00


 11/29/20 20:59  9/2/20 21:06


 


 


 Lorazepam


  (Ativan 2mg/ml


 1ml)  1 mg  Q4H  PRN


 IV


 For Anxiety  8/29/20 10:45


 9/5/20 10:44  9/1/20 04:56


 


 


 Minocycline HCl


  (Minocin)  200 mg  Q12HR


 ORAL


   8/26/20 21:00


 9/5/20 20:59  9/3/20 21:14


 


 


 Pantoprazole


  (Protonix)  40 mg  EVERY 12  HOURS


 IVP


   8/26/20 21:00


 9/20/20 20:59  9/3/20 21:14


 


 


 Polyethylene


 Glycol


  (Miralax)  17 gm  BEDTIME


 ORAL


   8/26/20 21:00


 9/20/20 20:59  9/2/20 21:06


 


 


 Trimethoprim/


 Sulfamethoxazole


 10 ml/Dextrose  285 ml @ 


 190 mls/hr  EVERY 12  HOURS


 IV


   8/26/20 21:00


 9/5/20 20:59  9/3/20 21:13


 








Allergies:  


Coded Allergies:  


     AMPICILLIN (Verified  Allergy, Unknown, hives, 8/21/20)


     PENICILLINS (Verified  Allergy, Unknown, hives, 8/21/20)


     TETRACYCLINES (Verified  Allergy, Unknown, hives, 8/21/20)


Subjective


awake, Responsive with open her eyes. WBC: 23.4, renal function stable..





Objective





Last Vital Signs








  Date Time  Temp Pulse Resp B/P (MAP) Pulse Ox O2 Delivery O2 Flow Rate FiO2


 


9/3/20 20:00 97.9 96 20 110/58 (75) 100   


 


9/3/20 20:00      Nasal Cannula 4.0 


 


9/3/20 19:34        36











Laboratory Tests








Test


  9/3/20


03:35


 


White Blood Count


  23.4 K/UL


(4.8-10.8)  *H


 


Red Blood Count


  2.99 M/UL


(4.20-5.40)  L


 


Hemoglobin


  9.7 G/DL


(12.0-16.0)  L


 


Hematocrit


  29.3 %


(37.0-47.0)  L


 


Mean Corpuscular Volume 98 FL (80-99)  


 


Mean Corpuscular Hemoglobin


  32.5 PG


(27.0-31.0)  H


 


Mean Corpuscular Hemoglobin


Concent 33.2 G/DL


(32.0-36.0)


 


Red Cell Distribution Width


  14.5 %


(11.6-14.8)


 


Platelet Count


  156 K/UL


(150-450)


 


Mean Platelet Volume


  7.6 FL


(6.5-10.1)


 


Neutrophils (%) (Auto)


  % (45.0-75.0)


 


 


Lymphocytes (%) (Auto)


  % (20.0-45.0)


 


 


Monocytes (%) (Auto)  % (1.0-10.0)  


 


Eosinophils (%) (Auto)  % (0.0-3.0)  


 


Basophils (%) (Auto)  % (0.0-2.0)  


 


Differential Total Cells


Counted 100  


 


 


Neutrophils % (Manual) 62 % (45-75)  


 


Lymphocytes % (Manual) 21 % (20-45)  


 


Monocytes % (Manual) 16 % (1-10)  H


 


Eosinophils % (Manual) 1 % (0-3)  


 


Basophils % (Manual) 0 % (0-2)  


 


Band Neutrophils 0 % (0-8)  


 


Nucleated Red Blood Cells 3 /100 WBC  


 


Platelet Estimate Adequate  


 


Platelet Morphology Normal  


 


Hypochromasia 2+  


 


Anisocytosis 1+  


 


Sodium Level


  141 MMOL/L


(136-145)


 


Potassium Level


  4.1 MMOL/L


(3.5-5.1)


 


Chloride Level


  106 MMOL/L


()


 


Carbon Dioxide Level


  26 MMOL/L


(21-32)


 


Anion Gap


  9 mmol/L


(5-15)


 


Blood Urea Nitrogen


  42 mg/dL


(7-18)  H


 


Creatinine


  1.8 MG/DL


(0.55-1.30)  H


 


Estimat Glomerular Filtration


Rate 27.7 mL/min


(>60)


 


Glucose Level


  98 MG/DL


()


 


Calcium Level


  8.6 MG/DL


(8.5-10.1)


 


Phosphorus Level


  3.4 MG/DL


(2.5-4.9)


 


Magnesium Level


  2.2 MG/DL


(1.8-2.4)


 


Total Bilirubin


  0.2 MG/DL


(0.2-1.0)


 


Aspartate Amino Transf


(AST/SGOT) 26 U/L (15-37)


 


 


Alanine Aminotransferase


(ALT/SGPT) 29 U/L (12-78)


 


 


Alkaline Phosphatase


  124 U/L


()  H


 


Total Protein


  5.8 G/DL


(6.4-8.2)  L


 


Albumin


  2.3 G/DL


(3.4-5.0)  L


 


Globulin 3.5 g/dL  


 


Albumin/Globulin Ratio


  0.7 (1.0-2.7)


L











Microbiology








 Date/Time


Source Procedure


Growth Status


 


 


 9/1/20 16:05


Nasopharynx SARS-CoV-2 RdRp Gene Assay - Final Complete

















Intake and Output  


 


 9/2/20 9/3/20





 19:00 07:00


 


Intake Total 1827.776 ml 1360 ml


 


Output Total 1600 ml 850 ml


 


Balance 227.776 ml 510 ml


 


  


 


Intake Free Water 200 ml 190 ml


 


IV Total 1242.776 ml 750 ml


 


Tube Feeding 385 ml 420 ml


 


Output Urine Total 1600 ml 850 ml








Objective


General: No acute distress, awake, not responsive, open her eyes.


HEENT: NCAT, sclera anicteric, PERRL, 


Neck: Supple, no significant jugular venous distention, 


Lungs: decreased at the bases, no Wheeze or Rales.


Heart: Regular rate and rhythm, normal S1/S2, no murmurs/


Abdomen: soft, nontender, nondistended. Normoactive bowel sounds, PEG intact.


: Mitchell cath


Extremities: No Cyanosis , clubbing or edema. 


Neuro: limited secondary to  patient status, contracted lower extremities.


Skin: warm, rash at the chest wall.





Assessment/Plan


Assessment/Plan


(1) UTI (urinary tract infection)


(2) Sepsis


(3) Hypernatremia


(4) Severe dehydration


(5) ARF (acute renal failure) on CKD.


(6) Pneumonia


(7) Hyperkalemia


(8) History of COVID-19 infection last month.





Plan:


Continue IV Bactrim #11/14 and PO Minocycline 200mg bid #10/10-14 (high dose) 

MDR ABC PNA based on cultures


DVT prophylaxis: Lovenox injection


CODE STATUS: Full code.


Monitor laboratory as  well as cultures.


Tube feeding at 35 cc/h











Umer Wahl MD Sep 3, 2020 23:01

## 2020-09-04 VITALS — DIASTOLIC BLOOD PRESSURE: 57 MMHG | SYSTOLIC BLOOD PRESSURE: 122 MMHG

## 2020-09-04 VITALS — DIASTOLIC BLOOD PRESSURE: 71 MMHG | SYSTOLIC BLOOD PRESSURE: 118 MMHG

## 2020-09-04 VITALS — SYSTOLIC BLOOD PRESSURE: 122 MMHG | DIASTOLIC BLOOD PRESSURE: 73 MMHG

## 2020-09-04 VITALS — DIASTOLIC BLOOD PRESSURE: 65 MMHG | SYSTOLIC BLOOD PRESSURE: 113 MMHG

## 2020-09-04 VITALS — SYSTOLIC BLOOD PRESSURE: 112 MMHG | DIASTOLIC BLOOD PRESSURE: 67 MMHG

## 2020-09-04 VITALS — SYSTOLIC BLOOD PRESSURE: 115 MMHG | DIASTOLIC BLOOD PRESSURE: 68 MMHG

## 2020-09-04 LAB
ALBUMIN SERPL-MCNC: 2.2 G/DL (ref 3.4–5)
ALBUMIN/GLOB SERPL: 0.6 {RATIO} (ref 1–2.7)
ALP SERPL-CCNC: 134 U/L (ref 46–116)
ALT SERPL-CCNC: 28 U/L (ref 12–78)
AMYLASE SERPL-CCNC: 79 U/L (ref 25–115)
ANION GAP SERPL CALC-SCNC: 8 MMOL/L (ref 5–15)
APPEARANCE UR: CLEAR
APTT PPP: (no result) S
AST SERPL-CCNC: 24 U/L (ref 15–37)
BILIRUB SERPL-MCNC: 0.3 MG/DL (ref 0.2–1)
BUN SERPL-MCNC: 43 MG/DL (ref 7–18)
CALCIUM SERPL-MCNC: 8.8 MG/DL (ref 8.5–10.1)
CHLORIDE SERPL-SCNC: 109 MMOL/L (ref 98–107)
CO2 SERPL-SCNC: 26 MMOL/L (ref 21–32)
CREAT SERPL-MCNC: 1.8 MG/DL (ref 0.55–1.3)
GLOBULIN SER-MCNC: 3.5 G/DL
GLUCOSE UR STRIP-MCNC: NEGATIVE MG/DL
KETONES UR QL STRIP: NEGATIVE
LEUKOCYTE ESTERASE UR QL STRIP: (no result)
NITRITE UR QL STRIP: NEGATIVE
PH UR STRIP: 6 [PH] (ref 4.5–8)
POTASSIUM SERPL-SCNC: 4 MMOL/L (ref 3.5–5.1)
PROT UR QL STRIP: NEGATIVE
SODIUM SERPL-SCNC: 143 MMOL/L (ref 136–145)
SP GR UR STRIP: 1.01 (ref 1–1.03)
UROBILINOGEN UR-MCNC: NORMAL MG/DL (ref 0–1)

## 2020-09-04 RX ADMIN — DOCUSATE SODIUM SCH MG: 50 LIQUID ORAL at 21:54

## 2020-09-04 RX ADMIN — MINOCYCLINE HYDROCHLORIDE SCH MG: 50 CAPSULE ORAL at 21:55

## 2020-09-04 RX ADMIN — PANTOPRAZOLE SODIUM SCH MG: 40 INJECTION, POWDER, FOR SOLUTION INTRAVENOUS at 21:55

## 2020-09-04 RX ADMIN — EPOETIN ALFA-EPBX SCH UNIT: 10000 INJECTION, SOLUTION INTRAVENOUS; SUBCUTANEOUS at 21:54

## 2020-09-04 RX ADMIN — DOCUSATE SODIUM SCH MG: 50 LIQUID ORAL at 05:21

## 2020-09-04 RX ADMIN — ENOXAPARIN SODIUM SCH MG: 40 INJECTION SUBCUTANEOUS at 09:24

## 2020-09-04 RX ADMIN — MINOCYCLINE HYDROCHLORIDE SCH MG: 50 CAPSULE ORAL at 09:23

## 2020-09-04 RX ADMIN — PANTOPRAZOLE SODIUM SCH MG: 40 INJECTION, POWDER, FOR SOLUTION INTRAVENOUS at 09:23

## 2020-09-04 RX ADMIN — POLYETHYLENE GLYCOL 3350 SCH GM: 17 POWDER, FOR SOLUTION ORAL at 21:55

## 2020-09-04 RX ADMIN — SULFAMETHOXAZOLE AND TRIMETHOPRIM SCH MLS/HR: 80; 16 INJECTION, SOLUTION, CONCENTRATE INTRAVENOUS at 22:00

## 2020-09-04 RX ADMIN — DOCUSATE SODIUM SCH MG: 50 LIQUID ORAL at 14:00

## 2020-09-04 RX ADMIN — SULFAMETHOXAZOLE AND TRIMETHOPRIM SCH MLS/HR: 80; 16 INJECTION, SOLUTION, CONCENTRATE INTRAVENOUS at 09:23

## 2020-09-04 NOTE — INFECTIOUS DISEASES PROG NOTE
Assessment/Plan


71yo F with:





Sepsis


Fever; SP


Leukocytosis; worsening (sp steroids)


    -9/3 u/a neg


          Bcx p


JASWINDER to 5.1, improving 


Hypoxic resp failure, sp NRB mask >VM> 4l NC 9/1


VRE UTI


Hx of COVID-19 1 mo ago ( doubt recurrent COVID-19) 


HAP


            9/3 sp cx p


            9/1 rapid COVID PCR neg; 9/3 rapid COVID PCR neg


   8/29 CXR:   Improving left lower lobe infiltrate and left pleural effusion. 

There is a probable small right pleural effusion.


   8/27 cdiff neg


           8/26 CXR: There are increased interstitial congestion and left 

pleural effusion,since prior exam of 8/22/2020


               Bcx NTD


            8/22 sp cx MDR ABC (S bactrim; I Colistin, Polymixin B, Minocycline)


   8/21 BCx NTD


   8/21 UA+, UCx >100k VRE (S Zyvox)


   8/21 CXR: Left retrocardiac mild atelectasis versus infiltrate.


   8/21 COVID rapid neg; 8/22 repeat COVID-19 +








BL arm rash, appears evolving, now small pustules with dry/crusting, querry 

resolving Shingles? Though odd to have on both arms


   8/21 HSV & VZV PCR ordered





Plan:


Continue IV bactrim #12/14 and PO Minocycline 200mg bid #11/14 (high dose) MDR 

ABC PNA based on cultures


   -8/29 SP ZYvox #7





       --8/24 SP Cefepime #4


       --8/23 SP IV Vancomcyin #4





F/u cx


Monitor CBC/BMP


Trend BUE rash


Dc covid isolation


f/u Bcx x2, Sp cx


f/u flow cytometry


f/u repeat cultures


CBC am





Discussed with RN





Subjective


Allergies:  


Coded Allergies:  


     AMPICILLIN (Verified  Allergy, Unknown, hives, 8/21/20)


     PENICILLINS (Verified  Allergy, Unknown, hives, 8/21/20)


     TETRACYCLINES (Verified  Allergy, Unknown, hives, 8/21/20)


afebrile


wbc increased; no CBC today


now cOVID neg x2





Objective





Last 24 Hour Vital Signs








  Date Time  Temp Pulse Resp B/P (MAP) Pulse Ox O2 Delivery O2 Flow Rate FiO2


 


9/4/20 12:00 98.1 92 29 122/73 (89) 100   


 


9/4/20 12:00      Nasal Cannula 4.0 


 


9/4/20 12:00  95      


 


9/4/20 08:00      Nasal Cannula 4.0 


 


9/4/20 08:00 98.2 95 18 115/68 (84) 100   


 


9/4/20 08:00  97      


 


9/4/20 07:00     100 Nasal Cannula 4.0 36


 


9/4/20 04:00 98.1 105 19 118/71 (87) 100   


 


9/4/20 04:00      Nasal Cannula 4.0 


 


9/4/20 04:00  98      


 


9/4/20 00:00  98      


 


9/4/20 00:00      Nasal Cannula 4.0 


 


9/4/20 00:00 98.1 89 20 112/67 (82) 100   


 


9/3/20 20:00 97.9 96 20 110/58 (75) 100   


 


9/3/20 20:00      Nasal Cannula 4.0 


 


9/3/20 19:36  94      


 


9/3/20 19:34     100 Nasal Cannula 4.0 36


 


9/3/20 16:00 98.1 91 21 106/58 (74) 100   


 


9/3/20 16:00      Nasal Cannula 4.0 


 


9/3/20 16:00  90      








Height (Feet):  5


Height (Inches):  2.00


Weight (Pounds):  123


HEENT:  atraumatic


Respiratory/Chest:  other - mild Rsp distress


Abdomen:  no organomegaly





Microbiology








 Date/Time


Source Procedure


Growth Status


 


 


 9/3/20 16:00


Nasopharynx SARS-CoV-2 RdRp Gene Assay - Final Complete


 


 9/1/20 16:05


Nasopharynx SARS-CoV-2 RdRp Gene Assay - Final Complete











Laboratory Tests








Test


  9/4/20


02:30 9/4/20


04:45


 


Urine Color Pale yellow   


 


Urine Appearance Clear   


 


Urine pH 6 (4.5-8.0)   


 


Urine Specific Gravity


  1.010


(1.005-1.035) 


 


 


Urine Protein


  Negative


(NEGATIVE) 


 


 


Urine Glucose (UA)


  Negative


(NEGATIVE) 


 


 


Urine Ketones


  Negative


(NEGATIVE) 


 


 


Urine Blood


  Negative


(NEGATIVE) 


 


 


Urine Nitrite


  Negative


(NEGATIVE) 


 


 


Urine Bilirubin


  Negative


(NEGATIVE) 


 


 


Urine Urobilinogen


  Normal MG/DL


(0.0-1.0) 


 


 


Urine Leukocyte Esterase


  1+ (NEGATIVE)


H 


 


 


Urine RBC


  0-2 /HPF (0 -


2) 


 


 


Urine WBC


  0-2 /HPF (0 -


2) 


 


 


Urine Squamous Epithelial


Cells Few /LPF


(NONE/OCC) 


 


 


Urine Bacteria


  None /HPF


(NONE) 


 


 


Sodium Level


  


  143 MMOL/L


(136-145)


 


Potassium Level


  


  4.0 MMOL/L


(3.5-5.1)


 


Chloride Level


  


  109 MMOL/L


()  H


 


Carbon Dioxide Level


  


  26 MMOL/L


(21-32)


 


Anion Gap


  


  8 mmol/L


(5-15)


 


Blood Urea Nitrogen


  


  43 mg/dL


(7-18)  H


 


Creatinine


  


  1.8 MG/DL


(0.55-1.30)  H


 


Estimat Glomerular Filtration


Rate 


  27.7 mL/min


(>60)


 


Glucose Level


  


  112 MG/DL


()  H


 


Calcium Level


  


  8.8 MG/DL


(8.5-10.1)


 


Total Bilirubin


  


  0.3 MG/DL


(0.2-1.0)


 


Aspartate Amino Transf


(AST/SGOT) 


  24 U/L (15-37)


 


 


Alanine Aminotransferase


(ALT/SGPT) 


  28 U/L (12-78)


 


 


Alkaline Phosphatase


  


  134 U/L


()  H


 


Total Protein


  


  5.7 G/DL


(6.4-8.2)  L


 


Albumin


  


  2.2 G/DL


(3.4-5.0)  L


 


Globulin  3.5 g/dL  


 


Albumin/Globulin Ratio


  


  0.6 (1.0-2.7)


L


 


Amylase Level


  


  79 U/L


()


 


Lipase


  


  254 U/L


()











Current Medications








 Medications


  (Trade)  Dose


 Ordered  Sig/Raji


 Route


 PRN Reason  Start Time


 Stop Time Status Last Admin


Dose Admin


 


 Acetaminophen


  (Tylenol)  650 mg  Q6H  PRN


 GT


 Mild Pain (Pain Scale 1-3)  8/31/20 16:25


 9/30/20 16:24  9/2/20 21:07


 


 


 Ascorbic Acid


  (Vitamin C)  500 mg  DAILY


 GT


   9/4/20 09:00


 10/4/20 08:59  9/4/20 09:23


 


 


 Clonidine HCl


  (Catapres Tab)  0.1 mg  Q4H  PRN


 ORAL


 bp over 160 syst  8/26/20 15:30


 11/22/20 15:29   


 


 


 Dextrose  1,000 ml @ 


 75 mls/hr  [id number]


 IV


   8/26/20 16:00


 9/20/20 15:59  9/4/20 15:38


 


 


 Docusate Sodium


  (Colace)  100 mg  EVERY 8  HOURS


 GT


   8/30/20 14:00


 9/20/20 08:59  9/2/20 15:07


 


 


 Enoxaparin Sodium


  (Lovenox)  40 mg  DAILY


 SUBQ


   9/3/20 09:00


 11/19/20 08:59  9/4/20 09:24


 


 


 Epoetin Jaswant


  (Epoetin


 Jaswant-EPBX(NON


 ESRD))  10,000 unit  MON-WED-FRI


 SUBQ


   8/31/20 21:00


 11/29/20 20:59  9/2/20 21:06


 


 


 Lorazepam


  (Ativan 2mg/ml


 1ml)  1 mg  Q4H  PRN


 IV


 For Anxiety  8/29/20 10:45


 9/5/20 10:44  9/1/20 04:56


 


 


 Minocycline HCl


  (Minocin)  200 mg  Q12HR


 ORAL


   8/26/20 21:00


 9/7/20 23:59  9/4/20 09:23


 


 


 Pantoprazole


  (Protonix)  40 mg  EVERY 12  HOURS


 IVP


   8/26/20 21:00


 9/20/20 20:59  9/4/20 09:23


 


 


 Polyethylene


 Glycol


  (Miralax)  17 gm  BEDTIME


 ORAL


   8/26/20 21:00


 9/20/20 20:59  9/2/20 21:06


 


 


 Trimethoprim/


 Sulfamethoxazole


 10 ml/Dextrose  285 ml @ 


 190 mls/hr  EVERY 12  HOURS


 IV


   8/26/20 21:00


 9/6/20 23:59  9/4/20 09:23


 

















Tanisha Brownlee M.D. Sep 4, 2020 16:00

## 2020-09-04 NOTE — INTERNAL MED PROGRESS NOTE
Subjective


Physician Name


Umer Wahl


Attending Physician


Umer Wahl MD





Current Medications








 Medications


  (Trade)  Dose


 Ordered  Sig/Raji


 Route


 PRN Reason  Start Time


 Stop Time Status Last Admin


Dose Admin


 


 Acetaminophen


  (Tylenol)  650 mg  Q6H  PRN


 GT


 Mild Pain (Pain Scale 1-3)  9/4/20 19:41


 10/4/20 19:40   


 


 


 Ascorbic Acid


  (Vitamin C)  500 mg  DAILY


 GT


   9/5/20 09:00


 10/4/20 08:59   


 


 


 Clonidine HCl


  (Catapres Tab)  0.1 mg  Q4H  PRN


 GT


 bp over 160 syst  9/4/20 19:41


 12/3/20 19:40   


 


 


 Dextrose  1,000 ml @ 


 75 mls/hr  [id number]


 IV


   9/4/20 19:41


 10/4/20 19:40  9/4/20 19:41


 


 


 Docusate Sodium


  (Colace)  100 mg  EVERY 8  HOURS


 GT


   9/4/20 22:00


 9/20/20 08:59  9/4/20 21:54


 


 


 Enoxaparin Sodium


  (Lovenox)  40 mg  DAILY


 SUBQ


   9/5/20 09:00


 11/19/20 08:59   


 


 


 Epoetin Jaswant


  (Epoetin


 Jaswant-EPBX(NON


 ESRD))  10,000 unit  MON-WED-FRI


 SUBQ


   9/4/20 21:00


 11/29/20 20:59  9/4/20 21:54


 


 


 Lorazepam


  (Ativan 2mg/ml


 1ml)  1 mg  Q4H  PRN


 IV


 For Anxiety  9/4/20 19:41


 9/11/20 19:40   


 


 


 Minocycline HCl


  (Minocin)  200 mg  Q12HR


 ORAL


   9/4/20 21:00


 9/7/20 23:59  9/4/20 21:55


 


 


 Pantoprazole


  (Protonix)  40 mg  EVERY 12  HOURS


 IVP


   9/4/20 21:00


 9/20/20 20:59  9/4/20 21:55


 


 


 Polyethylene


 Glycol


  (Miralax)  17 gm  BEDTIME


 GT


   9/4/20 21:00


 9/20/20 20:59  9/4/20 21:55


 


 


 Trimethoprim/


 Sulfamethoxazole


 10 ml/Dextrose  285 ml @ 


 190 mls/hr  EVERY 12  HOURS


 IV


   9/4/20 21:00


 9/6/20 23:59  9/4/20 22:00


 








Allergies:  


Coded Allergies:  


     AMPICILLIN (Verified  Allergy, Unknown, hives, 8/21/20)


     PENICILLINS (Verified  Allergy, Unknown, hives, 8/21/20)


     TETRACYCLINES (Verified  Allergy, Unknown, hives, 8/21/20)


Subjective


awake, Responsive with open her eyes. renal function stable..





Objective





Last Vital Signs








  Date Time  Temp Pulse Resp B/P (MAP) Pulse Ox O2 Delivery O2 Flow Rate FiO2


 


9/4/20 21:03     98 Nasal Cannula 4.0 36


 


9/4/20 16:00 98.1 91 24 113/65 (81)    











Laboratory Tests








Test


  9/4/20


02:30 9/4/20


04:45


 


Urine Color Pale yellow   


 


Urine Appearance Clear   


 


Urine pH 6 (4.5-8.0)   


 


Urine Specific Gravity


  1.010


(1.005-1.035) 


 


 


Urine Protein


  Negative


(NEGATIVE) 


 


 


Urine Glucose (UA)


  Negative


(NEGATIVE) 


 


 


Urine Ketones


  Negative


(NEGATIVE) 


 


 


Urine Blood


  Negative


(NEGATIVE) 


 


 


Urine Nitrite


  Negative


(NEGATIVE) 


 


 


Urine Bilirubin


  Negative


(NEGATIVE) 


 


 


Urine Urobilinogen


  Normal MG/DL


(0.0-1.0) 


 


 


Urine Leukocyte Esterase


  1+ (NEGATIVE)


H 


 


 


Urine RBC


  0-2 /HPF (0 -


2) 


 


 


Urine WBC


  0-2 /HPF (0 -


2) 


 


 


Urine Squamous Epithelial


Cells Few /LPF


(NONE/OCC) 


 


 


Urine Bacteria


  None /HPF


(NONE) 


 


 


Sodium Level


  


  143 MMOL/L


(136-145)


 


Potassium Level


  


  4.0 MMOL/L


(3.5-5.1)


 


Chloride Level


  


  109 MMOL/L


()  H


 


Carbon Dioxide Level


  


  26 MMOL/L


(21-32)


 


Anion Gap


  


  8 mmol/L


(5-15)


 


Blood Urea Nitrogen


  


  43 mg/dL


(7-18)  H


 


Creatinine


  


  1.8 MG/DL


(0.55-1.30)  H


 


Estimat Glomerular Filtration


Rate 


  27.7 mL/min


(>60)


 


Glucose Level


  


  112 MG/DL


()  H


 


Calcium Level


  


  8.8 MG/DL


(8.5-10.1)


 


Total Bilirubin


  


  0.3 MG/DL


(0.2-1.0)


 


Aspartate Amino Transf


(AST/SGOT) 


  24 U/L (15-37)


 


 


Alanine Aminotransferase


(ALT/SGPT) 


  28 U/L (12-78)


 


 


Alkaline Phosphatase


  


  134 U/L


()  H


 


Total Protein


  


  5.7 G/DL


(6.4-8.2)  L


 


Albumin


  


  2.2 G/DL


(3.4-5.0)  L


 


Globulin  3.5 g/dL  


 


Albumin/Globulin Ratio


  


  0.6 (1.0-2.7)


L


 


Amylase Level


  


  79 U/L


()


 


Lipase


  


  254 U/L


()











Microbiology








 Date/Time


Source Procedure


Growth Status


 


 


 9/3/20 16:00


Nasopharynx SARS-CoV-2 RdRp Gene Assay - Final Complete

















Intake and Output  


 


 9/3/20 9/4/20





 19:00 07:00


 


Intake Total 960 ml 1634 ml


 


Output Total 1700 ml 1300 ml


 


Balance -740 ml 334 ml


 


  


 


Intake Free Water 350 ml 30 ml


 


IV Total 225 ml 1184 ml


 


Tube Feeding 385 ml 420 ml


 


Output Urine Total 1700 ml 1300 ml


 


# Bowel Movements 2 








Objective


General: No acute distress, awake, not responsive, open her eyes.


HEENT: NCAT, sclera anicteric, PERRL, 


Neck: Supple, no significant jugular venous distention, 


Lungs: decreased at the bases, no Wheeze or Rales.


Heart: Regular rate and rhythm, normal S1/S2, no murmurs/


Abdomen: soft, nontender, nondistended. Normoactive bowel sounds, PEG intact.


: Mitchell cath


Extremities: No Cyanosis , clubbing or edema. 


Neuro: limited secondary to  patient status, contracted lower extremities.


Skin: warm, rash at the chest wall.





Assessment/Plan


Assessment/Plan


(1) UTI (urinary tract infection)


(2) Sepsis


(3) Hypernatremia


(4) Severe dehydration


(5) ARF (acute renal failure) on CKD.


(6) Pneumonia


(7) Hyperkalemia


(8) History of COVID-19 infection last month.





Plan:


Continue IV bactrim #12/14 and PO Minocycline 200mg bid #11/14 (high dose) MDR 

ABC PNA based on cultures


DVT prophylaxis: Lovenox injection


CODE STATUS: Full code.


Monitor laboratory as  well as cultures.


Tube feeding at 35 cc/h


Transferred to telemetry floor











Umer Wahl MD Sep 4, 2020 22:15

## 2020-09-04 NOTE — PULMONOLGY CRITICAL CARE NOTE
Critical Care - Asmt/Plan


Problems:  


(1) Sepsis


(2) 2019 novel coronavirus disease (COVID-19)


(3) ARF (acute renal failure)


(4) Multiple drug resistant organism (MDRO) culture positive


(5) Psychosis


Respiratory:  monitor respiratory rate, adjust FIO2, CXR


Cardiac:  continue pressors, continue to monitor HR/BP


Renal:  F/U I&O, keep IV fluid, check electrolytes


Infectious Disease:  check cultures


Gastrointestinal:  continue feedings/current rate


Hematologic:  monitor H/H, transfuse if hgb<8.5


Neurologic:  keep patient comfortable


Prophylaxis:  Protonix


Disposition:  keep in ICU


Time Spent (Minutes):  40


Notes Reviewed:  internist, cardio, renal


Discussed with:  nurses, consultants, 





Critical Care - Objective





Last 24 Hour Vital Signs








  Date Time  Temp Pulse Resp B/P (MAP) Pulse Ox O2 Delivery O2 Flow Rate FiO2


 


9/4/20 08:00      Nasal Cannula 4.0 


 


9/4/20 08:00 98.2 95 18 115/68 (84) 100   


 


9/4/20 08:00  97      


 


9/4/20 07:00     100 Nasal Cannula 4.0 36


 


9/4/20 04:00 98.1 105 19 118/71 (87) 100   


 


9/4/20 04:00      Nasal Cannula 4.0 


 


9/4/20 04:00  98      


 


9/4/20 00:00  98      


 


9/4/20 00:00      Nasal Cannula 4.0 


 


9/4/20 00:00 98.1 89 20 112/67 (82) 100   


 


9/3/20 20:00 97.9 96 20 110/58 (75) 100   


 


9/3/20 20:00      Nasal Cannula 4.0 


 


9/3/20 19:36  94      


 


9/3/20 19:34     100 Nasal Cannula 4.0 36


 


9/3/20 16:00 98.1 91 21 106/58 (74) 100   


 


9/3/20 16:00      Nasal Cannula 4.0 


 


9/3/20 16:00  90      


 


9/3/20 12:00 97.9 85 21 109/54 (72) 100   


 


9/3/20 12:00      Nasal Cannula 4.0 


 


9/3/20 11:58  84      








Status:  awake


Condition:  improving


HEENT:  atraumatic


Lungs:  clear


Heart:  HR/BP stable


Abdomen:  soft, non-tender


Extremities:  no C/C/E, edema


Decubiti:  stage


Micro:





Microbiology








 Date/Time


Source Procedure


Growth Status


 


 


 9/3/20 16:00


Nasopharynx SARS-CoV-2 RdRp Gene Assay - Final Complete


 


 9/1/20 16:05


Nasopharynx SARS-CoV-2 RdRp Gene Assay - Final Complete











Critical Care - Subjective


ROS Limited/Unobtainable:  Yes


Condition:  critical


EKG Rhythm:  Sinus Rhythm


FI02:  36


Sputum Amount:  None


Tube Feeding Amount:  35


I&O:











Intake and Output  


 


 9/3/20 9/4/20





 19:00 07:00


 


Intake Total 960 ml 1634 ml


 


Output Total 1700 ml 1300 ml


 


Balance -740 ml 334 ml


 


  


 


Intake Free Water 350 ml 30 ml


 


IV Total 225 ml 1184 ml


 


Tube Feeding 385 ml 420 ml


 


Output Urine Total 1700 ml 1300 ml


 


# Bowel Movements 2 








CXR:


no change


Labs:





Laboratory Tests








Test


  9/4/20


02:30 9/4/20


04:45


 


Urine Color Pale yellow   


 


Urine Appearance Clear   


 


Urine pH 6 (4.5-8.0)   


 


Urine Specific Gravity


  1.010


(1.005-1.035) 


 


 


Urine Protein


  Negative


(NEGATIVE) 


 


 


Urine Glucose (UA)


  Negative


(NEGATIVE) 


 


 


Urine Ketones


  Negative


(NEGATIVE) 


 


 


Urine Blood


  Negative


(NEGATIVE) 


 


 


Urine Nitrite


  Negative


(NEGATIVE) 


 


 


Urine Bilirubin


  Negative


(NEGATIVE) 


 


 


Urine Urobilinogen


  Normal MG/DL


(0.0-1.0) 


 


 


Urine Leukocyte Esterase


  1+ (NEGATIVE)


H 


 


 


Urine RBC


  0-2 /HPF (0 -


2) 


 


 


Urine WBC


  0-2 /HPF (0 -


2) 


 


 


Urine Squamous Epithelial


Cells Few /LPF


(NONE/OCC) 


 


 


Urine Bacteria


  None /HPF


(NONE) 


 


 


Sodium Level


  


  143 MMOL/L


(136-145)


 


Potassium Level


  


  4.0 MMOL/L


(3.5-5.1)


 


Chloride Level


  


  109 MMOL/L


()  H


 


Carbon Dioxide Level


  


  26 MMOL/L


(21-32)


 


Anion Gap


  


  8 mmol/L


(5-15)


 


Blood Urea Nitrogen


  


  43 mg/dL


(7-18)  H


 


Creatinine


  


  1.8 MG/DL


(0.55-1.30)  H


 


Estimat Glomerular Filtration


Rate 


  27.7 mL/min


(>60)


 


Glucose Level


  


  112 MG/DL


()  H


 


Calcium Level


  


  8.8 MG/DL


(8.5-10.1)


 


Total Bilirubin


  


  0.3 MG/DL


(0.2-1.0)


 


Aspartate Amino Transf


(AST/SGOT) 


  24 U/L (15-37)


 


 


Alanine Aminotransferase


(ALT/SGPT) 


  28 U/L (12-78)


 


 


Alkaline Phosphatase


  


  134 U/L


()  H


 


Total Protein


  


  5.7 G/DL


(6.4-8.2)  L


 


Albumin


  


  2.2 G/DL


(3.4-5.0)  L


 


Globulin  3.5 g/dL  


 


Albumin/Globulin Ratio


  


  0.6 (1.0-2.7)


L


 


Amylase Level


  


  79 U/L


()


 


Lipase


  


  254 U/L


()

















Live Brambila MD Sep 4, 2020 11:31

## 2020-09-04 NOTE — NEPHROLOGY PROGRESS NOTE
Assessment/Plan


Problem List:  


(1) ARF (acute renal failure)


Assessment:  Underlying CKD





(2) Sepsis


(3) UTI (urinary tract infection)


(4) Severe dehydration


(5) Hypernatremia


(6) Acute urinary retention


(7) Anemia


Assessment





Acute renal failure


Possible underlying chronic renal insufficiency


Severe dehydration


Hypotension


Sepsis, UTI


History of psychiatric disease


Elevated troponin I


Patient full code


Plan


September 4: Status quo.  Labs reviewed.  Serum creatinine unchanged.  Continue 

per consultants.


September 3:Late [place] entry due [place] [facility] [place] 

reviewed.  Electrolytes within normal limit.  Leukocytosis worsened.  Continue 

per consultants.  Serum creatinine 1.8 unchanged.


September 2: Low magnesium and low phosphorus corrected.  Continue per 

consultants.  On nasal cannula now.  Serum creatinine 1.8.


September 1: Serum creatinine 1.8 stable.  Continue per pulmonary.  Continue to 

monitor renal parameters.


August 31: On Venturi mask.  Serum creatinine lowered to 1.8.  Continue per 

consultants.  Medication list reviewed.  Potassium supplement given.


August 30: Status quo.  Creatinine lower at 2.  Other electrolytes within 

normal limits.  Continue per consultants.


August 29: Labs reviewed.  Creatinine 2.2 stable.  Potassium 3.1.  20 M EQ KCl 

given.  Continue her consultants.


August 28: Serum potassium 5.5.Creatinine 2.2.  1 dose Kayexalate given.  

Remains of 75 cc IV fluid.  We will continue to monitor renal parameters.


August 27: Serum potassium 5.3.  Creatinine higher at 2.2.  Medication list 

reviewed.  Suggest to avoid nephrotoxic's like Bactrim if possible.  Continue 

to monitor renal parameters and electrolytes.  Continue per pulmonary to adjust 

pulmonary status.  May require BiPAP.


August 26: Serum sodium lower.  Renal parameters appear more stable.  

Nevertheless leukocytosis is worsened.  Continue per consultants.


August 25: Serum sodium higher.  Continue D5W 75 cc an hour.  Continue to 

monitor electrolytes.  Continue per consultants.  Serum creatinine down to 2.4 

from 5.1 on admission.  Leukocytosis persists.


August 24: Patient now on isolation for COVID-19.  Serum creatinine is 

plateauing.  Will decrease the IV fluid to 75 cc an hour.  Continue management 

per ID.  Will monitor electrolytes and renal parameters.


August 23: Discussed with MARTIN Carter.  Patient needs a Mitchell catheter.  Accurate 

intake and output.  Decrease  cc D5W an hour.  1 dose of IV Venofer and 

then subcu Epogen ordered for anemia.  Continue to monitor renal parameters


August 22: Renal parameters improving.  Continue D5W IV fluid.  Potassium 

supplement IV given.  Continue per consultants.





Previously:


Fluid challenge


Monitor renal parameters and electrolytes


Monitor intake and output


Mitchell catheter


Antibiotics


Avoid nephrotoxic's


Chest x-ray





Kidney ultrasound findings: 


Right kidney measures 8.9 cm in length. Left kidney measures 9 cm in


length. Both kidneys demonstrate markedly increased echogenicity. No 

hydronephrosis. 


There are bilateral renal cysts. Normal inferior vena cava. Bladder is empty,


contains a Mitchell catheter. 


 


Incidentally noted are gallstones.


 


Impression: Markedly echogenic kidneys, consistent with medical renal disease


Negative for hydronephrosis


Empty bladder with a Mitchell catheter


Incidental finding of cholelithiasis.





Subjective


ROS Limited/Unobtainable:  No


Constitutional:  Reports: malaise





Objective


Objective





Last 24 Hour Vital Signs








  Date Time  Temp Pulse Resp B/P (MAP) Pulse Ox O2 Delivery O2 Flow Rate FiO2


 


9/4/20 08:00      Nasal Cannula 4.0 


 


9/4/20 08:00 98.2 95 18 115/68 (84) 100   


 


9/4/20 08:00  97      


 


9/4/20 07:00     100 Nasal Cannula 4.0 36


 


9/4/20 04:00 98.1 105 19 118/71 (87) 100   


 


9/4/20 04:00      Nasal Cannula 4.0 


 


9/4/20 04:00  98      


 


9/4/20 00:00  98      


 


9/4/20 00:00      Nasal Cannula 4.0 


 


9/4/20 00:00 98.1 89 20 112/67 (82) 100   


 


9/3/20 20:00 97.9 96 20 110/58 (75) 100   


 


9/3/20 20:00      Nasal Cannula 4.0 


 


9/3/20 19:36  94      


 


9/3/20 19:34     100 Nasal Cannula 4.0 36


 


9/3/20 16:00 98.1 91 21 106/58 (74) 100   


 


9/3/20 16:00      Nasal Cannula 4.0 


 


9/3/20 16:00  90      


 


9/3/20 12:00 97.9 85 21 109/54 (72) 100   


 


9/3/20 12:00      Nasal Cannula 4.0 


 


9/3/20 11:58  84      

















Intake and Output  


 


 9/3/20 9/4/20





 19:00 07:00


 


Intake Total 960 ml 1634 ml


 


Output Total 1700 ml 1300 ml


 


Balance -740 ml 334 ml


 


  


 


Intake Free Water 350 ml 30 ml


 


IV Total 225 ml 1184 ml


 


Tube Feeding 385 ml 420 ml


 


Output Urine Total 1700 ml 1300 ml


 


# Bowel Movements 2 








Laboratory Tests


9/4/20 02:30: 


Urine Color Pale yellow, Urine Appearance Clear, Urine pH 6, Urine Specific 

Gravity 1.010, Urine Protein Negative, Urine Glucose (UA) Negative, Urine 

Ketones Negative, Urine Blood Negative, Urine Nitrite Negative, Urine Bilirubin 

Negative, Urine Urobilinogen Normal, Urine Leukocyte Esterase 1+H, Urine RBC 0-2

, Urine WBC 0-2, Urine Squamous Epithelial Cells Few, Urine Bacteria None


9/4/20 04:45: 


Sodium Level 143, Potassium Level 4.0, Chloride Level 109H, Carbon Dioxide 

Level 26, Anion Gap 8, Blood Urea Nitrogen 43H, Creatinine 1.8H, Estimat 

Glomerular Filtration Rate 27.7, Glucose Level 112H, Calcium Level 8.8, Total 

Bilirubin 0.3, Aspartate Amino Transf (AST/SGOT) 24, Alanine Aminotransferase (

ALT/SGPT) 28, Alkaline Phosphatase 134H, Total Protein 5.7L, Albumin 2.2L, 

Globulin 3.5, Albumin/Globulin Ratio 0.6L, Amylase Level 79, Lipase 254


Height (Feet):  5


Height (Inches):  2.00


Weight (Pounds):  123


General Appearance:  no apparent distress


EENT:  other - On nasal cannula


Neck:  limited range of motion


Cardiovascular:  tachycardia


Respiratory/Chest:  decreased breath sounds


Abdomen:  distended











Derik Dos Santos MD Sep 4, 2020 11:48

## 2020-09-04 NOTE — SURGERY PROGRESS NOTE
Surgery Progress Note


Subjective


Additional Comments


worsening leukocytosis


no n/v


labs reviewed


exam unchanged





Objective





Last 24 Hour Vital Signs








  Date Time  Temp Pulse Resp B/P (MAP) Pulse Ox O2 Delivery O2 Flow Rate FiO2


 


9/4/20 08:00      Nasal Cannula 4.0 


 


9/4/20 08:00 98.2 95 18 115/68 (84) 100   


 


9/4/20 08:00  97      


 


9/4/20 07:00     100 Nasal Cannula 4.0 36


 


9/4/20 04:00 98.1 105 19 118/71 (87) 100   


 


9/4/20 04:00      Nasal Cannula 4.0 


 


9/4/20 04:00  98      


 


9/4/20 00:00  98      


 


9/4/20 00:00      Nasal Cannula 4.0 


 


9/4/20 00:00 98.1 89 20 112/67 (82) 100   


 


9/3/20 20:00 97.9 96 20 110/58 (75) 100   


 


9/3/20 20:00      Nasal Cannula 4.0 


 


9/3/20 19:36  94      


 


9/3/20 19:34     100 Nasal Cannula 4.0 36


 


9/3/20 16:00 98.1 91 21 106/58 (74) 100   


 


9/3/20 16:00      Nasal Cannula 4.0 


 


9/3/20 16:00  90      








I&O











Intake and Output  


 


 9/3/20 9/4/20





 19:00 07:00


 


Intake Total 960 ml 1634 ml


 


Output Total 1700 ml 1300 ml


 


Balance -740 ml 334 ml


 


  


 


Intake Free Water 350 ml 30 ml


 


IV Total 225 ml 1184 ml


 


Tube Feeding 385 ml 420 ml


 


Output Urine Total 1700 ml 1300 ml


 


# Bowel Movements 2 








Cardiovascular:  RSR


Respiratory:  decreased breath sounds


Abdomen:  soft, non-tender, present bowel sounds


Extremities:  no cyanosis





Laboratory Tests








Test


  9/4/20


02:30 9/4/20


04:45


 


Urine Color Pale yellow   


 


Urine Appearance Clear   


 


Urine pH 6 (4.5-8.0)   


 


Urine Specific Gravity


  1.010


(1.005-1.035) 


 


 


Urine Protein


  Negative


(NEGATIVE) 


 


 


Urine Glucose (UA)


  Negative


(NEGATIVE) 


 


 


Urine Ketones


  Negative


(NEGATIVE) 


 


 


Urine Blood


  Negative


(NEGATIVE) 


 


 


Urine Nitrite


  Negative


(NEGATIVE) 


 


 


Urine Bilirubin


  Negative


(NEGATIVE) 


 


 


Urine Urobilinogen


  Normal MG/DL


(0.0-1.0) 


 


 


Urine Leukocyte Esterase


  1+ (NEGATIVE)


H 


 


 


Urine RBC


  0-2 /HPF (0 -


2) 


 


 


Urine WBC


  0-2 /HPF (0 -


2) 


 


 


Urine Squamous Epithelial


Cells Few /LPF


(NONE/OCC) 


 


 


Urine Bacteria


  None /HPF


(NONE) 


 


 


Sodium Level


  


  143 MMOL/L


(136-145)


 


Potassium Level


  


  4.0 MMOL/L


(3.5-5.1)


 


Chloride Level


  


  109 MMOL/L


()  H


 


Carbon Dioxide Level


  


  26 MMOL/L


(21-32)


 


Anion Gap


  


  8 mmol/L


(5-15)


 


Blood Urea Nitrogen


  


  43 mg/dL


(7-18)  H


 


Creatinine


  


  1.8 MG/DL


(0.55-1.30)  H


 


Estimat Glomerular Filtration


Rate 


  27.7 mL/min


(>60)


 


Glucose Level


  


  112 MG/DL


()  H


 


Calcium Level


  


  8.8 MG/DL


(8.5-10.1)


 


Total Bilirubin


  


  0.3 MG/DL


(0.2-1.0)


 


Aspartate Amino Transf


(AST/SGOT) 


  24 U/L (15-37)


 


 


Alanine Aminotransferase


(ALT/SGPT) 


  28 U/L (12-78)


 


 


Alkaline Phosphatase


  


  134 U/L


()  H


 


Total Protein


  


  5.7 G/DL


(6.4-8.2)  L


 


Albumin


  


  2.2 G/DL


(3.4-5.0)  L


 


Globulin  3.5 g/dL  


 


Albumin/Globulin Ratio


  


  0.6 (1.0-2.7)


L


 


Amylase Level


  


  79 U/L


()


 


Lipase


  


  254 U/L


()











Plan


Problems:  


(1) Anemia


(2) 2019 novel coronavirus disease (COVID-19)


(3) Pneumonia


(4) Multiple drug resistant organism (MDRO) culture positive


(5) Hyperkalemia


(6) Hypernatremia


(7) Severe dehydration


(8) Acute urinary retention


(9) Sepsis


Assessment & Plan:  Pt presented on admission with multiple Pressure injuries. 

Pt noted to have band of  Blotchy red rash  with open and closed Blisters 

affecting  R Brachial to R forearm. Lateral R back /R flank is also blotchy 

erythematous with clusters of serous filled blisters. On Lateral L Back to L 

flank is Blotchy, erythematous with clusters of serous filled blisters.  


Reabsorbing DTPI that is partially opened Sacrum(L) 6cm x (W)5.5cm.  Base of 

wound is Maroon with with scattered areas that are maryellen and loose peeling soft 

black cap.Surrounding Non-Blanching erythema without induration.


Non-Blanching erythema that is indurated with delineated margins R trochanter(L)

5.5cm x (W)6.5cm.


No evidence of skin breakdown L trochanter.


Non-Blanching erythema noted to R and L ischial tuberosities.


DTPI medial R heel (L)3.3cm x (W)3.4cm. Base of injury is, fluctuant, maroon 

with delineated margins. surrounding red,  boggy heel.


DTPI Lateral R Heel(L)1.5cm x (W)2.5cm. Base of injury is indurated, maroon 

with purpuric center.


Non-Blanching erythema without induration /fluctuance lateral R malleolus(L)2cm 

x (W)1.5cm.





Tx.Plan:


Apply Moisture Barrier Paste to Sacrum. Cover with Optifoam drsg. Change every 

3 days and prn.


           


Apply Cavilon Skin Barrier to R and L trochanter. Cover each site with Optifoam 

drsgs. Change every 7 days and prn.


           


Apply Moisture Barrier Paste to R and L Ischial tuberosities with each 

Incontinence care.


           


Apply Cavilon Skin Barrier to Medial and Lateral R Heel. Cover with Optifoam 

drsg. Change every7 days and prn.


           


Apply Cavilon Skin Barrier to R lateral ,and medial Malleoli. Cover each site 

with Optifoam drsgs. Change every 7 days and prn.


           


Apply Cavilon Skin Barrier to L Heel and Malleoli. Cover each site with 

Optifoam drsgs. Change every 7 days and prn.


           


Cover open blisters as needed with Optifoam drsgs. 


           


Reposition at least every 2 hours or as tolerated.


           


Off-load heels with pillow.


           


Nutritional optimization





DAILY ESTIMATED NEEDS:


Needs based on Sepsis, renal, wound 50.9kg  


25-35  kcals/kg 


1460-6386  total kcals


1.25-1.5  g protein/kg


64-76  g total protein 


25-30  mL/kg


1793-7570  total fluid mLs





NUTRITION DIAGNOSIS:


1. Swallowing difficulty r/t dysphagia as evidenced by pt is GT dep.


2. Altered nutrition related lab values r/t sepsis and severe dehydration,


pre-diabetes as evidenced by Elev WBC (31.6 ->18.2), elev Na (179-> WNL),


elev BUN (131->48), elev Creat (5.1->2.2), elev K (5.5-> wnl->5.5, 5.3),


elev BGs (93, 113, 185), A1C of 6.4.





CURRENT TF: Nepro @35  








ENTERAL NUTRITION RECOMMENDATIONS:


LOWER GOAL RATE -> NEPRO @ 35ML/HR X 24 HRS  to provide 840ml, 1512kcal, 68g 

prot, 610ml free water 





- LOWER GOAL RATE: meets 100% est kcal/prot needs


- Flush per MD, HOB over 30 degrees.








ADDITIONAL RECOMMENDATIONS:


1) Monitor lytes and renal status-> TF change to Nepro (8/28), elev K 


2) Per [facility] wt of 112# 


3) NISS for BG control: A1C 6.4, TF at goal + added D5+ Decadron 


4) Wound healing: TF @ goal provides 100% RDI 


    add Vit C 500mg QD + José Manuel BID via GT  





micro reviewed


covid negative


c diff negative


bc ngtd 


wounds do not seem to be etiology of infection 





(10) UTI (urinary tract infection)


(11) ARF (acute renal failure)


(12) Psychosis











Alex Nicholas Sep 4, 2020 12:41

## 2020-09-05 VITALS — SYSTOLIC BLOOD PRESSURE: 110 MMHG | DIASTOLIC BLOOD PRESSURE: 61 MMHG

## 2020-09-05 VITALS — DIASTOLIC BLOOD PRESSURE: 87 MMHG | SYSTOLIC BLOOD PRESSURE: 126 MMHG

## 2020-09-05 VITALS — DIASTOLIC BLOOD PRESSURE: 61 MMHG | SYSTOLIC BLOOD PRESSURE: 114 MMHG

## 2020-09-05 VITALS — SYSTOLIC BLOOD PRESSURE: 108 MMHG | DIASTOLIC BLOOD PRESSURE: 55 MMHG

## 2020-09-05 VITALS — DIASTOLIC BLOOD PRESSURE: 71 MMHG | SYSTOLIC BLOOD PRESSURE: 120 MMHG

## 2020-09-05 VITALS — SYSTOLIC BLOOD PRESSURE: 122 MMHG | DIASTOLIC BLOOD PRESSURE: 61 MMHG

## 2020-09-05 LAB
ADD MANUAL DIFF: NO
ALBUMIN SERPL-MCNC: 2.2 G/DL (ref 3.4–5)
ALBUMIN/GLOB SERPL: 0.6 {RATIO} (ref 1–2.7)
ALP SERPL-CCNC: 116 U/L (ref 46–116)
ALT SERPL-CCNC: 34 U/L (ref 12–78)
ANION GAP SERPL CALC-SCNC: 6 MMOL/L (ref 5–15)
AST SERPL-CCNC: 26 U/L (ref 15–37)
BASOPHILS NFR BLD AUTO: 1.1 % (ref 0–2)
BILIRUB SERPL-MCNC: 0.4 MG/DL (ref 0.2–1)
BUN SERPL-MCNC: 38 MG/DL (ref 7–18)
CALCIUM SERPL-MCNC: 8.9 MG/DL (ref 8.5–10.1)
CHLORIDE SERPL-SCNC: 106 MMOL/L (ref 98–107)
CO2 SERPL-SCNC: 28 MMOL/L (ref 21–32)
CREAT SERPL-MCNC: 1.7 MG/DL (ref 0.55–1.3)
EOSINOPHIL NFR BLD AUTO: 1.3 % (ref 0–3)
ERYTHROCYTE [DISTWIDTH] IN BLOOD BY AUTOMATED COUNT: 17.9 % (ref 11.6–14.8)
GLOBULIN SER-MCNC: 3.5 G/DL
HCT VFR BLD CALC: 30.6 % (ref 37–47)
HGB BLD-MCNC: 9.9 G/DL (ref 12–16)
LYMPHOCYTES NFR BLD AUTO: 18.3 % (ref 20–45)
MCV RBC AUTO: 101 FL (ref 80–99)
MONOCYTES NFR BLD AUTO: 5.9 % (ref 1–10)
NEUTROPHILS NFR BLD AUTO: 73.4 % (ref 45–75)
PHOSPHATE SERPL-MCNC: 3.9 MG/DL (ref 2.5–4.9)
PLATELET # BLD: 131 K/UL (ref 150–450)
POTASSIUM SERPL-SCNC: 4.2 MMOL/L (ref 3.5–5.1)
RBC # BLD AUTO: 3.03 M/UL (ref 4.2–5.4)
SODIUM SERPL-SCNC: 140 MMOL/L (ref 136–145)
WBC # BLD AUTO: 17.8 K/UL (ref 4.8–10.8)

## 2020-09-05 RX ADMIN — PANTOPRAZOLE SODIUM SCH MG: 40 INJECTION, POWDER, FOR SOLUTION INTRAVENOUS at 22:02

## 2020-09-05 RX ADMIN — MINOCYCLINE HYDROCHLORIDE SCH MG: 50 CAPSULE ORAL at 22:03

## 2020-09-05 RX ADMIN — ENOXAPARIN SODIUM SCH MG: 40 INJECTION SUBCUTANEOUS at 10:10

## 2020-09-05 RX ADMIN — DOCUSATE SODIUM SCH MG: 50 LIQUID ORAL at 14:00

## 2020-09-05 RX ADMIN — POLYETHYLENE GLYCOL 3350 SCH GM: 17 POWDER, FOR SOLUTION ORAL at 22:02

## 2020-09-05 RX ADMIN — PANTOPRAZOLE SODIUM SCH MG: 40 INJECTION, POWDER, FOR SOLUTION INTRAVENOUS at 10:12

## 2020-09-05 RX ADMIN — Medication SCH MG: at 10:12

## 2020-09-05 RX ADMIN — DOCUSATE SODIUM SCH MG: 50 LIQUID ORAL at 22:03

## 2020-09-05 RX ADMIN — DOCUSATE SODIUM SCH MG: 50 LIQUID ORAL at 05:18

## 2020-09-05 RX ADMIN — MINOCYCLINE HYDROCHLORIDE SCH MG: 50 CAPSULE ORAL at 10:12

## 2020-09-05 RX ADMIN — SULFAMETHOXAZOLE AND TRIMETHOPRIM SCH MLS/HR: 80; 16 INJECTION, SOLUTION, CONCENTRATE INTRAVENOUS at 10:18

## 2020-09-05 RX ADMIN — SULFAMETHOXAZOLE AND TRIMETHOPRIM SCH MLS/HR: 80; 16 INJECTION, SOLUTION, CONCENTRATE INTRAVENOUS at 21:00

## 2020-09-05 NOTE — SURGERY PROGRESS NOTE
Surgery Progress Note


Subjective


Additional Comments


tolerating tube feeds


comfortable


no n/v





Objective





Last 24 Hour Vital Signs








  Date Time  Temp Pulse Resp B/P (MAP) Pulse Ox O2 Delivery O2 Flow Rate FiO2


 


9/5/20 04:00      Nasal Cannula 4.0 


 


9/5/20 04:00 98.1 100 20 108/55 (72) 100   


 


9/5/20 03:47  98      


 


9/5/20 00:00      Nasal Cannula 4.0 


 


9/5/20 00:00 98.4 93 20 126/87 (100) 100   


 


9/4/20 23:58  95      


 


9/4/20 21:03     98 Nasal Cannula 4.0 36


 


9/4/20 20:00 99.0 102 22 122/57 (78) 100   


 


9/4/20 20:00      Nasal Cannula 4.0 


 


9/4/20 16:00 98.1 91 24 113/65 (81) 100   


 


9/4/20 16:00      Nasal Cannula 4.0 


 


9/4/20 16:00  98      


 


9/4/20 12:00 98.1 92 29 122/73 (89) 100   


 


9/4/20 12:00      Nasal Cannula 4.0 


 


9/4/20 12:00  95      








I&O











Intake and Output  


 


 9/4/20 9/5/20





 19:00 07:00


 


Intake Total 895 ml 1438.75 ml


 


Output Total 650 ml 350 ml


 


Balance 245 ml 1088.75 ml


 


  


 


Intake Free Water 250 ml 50 ml


 


IV Total 225 ml 1003.75 ml


 


Tube Feeding 420 ml 385 ml


 


Output Urine Total 650 ml 350 ml








Dressing:  other


Wound:  other


Cardiovascular:  RSR


Respiratory:  decreased breath sounds


Abdomen:  soft, non-tender, present bowel sounds


Extremities:  no tenderness, no cyanosis





Laboratory Tests








Test


  9/5/20


09:00


 


White Blood Count


  17.8 K/UL


(4.8-10.8)  H


 


Red Blood Count


  3.03 M/UL


(4.20-5.40)  L


 


Hemoglobin


  9.9 G/DL


(12.0-16.0)  L


 


Hematocrit


  30.6 %


(37.0-47.0)  L


 


Mean Corpuscular Volume


  101 FL (80-99)


H


 


Mean Corpuscular Hemoglobin


  32.5 PG


(27.0-31.0)  H


 


Mean Corpuscular Hemoglobin


Concent 32.3 G/DL


(32.0-36.0)


 


Red Cell Distribution Width


  17.9 %


(11.6-14.8)  H


 


Platelet Count


  131 K/UL


(150-450)  L


 


Mean Platelet Volume


  7.3 FL


(6.5-10.1)


 


Neutrophils (%) (Auto)


  73.4 %


(45.0-75.0)


 


Lymphocytes (%) (Auto)


  18.3 %


(20.0-45.0)  L


 


Monocytes (%) (Auto)


  5.9 %


(1.0-10.0)


 


Eosinophils (%) (Auto)


  1.3 %


(0.0-3.0)


 


Basophils (%) (Auto)


  1.1 %


(0.0-2.0)


 


Erythrocyte Sedimentation Rate Pending  


 


Sodium Level


  140 MMOL/L


(136-145)


 


Potassium Level


  4.2 MMOL/L


(3.5-5.1)


 


Chloride Level


  106 MMOL/L


()


 


Carbon Dioxide Level


  28 MMOL/L


(21-32)


 


Anion Gap


  6 mmol/L


(5-15)


 


Blood Urea Nitrogen


  38 mg/dL


(7-18)  H


 


Creatinine


  1.7 MG/DL


(0.55-1.30)  H


 


Estimat Glomerular Filtration


Rate 29.5 mL/min


(>60)


 


Glucose Level


  113 MG/DL


()  H


 


Calcium Level


  8.9 MG/DL


(8.5-10.1)


 


Phosphorus Level


  3.9 MG/DL


(2.5-4.9)


 


Magnesium Level


  2.0 MG/DL


(1.8-2.4)


 


Total Bilirubin


  0.4 MG/DL


(0.2-1.0)


 


Aspartate Amino Transf


(AST/SGOT) 26 U/L (15-37)


 


 


Alanine Aminotransferase


(ALT/SGPT) 34 U/L (12-78)


 


 


Alkaline Phosphatase


  116 U/L


()


 


C-Reactive Protein,


Quantitative 3.2 mg/dL


(0.00-0.90)  H


 


Total Protein


  5.7 G/DL


(6.4-8.2)  L


 


Albumin


  2.2 G/DL


(3.4-5.0)  L


 


Globulin 3.5 g/dL  


 


Albumin/Globulin Ratio


  0.6 (1.0-2.7)


L











Plan


Problems:  


(1) Anemia


(2) 2019 novel coronavirus disease (COVID-19)


(3) Pneumonia


(4) Multiple drug resistant organism (MDRO) culture positive


(5) Hyperkalemia


(6) Hypernatremia


(7) Severe dehydration


(8) Acute urinary retention


(9) Sepsis


Assessment & Plan:  Pt presented on admission with multiple Pressure injuries. 

Pt noted to have band of  Blotchy red rash  with open and closed Blisters 

affecting  R Brachial to R forearm. Lateral R back /R flank is also blotchy 

erythematous with clusters of serous filled blisters. On Lateral L Back to L 

flank is Blotchy, erythematous with clusters of serous filled blisters.  


Reabsorbing DTPI that is partially opened Sacrum(L) 6cm x (W)5.5cm.  Base of 

wound is Maroon with with scattered areas that are maryellen and loose peeling soft 

black cap.Surrounding Non-Blanching erythema without induration.


Non-Blanching erythema that is indurated with delineated margins R trochanter(L)

5.5cm x (W)6.5cm.


No evidence of skin breakdown L trochanter.


Non-Blanching erythema noted to R and L ischial tuberosities.


DTPI medial R heel (L)3.3cm x (W)3.4cm. Base of injury is, fluctuant, maroon 

with delineated margins. surrounding red,  boggy heel.


DTPI Lateral R Heel(L)1.5cm x (W)2.5cm. Base of injury is indurated, maroon 

with purpuric center.


Non-Blanching erythema without induration /fluctuance lateral R malleolus(L)2cm 

x (W)1.5cm.





Tx.Plan:


Apply Moisture Barrier Paste to Sacrum. Cover with Optifoam drsg. Change every 

3 days and prn.


           


Apply Cavilon Skin Barrier to R and L trochanter. Cover each site with Optifoam 

drsgs. Change every 7 days and prn.


           


Apply Moisture Barrier Paste to R and L Ischial tuberosities with each 

Incontinence care.


           


Apply Cavilon Skin Barrier to Medial and Lateral R Heel. Cover with Optifoam 

drsg. Change every7 days and prn.


           


Apply Cavilon Skin Barrier to R lateral ,and medial Malleoli. Cover each site 

with Optifoam drsgs. Change every 7 days and prn.


           


Apply Cavilon Skin Barrier to L Heel and Malleoli. Cover each site with 

Optifoam drsgs. Change every 7 days and prn.


           


Cover open blisters as needed with Optifoam drsgs. 


           


Reposition at least every 2 hours or as tolerated.


           


Off-load heels with pillow.


           


Nutritional optimization





DAILY ESTIMATED NEEDS:


Needs based on Sepsis, renal, wound 50.9kg  


25-35  kcals/kg 


9111-5394  total kcals


1.25-1.5  g protein/kg


64-76  g total protein 


25-30  mL/kg


3083-2266  total fluid mLs





NUTRITION DIAGNOSIS:


1. Swallowing difficulty r/t dysphagia as evidenced by pt is GT dep.


2. Altered nutrition related lab values r/t sepsis and severe dehydration,


pre-diabetes as evidenced by Elev WBC (31.6 ->18.2), elev Na (179-> WNL),


elev BUN (131->48), elev Creat (5.1->2.2), elev K (5.5-> wnl->5.5, 5.3),


elev BGs (93, 113, 185), A1C of 6.4.





CURRENT TF: Nepro @35  








ENTERAL NUTRITION RECOMMENDATIONS:


LOWER GOAL RATE -> NEPRO @ 35ML/HR X 24 HRS  to provide 840ml, 1512kcal, 68g 

prot, 610ml free water 





- LOWER GOAL RATE: meets 100% est kcal/prot needs


- Flush per DIAN ROSARIO over 30 degrees.








ADDITIONAL RECOMMENDATIONS:


1) Monitor lytes and renal status-> TF change to Nepro (8/28), elev K 


2) Per SNF wt of 112# 


3) NISS for BG control: A1C 6.4, TF at goal + added D5+ Decadron 


4) Wound healing: TF @ goal provides 100% RDI 


    add Vit C 500mg QD + José Manuel BID via GT  





micro reviewed


covid negative


c diff negative


bc ngtd 


wounds do not seem to be etiology of infection 





(10) UTI (urinary tract infection)


(11) ARF (acute renal failure)


(12) Psychosis











Alex Nicholas Sep 5, 2020 10:33

## 2020-09-05 NOTE — INTERNAL MED PROGRESS NOTE
Subjective


Physician Name


Umer Wahl


Attending Physician


Umer Wahl MD





Current Medications








 Medications


  (Trade)  Dose


 Ordered  Sig/Raji


 Route


 PRN Reason  Start Time


 Stop Time Status Last Admin


Dose Admin


 


 Acetaminophen


  (Tylenol)  650 mg  Q6H  PRN


 GT


 Mild Pain (Pain Scale 1-3)  9/4/20 19:41


 10/4/20 19:40   


 


 


 Ascorbic Acid


  (Vitamin C)  500 mg  DAILY


 GT


   9/5/20 09:00


 10/4/20 08:59  9/5/20 10:12


 


 


 Clonidine HCl


  (Catapres Tab)  0.1 mg  Q4H  PRN


 GT


 bp over 160 syst  9/4/20 19:41


 12/3/20 19:40   


 


 


 Dextrose  1,000 ml @ 


 75 mls/hr  [id number]


 IV


   9/4/20 19:41


 10/4/20 19:40  9/5/20 09:01


 


 


 Docusate Sodium


  (Colace)  100 mg  EVERY 8  HOURS


 GT


   9/4/20 22:00


 9/20/20 08:59  9/5/20 05:18


 


 


 Enoxaparin Sodium


  (Lovenox)  40 mg  DAILY


 SUBQ


   9/5/20 09:00


 11/19/20 08:59  9/5/20 10:10


 


 


 Epoetin Jaswant


  (Epoetin


 Jaswant-EPBX(NON


 ESRD))  10,000 unit  MON-WED-FRI


 SUBQ


   9/4/20 21:00


 11/29/20 20:59  9/4/20 21:54


 


 


 Lorazepam


  (Ativan 2mg/ml


 1ml)  1 mg  Q4H  PRN


 IV


 For Anxiety  9/4/20 19:41


 9/11/20 19:40   


 


 


 Minocycline HCl


  (Minocin)  200 mg  Q12HR


 ORAL


   9/4/20 21:00


 9/7/20 23:59  9/5/20 10:12


 


 


 Pantoprazole


  (Protonix)  40 mg  EVERY 12  HOURS


 IVP


   9/4/20 21:00


 9/20/20 20:59  9/5/20 10:12


 


 


 Polyethylene


 Glycol


  (Miralax)  17 gm  BEDTIME


 GT


   9/4/20 21:00


 9/20/20 20:59  9/4/20 21:55


 


 


 Trimethoprim/


 Sulfamethoxazole


 10 ml/Dextrose  285 ml @ 


 190 mls/hr  EVERY 12  HOURS


 IV


   9/4/20 21:00


 9/6/20 23:59  9/5/20 10:18


 








Allergies:  


Coded Allergies:  


     AMPICILLIN (Verified  Allergy, Unknown, hives, 8/21/20)


     PENICILLINS (Verified  Allergy, Unknown, hives, 8/21/20)


     TETRACYCLINES (Verified  Allergy, Unknown, hives, 8/21/20)


Subjective


awake,More Responsive, open her eyes. Talking more, renal function stable, WBC: 

17.8.





Objective





Last Vital Signs








  Date Time  Temp Pulse Resp B/P (MAP) Pulse Ox O2 Delivery O2 Flow Rate FiO2


 


9/5/20 04:00      Nasal Cannula 4.0 


 


9/5/20 04:00 98.1 100 20 108/55 (72) 100   


 


9/4/20 21:03        36











Laboratory Tests








Test


  9/5/20


09:00


 


White Blood Count


  17.8 K/UL


(4.8-10.8)  H


 


Red Blood Count


  3.03 M/UL


(4.20-5.40)  L


 


Hemoglobin


  9.9 G/DL


(12.0-16.0)  L


 


Hematocrit


  30.6 %


(37.0-47.0)  L


 


Mean Corpuscular Volume


  101 FL (80-99)


H


 


Mean Corpuscular Hemoglobin


  32.5 PG


(27.0-31.0)  H


 


Mean Corpuscular Hemoglobin


Concent 32.3 G/DL


(32.0-36.0)


 


Red Cell Distribution Width


  17.9 %


(11.6-14.8)  H


 


Platelet Count


  131 K/UL


(150-450)  L


 


Mean Platelet Volume


  7.3 FL


(6.5-10.1)


 


Neutrophils (%) (Auto)


  73.4 %


(45.0-75.0)


 


Lymphocytes (%) (Auto)


  18.3 %


(20.0-45.0)  L


 


Monocytes (%) (Auto)


  5.9 %


(1.0-10.0)


 


Eosinophils (%) (Auto)


  1.3 %


(0.0-3.0)


 


Basophils (%) (Auto)


  1.1 %


(0.0-2.0)


 


Erythrocyte Sedimentation Rate


  48 MM/HR


(0-30)  H


 


Sodium Level


  140 MMOL/L


(136-145)


 


Potassium Level


  4.2 MMOL/L


(3.5-5.1)


 


Chloride Level


  106 MMOL/L


()


 


Carbon Dioxide Level


  28 MMOL/L


(21-32)


 


Anion Gap


  6 mmol/L


(5-15)


 


Blood Urea Nitrogen


  38 mg/dL


(7-18)  H


 


Creatinine


  1.7 MG/DL


(0.55-1.30)  H


 


Estimat Glomerular Filtration


Rate 29.5 mL/min


(>60)


 


Glucose Level


  113 MG/DL


()  H


 


Calcium Level


  8.9 MG/DL


(8.5-10.1)


 


Phosphorus Level


  3.9 MG/DL


(2.5-4.9)


 


Magnesium Level


  2.0 MG/DL


(1.8-2.4)


 


Total Bilirubin


  0.4 MG/DL


(0.2-1.0)


 


Aspartate Amino Transf


(AST/SGOT) 26 U/L (15-37)


 


 


Alanine Aminotransferase


(ALT/SGPT) 34 U/L (12-78)


 


 


Alkaline Phosphatase


  116 U/L


()


 


C-Reactive Protein,


Quantitative 3.2 mg/dL


(0.00-0.90)  H


 


Total Protein


  5.7 G/DL


(6.4-8.2)  L


 


Albumin


  2.2 G/DL


(3.4-5.0)  L


 


Globulin 3.5 g/dL  


 


Albumin/Globulin Ratio


  0.6 (1.0-2.7)


L











Microbiology








 Date/Time


Source Procedure


Growth Status


 


 


 9/4/20 04:50


Blood Blood Culture - Preliminary


NO GROWTH AFTER 24 HOURS Resulted


 


 9/4/20 04:45


Blood Blood Culture - Preliminary


NO GROWTH AFTER 24 HOURS Resulted


 


 9/3/20 16:00


Nasopharynx SARS-CoV-2 RdRp Gene Assay - Final Complete

















Intake and Output  


 


 9/4/20 9/5/20





 19:00 07:00


 


Intake Total 895 ml 1438.75 ml


 


Output Total 650 ml 350 ml


 


Balance 245 ml 1088.75 ml


 


  


 


Intake Free Water 250 ml 50 ml


 


IV Total 225 ml 1003.75 ml


 


Tube Feeding 420 ml 385 ml


 


Output Urine Total 650 ml 350 ml








Objective


General: No acute distress, awake, more responsive, open her eyes, talking more.


HEENT: NCAT, sclera anicteric, PERRL, EOMI, 


Neck: Supple, no significant jugular venous distention, 


Lungs: Decreased at the bases, no Wheeze or Rales.


Heart: Regular rate and rhythm, normal S1/S2, no murmurs/


Abdomen: soft, nontender, nondistended. Normoactive bowel sounds, PEG intact.


: Mitchell cath


Extremities: No Cyanosis , clubbing or edema. 


Neuro: limited secondary to  patient status, contracted lower extremities.


Skin: warm, rash at the chest wall.





Assessment/Plan


Assessment/Plan


(1) UTI (urinary tract infection)


(2) Sepsis


(3) Hypernatremia


(4) Severe dehydration


(5) ARF (acute renal failure) on CKD.


(6) Pneumonia


(7) Hyperkalemia


(8) History of COVID-19 infection last month.





Plan:


Continue IV Bactrim #13/14 and PO Minocycline 200mg bid #12/14 (high dose) MDR 

ABC PNA based on cultures


DVT prophylaxis: Lovenox injection


CODE STATUS: Full code.


Monitor laboratory as  well as cultures.


Tube feeding at 35 cc/h


In telemetry floor


COVID-19 tests negative on (9/1) & (9/3/)


consider DC Isolation.











Umer Wahl MD Sep 5, 2020 13:24

## 2020-09-05 NOTE — PULMONOLOGY PROGRESS NOTE
Subjective


ROS Limited/Unobtainable:  No


Constitutional:  Reports: no symptoms


HEENT:  Repors: no symptoms


Respiratory:  Reports: no symptoms


Allergies:  


Coded Allergies:  


     AMPICILLIN (Verified  Allergy, Unknown, hives, 8/21/20)


     PENICILLINS (Verified  Allergy, Unknown, hives, 8/21/20)


     TETRACYCLINES (Verified  Allergy, Unknown, hives, 8/21/20)





Objective





Last 24 Hour Vital Signs








  Date Time  Temp Pulse Resp B/P (MAP) Pulse Ox O2 Delivery O2 Flow Rate FiO2


 


9/5/20 04:00      Nasal Cannula 4.0 


 


9/5/20 04:00 98.1 100 20 108/55 (72) 100   


 


9/5/20 03:47  98      


 


9/5/20 00:00      Nasal Cannula 4.0 


 


9/5/20 00:00 98.4 93 20 126/87 (100) 100   


 


9/4/20 23:58  95      


 


9/4/20 21:03     98 Nasal Cannula 4.0 36


 


9/4/20 20:00 99.0 102 22 122/57 (78) 100   


 


9/4/20 20:00      Nasal Cannula 4.0 


 


9/4/20 16:00 98.1 91 24 113/65 (81) 100   


 


9/4/20 16:00      Nasal Cannula 4.0 


 


9/4/20 16:00  98      


 


9/4/20 12:00 98.1 92 29 122/73 (89) 100   


 


9/4/20 12:00      Nasal Cannula 4.0 


 


9/4/20 12:00  95      

















Intake and Output  


 


 9/4/20 9/5/20





 19:00 07:00


 


Intake Total 895 ml 1438.75 ml


 


Output Total 650 ml 350 ml


 


Balance 245 ml 1088.75 ml


 


  


 


Intake Free Water 250 ml 50 ml


 


IV Total 225 ml 1003.75 ml


 


Tube Feeding 420 ml 385 ml


 


Output Urine Total 650 ml 350 ml








General Appearance:  no acute distress, other - chronically ill looking 

bedridden female


HEENT:  normocephalic, atraumatic, other - VM


Respiratory:  chest wall non-tender, rhonchi - bilaterally - scattered


Cardiovascular:  normal peripheral pulses, normal rate - SR on tele


Abdomen:  normal bowel sounds, soft, non tender


Genitourinary:  normal external genitalia


Skin:  other - BUE crusting lesions


Neurologic:  CNs II-XII grossly normal, abnormal gait - bedridden


Lymphatic:  no neck adenopathy





Microbiology








 Date/Time


Source Procedure


Growth Status


 


 


 9/4/20 04:50


Blood Blood Culture - Preliminary


NO GROWTH AFTER 24 HOURS Resulted


 


 9/4/20 04:45


Blood Blood Culture - Preliminary


NO GROWTH AFTER 24 HOURS Resulted


 


 9/3/20 16:00


Nasopharynx SARS-CoV-2 RdRp Gene Assay - Final Complete








Laboratory Tests


9/5/20 09:00: 


White Blood Count [Pending], Red Blood Count [Pending], Hemoglobin [Pending], 

Hematocrit [Pending], Mean Corpuscular Volume [Pending], Mean Corpuscular 

Hemoglobin [Pending], Mean Corpuscular Hemoglobin Concent [Pending], Red Cell 

Distribution Width [Pending], Platelet Count [Pending], Mean Platelet Volume [

Pending], Neutrophils (%) (Auto) [Pending], Lymphocytes (%) (Auto) [Pending], 

Monocytes (%) (Auto) [Pending], Eosinophils (%) (Auto) [Pending], Basophils (%) 

(Auto) [Pending], Erythrocyte Sedimentation Rate [Pending], Sodium Level [

Pending], Potassium Level [Pending], Chloride Level [Pending], Carbon Dioxide 

Level [Pending], Blood Urea Nitrogen [Pending], Creatinine [Pending], Estimat 

Glomerular Filtration Rate [Pending], Glucose Level [Pending], Calcium Level [

Pending], Phosphorus Level [Pending], Magnesium Level [Pending], Total 

Bilirubin [Pending], Aspartate Amino Transf (AST/SGOT) [Pending], Alanine 

Aminotransferase (ALT/SGPT) [Pending], Alkaline Phosphatase [Pending], C-

Reactive Protein, Quantitative [Pending], Total Protein [Pending], Albumin [

Pending], Globulin [Pending]





Current Medications








 Medications


  (Trade)  Dose


 Ordered  Sig/Raji


 Route


 PRN Reason  Start Time


 Stop Time Status Last Admin


Dose Admin


 


 Acetaminophen


  (Tylenol)  650 mg  Q6H  PRN


 GT


 Mild Pain (Pain Scale 1-3)  9/4/20 19:41


 10/4/20 19:40   


 


 


 Ascorbic Acid


  (Vitamin C)  500 mg  DAILY


 GT


   9/5/20 09:00


 10/4/20 08:59   


 


 


 Clonidine HCl


  (Catapres Tab)  0.1 mg  Q4H  PRN


 GT


 bp over 160 syst  9/4/20 19:41


 12/3/20 19:40   


 


 


 Dextrose  1,000 ml @ 


 75 mls/hr  [id number]


 IV


   9/4/20 19:41


 10/4/20 19:40  9/4/20 19:41


 


 


 Docusate Sodium


  (Colace)  100 mg  EVERY 8  HOURS


 GT


   9/4/20 22:00


 9/20/20 08:59  9/5/20 05:18


 


 


 Enoxaparin Sodium


  (Lovenox)  40 mg  DAILY


 SUBQ


   9/5/20 09:00


 11/19/20 08:59   


 


 


 Epoetin Jaswant


  (Epoetin


 Jaswant-EPBX(NON


 ESRD))  10,000 unit  MON-WED-FRI


 SUBQ


   9/4/20 21:00


 11/29/20 20:59  9/4/20 21:54


 


 


 Lorazepam


  (Ativan 2mg/ml


 1ml)  1 mg  Q4H  PRN


 IV


 For Anxiety  9/4/20 19:41


 9/11/20 19:40   


 


 


 Minocycline HCl


  (Minocin)  200 mg  Q12HR


 ORAL


   9/4/20 21:00


 9/7/20 23:59  9/4/20 21:55


 


 


 Pantoprazole


  (Protonix)  40 mg  EVERY 12  HOURS


 IVP


   9/4/20 21:00


 9/20/20 20:59  9/4/20 21:55


 


 


 Polyethylene


 Glycol


  (Miralax)  17 gm  BEDTIME


 GT


   9/4/20 21:00


 9/20/20 20:59  9/4/20 21:55


 


 


 Trimethoprim/


 Sulfamethoxazole


 10 ml/Dextrose  285 ml @ 


 190 mls/hr  EVERY 12  HOURS


 IV


   9/4/20 21:00


 9/6/20 23:59  9/4/20 22:00


 











Assessment/Plan


Problems:  


(1) 2019 novel coronavirus disease (COVID-19)


(2) Sepsis


(3) ARF (acute renal failure)


(4) Severe dehydration


(5) Hypernatremia


(6) Psychosis


(7) Pacemaker


Assessment/Plan


wbc higher


No  fever


WBC still high


COVID positive


pan cultures


iv fluids 


f/u electrolytes


renal studies


urine electrolytes


dvt prophylaxis











Live Brambila MD Sep 5, 2020 09:18

## 2020-09-05 NOTE — NEPHROLOGY PROGRESS NOTE
Assessment/Plan


Problem List:  


(1) ARF (acute renal failure)


Assessment:  Underlying CKD





(2) Sepsis


(3) UTI (urinary tract infection)


(4) Severe dehydration


(5) Hypernatremia


(6) Acute urinary retention


(7) Anemia


Assessment





Acute renal failure


Possible underlying chronic renal insufficiency


Severe dehydration


Hypotension


Sepsis, UTI


History of psychiatric disease


Elevated troponin I


Patient full code


Plan


September 5: Status quo.  Labs reviewed.  Stable from renal standpoint of view.


September 4: Status quo.  Labs reviewed.  Serum creatinine unchanged.  Continue 

per consultants.


September 3:Late note entry due to system problem at the [facility] today. Labs 

reviewed.  Electrolytes within normal limit.  Leukocytosis worsened.  Continue 

per consultants.  Serum creatinine 1.8 unchanged.


September 2: Low magnesium and low phosphorus corrected.  Continue per 

consultants.  On nasal cannula now.  Serum creatinine 1.8.


September 1: Serum creatinine 1.8 stable.  Continue per pulmonary.  Continue to 

monitor renal parameters.


August 31: On Venturi mask.  Serum creatinine lowered to 1.8.  Continue per 

consultants.  Medication list reviewed.  Potassium supplement given.


August 30: Status quo.  Creatinine lower at 2.  Other electrolytes within 

normal limits.  Continue per consultants.


August 29: Labs reviewed.  Creatinine 2.2 stable.  Potassium 3.1.  20 M EQ KCl 

given.  Continue her consultants.


August 28: Serum potassium 5.5.Creatinine 2.2.  1 dose Kayexalate given.  

Remains of 75 cc IV fluid.  We will continue to monitor renal parameters.


August 27: Serum potassium 5.3.  Creatinine higher at 2.2.  Medication list 

reviewed.  Suggest to avoid nephrotoxic's like Bactrim if possible.  Continue 

to monitor renal parameters and electrolytes.  Continue per pulmonary to adjust 

pulmonary status.  May require BiPAP.


August 26: Serum sodium lower.  Renal parameters appear more stable.  

Nevertheless leukocytosis is worsened.  Continue per consultants.


August 25: Serum sodium higher.  Continue D5W 75 cc an hour.  Continue to 

monitor electrolytes.  Continue per consultants.  Serum creatinine down to 2.4 

from 5.1 on admission.  Leukocytosis persists.


August 24: Patient now on isolation for COVID-19.  Serum creatinine is 

plateauing.  Will decrease the IV fluid to 75 cc an hour.  Continue management 

per ID.  Will monitor electrolytes and renal parameters.


August 23: Discussed with MARTIN Carter.  Patient needs a Mitchell catheter.  Accurate 

intake and output.  Decrease  cc D5W an hour.  1 dose of IV Venofer and 

then subcu Epogen ordered for anemia.  Continue to monitor renal parameters


August 22: Renal parameters improving.  Continue D5W IV fluid.  Potassium 

supplement IV given.  Continue per consultants.





Previously:


Fluid challenge


Monitor renal parameters and electrolytes


Monitor intake and output


Mitchell catheter


Antibiotics


Avoid nephrotoxic's


Chest x-ray





Kidney ultrasound findings: 


Right kidney measures 8.9 cm in length. Left kidney measures 9 cm in


length. Both kidneys demonstrate markedly increased echogenicity. No 

hydronephrosis. 


There are bilateral renal cysts. Normal inferior vena cava. Bladder is empty,


contains a Mitchell catheter. 


 


Incidentally noted are gallstones.


 


Impression: Markedly echogenic kidneys, consistent with medical renal disease


Negative for hydronephrosis


Empty bladder with a Mitchell catheter


Incidental finding of cholelithiasis.





Subjective


ROS Limited/Unobtainable:  No





Objective


Objective





Last 24 Hour Vital Signs








  Date Time  Temp Pulse Resp B/P (MAP) Pulse Ox O2 Delivery O2 Flow Rate FiO2


 


9/5/20 04:00      Nasal Cannula 4.0 


 


9/5/20 04:00 98.1 100 20 108/55 (72) 100   


 


9/5/20 03:47  98      


 


9/5/20 00:00      Nasal Cannula 4.0 


 


9/5/20 00:00 98.4 93 20 126/87 (100) 100   


 


9/4/20 23:58  95      


 


9/4/20 21:03     98 Nasal Cannula 4.0 36


 


9/4/20 20:00 99.0 102 22 122/57 (78) 100   


 


9/4/20 20:00      Nasal Cannula 4.0 


 


9/4/20 16:00 98.1 91 24 113/65 (81) 100   


 


9/4/20 16:00      Nasal Cannula 4.0 


 


9/4/20 16:00  98      

















Intake and Output  


 


 9/4/20 9/5/20





 19:00 07:00


 


Intake Total 895 ml 1438.75 ml


 


Output Total 650 ml 350 ml


 


Balance 245 ml 1088.75 ml


 


  


 


Intake Free Water 250 ml 50 ml


 


IV Total 225 ml 1003.75 ml


 


Tube Feeding 420 ml 385 ml


 


Output Urine Total 650 ml 350 ml








Laboratory Tests


9/5/20 09:00: 


White Blood Count 17.8H, Red Blood Count 3.03L, Hemoglobin 9.9L, Hematocrit 

30.6L, Mean Corpuscular Volume 101H, Mean Corpuscular Hemoglobin 32.5H, Mean 

Corpuscular Hemoglobin Concent 32.3, Red Cell Distribution Width 17.9H, 

Platelet Count 131L, Mean Platelet Volume 7.3, Neutrophils (%) (Auto) 73.4, 

Lymphocytes (%) (Auto) 18.3L, Monocytes (%) (Auto) 5.9, Eosinophils (%) (Auto) 

1.3, Basophils (%) (Auto) 1.1, Erythrocyte Sedimentation Rate 48H, Sodium Level 

140, Potassium Level 4.2, Chloride Level 106, Carbon Dioxide Level 28, Anion 

Gap 6, Blood Urea Nitrogen 38H, Creatinine 1.7H, Estimat Glomerular Filtration 

Rate 29.5, Glucose Level 113H, Calcium Level 8.9, Phosphorus Level 3.9, 

Magnesium Level 2.0, Total Bilirubin 0.4, Aspartate Amino Transf (AST/SGOT) 26, 

Alanine Aminotransferase (ALT/SGPT) 34, Alkaline Phosphatase 116, C-Reactive 

Protein, Quantitative 3.2H, Total Protein 5.7L, Albumin 2.2L, Globulin 3.5, 

Albumin/Globulin Ratio 0.6L


Height (Feet):  5


Height (Inches):  2.00


Weight (Pounds):  123


General Appearance:  no apparent distress


EENT:  other - On nasal cannula


Cardiovascular:  tachycardia


Respiratory/Chest:  decreased breath sounds


Abdomen:  distended











Derik Dos Santos MD Sep 5, 2020 13:20

## 2020-09-06 VITALS — DIASTOLIC BLOOD PRESSURE: 63 MMHG | SYSTOLIC BLOOD PRESSURE: 103 MMHG

## 2020-09-06 VITALS — DIASTOLIC BLOOD PRESSURE: 96 MMHG | SYSTOLIC BLOOD PRESSURE: 105 MMHG

## 2020-09-06 VITALS — SYSTOLIC BLOOD PRESSURE: 109 MMHG | DIASTOLIC BLOOD PRESSURE: 67 MMHG

## 2020-09-06 VITALS — DIASTOLIC BLOOD PRESSURE: 48 MMHG | SYSTOLIC BLOOD PRESSURE: 119 MMHG

## 2020-09-06 VITALS — DIASTOLIC BLOOD PRESSURE: 60 MMHG | SYSTOLIC BLOOD PRESSURE: 126 MMHG

## 2020-09-06 VITALS — DIASTOLIC BLOOD PRESSURE: 64 MMHG | SYSTOLIC BLOOD PRESSURE: 118 MMHG

## 2020-09-06 LAB
ADD MANUAL DIFF: NO
BASOPHILS NFR BLD AUTO: 1 % (ref 0–2)
EOSINOPHIL NFR BLD AUTO: 1.7 % (ref 0–3)
ERYTHROCYTE [DISTWIDTH] IN BLOOD BY AUTOMATED COUNT: 18 % (ref 11.6–14.8)
HCT VFR BLD CALC: 29.2 % (ref 37–47)
HGB BLD-MCNC: 9.5 G/DL (ref 12–16)
LYMPHOCYTES NFR BLD AUTO: 18.3 % (ref 20–45)
MCV RBC AUTO: 101 FL (ref 80–99)
MONOCYTES NFR BLD AUTO: 6.4 % (ref 1–10)
NEUTROPHILS NFR BLD AUTO: 72.6 % (ref 45–75)
PLATELET # BLD: 130 K/UL (ref 150–450)
RBC # BLD AUTO: 2.89 M/UL (ref 4.2–5.4)
WBC # BLD AUTO: 14.1 K/UL (ref 4.8–10.8)

## 2020-09-06 RX ADMIN — MINOCYCLINE HYDROCHLORIDE SCH MG: 50 CAPSULE ORAL at 10:11

## 2020-09-06 RX ADMIN — SULFAMETHOXAZOLE AND TRIMETHOPRIM SCH MLS/HR: 80; 16 INJECTION, SOLUTION, CONCENTRATE INTRAVENOUS at 10:10

## 2020-09-06 RX ADMIN — PANTOPRAZOLE SODIUM SCH MG: 40 INJECTION, POWDER, FOR SOLUTION INTRAVENOUS at 10:11

## 2020-09-06 RX ADMIN — DOCUSATE SODIUM SCH MG: 50 LIQUID ORAL at 06:47

## 2020-09-06 RX ADMIN — DOCUSATE SODIUM SCH MG: 50 LIQUID ORAL at 13:36

## 2020-09-06 RX ADMIN — ENOXAPARIN SODIUM SCH MG: 40 INJECTION SUBCUTANEOUS at 09:00

## 2020-09-06 RX ADMIN — DEXTROSE MONOHYDRATE SCH MLS/HR: 50 INJECTION, SOLUTION INTRAVENOUS at 17:00

## 2020-09-06 RX ADMIN — Medication SCH MG: at 10:10

## 2020-09-06 RX ADMIN — POLYETHYLENE GLYCOL 3350 SCH GM: 17 POWDER, FOR SOLUTION ORAL at 21:00

## 2020-09-06 RX ADMIN — DOCUSATE SODIUM SCH MG: 50 LIQUID ORAL at 22:00

## 2020-09-06 NOTE — INTERNAL MED PROGRESS NOTE
Subjective


Physician Name


Umer Wahl


Attending Physician


Umer Wahl MD





Current Medications








 Medications


  (Trade)  Dose


 Ordered  Sig/Raji


 Route


 PRN Reason  Start Time


 Stop Time Status Last Admin


Dose Admin


 


 Acetaminophen


  (Tylenol)  650 mg  Q6H  PRN


 GT


 Mild Pain (Pain Scale 1-3)  9/4/20 19:41


 10/4/20 19:40   


 


 


 Ascorbic Acid


  (Vitamin C)  500 mg  DAILY


 GT


   9/5/20 09:00


 10/4/20 08:59  9/6/20 10:10


 


 


 Clonidine HCl


  (Catapres Tab)  0.1 mg  Q4H  PRN


 GT


 bp over 160 syst  9/4/20 19:41


 12/3/20 19:40   


 


 


 Dextrose  1,000 ml @ 


 75 mls/hr  [id number]


 IV


   9/4/20 19:41


 10/4/20 19:40  9/6/20 01:50


 


 


 Docusate Sodium


  (Colace)  100 mg  EVERY 8  HOURS


 GT


   9/4/20 22:00


 9/20/20 08:59  9/6/20 13:36


 


 


 Enoxaparin Sodium


  (Lovenox)  40 mg  DAILY


 SUBQ


   9/5/20 09:00


 11/19/20 08:59  9/5/20 10:10


 


 


 Epoetin Jaswant


  (Epoetin


 Jaswant-EPBX(NON


 ESRD))  10,000 unit  MON-WED-FRI


 SUBQ


   9/4/20 21:00


 11/29/20 20:59  9/4/20 21:54


 


 


 Lorazepam


  (Ativan 2mg/ml


 1ml)  1 mg  Q4H  PRN


 IV


 For Anxiety  9/4/20 19:41


 9/11/20 19:40   


 


 


 Minocycline HCl


  (Minocin)  200 mg  Q12HR


 ORAL


   9/4/20 21:00


 9/7/20 23:59  9/6/20 10:11


 


 


 Pantoprazole


  (Protonix)  40 mg  EVERY 12  HOURS


 IVP


   9/4/20 21:00


 9/20/20 20:59  9/6/20 10:11


 


 


 Polyethylene


 Glycol


  (Miralax)  17 gm  BEDTIME


 GT


   9/4/20 21:00


 9/20/20 20:59  9/5/20 22:02


 


 


 Trimethoprim/


 Sulfamethoxazole


 10 ml/Dextrose  285 ml @ 


 190 mls/hr  EVERY 12  HOURS


 IV


   9/4/20 21:00


 9/6/20 23:59  9/6/20 10:10


 








Allergies:  


Coded Allergies:  


     AMPICILLIN (Verified  Allergy, Unknown, hives, 8/21/20)


     PENICILLINS (Verified  Allergy, Unknown, hives, 8/21/20)


     TETRACYCLINES (Verified  Allergy, Unknown, hives, 8/21/20)


Subjective


awake, less Responsive, open her eyes, renal function stable, WBC: 14.1 

decrease.





Objective





Last Vital Signs








  Date Time  Temp Pulse Resp B/P (MAP) Pulse Ox O2 Delivery O2 Flow Rate FiO2


 


9/6/20 12:00      Nasal Cannula 4.0 


 


9/6/20 12:00  97      


 


9/6/20 08:46     96   32


 


9/6/20 08:00 96.6  19 103/63 (76)    











Laboratory Tests








Test


  9/6/20


10:42


 


White Blood Count


  14.1 K/UL


(4.8-10.8)  H


 


Red Blood Count


  2.89 M/UL


(4.20-5.40)  L


 


Hemoglobin


  9.5 G/DL


(12.0-16.0)  L


 


Hematocrit


  29.2 %


(37.0-47.0)  L


 


Mean Corpuscular Volume


  101 FL (80-99)


H


 


Mean Corpuscular Hemoglobin


  32.9 PG


(27.0-31.0)  H


 


Mean Corpuscular Hemoglobin


Concent 32.6 G/DL


(32.0-36.0)


 


Red Cell Distribution Width


  18.0 %


(11.6-14.8)  H


 


Platelet Count


  130 K/UL


(150-450)  L


 


Mean Platelet Volume


  7.7 FL


(6.5-10.1)


 


Neutrophils (%) (Auto)


  72.6 %


(45.0-75.0)


 


Lymphocytes (%) (Auto)


  18.3 %


(20.0-45.0)  L


 


Monocytes (%) (Auto)


  6.4 %


(1.0-10.0)


 


Eosinophils (%) (Auto)


  1.7 %


(0.0-3.0)


 


Basophils (%) (Auto)


  1.0 %


(0.0-2.0)











Microbiology








 Date/Time


Source Procedure


Growth Status


 


 


 9/4/20 04:50


Blood Blood Culture - Preliminary Resulted


 


 9/4/20 04:45


Blood Blood Culture - Preliminary


NO GROWTH AFTER 48 HOURS Resulted





 9/5/20 03:00


Sputum Gram Stain - Final Resulted


 


 9/5/20 03:00


Sputum Sputum Culture - Preliminary


NORMAL UPPER RESPIRATORY LATASHA AT 24 ... Resulted


 


 9/3/20 16:00


Nasopharynx SARS-CoV-2 RdRp Gene Assay - Final Complete

















Intake and Output  


 


 9/5/20 9/6/20





 19:00 07:00


 


Intake Total 85 ml 


 


Output Total 350 ml 3200 ml


 


Balance -265 ml -3200 ml


 


  


 


Intake Free Water 50 ml 


 


Tube Feeding 35 ml 


 


Output Urine Total 350 ml 3200 ml


 


# Voids  1








Objective


General: No acute distress, awake, responsive, open her eyes, talking.


HEENT: NCAT, sclera anicteric, PERRL, EOMI, 


Neck: Supple, no significant jugular venous distention, 


Lungs: Decreased at the bases, no Wheeze or Rales.


Heart: Regular rate and rhythm, normal S1/S2, no murmurs/


Abdomen: soft, nontender, nondistended. Normoactive bowel sounds, PEG intact.


: Mitchell cath


Extremities: No Cyanosis , clubbing or edema. 


Neuro: limited secondary to  patient status, contracted lower extremities.


Skin: warm, rash at the chest wall.





Assessment/Plan


Assessment/Plan


(1) UTI (urinary tract infection)


(2) Sepsis


(3) Hypernatremia


(4) Severe dehydration


(5) ARF (acute renal failure) on CKD.


(6) Pneumonia


(7) Hyperkalemia


(8) History of COVID-19 infection last month.





Plan:


Continue IV Bactrim #14/14 and PO Minocycline 200mg bid #13/14 (high dose) MDR 

ABC PNA based on cultures


DVT prophylaxis: Lovenox injection


CODE STATUS: Full code.


Monitor laboratory as  well as cultures.


Tube feeding at 35 cc/h


In telemetry floor


COVID-19 tests negative on (9/1) & (9/3/)


consider DC Isolation.











Umer Wahl MD Sep 6, 2020 15:47

## 2020-09-06 NOTE — PULMONOLOGY PROGRESS NOTE
Subjective


ROS Limited/Unobtainable:  No


Constitutional:  Reports: no symptoms


HEENT:  Repors: no symptoms


Respiratory:  Reports: no symptoms


Allergies:  


Coded Allergies:  


     AMPICILLIN (Verified  Allergy, Unknown, hives, 8/21/20)


     PENICILLINS (Verified  Allergy, Unknown, hives, 8/21/20)


     TETRACYCLINES (Verified  Allergy, Unknown, hives, 8/21/20)





Objective





Last 24 Hour Vital Signs








  Date Time  Temp Pulse Resp B/P (MAP) Pulse Ox O2 Delivery O2 Flow Rate FiO2


 


9/6/20 12:00      Nasal Cannula 4.0 


 


9/6/20 12:00  97      


 


9/6/20 08:56      Nasal Cannula 4.0 


 


9/6/20 08:46     96 Nasal Cannula 3.0 32


 


9/6/20 08:00  97      


 


9/6/20 08:00 96.6 79 19 103/63 (76) 98   


 


9/6/20 04:00      Nasal Cannula 4.0 


 


9/6/20 04:00 98.8 78 18 126/60 (82) 96   


 


9/6/20 04:00  100      


 


9/6/20 00:00  101      


 


9/6/20 00:00      Nasal Cannula 4.0 


 


9/6/20 00:00 98.6 72 18 118/64 (82) 96   


 


9/5/20 20:00  104      


 


9/5/20 20:00     98 Nasal Cannula 3.0 32


 


9/5/20 20:00 98.3 68 18 110/61 (77) 96   


 


9/5/20 20:00      Nasal Cannula 4.0 


 


9/5/20 17:27      Nasal Cannula 4.0 

















Intake and Output  


 


 9/5/20 9/6/20





 19:00 07:00


 


Intake Total 85 ml 


 


Output Total 350 ml 3200 ml


 


Balance -265 ml -3200 ml


 


  


 


Intake Free Water 50 ml 


 


Tube Feeding 35 ml 


 


Output Urine Total 350 ml 3200 ml


 


# Voids  1








General Appearance:  no acute distress, other - chronically ill looking 

bedridden female


HEENT:  normocephalic, atraumatic, other - VM


Respiratory:  chest wall non-tender, rhonchi - bilaterally - scattered


Cardiovascular:  normal peripheral pulses, normal rate - SR on tele


Abdomen:  normal bowel sounds, soft, non tender


Genitourinary:  normal external genitalia


Skin:  other - BUE crusting lesions


Neurologic:  CNs II-XII grossly normal, abnormal gait - bedridden


Lymphatic:  no neck adenopathy





Microbiology








 Date/Time


Source Procedure


Growth Status


 


 


 9/4/20 04:50


Blood Blood Culture - Preliminary Resulted


 


 9/4/20 04:45


Blood Blood Culture - Preliminary


NO GROWTH AFTER 48 HOURS Resulted





 9/5/20 03:00


Sputum Gram Stain - Final Resulted


 


 9/5/20 03:00


Sputum Sputum Culture - Preliminary


NORMAL UPPER RESPIRATORY LATASHA AT 24 ... Resulted








Laboratory Tests


9/6/20 10:42: 


White Blood Count 14.1H, Red Blood Count 2.89L, Hemoglobin 9.5L, Hematocrit 

29.2L, Mean Corpuscular Volume 101H, Mean Corpuscular Hemoglobin 32.9H, Mean 

Corpuscular Hemoglobin Concent 32.6, Red Cell Distribution Width 18.0H, 

Platelet Count 130L, Mean Platelet Volume 7.7, Neutrophils (%) (Auto) 72.6, 

Lymphocytes (%) (Auto) 18.3L, Monocytes (%) (Auto) 6.4, Eosinophils (%) (Auto) 

1.7, Basophils (%) (Auto) 1.0





Current Medications








 Medications


  (Trade)  Dose


 Ordered  Sig/Raji


 Route


 PRN Reason  Start Time


 Stop Time Status Last Admin


Dose Admin


 


 Acetaminophen


  (Tylenol)  650 mg  Q6H  PRN


 GT


 Mild Pain (Pain Scale 1-3)  9/4/20 19:41


 10/4/20 19:40   


 


 


 Ascorbic Acid


  (Vitamin C)  500 mg  DAILY


 GT


   9/5/20 09:00


 10/4/20 08:59  9/6/20 10:10


 


 


 Clonidine HCl


  (Catapres Tab)  0.1 mg  Q4H  PRN


 GT


 bp over 160 syst  9/4/20 19:41


 12/3/20 19:40   


 


 


 Dextrose  1,000 ml @ 


 75 mls/hr  [id number]


 IV


   9/4/20 19:41


 10/4/20 19:40  9/6/20 01:50


 


 


 Docusate Sodium


  (Colace)  100 mg  EVERY 8  HOURS


 GT


   9/4/20 22:00


 9/20/20 08:59  9/6/20 13:36


 


 


 Enoxaparin Sodium


  (Lovenox)  40 mg  DAILY


 SUBQ


   9/5/20 09:00


 11/19/20 08:59  9/5/20 10:10


 


 


 Epoetin Jaswant


  (Epoetin


 Jaswant-EPBX(NON


 ESRD))  10,000 unit  MON-WED-FRI


 SUBQ


   9/4/20 21:00


 11/29/20 20:59  9/4/20 21:54


 


 


 Lorazepam


  (Ativan 2mg/ml


 1ml)  1 mg  Q4H  PRN


 IV


 For Anxiety  9/4/20 19:41


 9/11/20 19:40   


 


 


 Micafungin Sodium


 100 mg/Sodium


 Chloride  110 ml @ 


 110 mls/hr  Q24H


 IVPB


   9/6/20 17:00


 9/13/20 16:59   


 


 


 Minocycline HCl


  (Minocin)  200 mg  Q12HR


 ORAL


   9/4/20 21:00


 9/7/20 23:59  9/6/20 10:11


 


 


 Pantoprazole


  (Protonix)  40 mg  EVERY 12  HOURS


 IVP


   9/4/20 21:00


 9/20/20 20:59  9/6/20 10:11


 


 


 Polyethylene


 Glycol


  (Miralax)  17 gm  BEDTIME


 GT


   9/4/20 21:00


 9/20/20 20:59  9/5/20 22:02


 


 


 Trimethoprim/


 Sulfamethoxazole


 10 ml/Dextrose  285 ml @ 


 190 mls/hr  EVERY 12  HOURS


 IV


   9/4/20 21:00


 9/6/20 23:59  9/6/20 10:10


 











Assessment/Plan


Problems:  


(1) 2019 novel coronavirus disease (COVID-19)


(2) Multiple drug resistant organism (MDRO) culture positive


(3) Sepsis


(4) ARF (acute renal failure)


(5) Severe dehydration


(6) Hypernatremia


(7) Psychosis


(8) Pacemaker


Assessment/Plan


wbc lower today


No  fever


WBC still high


COVID positive





f/u electrolytes


renal studies


urine electrolytes


dvt prophylaxis











Live Brambila MD Sep 6, 2020 16:42

## 2020-09-06 NOTE — NEPHROLOGY PROGRESS NOTE
Assessment/Plan


Problem List:  


(1) ARF (acute renal failure)


Assessment:  Underlying CKD





(2) Sepsis


(3) UTI (urinary tract infection)


(4) Severe dehydration


(5) Hypernatremia


(6) Acute urinary retention


(7) Anemia


Assessment





Acute renal failure


Possible underlying chronic renal insufficiency


Severe dehydration


Hypotension


Sepsis, UTI


History of psychiatric disease


Elevated troponin I


Patient full code


Plan


September 6: No chemistry panel done today.  Status quo.  Continue per 

consultants.


September 5: Status quo.  Labs reviewed.  Stable from renal standpoint of view.


September 4: Status quo.  Labs reviewed.  Serum creatinine unchanged.  Continue 

per consultants.


September 3:Late [place] entry due [place] [facility] [place] 

reviewed.  Electrolytes within normal limit.  Leukocytosis worsened.  Continue 

per consultants.  Serum creatinine 1.8 unchanged.


September 2: Low magnesium and low phosphorus corrected.  Continue per 

consultants.  On nasal cannula now.  Serum creatinine 1.8.


September 1: Serum creatinine 1.8 stable.  Continue per pulmonary.  Continue to 

monitor renal parameters.


August 31: On Venturi mask.  Serum creatinine lowered to 1.8.  Continue per 

consultants.  Medication list reviewed.  Potassium supplement given.


August 30: Status quo.  Creatinine lower at 2.  Other electrolytes within 

normal limits.  Continue per consultants.


August 29: Labs reviewed.  Creatinine 2.2 stable.  Potassium 3.1.  20 M EQ KCl 

given.  Continue her consultants.


August 28: Serum potassium 5.5.Creatinine 2.2.  1 dose Kayexalate given.  

Remains of 75 cc IV fluid.  We will continue to monitor renal parameters.


August 27: Serum potassium 5.3.  Creatinine higher at 2.2.  Medication list 

reviewed.  Suggest to avoid nephrotoxic's like Bactrim if possible.  Continue 

to monitor renal parameters and electrolytes.  Continue per pulmonary to adjust 

pulmonary status.  May require BiPAP.


August 26: Serum sodium lower.  Renal parameters appear more stable.  

Nevertheless leukocytosis is worsened.  Continue per consultants.


August 25: Serum sodium higher.  Continue D5W 75 cc an hour.  Continue to 

monitor electrolytes.  Continue per consultants.  Serum creatinine down to 2.4 

from 5.1 on admission.  Leukocytosis persists.


August 24: Patient now on isolation for COVID-19.  Serum creatinine is 

plateauing.  Will decrease the IV fluid to 75 cc an hour.  Continue management 

per ID.  Will monitor electrolytes and renal parameters.


August 23: Discussed with MARTIN Carter.  Patient needs a Mitchell catheter.  Accurate 

intake and output.  Decrease  cc D5W an hour.  1 dose of IV Venofer and 

then subcu Epogen ordered for anemia.  Continue to monitor renal parameters


August 22: Renal parameters improving.  Continue D5W IV fluid.  Potassium 

supplement IV given.  Continue per consultants.





Previously:


Fluid challenge


Monitor renal parameters and electrolytes


Monitor intake and output


Mitchell catheter


Antibiotics


Avoid nephrotoxic's


Chest x-ray





Kidney ultrasound findings: 


Right kidney measures 8.9 cm in length. Left kidney measures 9 cm in


length. Both kidneys demonstrate markedly increased echogenicity. No 

hydronephrosis. 


There are bilateral renal cysts. Normal inferior vena cava. Bladder is empty,


contains a Mitchell catheter. 


 


Incidentally noted are gallstones.


 


Impression: Markedly echogenic kidneys, consistent with medical renal disease


Negative for hydronephrosis


Empty bladder with a Mitchell catheter


Incidental finding of cholelithiasis.





Subjective


ROS Limited/Unobtainable:  No


Constitutional:  Reports: malaise, weakness





Objective


Objective





Last 24 Hour Vital Signs








  Date Time  Temp Pulse Resp B/P (MAP) Pulse Ox O2 Delivery O2 Flow Rate FiO2


 


9/6/20 08:56      Nasal Cannula 4.0 


 


9/6/20 08:46     96 Nasal Cannula 3.0 32


 


9/6/20 08:00 96.6 79 19 103/63 (76) 98   


 


9/6/20 04:00      Nasal Cannula 4.0 


 


9/6/20 04:00 98.8 78 18 126/60 (82) 96   


 


9/6/20 04:00  100      


 


9/6/20 00:00  101      


 


9/6/20 00:00      Nasal Cannula 4.0 


 


9/6/20 00:00 98.6 72 18 118/64 (82) 96   


 


9/5/20 20:00  104      


 


9/5/20 20:00     98 Nasal Cannula 3.0 32


 


9/5/20 20:00 98.3 68 18 110/61 (77) 96   


 


9/5/20 20:00      Nasal Cannula 4.0 


 


9/5/20 17:27      Nasal Cannula 4.0 


 


9/5/20 16:00  106      


 


9/5/20 16:00      Nasal Cannula 4.0 


 


9/5/20 16:00 96.7 102 18 122/61 (81) 98   


 


9/5/20 12:00  101      


 


9/5/20 12:00 96.8 101 19 120/71 (87) 97   


 


9/5/20 12:00      Nasal Cannula 4.0 

















Intake and Output  


 


 9/5/20 9/6/20





 19:00 07:00


 


Intake Total 85 ml 


 


Output Total 350 ml 3200 ml


 


Balance -265 ml -3200 ml


 


  


 


Intake Free Water 50 ml 


 


Tube Feeding 35 ml 


 


Output Urine Total 350 ml 3200 ml


 


# Voids  1








Laboratory Tests


9/6/20 10:42: 


White Blood Count [Pending], Red Blood Count [Pending], Hemoglobin [Pending], 

Hematocrit [Pending], Mean Corpuscular Volume [Pending], Mean Corpuscular 

Hemoglobin [Pending], Mean Corpuscular Hemoglobin Concent [Pending], Red Cell 

Distribution Width [Pending], Platelet Count [Pending], Mean Platelet Volume [

Pending], Neutrophils (%) (Auto) [Pending], Lymphocytes (%) (Auto) [Pending], 

Monocytes (%) (Auto) [Pending], Eosinophils (%) (Auto) [Pending], Basophils (%) 

(Auto) [Pending]


Height (Feet):  5


Height (Inches):  2.00


Weight (Pounds):  123


General Appearance:  no apparent distress


Cardiovascular:  normal rate


Respiratory/Chest:  decreased breath sounds


Abdomen:  soft, distended











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 Per mom who does not visit very often at [facility], pt was in his usual state of health until a few days ago when started to having increasing secretions rhinorrhea. Fever x1d. Otherwise well-appearing, tolerating feeds. On day of event, pt was started having acute onset respiratory distress with desats to the 80%s. Did not resolve with increased FiO2 on the vent above baseline. Was on albuterol q1h since yesterday  and had planned to start daily prednisolone in the AM on . Transferred to [facility] [place] ED for respiratory distress. Of note, pt was palliative care and DNR until yesterday when parents rescinded DNR. [facility] ED - RVP neg, BMP grossly wnl, VBG wnl, CXR concerning for PNA, UA negative for UTI. BCx drawn and pending. Vent was weaned down to FiO2 close to home regimen. 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 Pt has been seen in the office by Dr. Menezes for increasing head circumference, was scheduled for MRI as outpatient not completed yet.    PAST MEDICAL & SURGICAL HISTORY:  Gastrostomy present  Tracheostomy present  Seizure  Tracheostomy in place  Gastrostomy tube in place    cholecalciferol Oral Liquid - Peds 400 Unit(s) Oral <User Schedule>  Clobazam Oral Tab/Cap - Peds 5 milliGRAM(s) Oral <User Schedule>  dextrose 5% + sodium chloride 0.9%. - Pediatric 1000 milliLiter(s) IV Continuous <Continuous>  diazepam Rectal Gel - Peds 5 milliGRAM(s) Rectal once PRN  glycerin  Pediatric Rectal Suppository - Peds 1 Suppository(s) Rectal every 48 hours PRN  levETIRAcetam  Oral Liquid - Peds 260 milliGRAM(s) Oral <User Schedule>  PHENobarbital  Oral Liquid - Peds 56 milliGRAM(s) Oral <User Schedule>  polyvinyl alcohol 1.4%/povidone 0.6% Ophthalmic Solution - Peds 1 Drop(s) Both EYES every 4 hours PRN  ranitidine  Oral Liquid - Peds 45 milliGRAM(s) Oral <User Schedule>  sodium chloride 0.9% IV Intermittent (Bolus) - Peds 260 milliLiter(s) IV Bolus once    SOCIAL HISTORY:  FAMILY HISTORY:  No pertinent family history in first degree relatives    Vital Signs Last 24 Hrs  T(C): 36.8 (21 Oct 2018 11:00), Max: 36.9 (21 Oct 2018 09:15)  T(F): 98.2 (21 Oct 2018 11:00), Max: 98.4 (21 Oct 2018 09:15)  HR: 106 (21 Oct 2018 11:00) (104 - 129)  BP: 100/79 (21 Oct 2018 11:00) (82/64 - 110/82)  BP(mean): 84 (21 Oct 2018 11:00) (68 - 89)  RR: 25 (21 Oct 2018 11:00) (22 - 25)  SpO2: 100% (21 Oct 2018 11:00) (100% - 100%)    PHYSICAL EXAM:  Macrocephaly  Trach on Ventilator  Pupils: Reactive  Motor- Moving all extremities to noxious        LABS:                          10.7   42.60 )-----------( 233      ( 21 Oct 2018 06:13 )             32.9     10-21    133<L>  |  95<L>  |  12  ----------------------------<  98  5.5<H>   |  24  |  0.21    Ca    9.7      21 Oct 2018 06:13  Phos  4.2     10-  Mg     2.4     10-    TPro  7.7  /  Alb  4.0  /  TBili  0.2  /  DBili  x   /  AST  44<H>  /  ALT  21  /  AlkPhos  197  10-21      Urinalysis Basic - ( 21 Oct 2018 08:00 )    Color: YELLOW / Appearance: TURBID / S.021 / pH: 7.5  Gluc: NEGATIVE / Ketone: NEGATIVE  / Bili: NEGATIVE / Urobili: NORMAL   Blood: NEGATIVE / Protein: 20 / Nitrite: NEGATIVE   Leuk Esterase: NEGATIVE / RBC: 0-2 / WBC 0-2   Sq Epi: x / Non Sq Epi: x / Bacteria: x        RADIOLOGY & ADDITIONAL STUDIES:  < from: CT Head No Cont (10.21.18 @ 07:12) >    Extensive dilatation of the lateral third ventricle are seen which is out   of proportion to the fourth ventricle. This is compatible with   noncommunicating hydrocephalus. No transependymal flow is identified to   suggest acute hydrocephalus.    There is no evidence of acute hemorrhage mass or mass effect seen.    Evaluation of the structures with the appropriate window appears   unremarkable    Bilateral maxillary ethmoid sinus mucosal thickening is seen.    Opacification of both mastoid and middle ear regions seen.    Impression: Hydrocephalus identified as described above.    < end of copied text >

## 2020-09-06 NOTE — INFECTIOUS DISEASES PROG NOTE
Assessment/Plan


69yo F with:





Sepsis


Fever; SP


Leukocytosis; worsening (sp steroids)


    -9/3 u/a neg


          Bcx p


JASWINDER to 5.1, improving 


Hypoxic resp failure, sp NRB mask >VM> 4l NC 9/1


VRE UTI


Hx of COVID-19 1 mo ago ( doubt recurrent COVID-19) 


HAP


            9/3 sp cx p


            9/1 rapid COVID PCR neg; 9/3 rapid COVID PCR neg


   8/29 CXR:   Improving left lower lobe infiltrate and left pleural effusion. 

There is a probable small right pleural effusion.


   8/27 cdiff neg


           8/26 CXR: There are increased interstitial congestion and left 

pleural effusion,since prior exam of 8/22/2020


               Bcx NTD


            8/22 sp cx MDR ABC (S bactrim; I Colistin, Polymixin B, Minocycline)


   8/21 BCx NTD


   8/21 UA+, UCx >100k VRE (S Zyvox)


   8/21 CXR: Left retrocardiac mild atelectasis versus infiltrate.


   8/21 COVID rapid neg; 8/22 repeat COVID-19 +








BL arm rash, appears evolving, now small pustules with dry/crusting, querry 

resolving Shingles? Though odd to have on both arms


   8/21 HSV & VZV PCR ordered





Plan:


Continue IV bactrim #14/14-21 and PO Minocycline 200mg bid #13/14-21 (high dose

) MDR ABC PNA based on cultures


   -8/29 SP ZYvox #7





       --8/24 SP Cefepime #4


       --8/23 SP IV Vancomcyin #4





F/u cx


Monitor CBC/BMP


Trend BUE rash


Dc covid isolation


f/u Bcx x2, Sp cx


f/u flow cytometry


f/u repeat cultures


CBC am





Discussed with RN





Subjective


Allergies:  


Coded Allergies:  


     AMPICILLIN (Verified  Allergy, Unknown, hives, 8/21/20)


     PENICILLINS (Verified  Allergy, Unknown, hives, 8/21/20)


     TETRACYCLINES (Verified  Allergy, Unknown, hives, 8/21/20)


Afebrile


Leukocytosis improved today


On 4L NC and satting well





Objective





Last 24 Hour Vital Signs








  Date Time  Temp Pulse Resp B/P (MAP) Pulse Ox O2 Delivery O2 Flow Rate FiO2


 


9/6/20 08:56      Nasal Cannula 4.0 


 


9/6/20 08:46     96 Nasal Cannula 3.0 32


 


9/6/20 08:00 96.6 79 19 103/63 (76) 98   


 


9/6/20 04:00      Nasal Cannula 4.0 


 


9/6/20 04:00 98.8 78 18 126/60 (82) 96   


 


9/6/20 04:00  100      


 


9/6/20 00:00  101      


 


9/6/20 00:00      Nasal Cannula 4.0 


 


9/6/20 00:00 98.6 72 18 118/64 (82) 96   


 


9/5/20 20:00  104      


 


9/5/20 20:00     98 Nasal Cannula 3.0 32


 


9/5/20 20:00 98.3 68 18 110/61 (77) 96   


 


9/5/20 20:00      Nasal Cannula 4.0 


 


9/5/20 17:27      Nasal Cannula 4.0 


 


9/5/20 16:00  106      


 


9/5/20 16:00      Nasal Cannula 4.0 


 


9/5/20 16:00 96.7 102 18 122/61 (81) 98   


 


9/5/20 12:00  101      


 


9/5/20 12:00 96.8 101 19 120/71 (87) 97   


 


9/5/20 12:00      Nasal Cannula 4.0 








Height (Feet):  5


Height (Inches):  2.00


Weight (Pounds):  123


GEN: NAD


HEENT: NCAT, MMM, EOMI


Pulm: Equal chest rise and fall B/L, NO accessory muscle use


ABD: Soft, ND





Microbiology








 Date/Time


Source Procedure


Growth Status


 


 


 9/4/20 04:50


Blood Blood Culture - Preliminary


NO GROWTH AFTER 48 HOURS Resulted


 


 9/4/20 04:45


Blood Blood Culture - Preliminary


NO GROWTH AFTER 48 HOURS Resulted





 9/5/20 03:00


Sputum Gram Stain - Final Resulted


 


 9/5/20 03:00


Sputum Sputum Culture - Preliminary


NORMAL UPPER RESPIRATORY LATASHA AT 24 ... Resulted


 


 9/3/20 16:00


Nasopharynx SARS-CoV-2 RdRp Gene Assay - Final Complete











Laboratory Tests








Test


  9/6/20


08:00


 


White Blood Count Pending  


 


Red Blood Count Pending  


 


Hemoglobin Pending  


 


Hematocrit Pending  


 


Mean Corpuscular Volume Pending  


 


Mean Corpuscular Hemoglobin Pending  


 


Mean Corpuscular Hemoglobin


Concent Pending  


 


 


Red Cell Distribution Width Pending  


 


Platelet Count Pending  


 


Mean Platelet Volume Pending  


 


Neutrophils (%) (Auto) Pending  


 


Lymphocytes (%) (Auto) Pending  


 


Monocytes (%) (Auto) Pending  


 


Eosinophils (%) (Auto) Pending  


 


Basophils (%) (Auto) Pending  











Current Medications








 Medications


  (Trade)  Dose


 Ordered  Sig/Raji


 Route


 PRN Reason  Start Time


 Stop Time Status Last Admin


Dose Admin


 


 Acetaminophen


  (Tylenol)  650 mg  Q6H  PRN


 GT


 Mild Pain (Pain Scale 1-3)  9/4/20 19:41


 10/4/20 19:40   


 


 


 Ascorbic Acid


  (Vitamin C)  500 mg  DAILY


 GT


   9/5/20 09:00


 10/4/20 08:59  9/5/20 10:12


 


 


 Clonidine HCl


  (Catapres Tab)  0.1 mg  Q4H  PRN


 GT


 bp over 160 syst  9/4/20 19:41


 12/3/20 19:40   


 


 


 Dextrose  1,000 ml @ 


 75 mls/hr  [id number]


 IV


   9/4/20 19:41


 10/4/20 19:40  9/6/20 01:50


 


 


 Docusate Sodium


  (Colace)  100 mg  EVERY 8  HOURS


 GT


   9/4/20 22:00


 9/20/20 08:59  9/6/20 06:47


 


 


 Enoxaparin Sodium


  (Lovenox)  40 mg  DAILY


 SUBQ


   9/5/20 09:00


 11/19/20 08:59  9/5/20 10:10


 


 


 Epoetin Jaswant


  (Epoetin


 Jaswant-EPBX(NON


 ESRD))  10,000 unit  MON-WED-FRI


 SUBQ


   9/4/20 21:00


 11/29/20 20:59  9/4/20 21:54


 


 


 Lorazepam


  (Ativan 2mg/ml


 1ml)  1 mg  Q4H  PRN


 IV


 For Anxiety  9/4/20 19:41


 9/11/20 19:40   


 


 


 Minocycline HCl


  (Minocin)  200 mg  Q12HR


 ORAL


   9/4/20 21:00


 9/7/20 23:59  9/5/20 22:03


 


 


 Pantoprazole


  (Protonix)  40 mg  EVERY 12  HOURS


 IVP


   9/4/20 21:00


 9/20/20 20:59  9/5/20 22:02


 


 


 Polyethylene


 Glycol


  (Miralax)  17 gm  BEDTIME


 GT


   9/4/20 21:00


 9/20/20 20:59  9/5/20 22:02


 


 


 Trimethoprim/


 Sulfamethoxazole


 10 ml/Dextrose  285 ml @ 


 190 mls/hr  EVERY 12  HOURS


 IV


   9/4/20 21:00


 9/6/20 23:59  9/5/20 21:00


 

















Sadiq Medina MD Sep 6, 2020 09:37

## 2020-09-07 VITALS — DIASTOLIC BLOOD PRESSURE: 53 MMHG | SYSTOLIC BLOOD PRESSURE: 103 MMHG

## 2020-09-07 VITALS — SYSTOLIC BLOOD PRESSURE: 98 MMHG | DIASTOLIC BLOOD PRESSURE: 51 MMHG

## 2020-09-07 VITALS — SYSTOLIC BLOOD PRESSURE: 90 MMHG | DIASTOLIC BLOOD PRESSURE: 72 MMHG

## 2020-09-07 VITALS — SYSTOLIC BLOOD PRESSURE: 117 MMHG | DIASTOLIC BLOOD PRESSURE: 47 MMHG

## 2020-09-07 VITALS — DIASTOLIC BLOOD PRESSURE: 42 MMHG | SYSTOLIC BLOOD PRESSURE: 96 MMHG

## 2020-09-07 VITALS — DIASTOLIC BLOOD PRESSURE: 53 MMHG | SYSTOLIC BLOOD PRESSURE: 132 MMHG

## 2020-09-07 LAB
ADD MANUAL DIFF: NO
ALBUMIN SERPL-MCNC: 2.2 G/DL (ref 3.4–5)
ALBUMIN/GLOB SERPL: 0.6 {RATIO} (ref 1–2.7)
ALP SERPL-CCNC: 124 U/L (ref 46–116)
ALT SERPL-CCNC: 22 U/L (ref 12–78)
ANION GAP SERPL CALC-SCNC: 10 MMOL/L (ref 5–15)
AST SERPL-CCNC: 19 U/L (ref 15–37)
BASOPHILS NFR BLD AUTO: 1 % (ref 0–2)
BILIRUB SERPL-MCNC: 0.4 MG/DL (ref 0.2–1)
BUN SERPL-MCNC: 38 MG/DL (ref 7–18)
CALCIUM SERPL-MCNC: 9.3 MG/DL (ref 8.5–10.1)
CHLORIDE SERPL-SCNC: 107 MMOL/L (ref 98–107)
CO2 SERPL-SCNC: 26 MMOL/L (ref 21–32)
CREAT SERPL-MCNC: 1.9 MG/DL (ref 0.55–1.3)
EOSINOPHIL NFR BLD AUTO: 1 % (ref 0–3)
ERYTHROCYTE [DISTWIDTH] IN BLOOD BY AUTOMATED COUNT: 17.4 % (ref 11.6–14.8)
GLOBULIN SER-MCNC: 3.5 G/DL
HCT VFR BLD CALC: 30.9 % (ref 37–47)
HGB BLD-MCNC: 9.7 G/DL (ref 12–16)
LYMPHOCYTES NFR BLD AUTO: 19.4 % (ref 20–45)
MCV RBC AUTO: 101 FL (ref 80–99)
MONOCYTES NFR BLD AUTO: 5.2 % (ref 1–10)
NEUTROPHILS NFR BLD AUTO: 73.4 % (ref 45–75)
PHOSPHATE SERPL-MCNC: 4.2 MG/DL (ref 2.5–4.9)
PLATELET # BLD: 139 K/UL (ref 150–450)
POTASSIUM SERPL-SCNC: 4.1 MMOL/L (ref 3.5–5.1)
RBC # BLD AUTO: 3.05 M/UL (ref 4.2–5.4)
SODIUM SERPL-SCNC: 143 MMOL/L (ref 136–145)
WBC # BLD AUTO: 14.4 K/UL (ref 4.8–10.8)

## 2020-09-07 RX ADMIN — MINOCYCLINE HYDROCHLORIDE SCH MG: 50 CAPSULE ORAL at 20:56

## 2020-09-07 RX ADMIN — PANTOPRAZOLE SODIUM SCH MG: 40 INJECTION, POWDER, FOR SOLUTION INTRAVENOUS at 00:22

## 2020-09-07 RX ADMIN — ENOXAPARIN SODIUM SCH MG: 40 INJECTION SUBCUTANEOUS at 09:00

## 2020-09-07 RX ADMIN — PANTOPRAZOLE SODIUM SCH MG: 40 INJECTION, POWDER, FOR SOLUTION INTRAVENOUS at 09:35

## 2020-09-07 RX ADMIN — MINOCYCLINE HYDROCHLORIDE SCH MG: 50 CAPSULE ORAL at 00:22

## 2020-09-07 RX ADMIN — MINOCYCLINE HYDROCHLORIDE SCH MG: 50 CAPSULE ORAL at 09:44

## 2020-09-07 RX ADMIN — DOCUSATE SODIUM SCH MG: 50 LIQUID ORAL at 22:00

## 2020-09-07 RX ADMIN — SULFAMETHOXAZOLE AND TRIMETHOPRIM SCH MLS/HR: 80; 16 INJECTION, SOLUTION, CONCENTRATE INTRAVENOUS at 10:08

## 2020-09-07 RX ADMIN — PANTOPRAZOLE SODIUM SCH MG: 40 INJECTION, POWDER, FOR SOLUTION INTRAVENOUS at 20:56

## 2020-09-07 RX ADMIN — POLYETHYLENE GLYCOL 3350 SCH GM: 17 POWDER, FOR SOLUTION ORAL at 20:56

## 2020-09-07 RX ADMIN — EPOETIN ALFA-EPBX SCH UNIT: 10000 INJECTION, SOLUTION INTRAVENOUS; SUBCUTANEOUS at 21:15

## 2020-09-07 RX ADMIN — SULFAMETHOXAZOLE AND TRIMETHOPRIM SCH MLS/HR: 80; 16 INJECTION, SOLUTION, CONCENTRATE INTRAVENOUS at 20:57

## 2020-09-07 RX ADMIN — DOCUSATE SODIUM SCH MG: 50 LIQUID ORAL at 14:32

## 2020-09-07 RX ADMIN — SULFAMETHOXAZOLE AND TRIMETHOPRIM SCH MLS/HR: 80; 16 INJECTION, SOLUTION, CONCENTRATE INTRAVENOUS at 00:11

## 2020-09-07 RX ADMIN — DOCUSATE SODIUM SCH MG: 50 LIQUID ORAL at 05:45

## 2020-09-07 RX ADMIN — DEXTROSE MONOHYDRATE SCH MLS/HR: 50 INJECTION, SOLUTION INTRAVENOUS at 16:14

## 2020-09-07 RX ADMIN — Medication SCH MG: at 09:35

## 2020-09-07 NOTE — NEPHROLOGY PROGRESS NOTE
Assessment/Plan


Problem List:  


(1) ARF (acute renal failure)


Assessment:  Underlying CKD





(2) Sepsis


(3) UTI (urinary tract infection)


(4) Severe dehydration


(5) Hypernatremia


(6) Acute urinary retention


(7) Anemia


Assessment





Acute renal failure


Possible underlying chronic renal insufficiency


Severe dehydration


Hypotension


Sepsis, UTI


History of psychiatric disease


Elevated troponin I


Patient full code


Plan


September 7: Labs reviewed.  Remains stable from renal standpoint of view, as 

serum creatinine leveled off around 1.8 and 1.9.  Will continue to monitor 

renal parameters


September 6: No chemistry panel done today.  Status quo.  Continue per 

consultants.


September 5: Status quo.  Labs reviewed.  Stable from renal standpoint of view.


September 4: Status quo.  Labs reviewed.  Serum creatinine unchanged.  Continue 

per consultants.


September 3:Late note entry due to system problem at the [facility] today. Labs 

reviewed.  Electrolytes within normal limit.  Leukocytosis worsened.  Continue 

per consultants.  Serum creatinine 1.8 unchanged.


September 2: Low magnesium and low phosphorus corrected.  Continue per 

consultants.  On nasal cannula now.  Serum creatinine 1.8.


September 1: Serum creatinine 1.8 stable.  Continue per pulmonary.  Continue to 

monitor renal parameters.


August 31: On Venturi mask.  Serum creatinine lowered to 1.8.  Continue per 

consultants.  Medication list reviewed.  Potassium supplement given.


August 30: Status quo.  Creatinine lower at 2.  Other electrolytes within 

normal limits.  Continue per consultants.


August 29: Labs reviewed.  Creatinine 2.2 stable.  Potassium 3.1.  20 M EQ KCl 

given.  Continue her consultants.


August 28: Serum potassium 5.5.Creatinine 2.2.  1 dose Kayexalate given.  

Remains of 75 cc IV fluid.  We will continue to monitor renal parameters.


August 27: Serum potassium 5.3.  Creatinine higher at 2.2.  Medication list 

reviewed.  Suggest to avoid nephrotoxic's like Bactrim if possible.  Continue 

to monitor renal parameters and electrolytes.  Continue per pulmonary to adjust 

pulmonary status.  May require BiPAP.


August 26: Serum sodium lower.  Renal parameters appear more stable.  

Nevertheless leukocytosis is worsened.  Continue per consultants.


August 25: Serum sodium higher.  Continue D5W 75 cc an hour.  Continue to 

monitor electrolytes.  Continue per consultants.  Serum creatinine down to 2.4 

from 5.1 on admission.  Leukocytosis persists.


August 24: Patient now on isolation for COVID-19.  Serum creatinine is 

plateauing.  Will decrease the IV fluid to 75 cc an hour.  Continue management 

per ID.  Will monitor electrolytes and renal parameters.


August 23: Discussed with MARTIN Carter.  Patient needs a Mitchell catheter.  Accurate 

intake and output.  Decrease  cc D5W an hour.  1 dose of IV Venofer and 

then subcu Epogen ordered for anemia.  Continue to monitor renal parameters


August 22: Renal parameters improving.  Continue D5W IV fluid.  Potassium 

supplement IV given.  Continue per consultants.





Previously:


Fluid challenge


Monitor renal parameters and electrolytes


Monitor intake and output


Mitchell catheter


Antibiotics


Avoid nephrotoxic's


Chest x-ray





Kidney ultrasound findings: 


Right kidney measures 8.9 cm in length. Left kidney measures 9 cm in


length. Both kidneys demonstrate markedly increased echogenicity. No 

hydronephrosis. 


There are bilateral renal cysts. Normal inferior vena cava. Bladder is empty,


contains a Mitchell catheter. 


 


Incidentally noted are gallstones.


 


Impression: Markedly echogenic kidneys, consistent with medical renal disease


Negative for hydronephrosis


Empty bladder with a Mitchell catheter


Incidental finding of cholelithiasis.





Subjective


ROS Limited/Unobtainable:  No


Constitutional:  Reports: malaise





Objective


Objective





Last 24 Hour Vital Signs








  Date Time  Temp Pulse Resp B/P (MAP) Pulse Ox O2 Delivery O2 Flow Rate FiO2


 


9/7/20 04:00      Nasal Cannula 4.0 


 


9/7/20 04:00 98.8 102 24 96/42 (60) 98   


 


9/7/20 04:00  101      


 


9/7/20 00:00  105      


 


9/7/20 00:00 100.0 103 25 103/53 (70) 98   


 


9/7/20 00:00      Nasal Cannula 4.0 


 


9/6/20 20:00 99.8 98 20 128/58 (81) 98   


 


9/6/20 20:00  102      


 


9/6/20 20:00      Nasal Cannula 4.0 


 


9/6/20 16:00  100      


 


9/6/20 16:00      Nasal Cannula 4.0 


 


9/6/20 16:00 96.8 101 18 109/67 (81) 96   


 


9/6/20 12:00 97.9 96 20 105/96 (99) 97   


 


9/6/20 12:00      Nasal Cannula 4.0 


 


9/6/20 12:00  97      

















Intake and Output  


 


 9/6/20 9/7/20





 19:00 07:00


 


Intake Total 235 ml 


 


Output Total 350 ml 400 ml


 


Balance -115 ml -400 ml


 


  


 


Intake Free Water 200 ml 


 


Tube Feeding 35 ml 


 


Output Urine Total 350 ml 400 ml


 


# Bowel Movements  1








Laboratory Tests


9/7/20 06:30: 


White Blood Count 14.4H, Red Blood Count 3.05L, Hemoglobin 9.7L, Hematocrit 

30.9L, Mean Corpuscular Volume 101H, Mean Corpuscular Hemoglobin 31.8H, Mean 

Corpuscular Hemoglobin Concent 31.4L, Red Cell Distribution Width 17.4H, 

Platelet Count 139L, Mean Platelet Volume 7.5, Neutrophils (%) (Auto) 73.4, 

Lymphocytes (%) (Auto) 19.4L, Monocytes (%) (Auto) 5.2, Eosinophils (%) (Auto) 

1.0, Basophils (%) (Auto) 1.0, Sodium Level 143, Potassium Level 4.1, Chloride 

Level 107, Carbon Dioxide Level 26, Anion Gap 10, Blood Urea Nitrogen 38H, 

Creatinine 1.9H, Estimat Glomerular Filtration Rate 26.0, Glucose Level 100, 

Calcium Level 9.3, Phosphorus Level 4.2, Magnesium Level 2.1, Total Bilirubin 

0.4, Aspartate Amino Transf (AST/SGOT) 19, Alanine Aminotransferase (ALT/SGPT) 

22, Alkaline Phosphatase 124H, Total Protein 5.7L, Albumin 2.2L, Globulin 3.5, 

Albumin/Globulin Ratio 0.6L


Height (Feet):  5


Height (Inches):  2.00


Weight (Pounds):  123


General Appearance:  no apparent distress


Cardiovascular:  tachycardia


Respiratory/Chest:  decreased breath sounds


Abdomen:  distended











Derik Dos Santos MD Sep 7, 2020 10:53

## 2020-09-07 NOTE — PULMONOLOGY PROGRESS NOTE
Subjective


ROS Limited/Unobtainable:  No


Constitutional:  Reports: no symptoms


HEENT:  Repors: no symptoms


Respiratory:  Reports: no symptoms


Allergies:  


Coded Allergies:  


     AMPICILLIN (Verified  Allergy, Unknown, hives, 8/21/20)


     PENICILLINS (Verified  Allergy, Unknown, hives, 8/21/20)


     TETRACYCLINES (Verified  Allergy, Unknown, hives, 8/21/20)





Objective





Last 24 Hour Vital Signs








  Date Time  Temp Pulse Resp B/P (MAP) Pulse Ox O2 Delivery O2 Flow Rate FiO2


 


9/7/20 12:00      Nasal Cannula 4.0 


 


9/7/20 12:00  92      


 


9/7/20 12:00 98.8 102 19 90/72 (78) 98   


 


9/7/20 10:05 97.1       


 


9/7/20 08:00  90      


 


9/7/20 08:00      Nasal Cannula 4.0 


 


9/7/20 08:00 98.8 102 24 117/47 (70) 98   


 


9/7/20 04:00      Nasal Cannula 4.0 


 


9/7/20 04:00 98.8 102 24 96/42 (60) 98   


 


9/7/20 04:00  101      


 


9/7/20 00:00  105      


 


9/7/20 00:00 100.0 103 25 103/53 (70) 98   


 


9/7/20 00:00      Nasal Cannula 4.0 


 


9/6/20 20:00 99.8 98 20 128/58 (81) 98   


 


9/6/20 20:00  102      


 


9/6/20 20:00      Nasal Cannula 4.0 

















Intake and Output  


 


 9/6/20 9/7/20





 19:00 07:00


 


Intake Total 235 ml 


 


Output Total 350 ml 400 ml


 


Balance -115 ml -400 ml


 


  


 


Intake Free Water 200 ml 


 


Tube Feeding 35 ml 


 


Output Urine Total 350 ml 400 ml


 


# Bowel Movements  1








General Appearance:  no acute distress, other - chronically ill looking 

bedridden female


HEENT:  normocephalic, atraumatic, other - VM


Respiratory:  chest wall non-tender, rhonchi - bilaterally - scattered


Cardiovascular:  normal peripheral pulses, normal rate - SR on tele


Abdomen:  normal bowel sounds, soft, non tender


Genitourinary:  normal external genitalia


Skin:  other - BUE crusting lesions


Neurologic:  CNs II-XII grossly normal, abnormal gait - bedridden


Lymphatic:  no neck adenopathy





Microbiology








 Date/Time


Source Procedure


Growth Status


 


 


 9/5/20 03:00


Sputum Gram Stain - Final Resulted


 


 9/5/20 03:00 Sputum Culture - Preliminary


YEAST


Usual Respiratory Autumn Resulted








Laboratory Tests


9/7/20 06:30: 


White Blood Count 14.4H, Red Blood Count 3.05L, Hemoglobin 9.7L, Hematocrit 

30.9L, Mean Corpuscular Volume 101H, Mean Corpuscular Hemoglobin 31.8H, Mean 

Corpuscular Hemoglobin Concent 31.4L, Red Cell Distribution Width 17.4H, 

Platelet Count 139L, Mean Platelet Volume 7.5, Neutrophils (%) (Auto) 73.4, 

Lymphocytes (%) (Auto) 19.4L, Monocytes (%) (Auto) 5.2, Eosinophils (%) (Auto) 

1.0, Basophils (%) (Auto) 1.0, Sodium Level 143, Potassium Level 4.1, Chloride 

Level 107, Carbon Dioxide Level 26, Anion Gap 10, Blood Urea Nitrogen 38H, 

Creatinine 1.9H, Estimat Glomerular Filtration Rate 26.0, Glucose Level 100, 

Calcium Level 9.3, Phosphorus Level 4.2, Magnesium Level 2.1, Total Bilirubin 

0.4, Aspartate Amino Transf (AST/SGOT) 19, Alanine Aminotransferase (ALT/SGPT) 

22, Alkaline Phosphatase 124H, Total Protein 5.7L, Albumin 2.2L, Globulin 3.5, 

Albumin/Globulin Ratio 0.6L





Current Medications








 Medications


  (Trade)  Dose


 Ordered  Sig/Raji


 Route


 PRN Reason  Start Time


 Stop Time Status Last Admin


Dose Admin


 


 Acetaminophen


  (Tylenol)  650 mg  Q6H  PRN


 GT


 Mild Pain (Pain Scale 1-3)  9/4/20 19:41


 10/4/20 19:40  9/7/20 09:35


 


 


 Ascorbic Acid


  (Vitamin C)  500 mg  DAILY


 GT


   9/5/20 09:00


 10/4/20 08:59  9/7/20 09:35


 


 


 Clonidine HCl


  (Catapres Tab)  0.1 mg  Q4H  PRN


 GT


 bp over 160 syst  9/4/20 19:41


 12/3/20 19:40   


 


 


 Dextrose  1,000 ml @ 


 75 mls/hr  [id number]


 IV


   9/4/20 19:41


 10/4/20 19:40  9/7/20 14:33


 


 


 Docusate Sodium


  (Colace)  100 mg  EVERY 8  HOURS


 GT


   9/4/20 22:00


 9/20/20 08:59  9/7/20 14:32


 


 


 Enoxaparin Sodium


  (Lovenox)  40 mg  DAILY


 SUBQ


   9/5/20 09:00


 11/19/20 08:59  9/5/20 10:10


 


 


 Epoetin Jaswant


  (Epoetin


 Jaswant-EPBX(NON


 ESRD))  10,000 unit  MON-WED-FRI


 SUBQ


   9/4/20 21:00


 11/29/20 20:59  9/4/20 21:54


 


 


 Lorazepam


  (Ativan 2mg/ml


 1ml)  1 mg  Q4H  PRN


 IV


 For Anxiety  9/4/20 19:41


 9/11/20 19:40   


 


 


 Micafungin Sodium


 100 mg/Sodium


 Chloride  110 ml @ 


 110 mls/hr  Q24H


 IVPB


   9/6/20 17:00


 9/13/20 16:59  9/7/20 16:14


 


 


 Minocycline HCl


  (Minocin)  100 mg  Q12HR


 ORAL


   9/7/20 10:00


 9/14/20 09:59  9/7/20 09:44


 


 


 Pantoprazole


  (Protonix)  40 mg  EVERY 12  HOURS


 IVP


   9/4/20 21:00


 9/20/20 20:59  9/7/20 09:35


 


 


 Polyethylene


 Glycol


  (Miralax)  17 gm  BEDTIME


 GT


   9/4/20 21:00


 9/20/20 20:59  9/5/20 22:02


 


 


 Trimethoprim/


 Sulfamethoxazole


 10 ml/Dextrose  285 ml @ 


 190 mls/hr  EVERY 12  HOURS


 IV


   9/7/20 10:00


 9/14/20 09:59  9/7/20 10:08


 











Assessment/Plan


Problems:  


(1) 2019 novel coronavirus disease (COVID-19)


(2) Multiple drug resistant organism (MDRO) culture positive


(3) Sepsis


(4) ARF (acute renal failure)


(5) Severe dehydration


(6) Hypernatremia


(7) Psychosis


(8) Pacemaker


Assessment/Plan


\


lowq grade  fever


WBC still high


COVID positive





f/u electrolytes


renal studies


urine electrolytes


dvt prophylaxis











Live Brambila MD Sep 7, 2020 16:38

## 2020-09-07 NOTE — SURGERY PROGRESS NOTE
Surgery Progress Note


Subjective


Additional Comments


leukocytosis


anemia


labs as below


comfortable





Objective





Last 24 Hour Vital Signs








  Date Time  Temp Pulse Resp B/P (MAP) Pulse Ox O2 Delivery O2 Flow Rate FiO2


 


9/7/20 10:05 97.1       


 


9/7/20 08:00  90      


 


9/7/20 08:00      Nasal Cannula 4.0 


 


9/7/20 08:00 98.8 102 24 117/47 (70) 98   


 


9/7/20 04:00      Nasal Cannula 4.0 


 


9/7/20 04:00 98.8 102 24 96/42 (60) 98   


 


9/7/20 04:00  101      


 


9/7/20 00:00  105      


 


9/7/20 00:00 100.0 103 25 103/53 (70) 98   


 


9/7/20 00:00      Nasal Cannula 4.0 


 


9/6/20 20:00 99.8 98 20 128/58 (81) 98   


 


9/6/20 20:00  102      


 


9/6/20 20:00      Nasal Cannula 4.0 


 


9/6/20 16:00  100      


 


9/6/20 16:00      Nasal Cannula 4.0 


 


9/6/20 16:00 96.8 101 18 109/67 (81) 96   








I&O











Intake and Output  


 


 9/6/20 9/7/20





 19:00 07:00


 


Intake Total 235 ml 


 


Output Total 350 ml 400 ml


 


Balance -115 ml -400 ml


 


  


 


Intake Free Water 200 ml 


 


Tube Feeding 35 ml 


 


Output Urine Total 350 ml 400 ml


 


# Bowel Movements  1








Dressing:  saturated


Wound:  clean


Cardiovascular:  RSR


Respiratory:  decreased breath sounds


Abdomen:  soft, non-tender, present bowel sounds


Extremities:  no edema, no tenderness, no cyanosis





Laboratory Tests








Test


  9/7/20


06:30


 


White Blood Count


  14.4 K/UL


(4.8-10.8)  H


 


Red Blood Count


  3.05 M/UL


(4.20-5.40)  L


 


Hemoglobin


  9.7 G/DL


(12.0-16.0)  L


 


Hematocrit


  30.9 %


(37.0-47.0)  L


 


Mean Corpuscular Volume


  101 FL (80-99)


H


 


Mean Corpuscular Hemoglobin


  31.8 PG


(27.0-31.0)  H


 


Mean Corpuscular Hemoglobin


Concent 31.4 G/DL


(32.0-36.0)  L


 


Red Cell Distribution Width


  17.4 %


(11.6-14.8)  H


 


Platelet Count


  139 K/UL


(150-450)  L


 


Mean Platelet Volume


  7.5 FL


(6.5-10.1)


 


Neutrophils (%) (Auto)


  73.4 %


(45.0-75.0)


 


Lymphocytes (%) (Auto)


  19.4 %


(20.0-45.0)  L


 


Monocytes (%) (Auto)


  5.2 %


(1.0-10.0)


 


Eosinophils (%) (Auto)


  1.0 %


(0.0-3.0)


 


Basophils (%) (Auto)


  1.0 %


(0.0-2.0)


 


Sodium Level


  143 MMOL/L


(136-145)


 


Potassium Level


  4.1 MMOL/L


(3.5-5.1)


 


Chloride Level


  107 MMOL/L


()


 


Carbon Dioxide Level


  26 MMOL/L


(21-32)


 


Anion Gap


  10 mmol/L


(5-15)


 


Blood Urea Nitrogen


  38 mg/dL


(7-18)  H


 


Creatinine


  1.9 MG/DL


(0.55-1.30)  H


 


Estimat Glomerular Filtration


Rate 26.0 mL/min


(>60)


 


Glucose Level


  100 MG/DL


()


 


Calcium Level


  9.3 MG/DL


(8.5-10.1)


 


Phosphorus Level


  4.2 MG/DL


(2.5-4.9)


 


Magnesium Level


  2.1 MG/DL


(1.8-2.4)


 


Total Bilirubin


  0.4 MG/DL


(0.2-1.0)


 


Aspartate Amino Transf


(AST/SGOT) 19 U/L (15-37)


 


 


Alanine Aminotransferase


(ALT/SGPT) 22 U/L (12-78)


 


 


Alkaline Phosphatase


  124 U/L


()  H


 


Total Protein


  5.7 G/DL


(6.4-8.2)  L


 


Albumin


  2.2 G/DL


(3.4-5.0)  L


 


Globulin 3.5 g/dL  


 


Albumin/Globulin Ratio


  0.6 (1.0-2.7)


L











Plan


Problems:  


(1) Anemia


(2) 2019 novel coronavirus disease (COVID-19)


(3) Pneumonia


(4) Multiple drug resistant organism (MDRO) culture positive


(5) Hyperkalemia


(6) Hypernatremia


(7) Severe dehydration


(8) Acute urinary retention


(9) Sepsis


Assessment & Plan:  Pt presented on admission with multiple Pressure injuries. 

Pt noted to have band of  Blotchy red rash  with open and closed Blisters 

affecting  R Brachial to R forearm. Lateral R back /R flank is also blotchy 

erythematous with clusters of serous filled blisters. On Lateral L Back to L 

flank is Blotchy, erythematous with clusters of serous filled blisters.  


Reabsorbing DTPI that is partially opened Sacrum(L) 6cm x (W)5.5cm.  Base of 

wound is Maroon with with scattered areas that are maryellen and loose peeling soft 

black cap.Surrounding Non-Blanching erythema without induration.


Non-Blanching erythema that is indurated with delineated margins R trochanter(L)

5.5cm x (W)6.5cm.


No evidence of skin breakdown L trochanter.


Non-Blanching erythema noted to R and L ischial tuberosities.


DTPI medial R heel (L)3.3cm x (W)3.4cm. Base of injury is, fluctuant, maroon 

with delineated margins. surrounding red,  boggy heel.


DTPI Lateral R Heel(L)1.5cm x (W)2.5cm. Base of injury is indurated, maroon 

with purpuric center.


Non-Blanching erythema without induration /fluctuance lateral R malleolus(L)2cm 

x (W)1.5cm.





Tx.Plan:


Apply Moisture Barrier Paste to Sacrum. Cover with Optifoam drsg. Change every 

3 days and prn.


           


Apply Cavilon Skin Barrier to R and L trochanter. Cover each site with Optifoam 

drsgs. Change every 7 days and prn.


           


Apply Moisture Barrier Paste to R and L Ischial tuberosities with each 

Incontinence care.


           


Apply Cavilon Skin Barrier to Medial and Lateral R Heel. Cover with Optifoam 

drsg. Change every7 days and prn.


           


Apply Cavilon Skin Barrier to R lateral ,and medial Malleoli. Cover each site 

with Optifoam drsgs. Change every 7 days and prn.


           


Apply Cavilon Skin Barrier to L Heel and Malleoli. Cover each site with 

Optifoam drsgs. Change every 7 days and prn.


           


Cover open blisters as needed with Optifoam drsgs. 


           


Reposition at least every 2 hours or as tolerated.


           


Off-load heels with pillow.


           


Nutritional optimization





DAILY ESTIMATED NEEDS:


Needs based on Sepsis, renal, wound 50.9kg  


25-35  kcals/kg 


4015-6183  total kcals


1.25-1.5  g protein/kg


64-76  g total protein 


25-30  mL/kg


7273-0577  total fluid mLs





NUTRITION DIAGNOSIS:


1. Swallowing difficulty r/t dysphagia as evidenced by pt is GT dep.


2. Altered nutrition related lab values r/t sepsis and severe dehydration,


pre-diabetes as evidenced by Elev WBC (31.6 ->18.2), elev Na (179-> WNL),


elev BUN (131->48), elev Creat (5.1->2.2), elev K (5.5-> wnl->5.5, 5.3),


elev BGs (93, 113, 185), A1C of 6.4.





CURRENT TF: Nepro @35  








ENTERAL NUTRITION RECOMMENDATIONS:


LOWER GOAL RATE -> NEPRO @ 35ML/HR X 24 HRS  to provide 840ml, 1512kcal, 68g 

prot, 610ml free water 





- LOWER GOAL RATE: meets 100% est kcal/prot needs


- Flush per MD, DIAN over 30 degrees.








ADDITIONAL RECOMMENDATIONS:


1) Monitor lytes and renal status-> TF change to Nepro (8/28), elev K 


2) Per [facility] wt of 112# 


3) NISS for BG control: A1C 6.4, TF at goal + added D5+ Decadron 


4) Wound healing: TF @ goal provides 100% RDI 


    add Vit C 500mg QD + José Manuel BID via GT  





micro reviewed


covid negative


c diff negative


bc ngtd 


wounds do not seem to be etiology of infection 





(10) UTI (urinary tract infection)


(11) ARF (acute renal failure)


(12) Psychosis











Alex Nicholas Sep 7, 2020 12:17

## 2020-09-07 NOTE — INTERNAL MED PROGRESS NOTE
Subjective


Physician Name


Umer Wahl


Attending Physician


Umer Wahl MD





Current Medications








 Medications


  (Trade)  Dose


 Ordered  Sig/Raji


 Route


 PRN Reason  Start Time


 Stop Time Status Last Admin


Dose Admin


 


 Acetaminophen


  (Tylenol)  650 mg  Q6H  PRN


 GT


 Mild Pain (Pain Scale 1-3)  9/4/20 19:41


 10/4/20 19:40  9/7/20 09:35


 


 


 Ascorbic Acid


  (Vitamin C)  500 mg  DAILY


 GT


   9/5/20 09:00


 10/4/20 08:59  9/7/20 09:35


 


 


 Clonidine HCl


  (Catapres Tab)  0.1 mg  Q4H  PRN


 GT


 bp over 160 syst  9/4/20 19:41


 12/3/20 19:40   


 


 


 Dextrose  1,000 ml @ 


 75 mls/hr  [id number]


 IV


   9/4/20 19:41


 10/4/20 19:40  9/6/20 01:50


 


 


 Docusate Sodium


  (Colace)  100 mg  EVERY 8  HOURS


 GT


   9/4/20 22:00


 9/20/20 08:59  9/6/20 13:36


 


 


 Enoxaparin Sodium


  (Lovenox)  40 mg  DAILY


 SUBQ


   9/5/20 09:00


 11/19/20 08:59  9/5/20 10:10


 


 


 Epoetin Jaswant


  (Epoetin


 Jaswant-EPBX(NON


 ESRD))  10,000 unit  MON-WED-FRI


 SUBQ


   9/4/20 21:00


 11/29/20 20:59  9/4/20 21:54


 


 


 Lorazepam


  (Ativan 2mg/ml


 1ml)  1 mg  Q4H  PRN


 IV


 For Anxiety  9/4/20 19:41


 9/11/20 19:40   


 


 


 Micafungin Sodium


 100 mg/Sodium


 Chloride  110 ml @ 


 110 mls/hr  Q24H


 IVPB


   9/6/20 17:00


 9/13/20 16:59  9/6/20 17:00


 


 


 Minocycline HCl


  (Minocin)  100 mg  Q12HR


 ORAL


   9/7/20 10:00


 9/14/20 09:59  9/7/20 09:44


 


 


 Pantoprazole


  (Protonix)  40 mg  EVERY 12  HOURS


 IVP


   9/4/20 21:00


 9/20/20 20:59  9/7/20 09:35


 


 


 Polyethylene


 Glycol


  (Miralax)  17 gm  BEDTIME


 GT


   9/4/20 21:00


 9/20/20 20:59  9/5/20 22:02


 


 


 Trimethoprim/


 Sulfamethoxazole


 10 ml/Dextrose  285 ml @ 


 190 mls/hr  EVERY 12  HOURS


 IV


   9/7/20 10:00


 9/14/20 09:59  9/7/20 10:08


 








Allergies:  


Coded Allergies:  


     AMPICILLIN (Verified  Allergy, Unknown, hives, 8/21/20)


     PENICILLINS (Verified  Allergy, Unknown, hives, 8/21/20)


     TETRACYCLINES (Verified  Allergy, Unknown, hives, 8/21/20)


Subjective


awake, less Responsive, open her eyes, renal function stable, WBC: 14.4..





Objective





Last Vital Signs








  Date Time  Temp Pulse Resp B/P (MAP) Pulse Ox O2 Delivery O2 Flow Rate FiO2


 


9/7/20 12:00      Nasal Cannula 4.0 


 


9/7/20 12:00  92      


 


9/7/20 12:00 98.8  19 90/72 (78) 98   


 


9/6/20 08:46        32











Laboratory Tests








Test


  9/7/20


06:30


 


White Blood Count


  14.4 K/UL


(4.8-10.8)  H


 


Red Blood Count


  3.05 M/UL


(4.20-5.40)  L


 


Hemoglobin


  9.7 G/DL


(12.0-16.0)  L


 


Hematocrit


  30.9 %


(37.0-47.0)  L


 


Mean Corpuscular Volume


  101 FL (80-99)


H


 


Mean Corpuscular Hemoglobin


  31.8 PG


(27.0-31.0)  H


 


Mean Corpuscular Hemoglobin


Concent 31.4 G/DL


(32.0-36.0)  L


 


Red Cell Distribution Width


  17.4 %


(11.6-14.8)  H


 


Platelet Count


  139 K/UL


(150-450)  L


 


Mean Platelet Volume


  7.5 FL


(6.5-10.1)


 


Neutrophils (%) (Auto)


  73.4 %


(45.0-75.0)


 


Lymphocytes (%) (Auto)


  19.4 %


(20.0-45.0)  L


 


Monocytes (%) (Auto)


  5.2 %


(1.0-10.0)


 


Eosinophils (%) (Auto)


  1.0 %


(0.0-3.0)


 


Basophils (%) (Auto)


  1.0 %


(0.0-2.0)


 


Sodium Level


  143 MMOL/L


(136-145)


 


Potassium Level


  4.1 MMOL/L


(3.5-5.1)


 


Chloride Level


  107 MMOL/L


()


 


Carbon Dioxide Level


  26 MMOL/L


(21-32)


 


Anion Gap


  10 mmol/L


(5-15)


 


Blood Urea Nitrogen


  38 mg/dL


(7-18)  H


 


Creatinine


  1.9 MG/DL


(0.55-1.30)  H


 


Estimat Glomerular Filtration


Rate 26.0 mL/min


(>60)


 


Glucose Level


  100 MG/DL


()


 


Calcium Level


  9.3 MG/DL


(8.5-10.1)


 


Phosphorus Level


  4.2 MG/DL


(2.5-4.9)


 


Magnesium Level


  2.1 MG/DL


(1.8-2.4)


 


Total Bilirubin


  0.4 MG/DL


(0.2-1.0)


 


Aspartate Amino Transf


(AST/SGOT) 19 U/L (15-37)


 


 


Alanine Aminotransferase


(ALT/SGPT) 22 U/L (12-78)


 


 


Alkaline Phosphatase


  124 U/L


()  H


 


Total Protein


  5.7 G/DL


(6.4-8.2)  L


 


Albumin


  2.2 G/DL


(3.4-5.0)  L


 


Globulin 3.5 g/dL  


 


Albumin/Globulin Ratio


  0.6 (1.0-2.7)


L











Microbiology








 Date/Time


Source Procedure


Growth Status


 


 


 9/5/20 03:00


Sputum Gram Stain - Final Resulted


 


 9/5/20 03:00 Sputum Culture - Preliminary


YEAST


Usual Respiratory Autumn Resulted

















Intake and Output  


 


 9/6/20 9/7/20





 19:00 07:00


 


Intake Total 235 ml 


 


Output Total 350 ml 400 ml


 


Balance -115 ml -400 ml


 


  


 


Intake Free Water 200 ml 


 


Tube Feeding 35 ml 


 


Output Urine Total 350 ml 400 ml


 


# Bowel Movements  1








Objective


General: No acute distress, awake, responsive, open her eyes, talking.


HEENT: NCAT, sclera anicteric, PERRL, EOMI, 


Neck: Supple, no significant jugular venous distention, 


Lungs: Decreased at the bases, no Wheeze or Rales.


Heart: Regular rate and rhythm, normal S1/S2, no murmurs/


Abdomen: soft, nontender, nondistended. Normoactive bowel sounds, PEG intact.


: Mitchell cath


Extremities: No Cyanosis , clubbing or edema. 


Neuro: limited secondary to  patient status, contracted lower extremities.


Skin: warm, rash at the chest wall.





Assessment/Plan


Assessment/Plan


(1) UTI (urinary tract infection)


(2) Sepsis


(3) Hypernatremia


(4) Severe dehydration


(5) ARF (acute renal failure) on CKD.


(6) Pneumonia


(7) Hyperkalemia


(8) History of COVID-19 infection last month.





Plan:


Continue IV bactrim #15/14-21 and PO Minocycline 200mg bid #14/14-21 (high dose

) MDR ABC PNA based on cultures


DVT prophylaxis: Lovenox injection


CODE STATUS: Full code.


Monitor laboratory as  well as cultures.


Tube feeding at 35 cc/h


In telemetry floor


COVID-19 tests negative on (9/1) & (9/3/).











Umer Wahl MD Sep 7, 2020 14:02

## 2020-09-07 NOTE — INFECTIOUS DISEASES PROG NOTE
Assessment/Plan


69yo F with:





Sepsis


Fever; SP


Leukocytosis; worsening (sp steroids)


    -9/3 u/a neg


          Bcx p


JASWINDER to 5.1, improving 


Hypoxic resp failure, sp NRB mask >VM> 4l NC 9/1


VRE UTI


Hx of COVID-19 1 mo ago ( doubt recurrent COVID-19) 


HAP


            9/3 sp cx p


            9/1 rapid COVID PCR neg; 9/3 rapid COVID PCR neg


   8/29 CXR:   Improving left lower lobe infiltrate and left pleural effusion. 

There is a probable small right pleural effusion.


   8/27 cdiff neg


           8/26 CXR: There are increased interstitial congestion and left 

pleural effusion,since prior exam of 8/22/2020


               Bcx NTD


            8/22 sp cx MDR ABC (S bactrim; I Colistin, Polymixin B, Minocycline)


   8/21 BCx NTD


   8/21 UA+, UCx >100k VRE (S Zyvox)


   8/21 CXR: Left retrocardiac mild atelectasis versus infiltrate.


   8/21 COVID rapid neg; 8/22 repeat COVID-19 +








BL arm rash, appears evolving, now small pustules with dry/crusting, querry 

resolving Shingles? Though odd to have on both arms


   8/21 HSV & VZV PCR ordered





Plan:


Continue IV bactrim #15/14-21 and PO Minocycline 200mg bid #14/14-21 (high dose

) MDR ABC PNA based on cultures


   -8/29 SP ZYvox #7





       --8/24 SP Cefepime #4


       --8/23 SP IV Vancomcyin #4





F/u cx


Monitor CBC/BMP


Trend BUE rash


Dc covid isolation


f/u Bcx x2, Sp cx


f/u flow cytometry


f/u repeat cultures


CBC am





Discussed with RN





Subjective


Allergies:  


Coded Allergies:  


     AMPICILLIN (Verified  Allergy, Unknown, hives, 8/21/20)


     PENICILLINS (Verified  Allergy, Unknown, hives, 8/21/20)


     TETRACYCLINES (Verified  Allergy, Unknown, hives, 8/21/20)


Afebrile


Leukocytosis improved today


On 4L NC and satting well


KATIE





Objective





Last 24 Hour Vital Signs








  Date Time  Temp Pulse Resp B/P (MAP) Pulse Ox O2 Delivery O2 Flow Rate FiO2


 


9/7/20 04:00      Nasal Cannula 4.0 


 


9/7/20 04:00 98.8 102 24 96/42 (60) 98   


 


9/7/20 04:00  101      


 


9/7/20 00:00  105      


 


9/7/20 00:00 100.0 103 25 103/53 (70) 98   


 


9/7/20 00:00      Nasal Cannula 4.0 


 


9/6/20 20:00 99.8 98 20 128/58 (81) 98   


 


9/6/20 20:00  102      


 


9/6/20 20:00      Nasal Cannula 4.0 


 


9/6/20 16:00  100      


 


9/6/20 16:00      Nasal Cannula 4.0 


 


9/6/20 16:00 96.8 101 18 109/67 (81) 96   


 


9/6/20 12:00 97.9 96 20 105/96 (99) 97   


 


9/6/20 12:00      Nasal Cannula 4.0 


 


9/6/20 12:00  97      








Height (Feet):  5


Height (Inches):  2.00


Weight (Pounds):  123


GEN: NAD on NC


HEENT: NCAT, MMM, EOMI


Pulm: Equal chest rise and fall B/L, NO accessory muscle use


ABD: Soft, ND





Microbiology








 Date/Time


Source Procedure


Growth Status


 


 


 9/5/20 03:00


Sputum Gram Stain - Final Resulted


 


 9/5/20 03:00 Sputum Culture - Preliminary


YEAST


Usual Respiratory Autumn Resulted











Laboratory Tests








Test


  9/6/20


10:42 9/7/20


06:30


 


White Blood Count


  14.1 K/UL


(4.8-10.8)  H 14.4 K/UL


(4.8-10.8)  H


 


Red Blood Count


  2.89 M/UL


(4.20-5.40)  L 3.05 M/UL


(4.20-5.40)  L


 


Hemoglobin


  9.5 G/DL


(12.0-16.0)  L 9.7 G/DL


(12.0-16.0)  L


 


Hematocrit


  29.2 %


(37.0-47.0)  L 30.9 %


(37.0-47.0)  L


 


Mean Corpuscular Volume


  101 FL (80-99)


H 101 FL (80-99)


H


 


Mean Corpuscular Hemoglobin


  32.9 PG


(27.0-31.0)  H 31.8 PG


(27.0-31.0)  H


 


Mean Corpuscular Hemoglobin


Concent 32.6 G/DL


(32.0-36.0) 31.4 G/DL


(32.0-36.0)  L


 


Red Cell Distribution Width


  18.0 %


(11.6-14.8)  H 17.4 %


(11.6-14.8)  H


 


Platelet Count


  130 K/UL


(150-450)  L 139 K/UL


(150-450)  L


 


Mean Platelet Volume


  7.7 FL


(6.5-10.1) 7.5 FL


(6.5-10.1)


 


Neutrophils (%) (Auto)


  72.6 %


(45.0-75.0) 73.4 %


(45.0-75.0)


 


Lymphocytes (%) (Auto)


  18.3 %


(20.0-45.0)  L 19.4 %


(20.0-45.0)  L


 


Monocytes (%) (Auto)


  6.4 %


(1.0-10.0) 5.2 %


(1.0-10.0)


 


Eosinophils (%) (Auto)


  1.7 %


(0.0-3.0) 1.0 %


(0.0-3.0)


 


Basophils (%) (Auto)


  1.0 %


(0.0-2.0) 1.0 %


(0.0-2.0)


 


Sodium Level


  


  143 MMOL/L


(136-145)


 


Potassium Level


  


  4.1 MMOL/L


(3.5-5.1)


 


Chloride Level


  


  107 MMOL/L


()


 


Carbon Dioxide Level


  


  26 MMOL/L


(21-32)


 


Anion Gap


  


  10 mmol/L


(5-15)


 


Blood Urea Nitrogen


  


  38 mg/dL


(7-18)  H


 


Creatinine


  


  1.9 MG/DL


(0.55-1.30)  H


 


Estimat Glomerular Filtration


Rate 


  26.0 mL/min


(>60)


 


Glucose Level


  


  100 MG/DL


()


 


Calcium Level


  


  9.3 MG/DL


(8.5-10.1)


 


Phosphorus Level


  


  4.2 MG/DL


(2.5-4.9)


 


Magnesium Level


  


  2.1 MG/DL


(1.8-2.4)


 


Total Bilirubin


  


  0.4 MG/DL


(0.2-1.0)


 


Aspartate Amino Transf


(AST/SGOT) 


  19 U/L (15-37)


 


 


Alanine Aminotransferase


(ALT/SGPT) 


  22 U/L (12-78)


 


 


Alkaline Phosphatase


  


  124 U/L


()  H


 


Total Protein


  


  5.7 G/DL


(6.4-8.2)  L


 


Albumin


  


  2.2 G/DL


(3.4-5.0)  L


 


Globulin  3.5 g/dL  


 


Albumin/Globulin Ratio


  


  0.6 (1.0-2.7)


L











Current Medications








 Medications


  (Trade)  Dose


 Ordered  Sig/Raji


 Route


 PRN Reason  Start Time


 Stop Time Status Last Admin


Dose Admin


 


 Acetaminophen


  (Tylenol)  650 mg  Q6H  PRN


 GT


 Mild Pain (Pain Scale 1-3)  9/4/20 19:41


 10/4/20 19:40   


 


 


 Ascorbic Acid


  (Vitamin C)  500 mg  DAILY


 GT


   9/5/20 09:00


 10/4/20 08:59  9/6/20 10:10


 


 


 Clonidine HCl


  (Catapres Tab)  0.1 mg  Q4H  PRN


 GT


 bp over 160 syst  9/4/20 19:41


 12/3/20 19:40   


 


 


 Dextrose  1,000 ml @ 


 75 mls/hr  [id number]


 IV


   9/4/20 19:41


 10/4/20 19:40  9/6/20 01:50


 


 


 Docusate Sodium


  (Colace)  100 mg  EVERY 8  HOURS


 GT


   9/4/20 22:00


 9/20/20 08:59  9/6/20 13:36


 


 


 Enoxaparin Sodium


  (Lovenox)  40 mg  DAILY


 SUBQ


   9/5/20 09:00


 11/19/20 08:59  9/5/20 10:10


 


 


 Epoetin Jaswant


  (Epoetin


 Jaswant-EPBX(NON


 ESRD))  10,000 unit  MON-WED-FRI


 SUBQ


   9/4/20 21:00


 11/29/20 20:59  9/4/20 21:54


 


 


 Lorazepam


  (Ativan 2mg/ml


 1ml)  1 mg  Q4H  PRN


 IV


 For Anxiety  9/4/20 19:41


 9/11/20 19:40   


 


 


 Micafungin Sodium


 100 mg/Sodium


 Chloride  110 ml @ 


 110 mls/hr  Q24H


 IVPB


   9/6/20 17:00


 9/13/20 16:59  9/6/20 17:00


 


 


 Minocycline HCl


  (Minocin)  200 mg  Q12HR


 ORAL


   9/4/20 21:00


 9/7/20 23:59  9/7/20 00:22


 


 


 Pantoprazole


  (Protonix)  40 mg  EVERY 12  HOURS


 IVP


   9/4/20 21:00


 9/20/20 20:59  9/7/20 00:22


 


 


 Polyethylene


 Glycol


  (Miralax)  17 gm  BEDTIME


 GT


   9/4/20 21:00


 9/20/20 20:59  9/5/20 22:02


 

















Sadiq Medina MD Sep 7, 2020 09:21

## 2020-09-08 VITALS — DIASTOLIC BLOOD PRESSURE: 63 MMHG | SYSTOLIC BLOOD PRESSURE: 110 MMHG

## 2020-09-08 VITALS — DIASTOLIC BLOOD PRESSURE: 62 MMHG | SYSTOLIC BLOOD PRESSURE: 130 MMHG

## 2020-09-08 VITALS — SYSTOLIC BLOOD PRESSURE: 117 MMHG | DIASTOLIC BLOOD PRESSURE: 68 MMHG

## 2020-09-08 VITALS — DIASTOLIC BLOOD PRESSURE: 54 MMHG | SYSTOLIC BLOOD PRESSURE: 95 MMHG

## 2020-09-08 VITALS — DIASTOLIC BLOOD PRESSURE: 61 MMHG | SYSTOLIC BLOOD PRESSURE: 100 MMHG

## 2020-09-08 VITALS — SYSTOLIC BLOOD PRESSURE: 100 MMHG | DIASTOLIC BLOOD PRESSURE: 47 MMHG

## 2020-09-08 LAB
ADD MANUAL DIFF: NO
ALBUMIN SERPL-MCNC: 2.2 G/DL (ref 3.4–5)
ALBUMIN/GLOB SERPL: 0.6 {RATIO} (ref 1–2.7)
ALP SERPL-CCNC: 143 U/L (ref 46–116)
ALT SERPL-CCNC: 21 U/L (ref 12–78)
ANION GAP SERPL CALC-SCNC: 7 MMOL/L (ref 5–15)
AST SERPL-CCNC: 18 U/L (ref 15–37)
BASOPHILS NFR BLD AUTO: 0.7 % (ref 0–2)
BILIRUB SERPL-MCNC: 0.4 MG/DL (ref 0.2–1)
BUN SERPL-MCNC: 38 MG/DL (ref 7–18)
CALCIUM SERPL-MCNC: 9.4 MG/DL (ref 8.5–10.1)
CHLORIDE SERPL-SCNC: 107 MMOL/L (ref 98–107)
CO2 SERPL-SCNC: 29 MMOL/L (ref 21–32)
CREAT SERPL-MCNC: 1.8 MG/DL (ref 0.55–1.3)
EOSINOPHIL NFR BLD AUTO: 0.8 % (ref 0–3)
ERYTHROCYTE [DISTWIDTH] IN BLOOD BY AUTOMATED COUNT: 18.2 % (ref 11.6–14.8)
GLOBULIN SER-MCNC: 3.9 G/DL
HCT VFR BLD CALC: 34.1 % (ref 37–47)
HGB BLD-MCNC: 10.7 G/DL (ref 12–16)
LYMPHOCYTES NFR BLD AUTO: 17.7 % (ref 20–45)
MCV RBC AUTO: 102 FL (ref 80–99)
MONOCYTES NFR BLD AUTO: 6.2 % (ref 1–10)
NEUTROPHILS NFR BLD AUTO: 74.6 % (ref 45–75)
PHOSPHATE SERPL-MCNC: 3.4 MG/DL (ref 2.5–4.9)
PLATELET # BLD: 159 K/UL (ref 150–450)
POTASSIUM SERPL-SCNC: 4.3 MMOL/L (ref 3.5–5.1)
RBC # BLD AUTO: 3.36 M/UL (ref 4.2–5.4)
SODIUM SERPL-SCNC: 143 MMOL/L (ref 136–145)
WBC # BLD AUTO: 13.6 K/UL (ref 4.8–10.8)

## 2020-09-08 RX ADMIN — POLYETHYLENE GLYCOL 3350 SCH GM: 17 POWDER, FOR SOLUTION ORAL at 20:28

## 2020-09-08 RX ADMIN — MINOCYCLINE HYDROCHLORIDE SCH MG: 50 CAPSULE ORAL at 08:41

## 2020-09-08 RX ADMIN — PANTOPRAZOLE SODIUM SCH MG: 40 INJECTION, POWDER, FOR SOLUTION INTRAVENOUS at 20:27

## 2020-09-08 RX ADMIN — SULFAMETHOXAZOLE AND TRIMETHOPRIM SCH MLS/HR: 80; 16 INJECTION, SOLUTION, CONCENTRATE INTRAVENOUS at 08:41

## 2020-09-08 RX ADMIN — PANTOPRAZOLE SODIUM SCH MG: 40 INJECTION, POWDER, FOR SOLUTION INTRAVENOUS at 08:41

## 2020-09-08 RX ADMIN — DOCUSATE SODIUM SCH MG: 50 LIQUID ORAL at 13:33

## 2020-09-08 RX ADMIN — Medication SCH MG: at 08:40

## 2020-09-08 RX ADMIN — DOCUSATE SODIUM SCH MG: 50 LIQUID ORAL at 06:39

## 2020-09-08 RX ADMIN — DOCUSATE SODIUM SCH MG: 50 LIQUID ORAL at 21:47

## 2020-09-08 RX ADMIN — DEXTROSE MONOHYDRATE SCH MLS/HR: 50 INJECTION, SOLUTION INTRAVENOUS at 17:15

## 2020-09-08 RX ADMIN — MINOCYCLINE HYDROCHLORIDE SCH MG: 50 CAPSULE ORAL at 20:27

## 2020-09-08 RX ADMIN — SULFAMETHOXAZOLE AND TRIMETHOPRIM SCH MLS/HR: 80; 16 INJECTION, SOLUTION, CONCENTRATE INTRAVENOUS at 20:28

## 2020-09-08 RX ADMIN — DOCUSATE SODIUM SCH MG: 50 LIQUID ORAL at 04:58

## 2020-09-08 RX ADMIN — ENOXAPARIN SODIUM SCH MG: 40 INJECTION SUBCUTANEOUS at 08:41

## 2020-09-08 NOTE — NEPHROLOGY PROGRESS NOTE
Assessment/Plan


Problem List:  


(1) ARF (acute renal failure)


Assessment:  Underlying CKD





(2) Sepsis


(3) UTI (urinary tract infection)


(4) Severe dehydration


(5) Hypernatremia


(6) Acute urinary retention


(7) Anemia


Assessment





Acute renal failure


Possible underlying chronic renal insufficiency


Severe dehydration


Hypotension


Sepsis, UTI


History of psychiatric disease


Elevated troponin I


Patient full code


Plan


September 8: Labs reviewed.  Serum creatinine stable.  Electrolytes within 

reasonable range.


September 7: Labs reviewed.  Remains stable from renal standpoint of view, as 

serum creatinine leveled off around 1.8 and 1.9.  Will continue to monitor 

renal parameters


September 6: No chemistry panel done today.  Status quo.  Continue per 

consultants.


September 5: Status quo.  Labs reviewed.  Stable from renal standpoint of view.


September 4: Status quo.  Labs reviewed.  Serum creatinine unchanged.  Continue 

per consultants.


September 3:Late note entry due to system problem at the [facility] today. Labs 

reviewed.  Electrolytes within normal limit.  Leukocytosis worsened.  Continue 

per consultants.  Serum creatinine 1.8 unchanged.


September 2: Low magnesium and low phosphorus corrected.  Continue per 

consultants.  On nasal cannula now.  Serum creatinine 1.8.


September 1: Serum creatinine 1.8 stable.  Continue per pulmonary.  Continue to 

monitor renal parameters.


August 31: On Venturi mask.  Serum creatinine lowered to 1.8.  Continue per 

consultants.  Medication list reviewed.  Potassium supplement given.


August 30: Status quo.  Creatinine lower at 2.  Other electrolytes within 

normal limits.  Continue per consultants.


August 29: Labs reviewed.  Creatinine 2.2 stable.  Potassium 3.1.  20 M EQ KCl 

given.  Continue her consultants.


August 28: Serum potassium 5.5.Creatinine 2.2.  1 dose Kayexalate given.  

Remains of 75 cc IV fluid.  We will continue to monitor renal parameters.


August 27: Serum potassium 5.3.  Creatinine higher at 2.2.  Medication list 

reviewed.  Suggest to avoid nephrotoxic's like Bactrim if possible.  Continue 

to monitor renal parameters and electrolytes.  Continue per pulmonary to adjust 

pulmonary status.  May require BiPAP.


August 26: Serum sodium lower.  Renal parameters appear more stable.  

Nevertheless leukocytosis is worsened.  Continue per consultants.


August 25: Serum sodium higher.  Continue D5W 75 cc an hour.  Continue to 

monitor electrolytes.  Continue per consultants.  Serum creatinine down to 2.4 

from 5.1 on admission.  Leukocytosis persists.


August 24: Patient now on isolation for COVID-19.  Serum creatinine is 

plateauing.  Will decrease the IV fluid to 75 cc an hour.  Continue management 

per ID.  Will monitor electrolytes and renal parameters.


August 23: Discussed with MARTIN Carter.  Patient needs a Mitchell catheter.  Accurate 

intake and output.  Decrease  cc D5W an hour.  1 dose of IV Venofer and 

then subcu Epogen ordered for anemia.  Continue to monitor renal parameters


August 22: Renal parameters improving.  Continue D5W IV fluid.  Potassium 

supplement IV given.  Continue per consultants.





Previously:


Fluid challenge


Monitor renal parameters and electrolytes


Monitor intake and output


Mitchell catheter


Antibiotics


Avoid nephrotoxic's


Chest x-ray





Kidney ultrasound findings: 


Right kidney measures 8.9 cm in length. Left kidney measures 9 cm in


length. Both kidneys demonstrate markedly increased echogenicity. No 

hydronephrosis. 


There are bilateral renal cysts. Normal inferior vena cava. Bladder is empty,


contains a Mitchell catheter. 


 


Incidentally noted are gallstones.


 


Impression: Markedly echogenic kidneys, consistent with medical renal disease


Negative for hydronephrosis


Empty bladder with a Mitchell catheter


Incidental finding of cholelithiasis.





Subjective


ROS Limited/Unobtainable:  No


Constitutional:  Reports: malaise





Objective


Objective





Last 24 Hour Vital Signs








  Date Time  Temp Pulse Resp B/P (MAP) Pulse Ox O2 Delivery O2 Flow Rate FiO2


 


9/8/20 12:00 98.0 97 19 110/63 (79) 97   


 


9/8/20 09:36     95 Nasal Cannula 3.0 32


 


9/8/20 09:00      Room Air  


 


9/8/20 08:00 97.8 94 18 100/61 (74) 98   


 


9/8/20 05:01      Nasal Cannula 4.0 


 


9/8/20 04:19      Nasal Cannula 4.0 


 


9/8/20 04:19  99      


 


9/8/20 04:00 97.6 91 18 95/54 (68) 97   


 


9/8/20 00:00 97.9 96 18 100/47 (64) 97   


 


9/8/20 00:00  90      


 


9/8/20 00:00      Nasal Cannula 4.0 


 


9/7/20 21:51     96 Room Air  


 


9/7/20 20:00      Nasal Cannula 4.0 


 


9/7/20 20:00 98.0 95 19 98/51 (67) 97   


 


9/7/20 20:00  90      


 


9/7/20 16:00  90      


 


9/7/20 16:00      Nasal Cannula 4.0 


 


9/7/20 16:00 98.0 90 20 132/53 (79) 99   

















Intake and Output  


 


 9/7/20 9/8/20





 19:00 07:00


 


Intake Total  350 ml


 


Output Total 650 ml 1800 ml


 


Balance -650 ml -1450 ml


 


  


 


Tube Feeding  350 ml


 


Output Urine Total 650 ml 1800 ml


 


# Bowel Movements  1








Laboratory Tests


9/8/20 06:40: 


White Blood Count 13.6H, Red Blood Count 3.36L, Hemoglobin 10.7L, Hematocrit 

34.1L, Mean Corpuscular Volume 102H, Mean Corpuscular Hemoglobin 31.8H, Mean 

Corpuscular Hemoglobin Concent 31.3L, Red Cell Distribution Width 18.2H, 

Platelet Count 159, Mean Platelet Volume 7.5, Neutrophils (%) (Auto) 74.6, 

Lymphocytes (%) (Auto) 17.7L, Monocytes (%) (Auto) 6.2, Eosinophils (%) (Auto) 

0.8, Basophils (%) (Auto) 0.7, Erythrocyte Sedimentation Rate 53H, Sodium Level 

143, Potassium Level 4.3, Chloride Level 107, Carbon Dioxide Level 29, Anion 

Gap 7, Blood Urea Nitrogen 38H, Creatinine 1.8H, Estimat Glomerular Filtration 

Rate 27.7, Glucose Level 103, Calcium Level 9.4, Phosphorus Level 3.4, 

Magnesium Level 2.1, Total Bilirubin 0.4, Aspartate Amino Transf (AST/SGOT) 18, 

Alanine Aminotransferase (ALT/SGPT) 21, Alkaline Phosphatase 143H, C-Reactive 

Protein, Quantitative 2.8H, Total Protein 6.1L, Albumin 2.2L, Globulin 3.9, 

Albumin/Globulin Ratio 0.6L


Height (Feet):  5


Height (Inches):  2.00


Weight (Pounds):  123


General Appearance:  no apparent distress


EENT:  other


Cardiovascular:  tachycardia - Heart rate 90s


Respiratory/Chest:  decreased breath sounds


Abdomen:  distended











Derik Dos Santos MD Sep 8, 2020 13:12

## 2020-09-08 NOTE — PULMONOLOGY PROGRESS NOTE
Subjective


ROS Limited/Unobtainable:  No


Constitutional:  Reports: no symptoms


HEENT:  Repors: no symptoms


Respiratory:  Reports: no symptoms


Allergies:  


Coded Allergies:  


     AMPICILLIN (Verified  Allergy, Unknown, hives, 8/21/20)


     PENICILLINS (Verified  Allergy, Unknown, hives, 8/21/20)


     TETRACYCLINES (Verified  Allergy, Unknown, hives, 8/21/20)





Objective





Last 24 Hour Vital Signs








  Date Time  Temp Pulse Resp B/P (MAP) Pulse Ox O2 Delivery O2 Flow Rate FiO2


 


9/8/20 09:36     95 Nasal Cannula 3.0 32


 


9/8/20 09:00      Room Air  


 


9/8/20 08:00 97.8 94 18 100/61 (74) 98   


 


9/8/20 05:01      Nasal Cannula 4.0 


 


9/8/20 04:19      Nasal Cannula 4.0 


 


9/8/20 04:19  99      


 


9/8/20 04:00 97.6 91 18 95/54 (68) 97   


 


9/8/20 00:00 97.9 96 18 100/47 (64) 97   


 


9/8/20 00:00  90      


 


9/8/20 00:00      Nasal Cannula 4.0 


 


9/7/20 21:51     96 Room Air  


 


9/7/20 20:00      Nasal Cannula 4.0 


 


9/7/20 20:00 98.0 95 19 98/51 (67) 97   


 


9/7/20 20:00  90      


 


9/7/20 16:00  90      


 


9/7/20 16:00      Nasal Cannula 4.0 


 


9/7/20 16:00 98.0 90 20 132/53 (79) 99   


 


9/7/20 12:00      Nasal Cannula 4.0 


 


9/7/20 12:00  92      


 


9/7/20 12:00 98.8 102 19 90/72 (78) 98   

















Intake and Output  


 


 9/7/20 9/8/20





 19:00 07:00


 


Intake Total  350 ml


 


Output Total 650 ml 1800 ml


 


Balance -650 ml -1450 ml


 


  


 


Tube Feeding  350 ml


 


Output Urine Total 650 ml 1800 ml


 


# Bowel Movements  1








General Appearance:  no acute distress, other - chronically ill looking 

bedridden female


HEENT:  normocephalic, atraumatic, other - VM


Respiratory:  chest wall non-tender, rhonchi - bilaterally - scattered


Cardiovascular:  normal peripheral pulses, normal rate - SR on tele


Abdomen:  normal bowel sounds, soft, non tender


Genitourinary:  normal external genitalia


Skin:  other - BUE crusting lesions


Neurologic:  CNs II-XII grossly normal, abnormal gait - bedridden


Lymphatic:  no neck adenopathy


Laboratory Tests


9/8/20 06:40: 


White Blood Count 13.6H, Red Blood Count 3.36L, Hemoglobin 10.7L, Hematocrit 

34.1L, Mean Corpuscular Volume 102H, Mean Corpuscular Hemoglobin 31.8H, Mean 

Corpuscular Hemoglobin Concent 31.3L, Red Cell Distribution Width 18.2H, 

Platelet Count 159, Mean Platelet Volume 7.5, Neutrophils (%) (Auto) 74.6, 

Lymphocytes (%) (Auto) 17.7L, Monocytes (%) (Auto) 6.2, Eosinophils (%) (Auto) 

0.8, Basophils (%) (Auto) 0.7, Erythrocyte Sedimentation Rate 53H, Sodium Level 

143, Potassium Level 4.3, Chloride Level 107, Carbon Dioxide Level 29, Anion 

Gap 7, Blood Urea Nitrogen 38H, Creatinine 1.8H, Estimat Glomerular Filtration 

Rate 27.7, Glucose Level 103, Calcium Level 9.4, Phosphorus Level 3.4, 

Magnesium Level 2.1, Total Bilirubin 0.4, Aspartate Amino Transf (AST/SGOT) 18, 

Alanine Aminotransferase (ALT/SGPT) 21, Alkaline Phosphatase 143H, C-Reactive 

Protein, Quantitative 2.8H, Total Protein 6.1L, Albumin 2.2L, Globulin 3.9, 

Albumin/Globulin Ratio 0.6L





Current Medications








 Medications


  (Trade)  Dose


 Ordered  Sig/Raji


 Route


 PRN Reason  Start Time


 Stop Time Status Last Admin


Dose Admin


 


 Acetaminophen


  (Tylenol)  650 mg  Q6H  PRN


 GT


 Mild Pain (Pain Scale 1-3)  9/8/20 06:00


 10/4/20 05:59   


 


 


 Ascorbic Acid


  (Vitamin C)  500 mg  DAILY


 GT


   9/8/20 09:00


 10/4/20 08:59  9/8/20 08:40


 


 


 Clonidine HCl


  (Catapres Tab)  0.1 mg  Q4H  PRN


 GT


 bp over 160 syst  9/8/20 07:45


 12/3/20 19:40   


 


 


 Dextrose  1,000 ml @ 


 75 mls/hr  [id number]


 IV


   9/8/20 06:00


 10/4/20 19:40  9/8/20 06:39


 


 


 Docusate Sodium


  (Colace)  100 mg  EVERY 8  HOURS


 GT


   9/8/20 06:00


 9/20/20 08:59  9/8/20 06:39


 


 


 Enoxaparin Sodium


  (Lovenox)  40 mg  DAILY


 SUBQ


   9/8/20 09:00


 11/19/20 08:59  9/8/20 08:41


 


 


 Epoetin Jaswant


  (Epoetin


 Jaswant-EPBX(NON


 ESRD))  10,000 unit  MON-WED-FRI


 SUBQ


   9/9/20 21:00


 11/29/20 20:59   


 


 


 Lorazepam


  (Ativan 2mg/ml


 1ml)  1 mg  Q4H  PRN


 IV


 For Anxiety  9/8/20 07:45


 9/11/20 19:40   


 


 


 Micafungin Sodium


 100 mg/Sodium


 Chloride  110 ml @ 


 110 mls/hr  Q24H


 IVPB


   9/8/20 17:00


 9/13/20 16:59   


 


 


 Minocycline HCl


  (Minocin)  100 mg  Q12HR


 ORAL


   9/8/20 09:00


 9/14/20 09:59  9/8/20 08:41


 


 


 Pantoprazole


  (Protonix)  40 mg  EVERY 12  HOURS


 IVP


   9/8/20 09:00


 9/20/20 20:59  9/8/20 08:41


 


 


 Polyethylene


 Glycol


  (Miralax)  17 gm  BEDTIME


 GT


   9/8/20 21:00


 9/20/20 20:59   


 


 


 Trimethoprim/


 Sulfamethoxazole


 10 ml/Dextrose  285 ml @ 


 190 mls/hr  EVERY 12  HOURS


 IV


   9/8/20 09:00


 9/14/20 09:59  9/8/20 08:41


 











Assessment/Plan


Problems:  


(1) 2019 novel coronavirus disease (COVID-19)


(2) Multiple drug resistant organism (MDRO) culture positive


(3) Sepsis


(4) ARF (acute renal failure)


(5) Severe dehydration


(6) Hypernatremia


(7) Psychosis


(8) Pacemaker


Assessment/Plan


\


lowq grade  fever


WBC still high


repeat cxr, ESR and CRP





COVID positive





f/u electrolytes


renal studies


urine electrolytes


dvt prophylaxis











Live Brambila MD Sep 8, 2020 11:55

## 2020-09-08 NOTE — INFECTIOUS DISEASES PROG NOTE
Assessment/Plan


71yo F with:





Sepsis


Fever; SP


Leukocytosis; worsening (sp steroids)


Fungemia


    -9/3 u/a neg


          Bcx 1/4 yeast


JASWINDER to 5.1, improving 


Hypoxic resp failure, sp NRB mask >VM> 4l NC 9/1


VRE UTI


Hx of COVID-19 1 mo ago ( doubt recurrent COVID-19) 


HAP


            9/3 sp cx p


            9/1 rapid COVID PCR neg; 9/3 rapid COVID PCR neg


   8/29 CXR:   Improving left lower lobe infiltrate and left pleural effusion. 

There is a probable small right pleural effusion.


   8/27 cdiff neg


           8/26 CXR: There are increased interstitial congestion and left 

pleural effusion,since prior exam of 8/22/2020


               Bcx NTD


            8/22 sp cx MDR ABC (S bactrim; I Colistin, Polymixin B, Minocycline)


   8/21 BCx NTD


   8/21 UA+, UCx >100k VRE (S Zyvox)


   8/21 CXR: Left retrocardiac mild atelectasis versus infiltrate.


   8/21 COVID rapid neg; 8/22 repeat COVID-19 +








BL arm rash, appears evolving, now small pustules with dry/crusting, querry 

resolving Shingles? Though odd to have on both arms


   8/21 HSV & VZV PCR ordered





Plan:


Continue IV bactrim #15/14-21 and PO Minocycline 200mg bid #15/14-21 (high dose

) MDR ABC PNA based on cultures


Empiric Micafungin #3 for fungemia


       -8/29 SP ZYvox #7


       --8/24 SP Cefepime #4


       --8/23 SP IV Vancomcyin #4





F/u cx


Monitor CBC/BMP


Trend BUE rash


Dc covid isolation


f/u Bcx x2, Sp cx


Bcx x2, 2d echo





Discussed with RN





Subjective


Allergies:  


Coded Allergies:  


     AMPICILLIN (Verified  Allergy, Unknown, hives, 8/21/20)


     PENICILLINS (Verified  Allergy, Unknown, hives, 8/21/20)


     TETRACYCLINES (Verified  Allergy, Unknown, hives, 8/21/20)


afebrile in 24hrs


wbc improving


fungemic





Objective





Last 24 Hour Vital Signs








  Date Time  Temp Pulse Resp B/P (MAP) Pulse Ox O2 Delivery O2 Flow Rate FiO2


 


9/8/20 12:00 98.0 97 19 110/63 (79) 97   


 


9/8/20 09:36     95 Nasal Cannula 3.0 32


 


9/8/20 09:00      Room Air  


 


9/8/20 08:00 97.8 94 18 100/61 (74) 98   


 


9/8/20 05:01      Nasal Cannula 4.0 


 


9/8/20 04:19      Nasal Cannula 4.0 


 


9/8/20 04:19  99      


 


9/8/20 04:00 97.6 91 18 95/54 (68) 97   


 


9/8/20 00:00 97.9 96 18 100/47 (64) 97   


 


9/8/20 00:00  90      


 


9/8/20 00:00      Nasal Cannula 4.0 


 


9/7/20 21:51     96 Room Air  


 


9/7/20 20:00      Nasal Cannula 4.0 


 


9/7/20 20:00 98.0 95 19 98/51 (67) 97   


 


9/7/20 20:00  90      


 


9/7/20 16:00  90      


 


9/7/20 16:00      Nasal Cannula 4.0 


 


9/7/20 16:00 98.0 90 20 132/53 (79) 99   








Height (Feet):  5


Height (Inches):  2.00


Weight (Pounds):  123


HEENT:  atraumatic


Respiratory/Chest:  other - mild Rsp distress


Abdomen:  no organomegaly





Laboratory Tests








Test


  9/8/20


06:40


 


White Blood Count


  13.6 K/UL


(4.8-10.8)  H


 


Red Blood Count


  3.36 M/UL


(4.20-5.40)  L


 


Hemoglobin


  10.7 G/DL


(12.0-16.0)  L


 


Hematocrit


  34.1 %


(37.0-47.0)  L


 


Mean Corpuscular Volume


  102 FL (80-99)


H


 


Mean Corpuscular Hemoglobin


  31.8 PG


(27.0-31.0)  H


 


Mean Corpuscular Hemoglobin


Concent 31.3 G/DL


(32.0-36.0)  L


 


Red Cell Distribution Width


  18.2 %


(11.6-14.8)  H


 


Platelet Count


  159 K/UL


(150-450)


 


Mean Platelet Volume


  7.5 FL


(6.5-10.1)


 


Neutrophils (%) (Auto)


  74.6 %


(45.0-75.0)


 


Lymphocytes (%) (Auto)


  17.7 %


(20.0-45.0)  L


 


Monocytes (%) (Auto)


  6.2 %


(1.0-10.0)


 


Eosinophils (%) (Auto)


  0.8 %


(0.0-3.0)


 


Basophils (%) (Auto)


  0.7 %


(0.0-2.0)


 


Erythrocyte Sedimentation Rate


  53 MM/HR


(0-30)  H


 


Sodium Level


  143 MMOL/L


(136-145)


 


Potassium Level


  4.3 MMOL/L


(3.5-5.1)


 


Chloride Level


  107 MMOL/L


()


 


Carbon Dioxide Level


  29 MMOL/L


(21-32)


 


Anion Gap


  7 mmol/L


(5-15)


 


Blood Urea Nitrogen


  38 mg/dL


(7-18)  H


 


Creatinine


  1.8 MG/DL


(0.55-1.30)  H


 


Estimat Glomerular Filtration


Rate 27.7 mL/min


(>60)


 


Glucose Level


  103 MG/DL


()


 


Calcium Level


  9.4 MG/DL


(8.5-10.1)


 


Phosphorus Level


  3.4 MG/DL


(2.5-4.9)


 


Magnesium Level


  2.1 MG/DL


(1.8-2.4)


 


Total Bilirubin


  0.4 MG/DL


(0.2-1.0)


 


Aspartate Amino Transf


(AST/SGOT) 18 U/L (15-37)


 


 


Alanine Aminotransferase


(ALT/SGPT) 21 U/L (12-78)


 


 


Alkaline Phosphatase


  143 U/L


()  H


 


C-Reactive Protein,


Quantitative 2.8 mg/dL


(0.00-0.90)  H


 


Total Protein


  6.1 G/DL


(6.4-8.2)  L


 


Albumin


  2.2 G/DL


(3.4-5.0)  L


 


Globulin 3.9 g/dL  


 


Albumin/Globulin Ratio


  0.6 (1.0-2.7)


L











Current Medications








 Medications


  (Trade)  Dose


 Ordered  Sig/Raji


 Route


 PRN Reason  Start Time


 Stop Time Status Last Admin


Dose Admin


 


 Acetaminophen


  (Tylenol)  650 mg  Q6H  PRN


 GT


 Mild Pain (Pain Scale 1-3)  9/8/20 06:00


 10/4/20 05:59   


 


 


 Ascorbic Acid


  (Vitamin C)  500 mg  DAILY


 GT


   9/8/20 09:00


 10/4/20 08:59  9/8/20 08:40


 


 


 Clonidine HCl


  (Catapres Tab)  0.1 mg  Q4H  PRN


 GT


 bp over 160 syst  9/8/20 07:45


 12/3/20 19:40   


 


 


 Dextrose  1,000 ml @ 


 75 mls/hr  [id number]


 IV


   9/8/20 06:00


 10/4/20 19:40  9/8/20 06:39


 


 


 Docusate Sodium


  (Colace)  100 mg  EVERY 8  HOURS


 GT


   9/8/20 06:00


 9/20/20 08:59  9/8/20 13:33


 


 


 Enoxaparin Sodium


  (Lovenox)  40 mg  DAILY


 SUBQ


   9/8/20 09:00


 11/19/20 08:59  9/8/20 08:41


 


 


 Epoetin Jaswant


  (Epoetin


 Jaswant-EPBX(NON


 ESRD))  10,000 unit  MON-WED-FRI


 SUBQ


   9/9/20 21:00


 11/29/20 20:59   


 


 


 Lorazepam


  (Ativan 2mg/ml


 1ml)  1 mg  Q4H  PRN


 IV


 For Anxiety  9/8/20 07:45


 9/11/20 19:40   


 


 


 Micafungin Sodium


 100 mg/Sodium


 Chloride  110 ml @ 


 110 mls/hr  Q24H


 IVPB


   9/8/20 17:00


 9/13/20 16:59   


 


 


 Minocycline HCl


  (Minocin)  100 mg  Q12HR


 ORAL


   9/8/20 09:00


 9/14/20 09:59  9/8/20 08:41


 


 


 Pantoprazole


  (Protonix)  40 mg  EVERY 12  HOURS


 IVP


   9/8/20 09:00


 9/20/20 20:59  9/8/20 08:41


 


 


 Polyethylene


 Glycol


  (Miralax)  17 gm  BEDTIME


 GT


   9/8/20 21:00


 9/20/20 20:59   


 


 


 Trimethoprim/


 Sulfamethoxazole


 10 ml/Dextrose  285 ml @ 


 190 mls/hr  EVERY 12  HOURS


 IV


   9/8/20 09:00


 9/14/20 09:59  9/8/20 08:41


 

















Tanisha Brownlee M.D. Sep 8, 2020 15:19

## 2020-09-08 NOTE — SURGERY PROGRESS NOTE
Surgery Progress Note


Subjective


Symptoms:  improved, tolerating diet, passing flatus, BM





Objective





Last 24 Hour Vital Signs








  Date Time  Temp Pulse Resp B/P (MAP) Pulse Ox O2 Delivery O2 Flow Rate FiO2


 


9/8/20 12:00 98.0 97 19 110/63 (79) 97   


 


9/8/20 09:36     95 Nasal Cannula 3.0 32


 


9/8/20 09:00      Room Air  


 


9/8/20 08:00 97.8 94 18 100/61 (74) 98   


 


9/8/20 05:01      Nasal Cannula 4.0 


 


9/8/20 04:19      Nasal Cannula 4.0 


 


9/8/20 04:19  99      


 


9/8/20 04:00 97.6 91 18 95/54 (68) 97   


 


9/8/20 00:00 97.9 96 18 100/47 (64) 97   


 


9/8/20 00:00  90      


 


9/8/20 00:00      Nasal Cannula 4.0 


 


9/7/20 21:51     96 Room Air  


 


9/7/20 20:00      Nasal Cannula 4.0 


 


9/7/20 20:00 98.0 95 19 98/51 (67) 97   


 


9/7/20 20:00  90      


 


9/7/20 16:00  90      


 


9/7/20 16:00      Nasal Cannula 4.0 


 


9/7/20 16:00 98.0 90 20 132/53 (79) 99   








I&O











Intake and Output  


 


 9/7/20 9/8/20





 19:00 07:00


 


Intake Total  350 ml


 


Output Total 650 ml 1800 ml


 


Balance -650 ml -1450 ml


 


  


 


Tube Feeding  350 ml


 


Output Urine Total 650 ml 1800 ml


 


# Bowel Movements  1








Dressing:  saturated


Cardiovascular:  RSR


Respiratory:  decreased breath sounds


Abdomen:  soft, non-tender, present bowel sounds


Extremities:  no edema, no tenderness, no cyanosis





Laboratory Tests








Test


  9/8/20


06:40


 


White Blood Count


  13.6 K/UL


(4.8-10.8)  H


 


Red Blood Count


  3.36 M/UL


(4.20-5.40)  L


 


Hemoglobin


  10.7 G/DL


(12.0-16.0)  L


 


Hematocrit


  34.1 %


(37.0-47.0)  L


 


Mean Corpuscular Volume


  102 FL (80-99)


H


 


Mean Corpuscular Hemoglobin


  31.8 PG


(27.0-31.0)  H


 


Mean Corpuscular Hemoglobin


Concent 31.3 G/DL


(32.0-36.0)  L


 


Red Cell Distribution Width


  18.2 %


(11.6-14.8)  H


 


Platelet Count


  159 K/UL


(150-450)


 


Mean Platelet Volume


  7.5 FL


(6.5-10.1)


 


Neutrophils (%) (Auto)


  74.6 %


(45.0-75.0)


 


Lymphocytes (%) (Auto)


  17.7 %


(20.0-45.0)  L


 


Monocytes (%) (Auto)


  6.2 %


(1.0-10.0)


 


Eosinophils (%) (Auto)


  0.8 %


(0.0-3.0)


 


Basophils (%) (Auto)


  0.7 %


(0.0-2.0)


 


Erythrocyte Sedimentation Rate


  53 MM/HR


(0-30)  H


 


Sodium Level


  143 MMOL/L


(136-145)


 


Potassium Level


  4.3 MMOL/L


(3.5-5.1)


 


Chloride Level


  107 MMOL/L


()


 


Carbon Dioxide Level


  29 MMOL/L


(21-32)


 


Anion Gap


  7 mmol/L


(5-15)


 


Blood Urea Nitrogen


  38 mg/dL


(7-18)  H


 


Creatinine


  1.8 MG/DL


(0.55-1.30)  H


 


Estimat Glomerular Filtration


Rate 27.7 mL/min


(>60)


 


Glucose Level


  103 MG/DL


()


 


Calcium Level


  9.4 MG/DL


(8.5-10.1)


 


Phosphorus Level


  3.4 MG/DL


(2.5-4.9)


 


Magnesium Level


  2.1 MG/DL


(1.8-2.4)


 


Total Bilirubin


  0.4 MG/DL


(0.2-1.0)


 


Aspartate Amino Transf


(AST/SGOT) 18 U/L (15-37)


 


 


Alanine Aminotransferase


(ALT/SGPT) 21 U/L (12-78)


 


 


Alkaline Phosphatase


  143 U/L


()  H


 


C-Reactive Protein,


Quantitative 2.8 mg/dL


(0.00-0.90)  H


 


Total Protein


  6.1 G/DL


(6.4-8.2)  L


 


Albumin


  2.2 G/DL


(3.4-5.0)  L


 


Globulin 3.9 g/dL  


 


Albumin/Globulin Ratio


  0.6 (1.0-2.7)


L











Plan


Problems:  


(1) Anemia


(2) 2019 novel coronavirus disease (COVID-19)


(3) Pneumonia


(4) Multiple drug resistant organism (MDRO) culture positive


(5) Hyperkalemia


(6) Hypernatremia


(7) Severe dehydration


(8) Acute urinary retention


(9) Sepsis


Assessment & Plan:  Pt presented on admission with multiple Pressure injuries. 

Pt noted to have band of  Blotchy red rash  with open and closed Blisters 

affecting  R Brachial to R forearm. Lateral R back /R flank is also blotchy 

erythematous with clusters of serous filled blisters. On Lateral L Back to L 

flank is Blotchy, erythematous with clusters of serous filled blisters.  


Reabsorbing DTPI that is partially opened Sacrum(L) 6cm x (W)5.5cm.  Base of 

wound is Maroon with with scattered areas that are maryellen and loose peeling soft 

black cap.Surrounding Non-Blanching erythema without induration.


Non-Blanching erythema that is indurated with delineated margins R trochanter(L)

5.5cm x (W)6.5cm.


No evidence of skin breakdown L trochanter.


Non-Blanching erythema noted to R and L ischial tuberosities.


DTPI medial R heel (L)3.3cm x (W)3.4cm. Base of injury is, fluctuant, maroon 

with delineated margins. surrounding red,  boggy heel.


DTPI Lateral R Heel(L)1.5cm x (W)2.5cm. Base of injury is indurated, maroon 

with purpuric center.


Non-Blanching erythema without induration /fluctuance lateral R malleolus(L)2cm 

x (W)1.5cm.





Tx.Plan:


Apply Moisture Barrier Paste to Sacrum. Cover with Optifoam drsg. Change every 

3 days and prn.


           


Apply Cavilon Skin Barrier to R and L trochanter. Cover each site with Optifoam 

drsgs. Change every 7 days and prn.


           


Apply Moisture Barrier Paste to R and L Ischial tuberosities with each 

Incontinence care.


           


Apply Cavilon Skin Barrier to Medial and Lateral R Heel. Cover with Optifoam 

drsg. Change every7 days and prn.


           


Apply Cavilon Skin Barrier to R lateral ,and medial Malleoli. Cover each site 

with Optifoam drsgs. Change every 7 days and prn.


           


Apply Cavilon Skin Barrier to L Heel and Malleoli. Cover each site with 

Optifoam drsgs. Change every 7 days and prn.


           


Cover open blisters as needed with Optifoam drsgs. 


           


Reposition at least every 2 hours or as tolerated.


           


Off-load heels with pillow.


           


Nutritional optimization





DAILY ESTIMATED NEEDS:


Needs based on Sepsis, renal, wound 50.9kg  


25-35  kcals/kg 


5366-7770  total kcals


1.25-1.5  g protein/kg


64-76  g total protein 


25-30  mL/kg


5668-4695  total fluid mLs





NUTRITION DIAGNOSIS:


1. Swallowing difficulty r/t dysphagia as evidenced by pt is GT dep.


2. Altered nutrition related lab values r/t sepsis and severe dehydration,


pre-diabetes as evidenced by Elev WBC (31.6 ->18.2), elev Na (179-> WNL),


elev BUN (131->48), elev Creat (5.1->2.2), elev K (5.5-> wnl->5.5, 5.3),


elev BGs (93, 113, 185), A1C of 6.4.





CURRENT TF: Nepro @35  








ENTERAL NUTRITION RECOMMENDATIONS:


LOWER GOAL RATE -> NEPRO @ 35ML/HR X 24 HRS  to provide 840ml, 1512kcal, 68g 

prot, 610ml free water 





- LOWER GOAL RATE: meets 100% est kcal/prot needs


- Flush per MD, HOB over 30 degrees.








ADDITIONAL RECOMMENDATIONS:


1) Monitor lytes and renal status-> TF change to Nepro (8/28), elev K 


2) Per SNF wt of 112# 


3) NISS for BG control: A1C 6.4, TF at goal + added D5+ Decadron 


4) Wound healing: TF @ goal provides 100% RDI 


    add Vit C 500mg QD + José Manuel BID via GT  





micro reviewed


covid negative


c diff negative


bc ngtd 


wounds do not seem to be etiology of infection 





(10) UTI (urinary tract infection)


(11) ARF (acute renal failure)


(12) Psychosis











Alex Nicholas Sep 8, 2020 12:41

## 2020-09-08 NOTE — INTERNAL MED PROGRESS NOTE
Subjective


Date of Service:  Sep 8, 2020


Physician Name


HebertWing


Attending Physician


Umer Wahl MD





Current Medications








 Medications


  (Trade)  Dose


 Ordered  Sig/Raji


 Route


 PRN Reason  Start Time


 Stop Time Status Last Admin


Dose Admin


 


 Acetaminophen


  (Tylenol)  650 mg  Q6H  PRN


 GT


 Mild Pain (Pain Scale 1-3)  9/8/20 06:00


 10/4/20 05:59   


 


 


 Ascorbic Acid


  (Vitamin C)  500 mg  DAILY


 GT


   9/8/20 09:00


 10/4/20 08:59  9/8/20 08:40


 


 


 Clonidine HCl


  (Catapres Tab)  0.1 mg  Q4H  PRN


 GT


 bp over 160 syst  9/8/20 07:45


 12/3/20 19:40   


 


 


 Dextrose  1,000 ml @ 


 75 mls/hr  [id number]


 IV


   9/8/20 06:00


 10/4/20 19:40  9/8/20 17:15


 


 


 Docusate Sodium


  (Colace)  100 mg  EVERY 8  HOURS


 GT


   9/8/20 06:00


 9/20/20 08:59  9/8/20 13:33


 


 


 Enoxaparin Sodium


  (Lovenox)  40 mg  DAILY


 SUBQ


   9/8/20 09:00


 11/19/20 08:59  9/8/20 08:41


 


 


 Epoetin Jaswant


  (Epoetin


 Jaswant-EPBX(NON


 ESRD))  10,000 unit  MON-WED-FRI


 SUBQ


   9/9/20 21:00


 11/29/20 20:59   


 


 


 Lorazepam


  (Ativan 2mg/ml


 1ml)  1 mg  Q4H  PRN


 IV


 For Anxiety  9/8/20 07:45


 9/11/20 19:40   


 


 


 Micafungin Sodium


 100 mg/Sodium


 Chloride  110 ml @ 


 110 mls/hr  Q24H


 IVPB


   9/8/20 17:00


 9/13/20 16:59  9/8/20 17:15


 


 


 Minocycline HCl


  (Minocin)  100 mg  Q12HR


 ORAL


   9/8/20 09:00


 9/14/20 09:59  9/8/20 08:41


 


 


 Pantoprazole


  (Protonix)  40 mg  EVERY 12  HOURS


 IVP


   9/8/20 09:00


 9/20/20 20:59  9/8/20 08:41


 


 


 Polyethylene


 Glycol


  (Miralax)  17 gm  BEDTIME


 GT


   9/8/20 21:00


 9/20/20 20:59   


 


 


 Trimethoprim/


 Sulfamethoxazole


 10 ml/Dextrose  285 ml @ 


 190 mls/hr  EVERY 12  HOURS


 IV


   9/8/20 09:00


 9/14/20 09:59  9/8/20 08:41


 








Allergies:  


Coded Allergies:  


     AMPICILLIN (Verified  Allergy, Unknown, hives, 8/21/20)


     PENICILLINS (Verified  Allergy, Unknown, hives, 8/21/20)


     TETRACYCLINES (Verified  Allergy, Unknown, hives, 8/21/20)


ROS Limited/Unobtainable:  Yes


Subjective


69 YO F with previous COVID 19 pos admitted with shortness of breath.  Now 

pneumonia and sepsis.  Cover for Int Med-DR Wahl.





Objective





Last Vital Signs








  Date Time  Temp Pulse Resp B/P (MAP) Pulse Ox O2 Delivery O2 Flow Rate FiO2


 


9/8/20 16:00 98.1 95 19 117/68 (84) 98   


 


9/8/20 09:36      Nasal Cannula 3.0 32











Laboratory Tests








Test


  9/8/20


06:40


 


White Blood Count


  13.6 K/UL


(4.8-10.8)  H


 


Red Blood Count


  3.36 M/UL


(4.20-5.40)  L


 


Hemoglobin


  10.7 G/DL


(12.0-16.0)  L


 


Hematocrit


  34.1 %


(37.0-47.0)  L


 


Mean Corpuscular Volume


  102 FL (80-99)


H


 


Mean Corpuscular Hemoglobin


  31.8 PG


(27.0-31.0)  H


 


Mean Corpuscular Hemoglobin


Concent 31.3 G/DL


(32.0-36.0)  L


 


Red Cell Distribution Width


  18.2 %


(11.6-14.8)  H


 


Platelet Count


  159 K/UL


(150-450)


 


Mean Platelet Volume


  7.5 FL


(6.5-10.1)


 


Neutrophils (%) (Auto)


  74.6 %


(45.0-75.0)


 


Lymphocytes (%) (Auto)


  17.7 %


(20.0-45.0)  L


 


Monocytes (%) (Auto)


  6.2 %


(1.0-10.0)


 


Eosinophils (%) (Auto)


  0.8 %


(0.0-3.0)


 


Basophils (%) (Auto)


  0.7 %


(0.0-2.0)


 


Erythrocyte Sedimentation Rate


  53 MM/HR


(0-30)  H


 


Sodium Level


  143 MMOL/L


(136-145)


 


Potassium Level


  4.3 MMOL/L


(3.5-5.1)


 


Chloride Level


  107 MMOL/L


()


 


Carbon Dioxide Level


  29 MMOL/L


(21-32)


 


Anion Gap


  7 mmol/L


(5-15)


 


Blood Urea Nitrogen


  38 mg/dL


(7-18)  H


 


Creatinine


  1.8 MG/DL


(0.55-1.30)  H


 


Estimat Glomerular Filtration


Rate 27.7 mL/min


(>60)


 


Glucose Level


  103 MG/DL


()


 


Calcium Level


  9.4 MG/DL


(8.5-10.1)


 


Phosphorus Level


  3.4 MG/DL


(2.5-4.9)


 


Magnesium Level


  2.1 MG/DL


(1.8-2.4)


 


Total Bilirubin


  0.4 MG/DL


(0.2-1.0)


 


Aspartate Amino Transf


(AST/SGOT) 18 U/L (15-37)


 


 


Alanine Aminotransferase


(ALT/SGPT) 21 U/L (12-78)


 


 


Alkaline Phosphatase


  143 U/L


()  H


 


C-Reactive Protein,


Quantitative 2.8 mg/dL


(0.00-0.90)  H


 


Total Protein


  6.1 G/DL


(6.4-8.2)  L


 


Albumin


  2.2 G/DL


(3.4-5.0)  L


 


Globulin 3.9 g/dL  


 


Albumin/Globulin Ratio


  0.6 (1.0-2.7)


L

















Intake and Output  


 


 9/7/20 9/8/20





 19:00 07:00


 


Intake Total  385 ml


 


Output Total 650 ml 1800 ml


 


Balance -650 ml -1415 ml


 


  


 


Tube Feeding  385 ml


 


Output Urine Total 650 ml 1800 ml


 


# Bowel Movements  1








Objective


General Appearance:  WD/WN, no apparent distress, lethargic


EENT:  PERRL/EOMI, normal ENT inspection


Neck:  non-tender, normal alignment, supple, normal inspection


Cardiovascular:  normal peripheral pulses, normal rate, regular rhythm, no 

gallop/murmur, no JVD


Respiratory/Chest:  Venturi mask; chest wall non-tender, respiratory distress, 

crackles/rales, rhonchi - bilaterally, expiratory wheezing


Abdomen:  normal bowel sounds, non tender, soft, no organomegaly, no mass


Extremities:  normal range of motion, non-tender


Neurologic:  CNs II-XII grossly normal, no motor/sensory deficits


Skin:  normal pigmentation, warm/dry





Assessment/Plan


Problem List:  


(1) UTI (urinary tract infection)


Assessment & Plan:  Vanco res enterococcus.  Cont zyvox per ID





(2) Sepsis


Assessment & Plan:  ID= Dr Brownlee.  Continue IV bactrim, linezolid and 

minocycline





(3) Hypernatremia


Assessment & Plan:  Nephrology=DR Dos Santos





(4) Severe dehydration


(5) ARF (acute renal failure)


Assessment & Plan:  Nephrology = Dr Dos Santos





(6) Pneumonia


Assessment & Plan:  Acenitobacter.  Cont minocycline, linezolid and IV bactrim 

per ID=Dr Brownlee; pulmonary = Dr Brambila





(7) Hyperkalemia











Wing Hebert MD Sep 8, 2020 19:02

## 2020-09-09 VITALS — DIASTOLIC BLOOD PRESSURE: 74 MMHG | SYSTOLIC BLOOD PRESSURE: 118 MMHG

## 2020-09-09 VITALS — SYSTOLIC BLOOD PRESSURE: 133 MMHG | DIASTOLIC BLOOD PRESSURE: 66 MMHG

## 2020-09-09 VITALS — DIASTOLIC BLOOD PRESSURE: 57 MMHG | SYSTOLIC BLOOD PRESSURE: 114 MMHG

## 2020-09-09 VITALS — DIASTOLIC BLOOD PRESSURE: 68 MMHG | SYSTOLIC BLOOD PRESSURE: 136 MMHG

## 2020-09-09 VITALS — SYSTOLIC BLOOD PRESSURE: 117 MMHG | DIASTOLIC BLOOD PRESSURE: 64 MMHG

## 2020-09-09 VITALS — DIASTOLIC BLOOD PRESSURE: 70 MMHG | SYSTOLIC BLOOD PRESSURE: 130 MMHG

## 2020-09-09 LAB
ADD MANUAL DIFF: NO
ALBUMIN SERPL-MCNC: 2.3 G/DL (ref 3.4–5)
ALBUMIN/GLOB SERPL: 0.6 {RATIO} (ref 1–2.7)
ALP SERPL-CCNC: 144 U/L (ref 46–116)
ALT SERPL-CCNC: 23 U/L (ref 12–78)
ANION GAP SERPL CALC-SCNC: 6 MMOL/L (ref 5–15)
AST SERPL-CCNC: 19 U/L (ref 15–37)
BASOPHILS NFR BLD AUTO: 0.9 % (ref 0–2)
BILIRUB SERPL-MCNC: 0.3 MG/DL (ref 0.2–1)
BUN SERPL-MCNC: 38 MG/DL (ref 7–18)
CALCIUM SERPL-MCNC: 9 MG/DL (ref 8.5–10.1)
CHLORIDE SERPL-SCNC: 106 MMOL/L (ref 98–107)
CO2 SERPL-SCNC: 30 MMOL/L (ref 21–32)
CREAT SERPL-MCNC: 1.9 MG/DL (ref 0.55–1.3)
EOSINOPHIL NFR BLD AUTO: 1.7 % (ref 0–3)
ERYTHROCYTE [DISTWIDTH] IN BLOOD BY AUTOMATED COUNT: 18.2 % (ref 11.6–14.8)
GLOBULIN SER-MCNC: 4 G/DL
HCT VFR BLD CALC: 34.5 % (ref 37–47)
HGB BLD-MCNC: 10.8 G/DL (ref 12–16)
LYMPHOCYTES NFR BLD AUTO: 22.1 % (ref 20–45)
MCV RBC AUTO: 102 FL (ref 80–99)
MONOCYTES NFR BLD AUTO: 8.1 % (ref 1–10)
NEUTROPHILS NFR BLD AUTO: 67.2 % (ref 45–75)
PHOSPHATE SERPL-MCNC: 3.2 MG/DL (ref 2.5–4.9)
PLATELET # BLD: 168 K/UL (ref 150–450)
POTASSIUM SERPL-SCNC: 4.4 MMOL/L (ref 3.5–5.1)
RBC # BLD AUTO: 3.39 M/UL (ref 4.2–5.4)
SODIUM SERPL-SCNC: 142 MMOL/L (ref 136–145)
WBC # BLD AUTO: 11.1 K/UL (ref 4.8–10.8)

## 2020-09-09 RX ADMIN — POLYETHYLENE GLYCOL 3350 SCH GM: 17 POWDER, FOR SOLUTION ORAL at 21:00

## 2020-09-09 RX ADMIN — MINOCYCLINE HYDROCHLORIDE SCH MG: 50 CAPSULE ORAL at 08:51

## 2020-09-09 RX ADMIN — PANTOPRAZOLE SODIUM SCH MG: 40 INJECTION, POWDER, FOR SOLUTION INTRAVENOUS at 21:23

## 2020-09-09 RX ADMIN — PANTOPRAZOLE SODIUM SCH MG: 40 INJECTION, POWDER, FOR SOLUTION INTRAVENOUS at 08:51

## 2020-09-09 RX ADMIN — DOCUSATE SODIUM SCH MG: 50 LIQUID ORAL at 05:55

## 2020-09-09 RX ADMIN — Medication SCH MG: at 08:51

## 2020-09-09 RX ADMIN — DEXTROSE MONOHYDRATE SCH MLS/HR: 50 INJECTION, SOLUTION INTRAVENOUS at 16:05

## 2020-09-09 RX ADMIN — DOCUSATE SODIUM SCH MG: 50 LIQUID ORAL at 13:18

## 2020-09-09 RX ADMIN — SULFAMETHOXAZOLE AND TRIMETHOPRIM SCH MLS/HR: 80; 16 INJECTION, SOLUTION, CONCENTRATE INTRAVENOUS at 08:51

## 2020-09-09 RX ADMIN — MINOCYCLINE HYDROCHLORIDE SCH MG: 50 CAPSULE ORAL at 21:23

## 2020-09-09 RX ADMIN — ENOXAPARIN SODIUM SCH MG: 40 INJECTION SUBCUTANEOUS at 08:55

## 2020-09-09 RX ADMIN — DOCUSATE SODIUM SCH MG: 50 LIQUID ORAL at 21:22

## 2020-09-09 NOTE — CARDIOLOGY REPORT
--------------- APPROVED REPORT --------------





EKG Measurement

Heart [id number]

[id number]

[id number]

[id number]

[id number]



<Conclusion>

Atrial sensed, ventricular paced rhythm

Electronic pacemaker

## 2020-09-09 NOTE — INTERNAL MED PROGRESS NOTE
Subjective


Date of Service:  Sep 9, 2020


Physician Name


Wing Hebert


Attending Physician


Umer Wahl MD





Current Medications








 Medications


  (Trade)  Dose


 Ordered  Sig/Raji


 Route


 PRN Reason  Start Time


 Stop Time Status Last Admin


Dose Admin


 


 Acetaminophen


  (Tylenol)  650 mg  Q6H  PRN


 GT


 Mild Pain (Pain Scale 1-3)  9/8/20 06:00


 10/4/20 05:59   


 


 


 Ascorbic Acid


  (Vitamin C)  500 mg  DAILY


 GT


   9/8/20 09:00


 10/4/20 08:59  9/9/20 08:51


 


 


 Clonidine HCl


  (Catapres Tab)  0.1 mg  Q4H  PRN


 GT


 bp over 160 syst  9/8/20 07:45


 12/3/20 19:40   


 


 


 Dextrose  1,000 ml @ 


 75 mls/hr  [id number]


 IV


   9/8/20 06:00


 10/4/20 19:40  9/9/20 09:12


 


 


 Docusate Sodium


  (Colace)  100 mg  EVERY 8  HOURS


 GT


   9/8/20 06:00


 9/20/20 08:59  9/9/20 05:55


 


 


 Enoxaparin Sodium


  (Lovenox)  40 mg  DAILY


 SUBQ


   9/8/20 09:00


 11/19/20 08:59  9/9/20 08:55


 


 


 Epoetin Jaswant


  (Epoetin


 Jaswant-EPBX(NON


 ESRD))  10,000 unit  MON-WED-FRI


 SUBQ


   9/9/20 21:00


 11/29/20 20:59   


 


 


 Lorazepam


  (Ativan 2mg/ml


 1ml)  1 mg  Q4H  PRN


 IV


 For Anxiety  9/8/20 07:45


 9/11/20 19:40   


 


 


 Micafungin Sodium


 100 mg/Sodium


 Chloride  110 ml @ 


 110 mls/hr  Q24H


 IVPB


   9/8/20 17:00


 9/13/20 16:59  9/8/20 17:15


 


 


 Minocycline HCl


  (Minocin)  100 mg  Q12HR


 ORAL


   9/8/20 09:00


 9/14/20 09:59  9/9/20 08:51


 


 


 Pantoprazole


  (Protonix)  40 mg  EVERY 12  HOURS


 IVP


   9/8/20 09:00


 9/20/20 20:59  9/9/20 08:51


 


 


 Polyethylene


 Glycol


  (Miralax)  17 gm  BEDTIME


 GT


   9/8/20 21:00


 9/20/20 20:59  9/8/20 20:28


 


 


 Trimethoprim/


 Sulfamethoxazole


 10 ml/Dextrose  285 ml @ 


 190 mls/hr  EVERY 12  HOURS


 IV


   9/8/20 09:00


 9/14/20 09:59  9/9/20 08:51


 








Allergies:  


Coded Allergies:  


     AMPICILLIN (Verified  Allergy, Unknown, hives, 8/21/20)


     PENICILLINS (Verified  Allergy, Unknown, hives, 8/21/20)


     TETRACYCLINES (Verified  Allergy, Unknown, hives, 8/21/20)


ROS Limited/Unobtainable:  Yes


Subjective


69 YO F with previous COVID 19 pos admitted with shortness of breath.  Now 

pneumonia and sepsis.  Cover for Int Med-DR Wahl.





Objective





Last Vital Signs








  Date Time  Temp Pulse Resp B/P (MAP) Pulse Ox O2 Delivery O2 Flow Rate FiO2


 


9/9/20 08:00 98.4 95 21 114/57 (76) 98   





  103      


 


9/8/20 22:54      Nasal Cannula 3.0 32











Laboratory Tests








Test


  9/9/20


05:50


 


White Blood Count


  11.1 K/UL


(4.8-10.8)  H


 


Red Blood Count


  3.39 M/UL


(4.20-5.40)  L


 


Hemoglobin


  10.8 G/DL


(12.0-16.0)  L


 


Hematocrit


  34.5 %


(37.0-47.0)  L


 


Mean Corpuscular Volume


  102 FL (80-99)


H


 


Mean Corpuscular Hemoglobin


  31.8 PG


(27.0-31.0)  H


 


Mean Corpuscular Hemoglobin


Concent 31.3 G/DL


(32.0-36.0)  L


 


Red Cell Distribution Width


  18.2 %


(11.6-14.8)  H


 


Platelet Count


  168 K/UL


(150-450)


 


Mean Platelet Volume


  7.0 FL


(6.5-10.1)


 


Neutrophils (%) (Auto)


  67.2 %


(45.0-75.0)


 


Lymphocytes (%) (Auto)


  22.1 %


(20.0-45.0)


 


Monocytes (%) (Auto)


  8.1 %


(1.0-10.0)


 


Eosinophils (%) (Auto)


  1.7 %


(0.0-3.0)


 


Basophils (%) (Auto)


  0.9 %


(0.0-2.0)


 


Erythrocyte Sedimentation Rate


  61 MM/HR


(0-30)  H


 


Sodium Level


  142 MMOL/L


(136-145)


 


Potassium Level


  4.4 MMOL/L


(3.5-5.1)


 


Chloride Level


  106 MMOL/L


()


 


Carbon Dioxide Level


  30 MMOL/L


(21-32)


 


Anion Gap


  6 mmol/L


(5-15)


 


Blood Urea Nitrogen


  38 mg/dL


(7-18)  H


 


Creatinine


  1.9 MG/DL


(0.55-1.30)  H


 


Estimat Glomerular Filtration


Rate 26.0 mL/min


(>60)


 


Glucose Level


  113 MG/DL


()  H


 


Calcium Level


  9.0 MG/DL


(8.5-10.1)


 


Phosphorus Level


  3.2 MG/DL


(2.5-4.9)


 


Magnesium Level


  2.1 MG/DL


(1.8-2.4)


 


Total Bilirubin


  0.3 MG/DL


(0.2-1.0)


 


Aspartate Amino Transf


(AST/SGOT) 19 U/L (15-37)


 


 


Alanine Aminotransferase


(ALT/SGPT) 23 U/L (12-78)


 


 


Alkaline Phosphatase


  144 U/L


()  H


 


C-Reactive Protein,


Quantitative 1.8 mg/dL


(0.00-0.90)  H


 


Total Protein


  6.3 G/DL


(6.4-8.2)  L


 


Albumin


  2.3 G/DL


(3.4-5.0)  L


 


Globulin 4.0 g/dL  


 


Albumin/Globulin Ratio


  0.6 (1.0-2.7)


L

















Intake and Output  


 


 9/8/20 9/9/20





 19:00 07:00


 


Intake Total 500 ml 380 ml


 


Output Total 1500 ml 


 


Balance -1000 ml 380 ml


 


  


 


Intake Free Water 80 ml 100 ml


 


Tube Feeding 420 ml 280 ml


 


Output Urine Total 1500 ml 








Objective


General Appearance:  WD/WN, no apparent distress, lethargic


EENT:  PERRL/EOMI, normal ENT inspection


Neck:  non-tender, normal alignment, supple, normal inspection


Cardiovascular:  normal peripheral pulses, normal rate, regular rhythm, no 

gallop/murmur, no JVD


Respiratory/Chest:  Venturi mask; chest wall non-tender, respiratory distress, 

crackles/rales, rhonchi - bilaterally, expiratory wheezing


Abdomen:  normal bowel sounds, non tender, soft, no organomegaly, no mass


Extremities:  normal range of motion, non-tender


Neurologic:  CNs II-XII grossly normal, no motor/sensory deficits


Skin:  normal pigmentation, warm/dry





Assessment/Plan


Problem List:  


(1) UTI (urinary tract infection)


Assessment & Plan:  Vanco res enterococcus.  Cont zyvox per ID





(2) Sepsis


Assessment & Plan:  ID= Dr Brownlee.  Continue IV bactrim, micafungin and 

minocycline





(3) Hypernatremia


Assessment & Plan:  Nephrology=DR Dos Santos





(4) Severe dehydration


(5) ARF (acute renal failure)


Assessment & Plan:  Nephrology = Dr Dos Santos





(6) Pneumonia


Assessment & Plan:  Acenitobacter.  Cont minocycline, linezolid and IV bactrim 

per ID=Dr Brownlee; pulmonary = Dr Brambila





(7) Hyperkalemia











Wing Hebert MD Sep 9, 2020 11:12

## 2020-09-09 NOTE — CARDIOLOGY REPORT
--------------- APPROVED REPORT --------------





EXAM: Two-dimensional and M-mode echocardiogram with Doppler and color Doppler.



INDICATION

Vegitation



M-Mode DIMENSIONS 

IVSd1.3 (0.7-1.1cm)Left Atrium (MM)4.0 (1.6-4.0cm)

LVDd3.4 (3.5-5.6cm)Aortic Root2.9 (2.0-3.7cm)

PWd1.0 (0.7-1.1cm)Aortic Cusp Exc.1.7 (1.5-2.0cm)

IVSs1.4 cmEPSS0.4 (>1.0cm)

LVDs2.1 (2.5-4.0cm)

PWs1.6 cm



<Conclusion>

Normal left ventricular chamber size, systolic function and wall motion.

Left ventricular ejection fraction estimated to be 65 %.

Mild left ventricular hypertrophy.

Anterior Echo-free space, may be due to pericardial fat.

All other cardiac chamber sizes are within normal limits. 

Focal aortic valve sclerosis with adequate cusp excursion.

Thickened mitral valve leaflets with normal excursion.

Mitral annulus and aortic root calcification.

Normal pulmonic valve structure. 

Normal tricuspid valve structure. 

IVC at normal size with physiologic collapse.

Pacemaker wire present in the right side chambers.

No discrete vegetations seen.



A  color flow and spectral Doppler study was performed and revealed:

No aortic insufficiency.

Trace mitral regurgitation.

Mitral diastolic velocities suggest mild left ventricular diastolic dysfunction (Grade 1).

Mild tricuspid regurgitation.

Tricuspid  systolic velocities suggests peak right ventricular systolic pressure of 32 mmHg.

## 2020-09-09 NOTE — DIAGNOSTIC IMAGING REPORT
Indication: Dyspnea

 

Technique: One view of the chest

 

Comparison: 9/1/2020

 

Findings: One the pleural spaces are clear. The heart size is normal. There is a

right chest pacemaker again demonstrated. Previously demonstrated left basilar

retrocardiac opacity is no longer evident

 

Impression: No acute process

## 2020-09-09 NOTE — SURGERY PROGRESS NOTE
Surgery Progress Note


Subjective


Symptoms:  improved, tolerating diet, passing flatus, BM





Objective





Last 24 Hour Vital Signs








  Date Time  Temp Pulse Resp B/P (MAP) Pulse Ox O2 Delivery O2 Flow Rate FiO2


 


9/9/20 12:00 98.7 99 19 118/74 (89) 95   





  103      


 


9/9/20 09:00      Room Air  


 


9/9/20 08:00 98.4 95 21 114/57 (76) 98   





  103      


 


9/9/20 04:00 97.3 99 24 130/70 (90) 98   





  99      


 


9/9/20 00:00 98.6 94 24 136/68 (90) 98   


 


9/8/20 22:54     96 Nasal Cannula 3.0 32


 


9/8/20 20:00 99.0 100 22 130/62 (84) 100   





  100      


 


9/8/20 16:00 98.1 95 19 117/68 (84) 98   








I&O











Intake and Output  


 


 9/8/20 9/9/20





 19:00 07:00


 


Intake Total 500 ml 380 ml


 


Output Total 1500 ml 


 


Balance -1000 ml 380 ml


 


  


 


Intake Free Water 80 ml 100 ml


 


Tube Feeding 420 ml 280 ml


 


Output Urine Total 1500 ml 








Dressing:  saturated


Cardiovascular:  RSR


Respiratory:  decreased breath sounds


Abdomen:  soft, non-tender, present bowel sounds


Extremities:  no edema, no tenderness, no cyanosis





Laboratory Tests








Test


  9/9/20


05:50


 


White Blood Count


  11.1 K/UL


(4.8-10.8)  H


 


Red Blood Count


  3.39 M/UL


(4.20-5.40)  L


 


Hemoglobin


  10.8 G/DL


(12.0-16.0)  L


 


Hematocrit


  34.5 %


(37.0-47.0)  L


 


Mean Corpuscular Volume


  102 FL (80-99)


H


 


Mean Corpuscular Hemoglobin


  31.8 PG


(27.0-31.0)  H


 


Mean Corpuscular Hemoglobin


Concent 31.3 G/DL


(32.0-36.0)  L


 


Red Cell Distribution Width


  18.2 %


(11.6-14.8)  H


 


Platelet Count


  168 K/UL


(150-450)


 


Mean Platelet Volume


  7.0 FL


(6.5-10.1)


 


Neutrophils (%) (Auto)


  67.2 %


(45.0-75.0)


 


Lymphocytes (%) (Auto)


  22.1 %


(20.0-45.0)


 


Monocytes (%) (Auto)


  8.1 %


(1.0-10.0)


 


Eosinophils (%) (Auto)


  1.7 %


(0.0-3.0)


 


Basophils (%) (Auto)


  0.9 %


(0.0-2.0)


 


Erythrocyte Sedimentation Rate


  61 MM/HR


(0-30)  H


 


Sodium Level


  142 MMOL/L


(136-145)


 


Potassium Level


  4.4 MMOL/L


(3.5-5.1)


 


Chloride Level


  106 MMOL/L


()


 


Carbon Dioxide Level


  30 MMOL/L


(21-32)


 


Anion Gap


  6 mmol/L


(5-15)


 


Blood Urea Nitrogen


  38 mg/dL


(7-18)  H


 


Creatinine


  1.9 MG/DL


(0.55-1.30)  H


 


Estimat Glomerular Filtration


Rate 26.0 mL/min


(>60)


 


Glucose Level


  113 MG/DL


()  H


 


Calcium Level


  9.0 MG/DL


(8.5-10.1)


 


Phosphorus Level


  3.2 MG/DL


(2.5-4.9)


 


Magnesium Level


  2.1 MG/DL


(1.8-2.4)


 


Total Bilirubin


  0.3 MG/DL


(0.2-1.0)


 


Aspartate Amino Transf


(AST/SGOT) 19 U/L (15-37)


 


 


Alanine Aminotransferase


(ALT/SGPT) 23 U/L (12-78)


 


 


Alkaline Phosphatase


  144 U/L


()  H


 


C-Reactive Protein,


Quantitative 1.8 mg/dL


(0.00-0.90)  H


 


Total Protein


  6.3 G/DL


(6.4-8.2)  L


 


Albumin


  2.3 G/DL


(3.4-5.0)  L


 


Globulin 4.0 g/dL  


 


Albumin/Globulin Ratio


  0.6 (1.0-2.7)


L











Plan


Problems:  


(1) Anemia


(2) 2019 novel coronavirus disease (COVID-19)


(3) Pneumonia


(4) Multiple drug resistant organism (MDRO) culture positive


(5) Hyperkalemia


(6) Hypernatremia


(7) Severe dehydration


(8) Acute urinary retention


(9) Sepsis


Assessment & Plan:  Pt presented on admission with multiple Pressure injuries. 

Pt noted to have band of  Blotchy red rash  with open and closed Blisters 

affecting  R Brachial to R forearm. Lateral R back /R flank is also blotchy 

erythematous with clusters of serous filled blisters. On Lateral L Back to L 

flank is Blotchy, erythematous with clusters of serous filled blisters.  


Reabsorbing DTPI that is partially opened Sacrum(L) 6cm x (W)5.5cm.  Base of 

wound is Maroon with with scattered areas that are maryellen and loose peeling soft 

black cap.Surrounding Non-Blanching erythema without induration.


Non-Blanching erythema that is indurated with delineated margins R trochanter(L)

5.5cm x (W)6.5cm.


No evidence of skin breakdown L trochanter.


Non-Blanching erythema noted to R and L ischial tuberosities.


DTPI medial R heel (L)3.3cm x (W)3.4cm. Base of injury is, fluctuant, maroon 

with delineated margins. surrounding red,  boggy heel.


DTPI Lateral R Heel(L)1.5cm x (W)2.5cm. Base of injury is indurated, maroon 

with purpuric center.


Non-Blanching erythema without induration /fluctuance lateral R malleolus(L)2cm 

x (W)1.5cm.





Tx.Plan:


Apply Moisture Barrier Paste to Sacrum. Cover with Optifoam drsg. Change every 

3 days and prn.


           


Apply Cavilon Skin Barrier to R and L trochanter. Cover each site with Optifoam 

drsgs. Change every 7 days and prn.


           


Apply Moisture Barrier Paste to R and L Ischial tuberosities with each 

Incontinence care.


           


Apply Cavilon Skin Barrier to Medial and Lateral R Heel. Cover with Optifoam 

drsg. Change every7 days and prn.


           


Apply Cavilon Skin Barrier to R lateral ,and medial Malleoli. Cover each site 

with Optifoam drsgs. Change every 7 days and prn.


           


Apply Cavilon Skin Barrier to L Heel and Malleoli. Cover each site with 

Optifoam drsgs. Change every 7 days and prn.


           


Cover open blisters as needed with Optifoam drsgs. 


           


Reposition at least every 2 hours or as tolerated.


           


Off-load heels with pillow.


           


Nutritional optimization





DAILY ESTIMATED NEEDS:


Needs based on Sepsis, renal, wound 50.9kg  


25-35  kcals/kg 


8969-9287  total kcals


1.25-1.5  g protein/kg


64-76  g total protein 


25-30  mL/kg


6217-6230  total fluid mLs





NUTRITION DIAGNOSIS:


1. Swallowing difficulty r/t dysphagia as evidenced by pt is GT dep.


2. Altered nutrition related lab values r/t sepsis and severe dehydration,


pre-diabetes as evidenced by Elev WBC (31.6 ->18.2), elev Na (179-> WNL),


elev BUN (131->48), elev Creat (5.1->2.2), elev K (5.5-> wnl->5.5, 5.3),


elev BGs (93, 113, 185), A1C of 6.4.





CURRENT TF: Nepro @35  








ENTERAL NUTRITION RECOMMENDATIONS:


LOWER GOAL RATE -> NEPRO @ 35ML/HR X 24 HRS  to provide 840ml, 1512kcal, 68g 

prot, 610ml free water 





- LOWER GOAL RATE: meets 100% est kcal/prot needs


- Flush per MD, HOB over 30 degrees.








ADDITIONAL RECOMMENDATIONS:


1) Monitor lytes and renal status-> TF change to Nepro (8/28), elev K 


2) Per SNF wt of 112# 


3) NISS for BG control: A1C 6.4, TF at goal + added D5+ Decadron 


4) Wound healing: TF @ goal provides 100% RDI 


    add Vit C 500mg QD + José Manuel BID via GT  





micro reviewed


covid negative


c diff negative


bc ngtd 


wounds do not seem to be etiology of infection 





(10) UTI (urinary tract infection)


(11) ARF (acute renal failure)


(12) Psychosis











Alex Nicholas Sep 9, 2020 15:19

## 2020-09-09 NOTE — PULMONOLOGY PROGRESS NOTE
Subjective


ROS Limited/Unobtainable:  No


Constitutional:  Reports: no symptoms


HEENT:  Repors: no symptoms


Respiratory:  Reports: no symptoms


Allergies:  


Coded Allergies:  


     AMPICILLIN (Verified  Allergy, Unknown, hives, 8/21/20)


     PENICILLINS (Verified  Allergy, Unknown, hives, 8/21/20)


     TETRACYCLINES (Verified  Allergy, Unknown, hives, 8/21/20)





Objective





Last 24 Hour Vital Signs








  Date Time  Temp Pulse Resp B/P (MAP) Pulse Ox O2 Delivery O2 Flow Rate FiO2


 


9/9/20 12:00 98.7 99 19 118/74 (89) 95   





  103      


 


9/9/20 09:00      Room Air  


 


9/9/20 08:00 98.4 95 21 114/57 (76) 98   





  103      


 


9/9/20 04:00 97.3 99 24 130/70 (90) 98   





  99      


 


9/9/20 00:00 98.6 94 24 136/68 (90) 98   


 


9/8/20 22:54     96 Nasal Cannula 3.0 32


 


9/8/20 20:00 99.0 100 22 130/62 (84) 100   





  100      

















Intake and Output  


 


 9/8/20 9/9/20





 19:00 07:00


 


Intake Total 500 ml 415 ml


 


Output Total 1500 ml 


 


Balance -1000 ml 415 ml


 


  


 


Intake Free Water 80 ml 100 ml


 


Tube Feeding 420 ml 315 ml


 


Output Urine Total 1500 ml 








General Appearance:  no acute distress, other - chronically ill looking 

bedridden female


HEENT:  normocephalic, atraumatic, other - VM


Respiratory:  chest wall non-tender, rhonchi - bilaterally - scattered


Cardiovascular:  normal peripheral pulses, normal rate - SR on tele


Abdomen:  normal bowel sounds, soft, non tender


Genitourinary:  normal external genitalia


Skin:  other - BUE crusting lesions


Neurologic:  CNs II-XII grossly normal, abnormal gait - bedridden


Lymphatic:  no neck adenopathy


Laboratory Tests


9/9/20 05:50: 


White Blood Count 11.1H, Red Blood Count 3.39L, Hemoglobin 10.8L, Hematocrit 

34.5L, Mean Corpuscular Volume 102H, Mean Corpuscular Hemoglobin 31.8H, Mean 

Corpuscular Hemoglobin Concent 31.3L, Red Cell Distribution Width 18.2H, 

Platelet Count 168, Mean Platelet Volume 7.0, Neutrophils (%) (Auto) 67.2, 

Lymphocytes (%) (Auto) 22.1, Monocytes (%) (Auto) 8.1, Eosinophils (%) (Auto) 

1.7, Basophils (%) (Auto) 0.9, Erythrocyte Sedimentation Rate 61H, Sodium Level 

142, Potassium Level 4.4, Chloride Level 106, Carbon Dioxide Level 30, Anion 

Gap 6, Blood Urea Nitrogen 38H, Creatinine 1.9H, Estimat Glomerular Filtration 

Rate 26.0, Glucose Level 113H, Calcium Level 9.0, Phosphorus Level 3.2, 

Magnesium Level 2.1, Total Bilirubin 0.3, Aspartate Amino Transf (AST/SGOT) 19, 

Alanine Aminotransferase (ALT/SGPT) 23, Alkaline Phosphatase 144H, C-Reactive 

Protein, Quantitative 1.8H, Total Protein 6.3L, Albumin 2.3L, Globulin 4.0, 

Albumin/Globulin Ratio 0.6L





Current Medications








 Medications


  (Trade)  Dose


 Ordered  Sig/Raji


 Route


 PRN Reason  Start Time


 Stop Time Status Last Admin


Dose Admin


 


 Acetaminophen


  (Tylenol)  650 mg  Q6H  PRN


 GT


 Mild Pain (Pain Scale 1-3)  9/8/20 06:00


 10/4/20 05:59   


 


 


 Ascorbic Acid


  (Vitamin C)  500 mg  DAILY


 GT


   9/8/20 09:00


 10/4/20 08:59  9/9/20 08:51


 


 


 Clonidine HCl


  (Catapres Tab)  0.1 mg  Q4H  PRN


 GT


 bp over 160 syst  9/8/20 07:45


 12/3/20 19:40   


 


 


 Dextrose  1,000 ml @ 


 75 mls/hr  [id number]


 IV


   9/8/20 06:00


 10/4/20 19:40  9/9/20 09:12


 


 


 Docusate Sodium


  (Colace)  100 mg  EVERY 8  HOURS


 GT


   9/8/20 06:00


 9/20/20 08:59  9/9/20 13:18


 


 


 Enoxaparin Sodium


  (Lovenox)  40 mg  DAILY


 SUBQ


   9/8/20 09:00


 11/19/20 08:59  9/9/20 08:55


 


 


 Epoetin Jaswant


  (Epoetin


 Jaswant-EPBX(NON


 ESRD))  8,000 unit  MON-WED-FRI


 SUBQ


   9/11/20 21:00


 12/10/20 20:59   


 


 


 Lorazepam


  (Ativan 2mg/ml


 1ml)  1 mg  Q4H  PRN


 IV


 For Anxiety  9/8/20 07:45


 9/11/20 19:40   


 


 


 Micafungin Sodium


 100 mg/Sodium


 Chloride  110 ml @ 


 110 mls/hr  Q24H


 IVPB


   9/8/20 17:00


 9/13/20 16:59  9/9/20 16:05


 


 


 Minocycline HCl


  (Minocin)  100 mg  Q12HR


 ORAL


   9/8/20 09:00


 9/14/20 09:59  9/9/20 08:51


 


 


 Pantoprazole


  (Protonix)  40 mg  EVERY 12  HOURS


 IVP


   9/8/20 09:00


 9/20/20 20:59  9/9/20 08:51


 


 


 Polyethylene


 Glycol


  (Miralax)  17 gm  BEDTIME


 GT


   9/8/20 21:00


 9/20/20 20:59  9/8/20 20:28


 











Assessment/Plan


Problems:  


(1) 2019 novel coronavirus disease (COVID-19)


(2) Multiple drug resistant organism (MDRO) culture positive


(3) Sepsis


(4) ARF (acute renal failure)


(5) Severe dehydration


(6) Hypernatremia


(7) Psychosis


(8) Pacemaker


Assessment/Plan





WBC still high


repeat cxr, ESR and CRP





COVID positive





f/u electrolytes


renal studies


urine electrolytes


dvt prophylaxis











Live Brambila MD Sep 9, 2020 17:55

## 2020-09-09 NOTE — CARDIOLOGY REPORT
--------------- APPROVED REPORT --------------





EXAM: Two-dimensional and M-mode echocardiogram with Doppler and color Doppler.



INDICATION

Congestive Heart Failure 



M-Mode DIMENSIONS 

IVSd0.8 (0.7-1.1cm)Left Atrium (MM)3.3 (1.6-4.0cm)

LVDd4.0 (3.5-5.6cm)Aortic Root2.5 (2.0-3.7cm)

PWd0.9 (0.7-1.1cm)Aortic Cusp Exc.2.0 (1.5-2.0cm)

IVSs1.3 cmEPSS0.3 (>1.0cm)

LVDs2.5 (2.5-4.0cm)

PWs1.4 cm



<Conclusion>

Normal left ventricular chamber size, systolic function and wall motion.

Left ventricular ejection fraction estimated to be 65-70%.

No left ventricular hypertrophy.

No evidence of pericardial effusion

All other cardiac chamber sizes are within normal limits. 

Mild focal aortic valve sclerosis with adequate cusp excursion.

Mildly thickened mitral valve leaflets with normal excursion.

Mild mitral annulus and aortic root calcification.

Normal pulmonic valve structure.

Normal tricuspid valve structure. 

Subcostal views unobtainable due to G tube.



A  color flow and spectral Doppler study was performed and revealed:

No aortic regurgitation.

Mitral diastolic velocities suggest reduced left ventricular relaxation c/w mild diastolic 

dysfunction (Grade I)

Trace mitral regurgitation.

Mild tricuspid regurgitation.

Tricuspid  systolic velocities suggests peak right ventricular systolic pressure of 32 mmHg.

## 2020-09-09 NOTE — INFECTIOUS DISEASES PROG NOTE
Assessment/Plan


71yo F with:





Sepsis


Fever; SP


Leukocytosis; worsening (sp steroids)


Fungemia


      -9/9 2d echo: no discrete vegetations


     -9/6 Bcx NTD


    -9/3 u/a neg


          Bcx 1/4 yeast





JASWINDER to 5.1, improving 


Hypoxic resp failure, sp NRB mask >VM> 4l NC 9/1


VRE UTI


Hx of COVID-19 1 mo ago ( doubt recurrent COVID-19) 


HAP


           9/9 CXR: No acute process


            9/3 sp cx yeast


            9/1 rapid COVID PCR neg; 9/3 rapid COVID PCR neg


   8/29 CXR:   Improving left lower lobe infiltrate and left pleural effusion. 

There is a probable small right pleural effusion.


   8/27 cdiff neg


           8/26 CXR: There are increased interstitial congestion and left 

pleural effusion,since prior exam of 8/22/2020


               Bcx NTD


            8/22 sp cx MDR ABC (S bactrim; I Colistin, Polymixin B, Minocycline)


   8/21 BCx NTD


   8/21 UA+, UCx >100k VRE (S Zyvox)


   8/21 CXR: Left retrocardiac mild atelectasis versus infiltrate.


   8/21 COVID rapid neg; 8/22 repeat COVID-19 +








BL arm rash, appears evolving, now small pustules with dry/crusting, querry 

resolving Shingles? Though odd to have on both arms


   8/21 HSV & VZV PCR ordered





Plan:


Dc IV bactrim #16/14-21 


 PO Minocycline 200mg bid #16/14-21 (high dose) ; will dc soon


Empiric Micafungin #4 for fungemia


       -8/29 SP ZYvox #7


       --8/24 SP Cefepime #4


       --8/23 SP IV Vancomcyin #4





F/u cx


Monitor CBC/BMP


Trend BUE rash


Dc covid isolation


f/u Bcx x2





Discussed with RN





Subjective


Allergies:  


Coded Allergies:  


     AMPICILLIN (Verified  Allergy, Unknown, hives, 8/21/20)


     PENICILLINS (Verified  Allergy, Unknown, hives, 8/21/20)


     TETRACYCLINES (Verified  Allergy, Unknown, hives, 8/21/20)


afebrile  >48hrs


leukocytosis resolving


fungemic


repeat Bcx NTD





Objective





Last 24 Hour Vital Signs








  Date Time  Temp Pulse Resp B/P (MAP) Pulse Ox O2 Delivery O2 Flow Rate FiO2


 


9/9/20 12:00 98.7 99 19 118/74 (89) 95   





  103      


 


9/9/20 09:00      Room Air  


 


9/9/20 08:00 98.4 95 21 114/57 (76) 98   





  103      


 


9/9/20 04:00 97.3 99 24 130/70 (90) 98   





  99      


 


9/9/20 00:00 98.6 94 24 136/68 (90) 98   


 


9/8/20 22:54     96 Nasal Cannula 3.0 32


 


9/8/20 20:00 99.0 100 22 130/62 (84) 100   





  100      


 


9/8/20 16:00 98.1 95 19 117/68 (84) 98   








Height (Feet):  5


Height (Inches):  2.00


Weight (Pounds):  123


HEENT:  atraumatic


Respiratory/Chest:  other - mild Rsp distress


Abdomen:  no organomegaly





Laboratory Tests








Test


  9/9/20


05:50


 


White Blood Count


  11.1 K/UL


(4.8-10.8)  H


 


Red Blood Count


  3.39 M/UL


(4.20-5.40)  L


 


Hemoglobin


  10.8 G/DL


(12.0-16.0)  L


 


Hematocrit


  34.5 %


(37.0-47.0)  L


 


Mean Corpuscular Volume


  102 FL (80-99)


H


 


Mean Corpuscular Hemoglobin


  31.8 PG


(27.0-31.0)  H


 


Mean Corpuscular Hemoglobin


Concent 31.3 G/DL


(32.0-36.0)  L


 


Red Cell Distribution Width


  18.2 %


(11.6-14.8)  H


 


Platelet Count


  168 K/UL


(150-450)


 


Mean Platelet Volume


  7.0 FL


(6.5-10.1)


 


Neutrophils (%) (Auto)


  67.2 %


(45.0-75.0)


 


Lymphocytes (%) (Auto)


  22.1 %


(20.0-45.0)


 


Monocytes (%) (Auto)


  8.1 %


(1.0-10.0)


 


Eosinophils (%) (Auto)


  1.7 %


(0.0-3.0)


 


Basophils (%) (Auto)


  0.9 %


(0.0-2.0)


 


Erythrocyte Sedimentation Rate


  61 MM/HR


(0-30)  H


 


Sodium Level


  142 MMOL/L


(136-145)


 


Potassium Level


  4.4 MMOL/L


(3.5-5.1)


 


Chloride Level


  106 MMOL/L


()


 


Carbon Dioxide Level


  30 MMOL/L


(21-32)


 


Anion Gap


  6 mmol/L


(5-15)


 


Blood Urea Nitrogen


  38 mg/dL


(7-18)  H


 


Creatinine


  1.9 MG/DL


(0.55-1.30)  H


 


Estimat Glomerular Filtration


Rate 26.0 mL/min


(>60)


 


Glucose Level


  113 MG/DL


()  H


 


Calcium Level


  9.0 MG/DL


(8.5-10.1)


 


Phosphorus Level


  3.2 MG/DL


(2.5-4.9)


 


Magnesium Level


  2.1 MG/DL


(1.8-2.4)


 


Total Bilirubin


  0.3 MG/DL


(0.2-1.0)


 


Aspartate Amino Transf


(AST/SGOT) 19 U/L (15-37)


 


 


Alanine Aminotransferase


(ALT/SGPT) 23 U/L (12-78)


 


 


Alkaline Phosphatase


  144 U/L


()  H


 


C-Reactive Protein,


Quantitative 1.8 mg/dL


(0.00-0.90)  H


 


Total Protein


  6.3 G/DL


(6.4-8.2)  L


 


Albumin


  2.3 G/DL


(3.4-5.0)  L


 


Globulin 4.0 g/dL  


 


Albumin/Globulin Ratio


  0.6 (1.0-2.7)


L











Current Medications








 Medications


  (Trade)  Dose


 Ordered  Sig/Raji


 Route


 PRN Reason  Start Time


 Stop Time Status Last Admin


Dose Admin


 


 Acetaminophen


  (Tylenol)  650 mg  Q6H  PRN


 GT


 Mild Pain (Pain Scale 1-3)  9/8/20 06:00


 10/4/20 05:59   


 


 


 Ascorbic Acid


  (Vitamin C)  500 mg  DAILY


 GT


   9/8/20 09:00


 10/4/20 08:59  9/9/20 08:51


 


 


 Clonidine HCl


  (Catapres Tab)  0.1 mg  Q4H  PRN


 GT


 bp over 160 syst  9/8/20 07:45


 12/3/20 19:40   


 


 


 Dextrose  1,000 ml @ 


 75 mls/hr  [id number]


 IV


   9/8/20 06:00


 10/4/20 19:40  9/9/20 09:12


 


 


 Docusate Sodium


  (Colace)  100 mg  EVERY 8  HOURS


 GT


   9/8/20 06:00


 9/20/20 08:59  9/9/20 13:18


 


 


 Enoxaparin Sodium


  (Lovenox)  40 mg  DAILY


 SUBQ


   9/8/20 09:00


 11/19/20 08:59  9/9/20 08:55


 


 


 Epoetin Jaswant


  (Epoetin


 Jaswant-EPBX(NON


 ESRD))  10,000 unit  MON-WED-FRI


 SUBQ


   9/9/20 21:00


 11/29/20 20:59   


 


 


 Lorazepam


  (Ativan 2mg/ml


 1ml)  1 mg  Q4H  PRN


 IV


 For Anxiety  9/8/20 07:45


 9/11/20 19:40   


 


 


 Micafungin Sodium


 100 mg/Sodium


 Chloride  110 ml @ 


 110 mls/hr  Q24H


 IVPB


   9/8/20 17:00


 9/13/20 16:59  9/8/20 17:15


 


 


 Minocycline HCl


  (Minocin)  100 mg  Q12HR


 ORAL


   9/8/20 09:00


 9/14/20 09:59  9/9/20 08:51


 


 


 Pantoprazole


  (Protonix)  40 mg  EVERY 12  HOURS


 IVP


   9/8/20 09:00


 9/20/20 20:59  9/9/20 08:51


 


 


 Polyethylene


 Glycol


  (Miralax)  17 gm  BEDTIME


 GT


   9/8/20 21:00


 9/20/20 20:59  9/8/20 20:28


 


 


 Trimethoprim/


 Sulfamethoxazole


 10 ml/Dextrose  285 ml @ 


 190 mls/hr  EVERY 12  HOURS


 IV


   9/8/20 09:00


 9/14/20 09:59  9/9/20 08:51


 

















Tanisha Brownlee M.D. Sep 9, 2020 15:02

## 2020-09-09 NOTE — NEPHROLOGY PROGRESS NOTE
Assessment/Plan


Problem List:  


(1) ARF (acute renal failure)


Assessment:  Underlying CKD





(2) Sepsis


(3) UTI (urinary tract infection)


(4) Severe dehydration


(5) Hypernatremia


(6) Acute urinary retention


(7) Anemia


Assessment





Acute renal failure


Possible underlying chronic renal insufficiency


Severe dehydration


Hypotension


Sepsis, UTI


History of psychiatric disease


Elevated troponin I


Patient full code


Plan


September 9: Lab reviewed.  Serum creatinine stable at 1.9.  Continue per 

consultants.


September 8: Labs reviewed.  Serum creatinine stable.  Electrolytes within 

reasonable range.


September 7: Labs reviewed.  Remains stable from renal standpoint of view, as 

serum creatinine leveled off around 1.8 and 1.9.  Will continue to monitor 

renal parameters


September 6: No chemistry panel done today.  Status quo.  Continue per 

consultants.


September 5: Status quo.  Labs reviewed.  Stable from renal standpoint of view.


September 4: Status quo.  Labs reviewed.  Serum creatinine unchanged.  Continue 

per consultants.


September 3:Late note entry due to system problem at the [facility] today. Labs 

reviewed.  Electrolytes within normal limit.  Leukocytosis worsened.  Continue 

per consultants.  Serum creatinine 1.8 unchanged.


September 2: Low magnesium and low phosphorus corrected.  Continue per 

consultants.  On nasal cannula now.  Serum creatinine 1.8.


September 1: Serum creatinine 1.8 stable.  Continue per pulmonary.  Continue to 

monitor renal parameters.


August 31: On Venturi mask.  Serum creatinine lowered to 1.8.  Continue per 

consultants.  Medication list reviewed.  Potassium supplement given.


August 30: Status quo.  Creatinine lower at 2.  Other electrolytes within 

normal limits.  Continue per consultants.


August 29: Labs reviewed.  Creatinine 2.2 stable.  Potassium 3.1.  20 M EQ KCl 

given.  Continue her consultants.


August 28: Serum potassium 5.5.Creatinine 2.2.  1 dose Kayexalate given.  

Remains of 75 cc IV fluid.  We will continue to monitor renal parameters.


August 27: Serum potassium 5.3.  Creatinine higher at 2.2.  Medication list 

reviewed.  Suggest to avoid nephrotoxic's like Bactrim if possible.  Continue 

to monitor renal parameters and electrolytes.  Continue per pulmonary to adjust 

pulmonary status.  May require BiPAP.


August 26: Serum sodium lower.  Renal parameters appear more stable.  

Nevertheless leukocytosis is worsened.  Continue per consultants.


August 25: Serum sodium higher.  Continue D5W 75 cc an hour.  Continue to 

monitor electrolytes.  Continue per consultants.  Serum creatinine down to 2.4 

from 5.1 on admission.  Leukocytosis persists.


August 24: Patient now on isolation for COVID-19.  Serum creatinine is 

plateauing.  Will decrease the IV fluid to 75 cc an hour.  Continue management 

per ID.  Will monitor electrolytes and renal parameters.


August 23: Discussed with MARTIN Carter.  Patient needs a Mitchell catheter.  Accurate 

intake and output.  Decrease  cc D5W an hour.  1 dose of IV Venofer and 

then subcu Epogen ordered for anemia.  Continue to monitor renal parameters


August 22: Renal parameters improving.  Continue D5W IV fluid.  Potassium 

supplement IV given.  Continue per consultants.





Previously:


Fluid challenge


Monitor renal parameters and electrolytes


Monitor intake and output


Mitchell catheter


Antibiotics


Avoid nephrotoxic's


Chest x-ray





Kidney ultrasound findings: 


Right kidney measures 8.9 cm in length. Left kidney measures 9 cm in


length. Both kidneys demonstrate markedly increased echogenicity. No 

hydronephrosis. 


There are bilateral renal cysts. Normal inferior vena cava. Bladder is empty,


contains a Mitchell catheter. 


 


Incidentally noted are gallstones.


 


Impression: Markedly echogenic kidneys, consistent with medical renal disease


Negative for hydronephrosis


Empty bladder with a Mitchell catheter


Incidental finding of cholelithiasis.





Subjective


ROS Limited/Unobtainable:  No


Constitutional:  Reports: malaise, weakness





Objective


Objective





Last 24 Hour Vital Signs








  Date Time  Temp Pulse Resp B/P (MAP) Pulse Ox O2 Delivery O2 Flow Rate FiO2


 


9/9/20 12:00 98.7 99 19 118/74 (89) 95   





  103      


 


9/9/20 09:00      Room Air  


 


9/9/20 08:00 98.4 95 21 114/57 (76) 98   





  103      


 


9/9/20 04:00 97.3 99 24 130/70 (90) 98   





  99      


 


9/9/20 00:00 98.6 94 24 136/68 (90) 98   


 


9/8/20 22:54     96 Nasal Cannula 3.0 32


 


9/8/20 20:00 99.0 100 22 130/62 (84) 100   





  100      


 


9/8/20 16:00 98.1 95 19 117/68 (84) 98   

















Intake and Output  


 


 9/8/20 9/9/20





 19:00 07:00


 


Intake Total 500 ml 380 ml


 


Output Total 1500 ml 


 


Balance -1000 ml 380 ml


 


  


 


Intake Free Water 80 ml 100 ml


 


Tube Feeding 420 ml 280 ml


 


Output Urine Total 1500 ml 











Current Medications








 Medications


  (Trade)  Dose


 Ordered  Sig/Raji


 Route


 PRN Reason  Start Time


 Stop Time Status Last Admin


Dose Admin


 


 Acetaminophen


  (Tylenol)  650 mg  Q6H  PRN


 GT


 Mild Pain (Pain Scale 1-3)  9/8/20 06:00


 10/4/20 05:59   


 


 


 Ascorbic Acid


  (Vitamin C)  500 mg  DAILY


 GT


   9/8/20 09:00


 10/4/20 08:59  9/9/20 08:51


 


 


 Clonidine HCl


  (Catapres Tab)  0.1 mg  Q4H  PRN


 GT


 bp over 160 syst  9/8/20 07:45


 12/3/20 19:40   


 


 


 Dextrose  1,000 ml @ 


 75 mls/hr  [id number]


 IV


   9/8/20 06:00


 10/4/20 19:40  9/9/20 09:12


 


 


 Docusate Sodium


  (Colace)  100 mg  EVERY 8  HOURS


 GT


   9/8/20 06:00


 9/20/20 08:59  9/9/20 13:18


 


 


 Enoxaparin Sodium


  (Lovenox)  40 mg  DAILY


 SUBQ


   9/8/20 09:00


 11/19/20 08:59  9/9/20 08:55


 


 


 Epoetin Jaswant


  (Epoetin


 Jaswant-EPBX(NON


 ESRD))  10,000 unit  MON-WED-FRI


 SUBQ


   9/9/20 21:00


 11/29/20 20:59   


 


 


 Lorazepam


  (Ativan 2mg/ml


 1ml)  1 mg  Q4H  PRN


 IV


 For Anxiety  9/8/20 07:45


 9/11/20 19:40   


 


 


 Micafungin Sodium


 100 mg/Sodium


 Chloride  110 ml @ 


 110 mls/hr  Q24H


 IVPB


   9/8/20 17:00


 9/13/20 16:59  9/8/20 17:15


 


 


 Minocycline HCl


  (Minocin)  100 mg  Q12HR


 ORAL


   9/8/20 09:00


 9/14/20 09:59  9/9/20 08:51


 


 


 Pantoprazole


  (Protonix)  40 mg  EVERY 12  HOURS


 IVP


   9/8/20 09:00


 9/20/20 20:59  9/9/20 08:51


 


 


 Polyethylene


 Glycol


  (Miralax)  17 gm  BEDTIME


 GT


   9/8/20 21:00


 9/20/20 20:59  9/8/20 20:28


 


 


 Trimethoprim/


 Sulfamethoxazole


 10 ml/Dextrose  285 ml @ 


 190 mls/hr  EVERY 12  HOURS


 IV


   9/8/20 09:00


 9/14/20 09:59  9/9/20 08:51


 





Laboratory Tests


9/9/20 05:50: 


White Blood Count 11.1H, Red Blood Count 3.39L, Hemoglobin 10.8L, Hematocrit 

34.5L, Mean Corpuscular Volume 102H, Mean Corpuscular Hemoglobin 31.8H, Mean 

Corpuscular Hemoglobin Concent 31.3L, Red Cell Distribution Width 18.2H, 

Platelet Count 168, Mean Platelet Volume 7.0, Neutrophils (%) (Auto) 67.2, 

Lymphocytes (%) (Auto) 22.1, Monocytes (%) (Auto) 8.1, Eosinophils (%) (Auto) 

1.7, Basophils (%) (Auto) 0.9, Erythrocyte Sedimentation Rate 61H, Sodium Level 

142, Potassium Level 4.4, Chloride Level 106, Carbon Dioxide Level 30, Anion 

Gap 6, Blood Urea Nitrogen 38H, Creatinine 1.9H, Estimat Glomerular Filtration 

Rate 26.0, Glucose Level 113H, Calcium Level 9.0, Phosphorus Level 3.2, 

Magnesium Level 2.1, Total Bilirubin 0.3, Aspartate Amino Transf (AST/SGOT) 19, 

Alanine Aminotransferase (ALT/SGPT) 23, Alkaline Phosphatase 144H, C-Reactive 

Protein, Quantitative 1.8H, Total Protein 6.3L, Albumin 2.3L, Globulin 4.0, 

Albumin/Globulin Ratio 0.6L


Height (Feet):  5


Height (Inches):  2.00


Weight (Pounds):  123


General Appearance:  no apparent distress


Cardiovascular:  tachycardia


Respiratory/Chest:  decreased breath sounds


Abdomen:  distended











Derik Dos Snatos MD Sep 9, 2020 14:56

## 2020-09-10 VITALS — DIASTOLIC BLOOD PRESSURE: 66 MMHG | SYSTOLIC BLOOD PRESSURE: 127 MMHG

## 2020-09-10 VITALS — SYSTOLIC BLOOD PRESSURE: 139 MMHG | DIASTOLIC BLOOD PRESSURE: 75 MMHG

## 2020-09-10 VITALS — SYSTOLIC BLOOD PRESSURE: 110 MMHG | DIASTOLIC BLOOD PRESSURE: 64 MMHG

## 2020-09-10 VITALS — SYSTOLIC BLOOD PRESSURE: 129 MMHG | DIASTOLIC BLOOD PRESSURE: 62 MMHG

## 2020-09-10 VITALS — DIASTOLIC BLOOD PRESSURE: 56 MMHG | SYSTOLIC BLOOD PRESSURE: 126 MMHG

## 2020-09-10 VITALS — DIASTOLIC BLOOD PRESSURE: 67 MMHG | SYSTOLIC BLOOD PRESSURE: 112 MMHG

## 2020-09-10 VITALS — DIASTOLIC BLOOD PRESSURE: 65 MMHG | SYSTOLIC BLOOD PRESSURE: 120 MMHG

## 2020-09-10 LAB
ADD MANUAL DIFF: NO
ANION GAP SERPL CALC-SCNC: 7 MMOL/L (ref 5–15)
BASOPHILS NFR BLD AUTO: 2.1 % (ref 0–2)
BUN SERPL-MCNC: 38 MG/DL (ref 7–18)
CALCIUM SERPL-MCNC: 9.8 MG/DL (ref 8.5–10.1)
CHLORIDE SERPL-SCNC: 102 MMOL/L (ref 98–107)
CO2 SERPL-SCNC: 30 MMOL/L (ref 21–32)
CREAT SERPL-MCNC: 2 MG/DL (ref 0.55–1.3)
EOSINOPHIL NFR BLD AUTO: 2 % (ref 0–3)
ERYTHROCYTE [DISTWIDTH] IN BLOOD BY AUTOMATED COUNT: 18.3 % (ref 11.6–14.8)
HCT VFR BLD CALC: 33.8 % (ref 37–47)
HGB BLD-MCNC: 10.9 G/DL (ref 12–16)
LYMPHOCYTES NFR BLD AUTO: 24.4 % (ref 20–45)
MCV RBC AUTO: 101 FL (ref 80–99)
MONOCYTES NFR BLD AUTO: 8.5 % (ref 1–10)
NEUTROPHILS NFR BLD AUTO: 63 % (ref 45–75)
PLATELET # BLD: 172 K/UL (ref 150–450)
POTASSIUM SERPL-SCNC: 4.1 MMOL/L (ref 3.5–5.1)
RBC # BLD AUTO: 3.34 M/UL (ref 4.2–5.4)
SODIUM SERPL-SCNC: 139 MMOL/L (ref 136–145)
WBC # BLD AUTO: 10.8 K/UL (ref 4.8–10.8)

## 2020-09-10 RX ADMIN — ENOXAPARIN SODIUM SCH MG: 40 INJECTION SUBCUTANEOUS at 09:30

## 2020-09-10 RX ADMIN — DOCUSATE SODIUM SCH MG: 50 LIQUID ORAL at 06:00

## 2020-09-10 RX ADMIN — NYSTATIN SCH APPLIC: 100000 POWDER TOPICAL at 09:51

## 2020-09-10 RX ADMIN — DEXTROSE MONOHYDRATE SCH MLS/HR: 50 INJECTION, SOLUTION INTRAVENOUS at 17:33

## 2020-09-10 RX ADMIN — DOCUSATE SODIUM SCH MG: 50 LIQUID ORAL at 14:43

## 2020-09-10 RX ADMIN — Medication SCH MG: at 09:29

## 2020-09-10 RX ADMIN — MINOCYCLINE HYDROCHLORIDE SCH MG: 50 CAPSULE ORAL at 09:29

## 2020-09-10 RX ADMIN — PANTOPRAZOLE SODIUM SCH MG: 40 INJECTION, POWDER, FOR SOLUTION INTRAVENOUS at 21:02

## 2020-09-10 RX ADMIN — DOCUSATE SODIUM SCH MG: 50 LIQUID ORAL at 22:00

## 2020-09-10 RX ADMIN — POLYETHYLENE GLYCOL 3350 SCH GM: 17 POWDER, FOR SOLUTION ORAL at 20:57

## 2020-09-10 RX ADMIN — NYSTATIN SCH APPLIC: 100000 POWDER TOPICAL at 17:33

## 2020-09-10 RX ADMIN — NYSTATIN SCH APPLIC: 100000 POWDER TOPICAL at 12:12

## 2020-09-10 RX ADMIN — PANTOPRAZOLE SODIUM SCH MG: 40 INJECTION, POWDER, FOR SOLUTION INTRAVENOUS at 09:29

## 2020-09-10 NOTE — INTERNAL MED PROGRESS NOTE
Subjective


Date of Service:  Sep 10, 2020


Physician Name


EmileeWing


Attending Physician


Umer Wahl MD





Current Medications








 Medications


  (Trade)  Dose


 Ordered  Sig/Raji


 Route


 PRN Reason  Start Time


 Stop Time Status Last Admin


Dose Admin


 


 Acetaminophen


  (Tylenol)  650 mg  Q6H  PRN


 GT


 Mild Pain (Pain Scale 1-3)  9/8/20 06:00


 10/4/20 05:59   


 


 


 Ascorbic Acid


  (Vitamin C)  500 mg  DAILY


 GT


   9/8/20 09:00


 10/4/20 08:59  9/10/20 09:29


 


 


 Clonidine HCl


  (Catapres Tab)  0.1 mg  Q4H  PRN


 GT


 bp over 160 syst  9/8/20 07:45


 12/3/20 19:40   


 


 


 Dextrose  1,000 ml @ 


 75 mls/hr  [id number]


 IV


   9/8/20 06:00


 10/4/20 19:40  9/10/20 12:11


 


 


 Docusate Sodium


  (Colace)  100 mg  EVERY 8  HOURS


 GT


   9/8/20 06:00


 9/20/20 08:59  9/10/20 14:43


 


 


 Enoxaparin Sodium


  (Lovenox)  40 mg  DAILY


 SUBQ


   9/8/20 09:00


 11/19/20 08:59  9/10/20 09:30


 


 


 Epoetin Jaswant


  (Epoetin


 Jaswant-EPBX(NON


 ESRD))  8,000 unit  MON-WED-FRI


 SUBQ


   9/11/20 21:00


 12/10/20 20:59   


 


 


 Lorazepam


  (Ativan 2mg/ml


 1ml)  1 mg  Q4H  PRN


 IV


 For Anxiety  9/8/20 07:45


 9/11/20 19:40   


 


 


 Micafungin Sodium


 100 mg/Sodium


 Chloride  110 ml @ 


 110 mls/hr  Q24H


 IVPB


   9/8/20 17:00


 9/13/20 16:59  9/10/20 17:33


 


 


 Nystatin


  (Nystop Powder)  1 applic  THREE TIMES A  DAY


 TOPIC


   9/10/20 09:00


 12/8/20 18:14  9/10/20 17:33


 


 


 Pantoprazole


  (Protonix)  40 mg  EVERY 12  HOURS


 IVP


   9/8/20 09:00


 9/20/20 20:59  9/10/20 09:29


 


 


 Polyethylene


 Glycol


  (Miralax)  17 gm  BEDTIME


 GT


   9/8/20 21:00


 9/20/20 20:59  9/8/20 20:28


 








Allergies:  


Coded Allergies:  


     AMPICILLIN (Verified  Allergy, Unknown, hives, 8/21/20)


     PENICILLINS (Verified  Allergy, Unknown, hives, 8/21/20)


     TETRACYCLINES (Verified  Allergy, Unknown, hives, 8/21/20)


ROS Limited/Unobtainable:  Yes


Subjective


71 YO F with previous COVID 19 pos admitted with shortness of breath.  Now 

pneumonia and sepsis.  Cover for Int Med-DR Wahl.





Objective





Last Vital Signs








  Date Time  Temp Pulse Resp B/P (MAP) Pulse Ox O2 Delivery O2 Flow Rate FiO2


 


9/10/20 16:00 99.7 95 18 112/67 (82) 98   


 


9/10/20 09:00      Room Air  


 


9/9/20 19:49       3.0 32











Laboratory Tests








Test


  9/10/20


08:15


 


White Blood Count


  10.8 K/UL


(4.8-10.8)


 


Red Blood Count


  3.34 M/UL


(4.20-5.40)  L


 


Hemoglobin


  10.9 G/DL


(12.0-16.0)  L


 


Hematocrit


  33.8 %


(37.0-47.0)  L


 


Mean Corpuscular Volume


  101 FL (80-99)


H


 


Mean Corpuscular Hemoglobin


  32.5 PG


(27.0-31.0)  H


 


Mean Corpuscular Hemoglobin


Concent 32.1 G/DL


(32.0-36.0)


 


Red Cell Distribution Width


  18.3 %


(11.6-14.8)  H


 


Platelet Count


  172 K/UL


(150-450)


 


Mean Platelet Volume


  6.7 FL


(6.5-10.1)


 


Neutrophils (%) (Auto)


  63.0 %


(45.0-75.0)


 


Lymphocytes (%) (Auto)


  24.4 %


(20.0-45.0)


 


Monocytes (%) (Auto)


  8.5 %


(1.0-10.0)


 


Eosinophils (%) (Auto)


  2.0 %


(0.0-3.0)


 


Basophils (%) (Auto)


  2.1 %


(0.0-2.0)  H


 


Sodium Level


  139 MMOL/L


(136-145)


 


Potassium Level


  4.1 MMOL/L


(3.5-5.1)


 


Chloride Level


  102 MMOL/L


()


 


Carbon Dioxide Level


  30 MMOL/L


(21-32)


 


Anion Gap


  7 mmol/L


(5-15)


 


Blood Urea Nitrogen


  38 mg/dL


(7-18)  H


 


Creatinine


  2.0 MG/DL


(0.55-1.30)  H


 


Estimat Glomerular Filtration


Rate 24.5 mL/min


(>60)


 


Glucose Level


  126 MG/DL


()  H


 


Calcium Level


  9.8 MG/DL


(8.5-10.1)

















Intake and Output  


 


 9/9/20 9/10/20





 19:00 07:00


 


Intake Total 1360 ml 110 ml


 


Output Total 600 ml 1400 ml


 


Balance 760 ml -1290 ml


 


  


 


Intake Free Water 300 ml 


 


IV Total 675 ml 75 ml


 


Tube Feeding 385 ml 35 ml


 


Output Urine Total 600 ml 1400 ml


 


# Bowel Movements  1








Objective


General Appearance:  WD/WN, no apparent distress, lethargic


EENT:  PERRL/EOMI, normal ENT inspection


Neck:  non-tender, normal alignment, supple, normal inspection


Cardiovascular:  normal peripheral pulses, normal rate, regular rhythm, no 

gallop/murmur, no JVD


Respiratory/Chest:  Venturi mask; chest wall non-tender, respiratory distress, 

crackles/rales, rhonchi - bilaterally, expiratory wheezing


Abdomen:  normal bowel sounds, non tender, soft, no organomegaly, no mass


Extremities:  normal range of motion, non-tender


Neurologic:  CNs II-XII grossly normal, no motor/sensory deficits


Skin:  normal pigmentation, warm/dry





Assessment/Plan


Problem List:  


(1) UTI (urinary tract infection)


Assessment & Plan:  Vanco res enterococcus.  Cont zyvox per ID





(2) Sepsis


Assessment & Plan:  ID= Dr Brownlee.  Continue IV bactrim, micafungin and 

minocycline





(3) Hypernatremia


Assessment & Plan:  Nephrology=DR Dos Santos





(4) Severe dehydration


(5) ARF (acute renal failure)


Assessment & Plan:  Nephrology = Dr Dos Santos





(6) Pneumonia


Assessment & Plan:  Acenitobacter.  Cont minocycline, linezolid and IV bactrim 

per ID=Dr Brownlee; pulmonary = Dr Brambila





(7) Hyperkalemia











Wing Hebert MD Sep 10, 2020 18:58

## 2020-09-10 NOTE — SURGERY PROGRESS NOTE
Surgery Progress Note


Subjective


Additional Comments


labs improved


exam stable


comfortable





Objective





Last 24 Hour Vital Signs








  Date Time  Temp Pulse Resp B/P (MAP) Pulse Ox O2 Delivery O2 Flow Rate FiO2


 


9/10/20 08:00 97.9 97 18 126/56 (79) 98   


 


9/10/20 03:51 99.9 99 24 129/62 (84) 96   





  97      


 


9/10/20 00:00 98.2 105 22 139/75 (96) 100   





  97      


 


9/9/20 21:00      Room Air  


 


9/9/20 20:00 98.2 100 22 133/66 (88) 96   





  97      


 


9/9/20 19:49     97 Nasal Cannula 3.0 32


 


9/9/20 16:00 98.5 97 19 117/64 (81) 96   





  97      


 


9/9/20 12:00 98.7 99 19 118/74 (89) 95   





  103      








I&O











Intake and Output  


 


 9/9/20 9/10/20





 19:00 07:00


 


Intake Total 1360 ml 


 


Output Total 600 ml 1400 ml


 


Balance 760 ml -1400 ml


 


  


 


Intake Free Water 300 ml 


 


IV Total 675 ml 


 


Tube Feeding 385 ml 


 


Output Urine Total 600 ml 1400 ml


 


# Bowel Movements  1








Dressing:  other


Wound:  other


Cardiovascular:  RSR


Respiratory:  decreased breath sounds


Abdomen:  soft, non-tender, present bowel sounds


Extremities:  no edema, no tenderness, no cyanosis





Laboratory Tests








Test


  9/10/20


08:15


 


White Blood Count


  10.8 K/UL


(4.8-10.8)


 


Red Blood Count


  3.34 M/UL


(4.20-5.40)  L


 


Hemoglobin


  10.9 G/DL


(12.0-16.0)  L


 


Hematocrit


  33.8 %


(37.0-47.0)  L


 


Mean Corpuscular Volume


  101 FL (80-99)


H


 


Mean Corpuscular Hemoglobin


  32.5 PG


(27.0-31.0)  H


 


Mean Corpuscular Hemoglobin


Concent 32.1 G/DL


(32.0-36.0)


 


Red Cell Distribution Width


  18.3 %


(11.6-14.8)  H


 


Platelet Count


  172 K/UL


(150-450)


 


Mean Platelet Volume


  6.7 FL


(6.5-10.1)


 


Neutrophils (%) (Auto)


  63.0 %


(45.0-75.0)


 


Lymphocytes (%) (Auto)


  24.4 %


(20.0-45.0)


 


Monocytes (%) (Auto)


  8.5 %


(1.0-10.0)


 


Eosinophils (%) (Auto)


  2.0 %


(0.0-3.0)


 


Basophils (%) (Auto)


  2.1 %


(0.0-2.0)  H


 


Sodium Level


  139 MMOL/L


(136-145)


 


Potassium Level


  4.1 MMOL/L


(3.5-5.1)


 


Chloride Level


  102 MMOL/L


()


 


Carbon Dioxide Level


  30 MMOL/L


(21-32)


 


Anion Gap


  7 mmol/L


(5-15)


 


Blood Urea Nitrogen


  38 mg/dL


(7-18)  H


 


Creatinine


  2.0 MG/DL


(0.55-1.30)  H


 


Estimat Glomerular Filtration


Rate 24.5 mL/min


(>60)


 


Glucose Level


  126 MG/DL


()  H


 


Calcium Level


  9.8 MG/DL


(8.5-10.1)











Plan


Problems:  


(1) Anemia


(2) 2019 novel coronavirus disease (COVID-19)


(3) Pneumonia


(4) Multiple drug resistant organism (MDRO) culture positive


(5) Hyperkalemia


(6) Hypernatremia


(7) Severe dehydration


(8) Acute urinary retention


(9) Sepsis


Assessment & Plan:  Pt presented on admission with multiple Pressure injuries. 

Pt noted to have band of  Blotchy red rash  with open and closed Blisters 

affecting  R Brachial to R forearm. Lateral R back /R flank is also blotchy 

erythematous with clusters of serous filled blisters. On Lateral L Back to L 

flank is Blotchy, erythematous with clusters of serous filled blisters.  


Reabsorbing DTPI that is partially opened Sacrum(L) 6cm x (W)5.5cm.  Base of 

wound is Maroon with with scattered areas that are maryellen and loose peeling soft 

black cap.Surrounding Non-Blanching erythema without induration.


Non-Blanching erythema that is indurated with delineated margins R trochanter(L)

5.5cm x (W)6.5cm.


No evidence of skin breakdown L trochanter.


Non-Blanching erythema noted to R and L ischial tuberosities.


DTPI medial R heel (L)3.3cm x (W)3.4cm. Base of injury is, fluctuant, maroon 

with delineated margins. surrounding red,  boggy heel.


DTPI Lateral R Heel(L)1.5cm x (W)2.5cm. Base of injury is indurated, maroon 

with purpuric center.


Non-Blanching erythema without induration /fluctuance lateral R malleolus(L)2cm 

x (W)1.5cm.





Tx.Plan:


Apply Moisture Barrier Paste to Sacrum. Cover with Optifoam drsg. Change every 

3 days and prn.


           


Apply Cavilon Skin Barrier to R and L trochanter. Cover each site with Optifoam 

drsgs. Change every 7 days and prn.


           


Apply Moisture Barrier Paste to R and L Ischial tuberosities with each 

Incontinence care.


           


Apply Cavilon Skin Barrier to Medial and Lateral R Heel. Cover with Optifoam 

drsg. Change every7 days and prn.


           


Apply Cavilon Skin Barrier to R lateral ,and medial Malleoli. Cover each site 

with Optifoam drsgs. Change every 7 days and prn.


           


Apply Cavilon Skin Barrier to L Heel and Malleoli. Cover each site with 

Optifoam drsgs. Change every 7 days and prn.


           


Cover open blisters as needed with Optifoam drsgs. 


           


Reposition at least every 2 hours or as tolerated.


           


Off-load heels with pillow.


           


Nutritional optimization





DAILY ESTIMATED NEEDS:


Needs based on Sepsis, renal, wound 50.9kg  


25-35  kcals/kg 


7737-7909  total kcals


1.25-1.5  g protein/kg


64-76  g total protein 


25-30  mL/kg


5809-0287  total fluid mLs





NUTRITION DIAGNOSIS:


1. Swallowing difficulty r/t dysphagia as evidenced by pt is GT dep.


2. Altered nutrition related lab values r/t sepsis and severe dehydration,


pre-diabetes as evidenced by Elev WBC (31.6 ->18.2), elev Na (179-> WNL),


elev BUN (131->48), elev Creat (5.1->2.2), elev K (5.5-> wnl->5.5, 5.3),


elev BGs (93, 113, 185), A1C of 6.4.





CURRENT TF: Nepro @35  








ENTERAL NUTRITION RECOMMENDATIONS:


LOWER GOAL RATE -> NEPRO @ 35ML/HR X 24 HRS  to provide 840ml, 1512kcal, 68g 

prot, 610ml free water 





- LOWER GOAL RATE: meets 100% est kcal/prot needs


- Flush per MD, HOB over 30 degrees.








ADDITIONAL RECOMMENDATIONS:


1) Monitor lytes and renal status-> TF change to Nepro (8/28), elev K 


2) Per SNF wt of 112# 


3) NISS for BG control: A1C 6.4, TF at goal + added D5+ Decadron 


4) Wound healing: TF @ goal provides 100% RDI 


    add Vit C 500mg QD + José Manuel BID via GT  





micro reviewed


covid negative


c diff negative


bc ngtd 


wounds do not seem to be etiology of infection 





(10) UTI (urinary tract infection)


(11) ARF (acute renal failure)


(12) Psychosis











Alex Nicholas Sep 10, 2020 10:42

## 2020-09-10 NOTE — PULMONOLOGY PROGRESS NOTE
Subjective


ROS Limited/Unobtainable:  No


Constitutional:  Reports: no symptoms


HEENT:  Repors: no symptoms


Respiratory:  Reports: no symptoms


Cardiovascular:  Reports: no symptoms


Allergies:  


Coded Allergies:  


     AMPICILLIN (Verified  Allergy, Unknown, hives, 8/21/20)


     PENICILLINS (Verified  Allergy, Unknown, hives, 8/21/20)


     TETRACYCLINES (Verified  Allergy, Unknown, hives, 8/21/20)





Objective





Last 24 Hour Vital Signs








  Date Time  Temp Pulse Resp B/P (MAP) Pulse Ox O2 Delivery O2 Flow Rate FiO2


 


9/10/20 12:00 97.7 100 20 120/65 (83) 96   


 


9/10/20 09:00      Room Air  


 


9/10/20 08:00 97.9 97 18 126/56 (79) 98   


 


9/10/20 03:51 99.9 99 24 129/62 (84) 96   





  97      


 


9/10/20 00:00 98.2 105 22 139/75 (96) 100   





  97      


 


9/9/20 21:00      Room Air  


 


9/9/20 20:00 98.2 100 22 133/66 (88) 96   





  97      


 


9/9/20 19:49     97 Nasal Cannula 3.0 32

















Intake and Output  


 


 9/9/20 9/10/20





 19:00 07:00


 


Intake Total 1360 ml 110 ml


 


Output Total 600 ml 1400 ml


 


Balance 760 ml -1290 ml


 


  


 


Intake Free Water 300 ml 


 


IV Total 675 ml 75 ml


 


Tube Feeding 385 ml 35 ml


 


Output Urine Total 600 ml 1400 ml


 


# Bowel Movements  1








General Appearance:  no acute distress, other - chronically ill looking 

bedridden female


HEENT:  normocephalic, atraumatic, other - VM


Respiratory:  chest wall non-tender, rhonchi - bilaterally - scattered


Cardiovascular:  normal peripheral pulses, normal rate - SR on tele


Abdomen:  normal bowel sounds, soft, non tender


Genitourinary:  normal external genitalia


Skin:  other - BUE crusting lesions


Neurologic:  CNs II-XII grossly normal, abnormal gait - bedridden


Lymphatic:  no neck adenopathy


Laboratory Tests


9/10/20 08:15: 


White Blood Count 10.8, Red Blood Count 3.34L, Hemoglobin 10.9L, Hematocrit 

33.8L, Mean Corpuscular Volume 101H, Mean Corpuscular Hemoglobin 32.5H, Mean 

Corpuscular Hemoglobin Concent 32.1, Red Cell Distribution Width 18.3H, 

Platelet Count 172, Mean Platelet Volume 6.7, Neutrophils (%) (Auto) 63.0, 

Lymphocytes (%) (Auto) 24.4, Monocytes (%) (Auto) 8.5, Eosinophils (%) (Auto) 

2.0, Basophils (%) (Auto) 2.1H, Sodium Level 139, Potassium Level 4.1, Chloride 

Level 102, Carbon Dioxide Level 30, Anion Gap 7, Blood Urea Nitrogen 38H, 

Creatinine 2.0H, Estimat Glomerular Filtration Rate 24.5, Glucose Level 126H, 

Calcium Level 9.8





Current Medications








 Medications


  (Trade)  Dose


 Ordered  Sig/Raji


 Route


 PRN Reason  Start Time


 Stop Time Status Last Admin


Dose Admin


 


 Acetaminophen


  (Tylenol)  650 mg  Q6H  PRN


 GT


 Mild Pain (Pain Scale 1-3)  9/8/20 06:00


 10/4/20 05:59   


 


 


 Ascorbic Acid


  (Vitamin C)  500 mg  DAILY


 GT


   9/8/20 09:00


 10/4/20 08:59  9/10/20 09:29


 


 


 Clonidine HCl


  (Catapres Tab)  0.1 mg  Q4H  PRN


 GT


 bp over 160 syst  9/8/20 07:45


 12/3/20 19:40   


 


 


 Dextrose  1,000 ml @ 


 75 mls/hr  [id number]


 IV


   9/8/20 06:00


 10/4/20 19:40  9/10/20 12:11


 


 


 Docusate Sodium


  (Colace)  100 mg  EVERY 8  HOURS


 GT


   9/8/20 06:00


 9/20/20 08:59  9/10/20 14:43


 


 


 Enoxaparin Sodium


  (Lovenox)  40 mg  DAILY


 SUBQ


   9/8/20 09:00


 11/19/20 08:59  9/10/20 09:30


 


 


 Epoetin Jaswant


  (Epoetin


 Jaswant-EPBX(NON


 ESRD))  8,000 unit  MON-WED-FRI


 SUBQ


   9/11/20 21:00


 12/10/20 20:59   


 


 


 Lorazepam


  (Ativan 2mg/ml


 1ml)  1 mg  Q4H  PRN


 IV


 For Anxiety  9/8/20 07:45


 9/11/20 19:40   


 


 


 Micafungin Sodium


 100 mg/Sodium


 Chloride  110 ml @ 


 110 mls/hr  Q24H


 IVPB


   9/8/20 17:00


 9/13/20 16:59  9/9/20 16:05


 


 


 Nystatin


  (Nystop Powder)  1 applic  THREE TIMES A  DAY


 TOPIC


   9/10/20 09:00


 12/8/20 18:14  9/10/20 12:12


 


 


 Pantoprazole


  (Protonix)  40 mg  EVERY 12  HOURS


 IVP


   9/8/20 09:00


 9/20/20 20:59  9/10/20 09:29


 


 


 Polyethylene


 Glycol


  (Miralax)  17 gm  BEDTIME


 GT


   9/8/20 21:00


 9/20/20 20:59  9/8/20 20:28


 











Assessment/Plan


Problems:  


(1) 2019 novel coronavirus disease (COVID-19)


(2) Multiple drug resistant organism (MDRO) culture positive


(3) Sepsis


(4) ARF (acute renal failure)


(5) Severe dehydration


(6) Hypernatremia


(7) Psychosis


(8) Pacemaker


Assessment/Plan





WBC normal


repeat cxr, 


Impression: No acute process





ESR and CRP





COVID positive





f/u electrolytes


renal studies


urine electrolytes


dvt prophylaxis











Live Brambila MD Sep 10, 2020 16:16

## 2020-09-10 NOTE — NEPHROLOGY PROGRESS NOTE
Assessment/Plan


Problem List:  


(1) ARF (acute renal failure)


Assessment:  Underlying CKD





(2) Sepsis


(3) UTI (urinary tract infection)


(4) Severe dehydration


(5) Hypernatremia


(6) Acute urinary retention


(7) Anemia


Assessment





Acute renal failure


Possible underlying chronic renal insufficiency


Severe dehydration


Hypotension


Sepsis, UTI


History of psychiatric disease


Elevated troponin I


Patient full code


Plan


September 10: Labs reviewed.  Medication list reviewed.  Serum creatinine 

slightly higher at 2.  We will continue to monitor renal parameters and ID to 

avoid nephrotoxic antibiotics as possible.


September 9: Lab reviewed.  Serum creatinine stable at 1.9.  Continue per 

consultants.


September 8: Labs reviewed.  Serum creatinine stable.  Electrolytes within 

reasonable range.


September 7: Labs reviewed.  Remains stable from renal standpoint of view, as 

serum creatinine leveled off around 1.8 and 1.9.  Will continue to monitor 

renal parameters


September 6: No chemistry panel done today.  Status quo.  Continue per 

consultants.


September 5: Status quo.  Labs reviewed.  Stable from renal standpoint of view.


September 4: Status quo.  Labs reviewed.  Serum creatinine unchanged.  Continue 

per consultants.


September 3:Late note entry due to system problem at the [facility] today. Labs 

reviewed.  Electrolytes within normal limit.  Leukocytosis worsened.  Continue 

per consultants.  Serum creatinine 1.8 unchanged.


September 2: Low magnesium and low phosphorus corrected.  Continue per 

consultants.  On nasal cannula now.  Serum creatinine 1.8.


September 1: Serum creatinine 1.8 stable.  Continue per pulmonary.  Continue to 

monitor renal parameters.


August 31: On Venturi mask.  Serum creatinine lowered to 1.8.  Continue per 

consultants.  Medication list reviewed.  Potassium supplement given.


August 30: Status quo.  Creatinine lower at 2.  Other electrolytes within 

normal limits.  Continue per consultants.


August 29: Labs reviewed.  Creatinine 2.2 stable.  Potassium 3.1.  20 M EQ KCl 

given.  Continue her consultants.


August 28: Serum potassium 5.5.Creatinine 2.2.  1 dose Kayexalate given.  

Remains of 75 cc IV fluid.  We will continue to monitor renal parameters.


August 27: Serum potassium 5.3.  Creatinine higher at 2.2.  Medication list 

reviewed.  Suggest to avoid nephrotoxic's like Bactrim if possible.  Continue 

to monitor renal parameters and electrolytes.  Continue per pulmonary to adjust 

pulmonary status.  May require BiPAP.


August 26: Serum sodium lower.  Renal parameters appear more stable.  

Nevertheless leukocytosis is worsened.  Continue per consultants.


August 25: Serum sodium higher.  Continue D5W 75 cc an hour.  Continue to 

monitor electrolytes.  Continue per consultants.  Serum creatinine down to 2.4 

from 5.1 on admission.  Leukocytosis persists.


August 24: Patient now on isolation for COVID-19.  Serum creatinine is 

plateauing.  Will decrease the IV fluid to 75 cc an hour.  Continue management 

per ID.  Will monitor electrolytes and renal parameters.


August 23: Discussed with MARTIN Carter.  Patient needs a Mitchell catheter.  Accurate 

intake and output.  Decrease  cc D5W an hour.  1 dose of IV Venofer and 

then subcu Epogen ordered for anemia.  Continue to monitor renal parameters


August 22: Renal parameters improving.  Continue D5W IV fluid.  Potassium 

supplement IV given.  Continue per consultants.





Previously:


Fluid challenge


Monitor renal parameters and electrolytes


Monitor intake and output


Mitchell catheter


Antibiotics


Avoid nephrotoxic's


Chest x-ray





Kidney ultrasound findings: 


Right kidney measures 8.9 cm in length. Left kidney measures 9 cm in


length. Both kidneys demonstrate markedly increased echogenicity. No 

hydronephrosis. 


There are bilateral renal cysts. Normal inferior vena cava. Bladder is empty,


contains a Mitchell catheter. 


 


Incidentally noted are gallstones.


 


Impression: Markedly echogenic kidneys, consistent with medical renal disease


Negative for hydronephrosis


Empty bladder with a Mitchell catheter


Incidental finding of cholelithiasis.





Subjective


ROS Limited/Unobtainable:  No


Constitutional:  Reports: malaise, weakness





Objective


Objective





Last 24 Hour Vital Signs








  Date Time  Temp Pulse Resp B/P (MAP) Pulse Ox O2 Delivery O2 Flow Rate FiO2


 


9/10/20 08:00 97.9 97 18 126/56 (79) 98   


 


9/10/20 03:51 99.9 99 24 129/62 (84) 96   





  97      


 


9/10/20 00:00 98.2 105 22 139/75 (96) 100   





  97      


 


9/9/20 21:00      Room Air  


 


9/9/20 20:00 98.2 100 22 133/66 (88) 96   





  97      


 


9/9/20 19:49     97 Nasal Cannula 3.0 32


 


9/9/20 16:00 98.5 97 19 117/64 (81) 96   





  97      


 


9/9/20 12:00 98.7 99 19 118/74 (89) 95   





  103      

















Intake and Output  


 


 9/9/20 9/10/20





 19:00 07:00


 


Intake Total 1360 ml 


 


Output Total 600 ml 1400 ml


 


Balance 760 ml -1400 ml


 


  


 


Intake Free Water 300 ml 


 


IV Total 675 ml 


 


Tube Feeding 385 ml 


 


Output Urine Total 600 ml 1400 ml


 


# Bowel Movements  1











Current Medications








 Medications


  (Trade)  Dose


 Ordered  Sig/Raji


 Route


 PRN Reason  Start Time


 Stop Time Status Last Admin


Dose Admin


 


 Acetaminophen


  (Tylenol)  650 mg  Q6H  PRN


 GT


 Mild Pain (Pain Scale 1-3)  9/8/20 06:00


 10/4/20 05:59   


 


 


 Ascorbic Acid


  (Vitamin C)  500 mg  DAILY


 GT


   9/8/20 09:00


 10/4/20 08:59  9/9/20 08:51


 


 


 Clonidine HCl


  (Catapres Tab)  0.1 mg  Q4H  PRN


 GT


 bp over 160 syst  9/8/20 07:45


 12/3/20 19:40   


 


 


 Dextrose  1,000 ml @ 


 75 mls/hr  [id number]


 IV


   9/8/20 06:00


 10/4/20 19:40  9/9/20 23:33


 


 


 Docusate Sodium


  (Colace)  100 mg  EVERY 8  HOURS


 GT


   9/8/20 06:00


 9/20/20 08:59  9/9/20 13:18


 


 


 Enoxaparin Sodium


  (Lovenox)  40 mg  DAILY


 SUBQ


   9/8/20 09:00


 11/19/20 08:59  9/9/20 08:55


 


 


 Epoetin Jaswant


  (Epoetin


 Jaswant-EPBX(NON


 ESRD))  8,000 unit  MON-WED-FRI


 SUBQ


   9/11/20 21:00


 12/10/20 20:59   


 


 


 Lorazepam


  (Ativan 2mg/ml


 1ml)  1 mg  Q4H  PRN


 IV


 For Anxiety  9/8/20 07:45


 9/11/20 19:40   


 


 


 Micafungin Sodium


 100 mg/Sodium


 Chloride  110 ml @ 


 110 mls/hr  Q24H


 IVPB


   9/8/20 17:00


 9/13/20 16:59  9/9/20 16:05


 


 


 Minocycline HCl


  (Minocin)  100 mg  Q12HR


 ORAL


   9/8/20 09:00


 9/14/20 09:59  9/9/20 21:23


 


 


 Nystatin


  (Nystop Powder)  1 applic  THREE TIMES A  DAY


 TOPIC


   9/10/20 09:00


 12/8/20 18:14   


 


 


 Pantoprazole


  (Protonix)  40 mg  EVERY 12  HOURS


 IVP


   9/8/20 09:00


 9/20/20 20:59  9/9/20 21:23


 


 


 Polyethylene


 Glycol


  (Miralax)  17 gm  BEDTIME


 GT


   9/8/20 21:00


 9/20/20 20:59  9/8/20 20:28


 





Laboratory Tests


9/10/20 08:15: 


White Blood Count 10.8, Red Blood Count 3.34L, Hemoglobin 10.9L, Hematocrit 

33.8L, Mean Corpuscular Volume 101H, Mean Corpuscular Hemoglobin 32.5H, Mean 

Corpuscular Hemoglobin Concent 32.1, Red Cell Distribution Width 18.3H, 

Platelet Count 172, Mean Platelet Volume 6.7, Neutrophils (%) (Auto) 63.0, 

Lymphocytes (%) (Auto) 24.4, Monocytes (%) (Auto) 8.5, Eosinophils (%) (Auto) 

2.0, Basophils (%) (Auto) 2.1H, Sodium Level 139, Potassium Level 4.1, Chloride 

Level 102, Carbon Dioxide Level 30, Anion Gap 7, Blood Urea Nitrogen 38H, 

Creatinine 2.0H, Estimat Glomerular Filtration Rate 24.5, Glucose Level 126H, 

Calcium Level 9.8


Height (Feet):  5


Height (Inches):  2.00


Weight (Pounds):  123


General Appearance:  no apparent distress, lethargic


Cardiovascular:  tachycardia


Respiratory/Chest:  decreased breath sounds


Abdomen:  distended











Derik Dos Santos MD Sep 10, 2020 09:28

## 2020-09-10 NOTE — INFECTIOUS DISEASES PROG NOTE
Assessment/Plan


71yo F with:





Sepsis


Fever; SP


Leukocytosis; worsening (sp steroids)- Resolved


Fungemia


      -9/9 2d echo: no discrete vegetations


     -9/6 Bcx NTD


    -9/3 u/a neg


          Bcx 1/4 yeast





JASWINDER to 5.1, improving 


Hypoxic resp failure, sp NRB mask >VM> 4l NC 9/1


VRE UTI


Hx of COVID-19 1 mo ago ( doubt recurrent COVID-19) 


HAP


           9/9 CXR: No acute process


            9/3 sp cx yeast


            9/1 rapid COVID PCR neg; 9/3 rapid COVID PCR neg


   8/29 CXR:   Improving left lower lobe infiltrate and left pleural effusion. 

There is a probable small right pleural effusion.


   8/27 cdiff neg


           8/26 CXR: There are increased interstitial congestion and left 

pleural effusion,since prior exam of 8/22/2020


               Bcx NTD


            8/22 sp cx MDR ABC (S bactrim; I Colistin, Polymixin B, Minocycline)


   8/21 BCx NTD


   8/21 UA+, UCx >100k VRE (S Zyvox)


   8/21 CXR: Left retrocardiac mild atelectasis versus infiltrate.


   8/21 COVID rapid neg; 8/22 repeat COVID-19 +








BL arm rash, appears evolving, now small pustules with dry/crusting, querry 

resolving Shingles? Though odd to have on both arms


   8/21 HSV & VZV PCR ordered





Plan:


Dc PO Minocycline 200mg bid #17


Empiric Micafungin #5 for fungemia


       -9/9 Sp IV bactrim #16


       -8/29 SP ZYvox #7


       --8/24 SP Cefepime #4


       --8/23 SP IV Vancomcyin #4





F/u cx


Monitor CBC/BMP


Trend BUE rash


Dc covid isolation


f/u Bcx x2





Discussed with RN and pharmacy staff





Subjective


Allergies:  


Coded Allergies:  


     AMPICILLIN (Verified  Allergy, Unknown, hives, 8/21/20)


     PENICILLINS (Verified  Allergy, Unknown, hives, 8/21/20)


     TETRACYCLINES (Verified  Allergy, Unknown, hives, 8/21/20)


afebrile  >72hrs


leukocytosis resolved


repeat Bcx NTD





Objective





Last 24 Hour Vital Signs








  Date Time  Temp Pulse Resp B/P (MAP) Pulse Ox O2 Delivery O2 Flow Rate FiO2


 


9/10/20 12:00 97.7 100 20 120/65 (83) 96   


 


9/10/20 09:00      Room Air  


 


9/10/20 08:00 97.9 97 18 126/56 (79) 98   


 


9/10/20 03:51 99.9 99 24 129/62 (84) 96   





  97      


 


9/10/20 00:00 98.2 105 22 139/75 (96) 100   





  97      


 


9/9/20 21:00      Room Air  


 


9/9/20 20:00 98.2 100 22 133/66 (88) 96   





  97      


 


9/9/20 19:49     97 Nasal Cannula 3.0 32


 


9/9/20 16:00 98.5 97 19 117/64 (81) 96   





  97      








Height (Feet):  5


Height (Inches):  2.00


Weight (Pounds):  123


HEENT:  atraumatic


Respiratory/Chest:  other - mild Rsp distress


Abdomen:  no organomegaly





Laboratory Tests








Test


  9/10/20


08:15


 


White Blood Count


  10.8 K/UL


(4.8-10.8)


 


Red Blood Count


  3.34 M/UL


(4.20-5.40)  L


 


Hemoglobin


  10.9 G/DL


(12.0-16.0)  L


 


Hematocrit


  33.8 %


(37.0-47.0)  L


 


Mean Corpuscular Volume


  101 FL (80-99)


H


 


Mean Corpuscular Hemoglobin


  32.5 PG


(27.0-31.0)  H


 


Mean Corpuscular Hemoglobin


Concent 32.1 G/DL


(32.0-36.0)


 


Red Cell Distribution Width


  18.3 %


(11.6-14.8)  H


 


Platelet Count


  172 K/UL


(150-450)


 


Mean Platelet Volume


  6.7 FL


(6.5-10.1)


 


Neutrophils (%) (Auto)


  63.0 %


(45.0-75.0)


 


Lymphocytes (%) (Auto)


  24.4 %


(20.0-45.0)


 


Monocytes (%) (Auto)


  8.5 %


(1.0-10.0)


 


Eosinophils (%) (Auto)


  2.0 %


(0.0-3.0)


 


Basophils (%) (Auto)


  2.1 %


(0.0-2.0)  H


 


Sodium Level


  139 MMOL/L


(136-145)


 


Potassium Level


  4.1 MMOL/L


(3.5-5.1)


 


Chloride Level


  102 MMOL/L


()


 


Carbon Dioxide Level


  30 MMOL/L


(21-32)


 


Anion Gap


  7 mmol/L


(5-15)


 


Blood Urea Nitrogen


  38 mg/dL


(7-18)  H


 


Creatinine


  2.0 MG/DL


(0.55-1.30)  H


 


Estimat Glomerular Filtration


Rate 24.5 mL/min


(>60)


 


Glucose Level


  126 MG/DL


()  H


 


Calcium Level


  9.8 MG/DL


(8.5-10.1)











Current Medications








 Medications


  (Trade)  Dose


 Ordered  Sig/Raji


 Route


 PRN Reason  Start Time


 Stop Time Status Last Admin


Dose Admin


 


 Acetaminophen


  (Tylenol)  650 mg  Q6H  PRN


 GT


 Mild Pain (Pain Scale 1-3)  9/8/20 06:00


 10/4/20 05:59   


 


 


 Ascorbic Acid


  (Vitamin C)  500 mg  DAILY


 GT


   9/8/20 09:00


 10/4/20 08:59  9/10/20 09:29


 


 


 Clonidine HCl


  (Catapres Tab)  0.1 mg  Q4H  PRN


 GT


 bp over 160 syst  9/8/20 07:45


 12/3/20 19:40   


 


 


 Dextrose  1,000 ml @ 


 75 mls/hr  [id number]


 IV


   9/8/20 06:00


 10/4/20 19:40  9/10/20 12:11


 


 


 Docusate Sodium


  (Colace)  100 mg  EVERY 8  HOURS


 GT


   9/8/20 06:00


 9/20/20 08:59  9/10/20 14:43


 


 


 Enoxaparin Sodium


  (Lovenox)  40 mg  DAILY


 SUBQ


   9/8/20 09:00


 11/19/20 08:59  9/10/20 09:30


 


 


 Epoetin Jaswant


  (Epoetin


 Jaswant-EPBX(NON


 ESRD))  8,000 unit  MON-WED-FRI


 SUBQ


   9/11/20 21:00


 12/10/20 20:59   


 


 


 Lorazepam


  (Ativan 2mg/ml


 1ml)  1 mg  Q4H  PRN


 IV


 For Anxiety  9/8/20 07:45


 9/11/20 19:40   


 


 


 Micafungin Sodium


 100 mg/Sodium


 Chloride  110 ml @ 


 110 mls/hr  Q24H


 IVPB


   9/8/20 17:00


 9/13/20 16:59  9/9/20 16:05


 


 


 Minocycline HCl


  (Minocin)  100 mg  Q12HR


 ORAL


   9/8/20 09:00


 9/14/20 09:59  9/10/20 09:29


 


 


 Nystatin


  (Nystop Powder)  1 applic  THREE TIMES A  DAY


 TOPIC


   9/10/20 09:00


 12/8/20 18:14  9/10/20 12:12


 


 


 Pantoprazole


  (Protonix)  40 mg  EVERY 12  HOURS


 IVP


   9/8/20 09:00


 9/20/20 20:59  9/10/20 09:29


 


 


 Polyethylene


 Glycol


  (Miralax)  17 gm  BEDTIME


 GT


   9/8/20 21:00


 9/20/20 20:59  9/8/20 20:28


 

















Tanisha Brownlee M.D. Sep 10, 2020 15:46

## 2020-09-11 VITALS — SYSTOLIC BLOOD PRESSURE: 114 MMHG | DIASTOLIC BLOOD PRESSURE: 63 MMHG

## 2020-09-11 VITALS — SYSTOLIC BLOOD PRESSURE: 136 MMHG | DIASTOLIC BLOOD PRESSURE: 76 MMHG

## 2020-09-11 VITALS — DIASTOLIC BLOOD PRESSURE: 70 MMHG | SYSTOLIC BLOOD PRESSURE: 118 MMHG

## 2020-09-11 VITALS — DIASTOLIC BLOOD PRESSURE: 68 MMHG | SYSTOLIC BLOOD PRESSURE: 117 MMHG

## 2020-09-11 VITALS — SYSTOLIC BLOOD PRESSURE: 119 MMHG | DIASTOLIC BLOOD PRESSURE: 69 MMHG

## 2020-09-11 LAB
ADD MANUAL DIFF: NO
ALBUMIN SERPL-MCNC: 2.5 G/DL (ref 3.4–5)
ALBUMIN/GLOB SERPL: 0.6 {RATIO} (ref 1–2.7)
ALP SERPL-CCNC: 163 U/L (ref 46–116)
ALT SERPL-CCNC: 21 U/L (ref 12–78)
ANION GAP SERPL CALC-SCNC: 5 MMOL/L (ref 5–15)
APPEARANCE UR: CLEAR
APTT PPP: (no result) S
AST SERPL-CCNC: 19 U/L (ref 15–37)
BASOPHILS NFR BLD AUTO: 1.3 % (ref 0–2)
BILIRUB SERPL-MCNC: 0.4 MG/DL (ref 0.2–1)
BUN SERPL-MCNC: 40 MG/DL (ref 7–18)
CALCIUM SERPL-MCNC: 9.5 MG/DL (ref 8.5–10.1)
CHLORIDE SERPL-SCNC: 104 MMOL/L (ref 98–107)
CO2 SERPL-SCNC: 30 MMOL/L (ref 21–32)
CREAT SERPL-MCNC: 2 MG/DL (ref 0.55–1.3)
EOSINOPHIL NFR BLD AUTO: 1.2 % (ref 0–3)
ERYTHROCYTE [DISTWIDTH] IN BLOOD BY AUTOMATED COUNT: 18.7 % (ref 11.6–14.8)
GLOBULIN SER-MCNC: 4.4 G/DL
GLUCOSE UR STRIP-MCNC: NEGATIVE MG/DL
HCT VFR BLD CALC: 39.4 % (ref 37–47)
HGB BLD-MCNC: 12.2 G/DL (ref 12–16)
KETONES UR QL STRIP: NEGATIVE
LEUKOCYTE ESTERASE UR QL STRIP: NEGATIVE
LYMPHOCYTES NFR BLD AUTO: 29.7 % (ref 20–45)
MCV RBC AUTO: 103 FL (ref 80–99)
MONOCYTES NFR BLD AUTO: 7.9 % (ref 1–10)
NEUTROPHILS NFR BLD AUTO: 60 % (ref 45–75)
NITRITE UR QL STRIP: NEGATIVE
PH UR STRIP: 8 [PH] (ref 4.5–8)
PHOSPHATE SERPL-MCNC: 3.6 MG/DL (ref 2.5–4.9)
PLATELET # BLD: 191 K/UL (ref 150–450)
POTASSIUM SERPL-SCNC: 4.6 MMOL/L (ref 3.5–5.1)
PROT UR QL STRIP: NEGATIVE
RBC # BLD AUTO: 3.82 M/UL (ref 4.2–5.4)
SODIUM SERPL-SCNC: 139 MMOL/L (ref 136–145)
SP GR UR STRIP: 1.01 (ref 1–1.03)
UROBILINOGEN UR-MCNC: NORMAL MG/DL (ref 0–1)
WBC # BLD AUTO: 15 K/UL (ref 4.8–10.8)

## 2020-09-11 RX ADMIN — Medication SCH MG: at 09:05

## 2020-09-11 RX ADMIN — ENOXAPARIN SODIUM SCH MG: 40 INJECTION SUBCUTANEOUS at 09:07

## 2020-09-11 RX ADMIN — NYSTATIN SCH APPLIC: 100000 POWDER TOPICAL at 09:07

## 2020-09-11 RX ADMIN — DEXTROSE MONOHYDRATE SCH MLS/HR: 50 INJECTION, SOLUTION INTRAVENOUS at 21:06

## 2020-09-11 RX ADMIN — POLYETHYLENE GLYCOL 3350 SCH GM: 17 POWDER, FOR SOLUTION ORAL at 21:00

## 2020-09-11 RX ADMIN — DOCUSATE SODIUM SCH MG: 50 LIQUID ORAL at 14:00

## 2020-09-11 RX ADMIN — NYSTATIN SCH APPLIC: 100000 POWDER TOPICAL at 13:00

## 2020-09-11 RX ADMIN — PANTOPRAZOLE SODIUM SCH MG: 40 INJECTION, POWDER, FOR SOLUTION INTRAVENOUS at 09:06

## 2020-09-11 RX ADMIN — DOCUSATE SODIUM SCH MG: 50 LIQUID ORAL at 05:40

## 2020-09-11 RX ADMIN — DOCUSATE SODIUM SCH MG: 50 LIQUID ORAL at 21:03

## 2020-09-11 RX ADMIN — NYSTATIN SCH APPLIC: 100000 POWDER TOPICAL at 18:00

## 2020-09-11 RX ADMIN — PANTOPRAZOLE SODIUM SCH MG: 40 INJECTION, POWDER, FOR SOLUTION INTRAVENOUS at 21:04

## 2020-09-11 NOTE — SURGERY PROGRESS NOTE
Surgery Progress Note


Subjective


Symptoms:  improved, tolerating diet, passing flatus





Objective





Last 24 Hour Vital Signs








  Date Time  Temp Pulse Resp B/P (MAP) Pulse Ox O2 Delivery O2 Flow Rate FiO2


 


9/11/20 12:00 97.5 87 18 119/69 (86) 97   


 


9/11/20 09:00      Room Air  


 


9/11/20 08:00 98.6 101 19 117/68 (84) 95   


 


9/11/20 04:00 98.5 99 18 114/63 (80) 99   


 


9/10/20 23:53 98.8 101 18 110/64 (79) 98   


 


9/10/20 21:00      Room Air  


 


9/10/20 20:20     96 Nasal Cannula 3.0 32


 


9/10/20 20:00 97.1 102 20 127/66 (86) 100   


 


9/10/20 16:00 99.7 95 18 112/67 (82) 98   








I&O











Intake and Output  


 


 9/10/20 9/11/20





 19:00 07:00


 


Intake Total 1700 ml 785 ml


 


Output Total 1200 ml 400 ml


 


Balance 500 ml 385 ml


 


  


 


Intake Free Water 400 ml 400 ml


 


IV Total 880 ml 


 


Tube Feeding 420 ml 385 ml


 


Output Urine Total 1200 ml 400 ml








Dressing:  saturated


Cardiovascular:  RSR


Respiratory:  decreased breath sounds


Abdomen:  soft, non-tender, present bowel sounds


Extremities:  no tenderness, no cyanosis





Laboratory Tests








Test


  9/11/20


06:40


 


White Blood Count


  15.0 K/UL


(4.8-10.8)  H


 


Red Blood Count


  3.82 M/UL


(4.20-5.40)  L


 


Hemoglobin


  12.2 G/DL


(12.0-16.0)


 


Hematocrit


  39.4 %


(37.0-47.0)


 


Mean Corpuscular Volume


  103 FL (80-99)


H


 


Mean Corpuscular Hemoglobin


  31.9 PG


(27.0-31.0)  H


 


Mean Corpuscular Hemoglobin


Concent 30.9 G/DL


(32.0-36.0)  L


 


Red Cell Distribution Width


  18.7 %


(11.6-14.8)  H


 


Platelet Count


  191 K/UL


(150-450)


 


Mean Platelet Volume


  7.1 FL


(6.5-10.1)


 


Neutrophils (%) (Auto)


  60.0 %


(45.0-75.0)


 


Lymphocytes (%) (Auto)


  29.7 %


(20.0-45.0)


 


Monocytes (%) (Auto)


  7.9 %


(1.0-10.0)


 


Eosinophils (%) (Auto)


  1.2 %


(0.0-3.0)


 


Basophils (%) (Auto)


  1.3 %


(0.0-2.0)


 


Sodium Level


  139 MMOL/L


(136-145)


 


Potassium Level


  4.6 MMOL/L


(3.5-5.1)


 


Chloride Level


  104 MMOL/L


()


 


Carbon Dioxide Level


  30 MMOL/L


(21-32)


 


Anion Gap


  5 mmol/L


(5-15)


 


Blood Urea Nitrogen


  40 mg/dL


(7-18)  H


 


Creatinine


  2.0 MG/DL


(0.55-1.30)  H


 


Estimat Glomerular Filtration


Rate 24.5 mL/min


(>60)


 


Glucose Level


  123 MG/DL


()  H


 


Uric Acid


  5.8 MG/DL


(2.6-7.2)


 


Calcium Level


  9.5 MG/DL


(8.5-10.1)


 


Phosphorus Level


  3.6 MG/DL


(2.5-4.9)


 


Magnesium Level


  2.2 MG/DL


(1.8-2.4)


 


Total Bilirubin


  0.4 MG/DL


(0.2-1.0)


 


Aspartate Amino Transf


(AST/SGOT) 19 U/L (15-37)


 


 


Alanine Aminotransferase


(ALT/SGPT) 21 U/L (12-78)


 


 


Alkaline Phosphatase


  163 U/L


()  H


 


Total Protein


  6.9 G/DL


(6.4-8.2)


 


Albumin


  2.5 G/DL


(3.4-5.0)  L


 


Globulin 4.4 g/dL  


 


Albumin/Globulin Ratio


  0.6 (1.0-2.7)


L











Plan


Problems:  


(1) Anemia


(2) 2019 novel coronavirus disease (COVID-19)


(3) Pneumonia


(4) Multiple drug resistant organism (MDRO) culture positive


(5) Hyperkalemia


(6) Hypernatremia


(7) Severe dehydration


(8) Acute urinary retention


(9) Sepsis


Assessment & Plan:  Pt presented on admission with multiple Pressure injuries. 

Pt noted to have band of  Blotchy red rash  with open and closed Blisters 

affecting  R Brachial to R forearm. Lateral R back /R flank is also blotchy 

erythematous with clusters of serous filled blisters. On Lateral L Back to L 

flank is Blotchy, erythematous with clusters of serous filled blisters.  


Reabsorbing DTPI that is partially opened Sacrum(L) 6cm x (W)5.5cm.  Base of 

wound is Maroon with with scattered areas that are maryellen and loose peeling soft 

black cap.Surrounding Non-Blanching erythema without induration.


Non-Blanching erythema that is indurated with delineated margins R trochanter(L)

5.5cm x (W)6.5cm.


No evidence of skin breakdown L trochanter.


Non-Blanching erythema noted to R and L ischial tuberosities.


DTPI medial R heel (L)3.3cm x (W)3.4cm. Base of injury is, fluctuant, maroon 

with delineated margins. surrounding red,  boggy heel.


DTPI Lateral R Heel(L)1.5cm x (W)2.5cm. Base of injury is indurated, maroon 

with purpuric center.


Non-Blanching erythema without induration /fluctuance lateral R malleolus(L)2cm 

x (W)1.5cm.





Tx.Plan:


Apply Moisture Barrier Paste to Sacrum. Cover with Optifoam drsg. Change every 

3 days and prn.


           


Apply Cavilon Skin Barrier to R and L trochanter. Cover each site with Optifoam 

drsgs. Change every 7 days and prn.


           


Apply Moisture Barrier Paste to R and L Ischial tuberosities with each 

Incontinence care.


           


Apply Cavilon Skin Barrier to Medial and Lateral R Heel. Cover with Optifoam 

drsg. Change every7 days and prn.


           


Apply Cavilon Skin Barrier to R lateral ,and medial Malleoli. Cover each site 

with Optifoam drsgs. Change every 7 days and prn.


           


Apply Cavilon Skin Barrier to L Heel and Malleoli. Cover each site with 

Optifoam drsgs. Change every 7 days and prn.


           


Cover open blisters as needed with Optifoam drsgs. 


           


Reposition at least every 2 hours or as tolerated.


           


Off-load heels with pillow.


           


Nutritional optimization





DAILY ESTIMATED NEEDS:


Needs based on Sepsis, renal, wound 50.9kg  


25-35  kcals/kg 


5930-4493  total kcals


1.25-1.5  g protein/kg


64-76  g total protein 


25-30  mL/kg


1778-8468  total fluid mLs





NUTRITION DIAGNOSIS:


1. Swallowing difficulty r/t dysphagia as evidenced by pt is GT dep.


2. Altered nutrition related lab values r/t sepsis and severe dehydration,


pre-diabetes as evidenced by Elev WBC (31.6 ->18.2), elev Na (179-> WNL),


elev BUN (131->48), elev Creat (5.1->2.2), elev K (5.5-> wnl->5.5, 5.3),


elev BGs (93, 113, 185), A1C of 6.4.





CURRENT TF: Nepro @35  








ENTERAL NUTRITION RECOMMENDATIONS:


LOWER GOAL RATE -> NEPRO @ 35ML/HR X 24 HRS  to provide 840ml, 1512kcal, 68g 

prot, 610ml free water 





- LOWER GOAL RATE: meets 100% est kcal/prot needs


- Flush per MD, HOB over 30 degrees.








ADDITIONAL RECOMMENDATIONS:


1) Monitor lytes and renal status-> TF change to Nepro (8/28), elev K 


2) Per SNF wt of 112# 


3) NISS for BG control: A1C 6.4, TF at goal + added D5+ Decadron 


4) Wound healing: TF @ goal provides 100% RDI 


    add Vit C 500mg QD + José Manuel BID via GT  





micro reviewed


covid negative


c diff negative


bc ngtd 


wounds do not seem to be etiology of infection 





(10) UTI (urinary tract infection)


(11) ARF (acute renal failure)


(12) Psychosis











Alex Nicholas Sep 11, 2020 14:51

## 2020-09-11 NOTE — PULMONOLOGY PROGRESS NOTE
Subjective


ROS Limited/Unobtainable:  No


Constitutional:  Reports: no symptoms


HEENT:  Repors: no symptoms


Respiratory:  Reports: no symptoms


Cardiovascular:  Reports: no symptoms


Allergies:  


Coded Allergies:  


     AMPICILLIN (Verified  Allergy, Unknown, hives, 8/21/20)


     PENICILLINS (Verified  Allergy, Unknown, hives, 8/21/20)


     TETRACYCLINES (Verified  Allergy, Unknown, hives, 8/21/20)





Objective





Last 24 Hour Vital Signs








  Date Time  Temp Pulse Resp B/P (MAP) Pulse Ox O2 Delivery O2 Flow Rate FiO2


 


9/11/20 12:00 97.5 87 18 119/69 (86) 97   


 


9/11/20 09:00      Room Air  


 


9/11/20 08:00 98.6 101 19 117/68 (84) 95   


 


9/11/20 04:00 98.5 99 18 114/63 (80) 99   


 


9/10/20 23:53 98.8 101 18 110/64 (79) 98   


 


9/10/20 21:00      Room Air  


 


9/10/20 20:20     96 Nasal Cannula 3.0 32


 


9/10/20 20:00 97.1 102 20 127/66 (86) 100   


 


9/10/20 16:00 99.7 95 18 112/67 (82) 98   

















Intake and Output  


 


 9/10/20 9/11/20





 19:00 07:00


 


Intake Total 1700 ml 785 ml


 


Output Total 1200 ml 400 ml


 


Balance 500 ml 385 ml


 


  


 


Intake Free Water 400 ml 400 ml


 


IV Total 880 ml 


 


Tube Feeding 420 ml 385 ml


 


Output Urine Total 1200 ml 400 ml








General Appearance:  no acute distress, other - chronically ill looking 

bedridden female


HEENT:  normocephalic, atraumatic, other - VM


Respiratory:  chest wall non-tender, rhonchi - bilaterally - scattered


Cardiovascular:  normal peripheral pulses, normal rate - SR on tele


Abdomen:  normal bowel sounds, soft, non tender


Genitourinary:  normal external genitalia


Skin:  other - BUE crusting lesions


Neurologic:  CNs II-XII grossly normal, abnormal gait - bedridden


Lymphatic:  no neck adenopathy


Laboratory Tests


9/11/20 06:40: 


White Blood Count 15.0H, Red Blood Count 3.82L, Hemoglobin 12.2, Hematocrit 39.4

, Mean Corpuscular Volume 103H, Mean Corpuscular Hemoglobin 31.9H, Mean 

Corpuscular Hemoglobin Concent 30.9L, Red Cell Distribution Width 18.7H, 

Platelet Count 191, Mean Platelet Volume 7.1, Neutrophils (%) (Auto) 60.0, 

Lymphocytes (%) (Auto) 29.7, Monocytes (%) (Auto) 7.9, Eosinophils (%) (Auto) 

1.2, Basophils (%) (Auto) 1.3, Sodium Level 139, Potassium Level 4.6, Chloride 

Level 104, Carbon Dioxide Level 30, Anion Gap 5, Blood Urea Nitrogen 40H, 

Creatinine 2.0H, Estimat Glomerular Filtration Rate 24.5, Glucose Level 123H, 

Uric Acid 5.8, Calcium Level 9.5, Phosphorus Level 3.6, Magnesium Level 2.2, 

Total Bilirubin 0.4, Aspartate Amino Transf (AST/SGOT) 19, Alanine 

Aminotransferase (ALT/SGPT) 21, Alkaline Phosphatase 163H, Total Protein 6.9, 

Albumin 2.5L, Globulin 4.4, Albumin/Globulin Ratio 0.6L





Current Medications








 Medications


  (Trade)  Dose


 Ordered  Sig/Raji


 Route


 PRN Reason  Start Time


 Stop Time Status Last Admin


Dose Admin


 


 Acetaminophen


  (Tylenol)  650 mg  Q6H  PRN


 GT


 Mild Pain (Pain Scale 1-3)  9/8/20 06:00


 10/4/20 05:59   


 


 


 Ascorbic Acid


  (Vitamin C)  500 mg  DAILY


 GT


   9/8/20 09:00


 10/4/20 08:59  9/11/20 09:05


 


 


 Clonidine HCl


  (Catapres Tab)  0.1 mg  Q4H  PRN


 GT


 bp over 160 syst  9/8/20 07:45


 12/3/20 19:40   


 


 


 Dextrose  1,000 ml @ 


 75 mls/hr  [id number]


 IV


   9/8/20 06:00


 10/4/20 19:40  9/11/20 00:53


 


 


 Docusate Sodium


  (Colace)  100 mg  EVERY 8  HOURS


 GT


   9/8/20 06:00


 9/20/20 08:59  9/11/20 05:40


 


 


 Enoxaparin Sodium


  (Lovenox)  40 mg  DAILY


 SUBQ


   9/8/20 09:00


 11/19/20 08:59  9/11/20 09:07


 


 


 Epoetin Jaswant


  (Epoetin


 Jaswant-EPBX(NON


 ESRD))  8,000 unit  MON-WED-FRI


 SUBQ


   9/11/20 21:00


 12/10/20 20:59   


 


 


 Lorazepam


  (Ativan 2mg/ml


 1ml)  1 mg  Q4H  PRN


 IV


 For Anxiety  9/8/20 07:45


 9/11/20 19:40   


 


 


 Micafungin Sodium


 100 mg/Sodium


 Chloride  110 ml @ 


 110 mls/hr  Q24H


 IVPB


   9/8/20 17:00


 9/13/20 16:59  9/10/20 17:33


 


 


 Nystatin


  (Nystop Powder)  1 applic  THREE TIMES A  DAY


 TOPIC


   9/10/20 09:00


 12/8/20 18:14  9/11/20 09:07


 


 


 Pantoprazole


  (Protonix)  40 mg  EVERY 12  HOURS


 IVP


   9/8/20 09:00


 9/20/20 20:59  9/11/20 09:06


 


 


 Polyethylene


 Glycol


  (Miralax)  17 gm  BEDTIME


 GT


   9/8/20 21:00


 9/20/20 20:59  9/10/20 20:57


 











Assessment/Plan


Problems:  


(1) 2019 novel coronavirus disease (COVID-19)


(2) Multiple drug resistant organism (MDRO) culture positive


(3) Sepsis


(4) ARF (acute renal failure)


(5) Severe dehydration


(6) Hypernatremia


(7) Psychosis


(8) Pacemaker


Assessment/Plan





WBC still high


Impression: No acute process


ESR and CRP





COVID positive





f/u electrolytes


renal studies


urine electrolytes


dvt prophylaxis











Live Brambila MD Sep 11, 2020 14:08

## 2020-09-11 NOTE — INTERNAL MED PROGRESS NOTE
Subjective


Physician Name


Umer Wahl


Attending Physician


Umer Wahl MD





Current Medications








 Medications


  (Trade)  Dose


 Ordered  Sig/Raji


 Route


 PRN Reason  Start Time


 Stop Time Status Last Admin


Dose Admin


 


 Acetaminophen


  (Tylenol)  650 mg  Q6H  PRN


 GT


 Mild Pain (Pain Scale 1-3)  9/8/20 06:00


 10/4/20 05:59   


 


 


 Ascorbic Acid


  (Vitamin C)  500 mg  DAILY


 GT


   9/8/20 09:00


 10/4/20 08:59  9/11/20 09:05


 


 


 Clonidine HCl


  (Catapres Tab)  0.1 mg  Q4H  PRN


 GT


 bp over 160 syst  9/8/20 07:45


 12/3/20 19:40   


 


 


 Dextrose  1,000 ml @ 


 75 mls/hr  [id number]


 IV


   9/8/20 06:00


 10/4/20 19:40  9/11/20 14:33


 


 


 Docusate Sodium


  (Colace)  100 mg  EVERY 8  HOURS


 GT


   9/8/20 06:00


 9/20/20 08:59  9/11/20 05:40


 


 


 Enoxaparin Sodium


  (Lovenox)  40 mg  DAILY


 SUBQ


   9/8/20 09:00


 11/19/20 08:59  9/11/20 09:07


 


 


 Epoetin Jaswant


  (Epoetin


 Jaswant-EPBX(NON


 ESRD))  8,000 unit  MON-WED-FRI


 SUBQ


   9/11/20 21:00


 12/10/20 20:59   


 


 


 Lorazepam


  (Ativan 2mg/ml


 1ml)  1 mg  Q4H  PRN


 IV


 For Anxiety  9/8/20 07:45


 9/11/20 19:40   


 


 


 Micafungin Sodium


 100 mg/Sodium


 Chloride  110 ml @ 


 110 mls/hr  Q24H


 IVPB


   9/8/20 17:00


 9/13/20 16:59  9/10/20 17:33


 


 


 Nystatin


  (Nystop Powder)  1 applic  THREE TIMES A  DAY


 TOPIC


   9/10/20 09:00


 12/8/20 18:14  9/11/20 13:00


 


 


 Pantoprazole


  (Protonix)  40 mg  EVERY 12  HOURS


 IVP


   9/8/20 09:00


 9/20/20 20:59  9/11/20 09:06


 


 


 Polyethylene


 Glycol


  (Miralax)  17 gm  BEDTIME


 GT


   9/8/20 21:00


 9/20/20 20:59  9/10/20 20:57


 








Allergies:  


Coded Allergies:  


     AMPICILLIN (Verified  Allergy, Unknown, hives, 8/21/20)


     PENICILLINS (Verified  Allergy, Unknown, hives, 8/21/20)


     TETRACYCLINES (Verified  Allergy, Unknown, hives, 8/21/20)


Subjective


awake, more responsive, open her eyes, talking, unable to follow commands, 

renal function stable, WBC: 15.0.





Objective





Last Vital Signs








  Date Time  Temp Pulse Resp B/P (MAP) Pulse Ox O2 Delivery O2 Flow Rate FiO2


 


9/11/20 16:00 97.7 81 18 118/70 (86) 97   


 


9/11/20 09:00      Room Air  


 


9/10/20 20:20       3.0 32











Laboratory Tests








Test


  9/11/20


06:40 9/11/20


17:45


 


White Blood Count


  15.0 K/UL


(4.8-10.8)  H 


 


 


Red Blood Count


  3.82 M/UL


(4.20-5.40)  L 


 


 


Hemoglobin


  12.2 G/DL


(12.0-16.0) 


 


 


Hematocrit


  39.4 %


(37.0-47.0) 


 


 


Mean Corpuscular Volume


  103 FL (80-99)


H 


 


 


Mean Corpuscular Hemoglobin


  31.9 PG


(27.0-31.0)  H 


 


 


Mean Corpuscular Hemoglobin


Concent 30.9 G/DL


(32.0-36.0)  L 


 


 


Red Cell Distribution Width


  18.7 %


(11.6-14.8)  H 


 


 


Platelet Count


  191 K/UL


(150-450) 


 


 


Mean Platelet Volume


  7.1 FL


(6.5-10.1) 


 


 


Neutrophils (%) (Auto)


  60.0 %


(45.0-75.0) 


 


 


Lymphocytes (%) (Auto)


  29.7 %


(20.0-45.0) 


 


 


Monocytes (%) (Auto)


  7.9 %


(1.0-10.0) 


 


 


Eosinophils (%) (Auto)


  1.2 %


(0.0-3.0) 


 


 


Basophils (%) (Auto)


  1.3 %


(0.0-2.0) 


 


 


Sodium Level


  139 MMOL/L


(136-145) 


 


 


Potassium Level


  4.6 MMOL/L


(3.5-5.1) 


 


 


Chloride Level


  104 MMOL/L


() 


 


 


Carbon Dioxide Level


  30 MMOL/L


(21-32) 


 


 


Anion Gap


  5 mmol/L


(5-15) 


 


 


Blood Urea Nitrogen


  40 mg/dL


(7-18)  H 


 


 


Creatinine


  2.0 MG/DL


(0.55-1.30)  H 


 


 


Estimat Glomerular Filtration


Rate 24.5 mL/min


(>60) 


 


 


Glucose Level


  123 MG/DL


()  H 


 


 


Uric Acid


  5.8 MG/DL


(2.6-7.2) 


 


 


Calcium Level


  9.5 MG/DL


(8.5-10.1) 


 


 


Phosphorus Level


  3.6 MG/DL


(2.5-4.9) 


 


 


Magnesium Level


  2.2 MG/DL


(1.8-2.4) 


 


 


Total Bilirubin


  0.4 MG/DL


(0.2-1.0) 


 


 


Aspartate Amino Transf


(AST/SGOT) 19 U/L (15-37)


  


 


 


Alanine Aminotransferase


(ALT/SGPT) 21 U/L (12-78)


  


 


 


Alkaline Phosphatase


  163 U/L


()  H 


 


 


Total Protein


  6.9 G/DL


(6.4-8.2) 


 


 


Albumin


  2.5 G/DL


(3.4-5.0)  L 


 


 


Globulin 4.4 g/dL   


 


Albumin/Globulin Ratio


  0.6 (1.0-2.7)


L 


 


 


Urine Color  Pale yellow  


 


Urine Appearance  Clear  


 


Urine pH  8 (4.5-8.0)  


 


Urine Specific Gravity


  


  1.010


(1.005-1.035)


 


Urine Protein


  


  Negative


(NEGATIVE)


 


Urine Glucose (UA)


  


  Negative


(NEGATIVE)


 


Urine Ketones


  


  Negative


(NEGATIVE)


 


Urine Blood


  


  Negative


(NEGATIVE)


 


Urine Nitrite


  


  Negative


(NEGATIVE)


 


Urine Bilirubin


  


  Negative


(NEGATIVE)


 


Urine Urobilinogen


  


  Normal MG/DL


(0.0-1.0)


 


Urine Leukocyte Esterase


  


  Negative


(NEGATIVE)

















Intake and Output  


 


 9/10/20 9/11/20





 19:00 07:00


 


Intake Total 1700 ml 820 ml


 


Output Total 1200 ml 400 ml


 


Balance 500 ml 420 ml


 


  


 


Intake Free Water 400 ml 400 ml


 


IV Total 880 ml 


 


Tube Feeding 420 ml 420 ml


 


Output Urine Total 1200 ml 400 ml








Objective


General: No acute distress, awake, responsive, open her eyes, talking.


HEENT: NCAT, sclera anicteric, PERRL, EOMI, 


Neck: Supple, no significant jugular venous distention, 


Lungs: Decreased at the bases, no Wheeze or Rales.


Heart: Regular rate and rhythm, normal S1/S2, no murmurs/


Abdomen: soft, nontender, nondistended. Normoactive bowel sounds, PEG intact.


: Mitchell cath


Extremities: No Cyanosis , clubbing or edema. 


Neuro: limited secondary to  patient status, contracted upper and lower 

extremities.


Skin: warm, rash at the chest wall.





Assessment/Plan


Assessment/Plan


(1) UTI (urinary tract infection)


(2) Sepsis


(3) Hypernatremia


(4) Severe dehydration


(5) ARF (acute renal failure) on CKD.


(6) Pneumonia


(7) Hyperkalemia


(8) History of COVID-19 infection last month.





Plan:


Empiric Micafungin #6 for fungemia


DVT prophylaxis: Lovenox injection


CODE STATUS: Full code.


Monitor laboratory as  well as cultures.


Tube feeding at 35 cc/h


In medical unit


COVID-19 tests negative on (9/1) & (9/3/).











Umer Wahl MD Sep 11, 2020 19:29

## 2020-09-11 NOTE — NEPHROLOGY PROGRESS NOTE
Assessment/Plan


Problem List:  


(1) ARF (acute renal failure)


Assessment:  Underlying CKD





(2) Sepsis


(3) UTI (urinary tract infection)


(4) Severe dehydration


(5) Hypernatremia


(6) Acute urinary retention


(7) Anemia


Assessment





Acute renal failure


Possible underlying chronic renal insufficiency


Severe dehydration


Hypotension


Sepsis, UTI


History of psychiatric disease


Elevated troponin I


Patient full code


Plan


September 11: Lab reviewed.  Serum creatinine remains at 2 unchanged.  

Medication list reviewed.  Continue current management.


September 10: Labs reviewed.  Medication list reviewed.  Serum creatinine 

slightly higher at 2.  We will continue to monitor renal parameters and ID to 

avoid nephrotoxic antibiotics as possible.


September 9: Lab reviewed.  Serum creatinine stable at 1.9.  Continue per 

consultants.


September 8: Labs reviewed.  Serum creatinine stable.  Electrolytes within 

reasonable range.


September 7: Labs reviewed.  Remains stable from renal standpoint of view, as 

serum creatinine leveled off around 1.8 and 1.9.  Will continue to monitor 

renal parameters


September 6: No chemistry panel done today.  Status quo.  Continue per 

consultants.


September 5: Status quo.  Labs reviewed.  Stable from renal standpoint of view.


September 4: Status quo.  Labs reviewed.  Serum creatinine unchanged.  Continue 

per consultants.


September 3:Late note entry due to system problem at the [facility] [place] today. Labs 

reviewed.  Electrolytes within normal limit.  Leukocytosis worsened.  Continue 

per consultants.  Serum creatinine 1.8 unchanged.


September 2: Low magnesium and low phosphorus corrected.  Continue per 

consultants.  On nasal cannula now.  Serum creatinine 1.8.


September 1: Serum creatinine 1.8 stable.  Continue per pulmonary.  Continue to 

monitor renal parameters.


August 31: On Venturi mask.  Serum creatinine lowered to 1.8.  Continue per 

consultants.  Medication list reviewed.  Potassium supplement given.


August 30: Status quo.  Creatinine lower at 2.  Other electrolytes within 

normal limits.  Continue per consultants.


August 29: Labs reviewed.  Creatinine 2.2 stable.  Potassium 3.1.  20 M EQ KCl 

given.  Continue her consultants.


August 28: Serum potassium 5.5.Creatinine 2.2.  1 dose Kayexalate given.  

Remains of 75 cc IV fluid.  We will continue to monitor renal parameters.


August 27: Serum potassium 5.3.  Creatinine higher at 2.2.  Medication list 

reviewed.  Suggest to avoid nephrotoxic's like Bactrim if possible.  Continue 

to monitor renal parameters and electrolytes.  Continue per pulmonary to adjust 

pulmonary status.  May require BiPAP.


August 26: Serum sodium lower.  Renal parameters appear more stable.  

Nevertheless leukocytosis is worsened.  Continue per consultants.


August 25: Serum sodium higher.  Continue D5W 75 cc an hour.  Continue to 

monitor electrolytes.  Continue per consultants.  Serum creatinine down to 2.4 

from 5.1 on admission.  Leukocytosis persists.


August 24: Patient now on isolation for COVID-19.  Serum creatinine is 

plateauing.  Will decrease the IV fluid to 75 cc an hour.  Continue management 

per ID.  Will monitor electrolytes and renal parameters.


August 23: Discussed with MARTIN Carter.  Patient needs a Mitchell catheter.  Accurate 

intake and output.  Decrease  cc D5W an hour.  1 dose of IV Venofer and 

then subcu Epogen ordered for anemia.  Continue to monitor renal parameters


August 22: Renal parameters improving.  Continue D5W IV fluid.  Potassium 

supplement IV given.  Continue per consultants.





Previously:


Fluid challenge


Monitor renal parameters and electrolytes


Monitor intake and output


Mitchell catheter


Antibiotics


Avoid nephrotoxic's


Chest x-ray





Kidney ultrasound findings: 


Right kidney measures 8.9 cm in length. Left kidney measures 9 cm in


length. Both kidneys demonstrate markedly increased echogenicity. No 

hydronephrosis. 


There are bilateral renal cysts. Normal inferior vena cava. Bladder is empty,


contains a Mitchell catheter. 


 


Incidentally noted are gallstones.


 


Impression: Markedly echogenic kidneys, consistent with medical renal disease


Negative for hydronephrosis


Empty bladder with a Mitchell catheter


Incidental finding of cholelithiasis.





Subjective


ROS Limited/Unobtainable:  No


Constitutional:  Reports: malaise, weakness





Objective


Objective





Last 24 Hour Vital Signs








  Date Time  Temp Pulse Resp B/P (MAP) Pulse Ox O2 Delivery O2 Flow Rate FiO2


 


9/11/20 08:00 98.6 101 19 117/68 (84) 95   


 


9/11/20 04:00 98.5 99 18 114/63 (80) 99   


 


9/10/20 23:53 98.8 101 18 110/64 (79) 98   


 


9/10/20 21:00      Room Air  


 


9/10/20 20:20     96 Nasal Cannula 3.0 32


 


9/10/20 20:00 97.1 102 20 127/66 (86) 100   


 


9/10/20 16:00 99.7 95 18 112/67 (82) 98   


 


9/10/20 12:00 97.7 100 20 120/65 (83) 96   

















Intake and Output  


 


 9/10/20 9/11/20





 19:00 07:00


 


Intake Total 1700 ml 785 ml


 


Output Total 1200 ml 400 ml


 


Balance 500 ml 385 ml


 


  


 


Intake Free Water 400 ml 400 ml


 


IV Total 880 ml 


 


Tube Feeding 420 ml 385 ml


 


Output Urine Total 1200 ml 400 ml








Laboratory Tests


9/11/20 06:40: 


White Blood Count 15.0H, Red Blood Count 3.82L, Hemoglobin 12.2, Hematocrit 39.4

, Mean Corpuscular Volume 103H, Mean Corpuscular Hemoglobin 31.9H, Mean 

Corpuscular Hemoglobin Concent 30.9L, Red Cell Distribution Width 18.7H, 

Platelet Count 191, Mean Platelet Volume 7.1, Neutrophils (%) (Auto) 60.0, 

Lymphocytes (%) (Auto) 29.7, Monocytes (%) (Auto) 7.9, Eosinophils (%) (Auto) 

1.2, Basophils (%) (Auto) 1.3, Sodium Level 139, Potassium Level 4.6, Chloride 

Level 104, Carbon Dioxide Level 30, Anion Gap 5, Blood Urea Nitrogen 40H, 

Creatinine 2.0H, Estimat Glomerular Filtration Rate 24.5, Glucose Level 123H, 

Uric Acid 5.8, Calcium Level 9.5, Phosphorus Level 3.6, Magnesium Level 2.2, 

Total Bilirubin 0.4, Aspartate Amino Transf (AST/SGOT) 19, Alanine 

Aminotransferase (ALT/SGPT) 21, Alkaline Phosphatase 163H, Total Protein 6.9, 

Albumin 2.5L, Globulin 4.4, Albumin/Globulin Ratio 0.6L


Height (Feet):  5


Height (Inches):  2.00


Weight (Pounds):  123


General Appearance:  no apparent distress


Cardiovascular:  tachycardia


Respiratory/Chest:  decreased breath sounds


Abdomen:  soft, distended











Derik Dos Santos MD Sep 11, 2020 12:00

## 2020-09-11 NOTE — INFECTIOUS DISEASES PROG NOTE
Assessment/Plan


71yo F with:





Sepsis


Fever; SP


Leukocytosis; worsening (sp steroids)- Recurrent


Fungemia


      -9/9 2d echo: no discrete vegetations


     -9/6 Bcx NTD


    -9/3 u/a neg


          Bcx 1/4 C. albicas





JASWINDER to 5.1, improving 


Hypoxic resp failure, sp NRB mask >VM> 4l NC 9/1


VRE UTI


Hx of COVID-19 1 mo ago ( doubt recurrent COVID-19) 


HAP


           9/9 CXR: No acute process


            9/3 sp cx yeast


            9/1 rapid COVID PCR neg; 9/3 rapid COVID PCR neg


   8/29 CXR:   Improving left lower lobe infiltrate and left pleural effusion. 

There is a probable small right pleural effusion.


   8/27 cdiff neg


           8/26 CXR: There are increased interstitial congestion and left 

pleural effusion,since prior exam of 8/22/2020


               Bcx NTD


            8/22 sp cx MDR ABC (S bactrim; I Colistin, Polymixin B, Minocycline)


   8/21 BCx NTD


   8/21 UA+, UCx >100k VRE (S Zyvox)


   8/21 CXR: Left retrocardiac mild atelectasis versus infiltrate.


   8/21 COVID rapid neg; 8/22 repeat COVID-19 +








BL arm rash, appears evolving, now small pustules with dry/crusting, querry 

resolving Shingles? Though odd to have on both arms


   8/21 HSV & VZV PCR ordered





Plan:





Empiric Micafungin #6 for fungemia


       -9/10 SP Minocycline #17


       -9/9 Sp IV bactrim #16


       -8/29 SP ZYvox #7


       --8/24 SP Cefepime #4


       --8/23 SP IV Vancomcyin #4





F/u cx


Monitor CBC/BMP


Trend BUE rash


Dc covid isolation


f/u Bcx x2


repeat cultures





Discussed with RN and pharmacy staff





Subjective


Allergies:  


Coded Allergies:  


     AMPICILLIN (Verified  Allergy, Unknown, hives, 8/21/20)


     PENICILLINS (Verified  Allergy, Unknown, hives, 8/21/20)


     TETRACYCLINES (Verified  Allergy, Unknown, hives, 8/21/20)


afebrile


tachycardic


recurrent leukocytosis





Objective





Last 24 Hour Vital Signs








  Date Time  Temp Pulse Resp B/P (MAP) Pulse Ox O2 Delivery O2 Flow Rate FiO2


 


9/11/20 12:00 97.5 87 18 119/69 (86) 97   


 


9/11/20 09:00      Room Air  


 


9/11/20 08:00 98.6 101 19 117/68 (84) 95   


 


9/11/20 04:00 98.5 99 18 114/63 (80) 99   


 


9/10/20 23:53 98.8 101 18 110/64 (79) 98   


 


9/10/20 21:00      Room Air  


 


9/10/20 20:20     96 Nasal Cannula 3.0 32


 


9/10/20 20:00 97.1 102 20 127/66 (86) 100   


 


9/10/20 16:00 99.7 95 18 112/67 (82) 98   








Height (Feet):  5


Height (Inches):  2.00


Weight (Pounds):  123


HEENT:  atraumatic


Respiratory/Chest:  other - mild Rsp distress


Abdomen:  no organomegaly





Laboratory Tests








Test


  9/11/20


06:40


 


White Blood Count


  15.0 K/UL


(4.8-10.8)  H


 


Red Blood Count


  3.82 M/UL


(4.20-5.40)  L


 


Hemoglobin


  12.2 G/DL


(12.0-16.0)


 


Hematocrit


  39.4 %


(37.0-47.0)


 


Mean Corpuscular Volume


  103 FL (80-99)


H


 


Mean Corpuscular Hemoglobin


  31.9 PG


(27.0-31.0)  H


 


Mean Corpuscular Hemoglobin


Concent 30.9 G/DL


(32.0-36.0)  L


 


Red Cell Distribution Width


  18.7 %


(11.6-14.8)  H


 


Platelet Count


  191 K/UL


(150-450)


 


Mean Platelet Volume


  7.1 FL


(6.5-10.1)


 


Neutrophils (%) (Auto)


  60.0 %


(45.0-75.0)


 


Lymphocytes (%) (Auto)


  29.7 %


(20.0-45.0)


 


Monocytes (%) (Auto)


  7.9 %


(1.0-10.0)


 


Eosinophils (%) (Auto)


  1.2 %


(0.0-3.0)


 


Basophils (%) (Auto)


  1.3 %


(0.0-2.0)


 


Sodium Level


  139 MMOL/L


(136-145)


 


Potassium Level


  4.6 MMOL/L


(3.5-5.1)


 


Chloride Level


  104 MMOL/L


()


 


Carbon Dioxide Level


  30 MMOL/L


(21-32)


 


Anion Gap


  5 mmol/L


(5-15)


 


Blood Urea Nitrogen


  40 mg/dL


(7-18)  H


 


Creatinine


  2.0 MG/DL


(0.55-1.30)  H


 


Estimat Glomerular Filtration


Rate 24.5 mL/min


(>60)


 


Glucose Level


  123 MG/DL


()  H


 


Uric Acid


  5.8 MG/DL


(2.6-7.2)


 


Calcium Level


  9.5 MG/DL


(8.5-10.1)


 


Phosphorus Level


  3.6 MG/DL


(2.5-4.9)


 


Magnesium Level


  2.2 MG/DL


(1.8-2.4)


 


Total Bilirubin


  0.4 MG/DL


(0.2-1.0)


 


Aspartate Amino Transf


(AST/SGOT) 19 U/L (15-37)


 


 


Alanine Aminotransferase


(ALT/SGPT) 21 U/L (12-78)


 


 


Alkaline Phosphatase


  163 U/L


()  H


 


Total Protein


  6.9 G/DL


(6.4-8.2)


 


Albumin


  2.5 G/DL


(3.4-5.0)  L


 


Globulin 4.4 g/dL  


 


Albumin/Globulin Ratio


  0.6 (1.0-2.7)


L











Current Medications








 Medications


  (Trade)  Dose


 Ordered  Sig/Raji


 Route


 PRN Reason  Start Time


 Stop Time Status Last Admin


Dose Admin


 


 Acetaminophen


  (Tylenol)  650 mg  Q6H  PRN


 GT


 Mild Pain (Pain Scale 1-3)  9/8/20 06:00


 10/4/20 05:59   


 


 


 Ascorbic Acid


  (Vitamin C)  500 mg  DAILY


 GT


   9/8/20 09:00


 10/4/20 08:59  9/11/20 09:05


 


 


 Clonidine HCl


  (Catapres Tab)  0.1 mg  Q4H  PRN


 GT


 bp over 160 syst  9/8/20 07:45


 12/3/20 19:40   


 


 


 Dextrose  1,000 ml @ 


 75 mls/hr  [id number]


 IV


   9/8/20 06:00


 10/4/20 19:40  9/11/20 14:33


 


 


 Docusate Sodium


  (Colace)  100 mg  EVERY 8  HOURS


 GT


   9/8/20 06:00


 9/20/20 08:59  9/11/20 05:40


 


 


 Enoxaparin Sodium


  (Lovenox)  40 mg  DAILY


 SUBQ


   9/8/20 09:00


 11/19/20 08:59  9/11/20 09:07


 


 


 Epoetin Jaswant


  (Epoetin


 Jaswant-EPBX(NON


 ESRD))  8,000 unit  MON-WED-FRI


 SUBQ


   9/11/20 21:00


 12/10/20 20:59   


 


 


 Lorazepam


  (Ativan 2mg/ml


 1ml)  1 mg  Q4H  PRN


 IV


 For Anxiety  9/8/20 07:45


 9/11/20 19:40   


 


 


 Micafungin Sodium


 100 mg/Sodium


 Chloride  110 ml @ 


 110 mls/hr  Q24H


 IVPB


   9/8/20 17:00


 9/13/20 16:59  9/10/20 17:33


 


 


 Nystatin


  (Nystop Powder)  1 applic  THREE TIMES A  DAY


 TOPIC


   9/10/20 09:00


 12/8/20 18:14  9/11/20 13:00


 


 


 Pantoprazole


  (Protonix)  40 mg  EVERY 12  HOURS


 IVP


   9/8/20 09:00


 9/20/20 20:59  9/11/20 09:06


 


 


 Polyethylene


 Glycol


  (Miralax)  17 gm  BEDTIME


 GT


   9/8/20 21:00


 9/20/20 20:59  9/10/20 20:57


 

















Tanisha Brownlee M.D. Sep 11, 2020 15:48

## 2020-09-12 VITALS — DIASTOLIC BLOOD PRESSURE: 69 MMHG | SYSTOLIC BLOOD PRESSURE: 128 MMHG

## 2020-09-12 VITALS — DIASTOLIC BLOOD PRESSURE: 70 MMHG | SYSTOLIC BLOOD PRESSURE: 121 MMHG

## 2020-09-12 VITALS — DIASTOLIC BLOOD PRESSURE: 73 MMHG | SYSTOLIC BLOOD PRESSURE: 114 MMHG

## 2020-09-12 VITALS — DIASTOLIC BLOOD PRESSURE: 68 MMHG | SYSTOLIC BLOOD PRESSURE: 123 MMHG

## 2020-09-12 VITALS — SYSTOLIC BLOOD PRESSURE: 117 MMHG | DIASTOLIC BLOOD PRESSURE: 67 MMHG

## 2020-09-12 VITALS — SYSTOLIC BLOOD PRESSURE: 111 MMHG | DIASTOLIC BLOOD PRESSURE: 68 MMHG

## 2020-09-12 LAB
ADD MANUAL DIFF: NO
ALBUMIN SERPL-MCNC: 2.5 G/DL (ref 3.4–5)
ALBUMIN/GLOB SERPL: 0.5 {RATIO} (ref 1–2.7)
ALP SERPL-CCNC: 149 U/L (ref 46–116)
ALT SERPL-CCNC: 24 U/L (ref 12–78)
AMYLASE SERPL-CCNC: 84 U/L (ref 25–115)
ANION GAP SERPL CALC-SCNC: 10 MMOL/L (ref 5–15)
AST SERPL-CCNC: 24 U/L (ref 15–37)
BASOPHILS NFR BLD AUTO: 0.6 % (ref 0–2)
BILIRUB SERPL-MCNC: 0.3 MG/DL (ref 0.2–1)
BUN SERPL-MCNC: 37 MG/DL (ref 7–18)
CALCIUM SERPL-MCNC: 9.9 MG/DL (ref 8.5–10.1)
CHLORIDE SERPL-SCNC: 102 MMOL/L (ref 98–107)
CO2 SERPL-SCNC: 28 MMOL/L (ref 21–32)
CREAT SERPL-MCNC: 1.8 MG/DL (ref 0.55–1.3)
EOSINOPHIL NFR BLD AUTO: 1.6 % (ref 0–3)
ERYTHROCYTE [DISTWIDTH] IN BLOOD BY AUTOMATED COUNT: 19.8 % (ref 11.6–14.8)
GLOBULIN SER-MCNC: 4.6 G/DL
HCT VFR BLD CALC: 39.2 % (ref 37–47)
HGB BLD-MCNC: 12.2 G/DL (ref 12–16)
LYMPHOCYTES NFR BLD AUTO: 24.5 % (ref 20–45)
MCV RBC AUTO: 104 FL (ref 80–99)
MONOCYTES NFR BLD AUTO: 7.8 % (ref 1–10)
NEUTROPHILS NFR BLD AUTO: 65.5 % (ref 45–75)
PLATELET # BLD: 179 K/UL (ref 150–450)
POTASSIUM SERPL-SCNC: 3.9 MMOL/L (ref 3.5–5.1)
RBC # BLD AUTO: 3.78 M/UL (ref 4.2–5.4)
SODIUM SERPL-SCNC: 140 MMOL/L (ref 136–145)
WBC # BLD AUTO: 12 K/UL (ref 4.8–10.8)

## 2020-09-12 RX ADMIN — POLYETHYLENE GLYCOL 3350 SCH GM: 17 POWDER, FOR SOLUTION ORAL at 21:00

## 2020-09-12 RX ADMIN — NYSTATIN SCH APPLIC: 100000 POWDER TOPICAL at 17:16

## 2020-09-12 RX ADMIN — ENOXAPARIN SODIUM SCH MG: 40 INJECTION SUBCUTANEOUS at 09:21

## 2020-09-12 RX ADMIN — DOCUSATE SODIUM SCH MG: 50 LIQUID ORAL at 14:00

## 2020-09-12 RX ADMIN — PANTOPRAZOLE SODIUM SCH MG: 40 INJECTION, POWDER, FOR SOLUTION INTRAVENOUS at 09:21

## 2020-09-12 RX ADMIN — Medication SCH MG: at 09:21

## 2020-09-12 RX ADMIN — DEXTROSE MONOHYDRATE SCH MLS/HR: 50 INJECTION, SOLUTION INTRAVENOUS at 21:37

## 2020-09-12 RX ADMIN — PANTOPRAZOLE SODIUM SCH MG: 40 INJECTION, POWDER, FOR SOLUTION INTRAVENOUS at 21:36

## 2020-09-12 RX ADMIN — NYSTATIN SCH APPLIC: 100000 POWDER TOPICAL at 09:22

## 2020-09-12 RX ADMIN — DOCUSATE SODIUM SCH MG: 50 LIQUID ORAL at 21:26

## 2020-09-12 RX ADMIN — NYSTATIN SCH APPLIC: 100000 POWDER TOPICAL at 12:11

## 2020-09-12 RX ADMIN — DOCUSATE SODIUM SCH MG: 50 LIQUID ORAL at 05:48

## 2020-09-12 NOTE — SURGERY PROGRESS NOTE
Surgery Progress Note


Subjective


Symptoms:  improved, tolerating diet, passing flatus





Objective





Last 24 Hour Vital Signs








  Date Time  Temp Pulse Resp B/P (MAP) Pulse Ox O2 Delivery O2 Flow Rate FiO2


 


9/12/20 12:00 97.7 101 19 117/67 (84) 96   


 


9/12/20 09:00      Room Air  


 


9/12/20 08:00 97.7 100 19 121/70 (87) 98   


 


9/12/20 04:00 97.9 99 20 123/68 (86) 98   


 


9/12/20 00:00 98.2 98 20 111/68 (82) 98   


 


9/11/20 20:00 97.2 99 20 136/76 (96) 98   


 


9/11/20 19:08     97 Nasal Cannula 3.0 32


 


9/11/20 16:00 97.7 81 18 118/70 (86) 97   








I&O











Intake and Output  


 


 9/11/20 9/12/20





 19:00 07:00


 


Intake Total 1685 ml 985 ml


 


Balance 1685 ml 985 ml


 


  


 


Intake Free Water 400 ml 200 ml


 


IV Total 900 ml 750 ml


 


Tube Feeding 385 ml 35 ml








Dressing:  saturated


Cardiovascular:  RSR


Respiratory:  decreased breath sounds


Abdomen:  soft, non-tender, present bowel sounds


Extremities:  no edema, no tenderness, no cyanosis





Laboratory Tests








Test


  9/11/20


17:45 9/12/20


05:50


 


Urine Color Pale yellow   


 


Urine Appearance Clear   


 


Urine pH 8 (4.5-8.0)   


 


Urine Specific Gravity


  1.010


(1.005-1.035) 


 


 


Urine Protein


  Negative


(NEGATIVE) 


 


 


Urine Glucose (UA)


  Negative


(NEGATIVE) 


 


 


Urine Ketones


  Negative


(NEGATIVE) 


 


 


Urine Blood


  Negative


(NEGATIVE) 


 


 


Urine Nitrite


  Negative


(NEGATIVE) 


 


 


Urine Bilirubin


  Negative


(NEGATIVE) 


 


 


Urine Urobilinogen


  Normal MG/DL


(0.0-1.0) 


 


 


Urine Leukocyte Esterase


  Negative


(NEGATIVE) 


 


 


White Blood Count


  


  12.0 K/UL


(4.8-10.8)  H


 


Red Blood Count


  


  3.78 M/UL


(4.20-5.40)  L


 


Hemoglobin


  


  12.2 G/DL


(12.0-16.0)


 


Hematocrit


  


  39.2 %


(37.0-47.0)


 


Mean Corpuscular Volume


  


  104 FL (80-99)


H


 


Mean Corpuscular Hemoglobin


  


  32.1 PG


(27.0-31.0)  H


 


Mean Corpuscular Hemoglobin


Concent 


  31.0 G/DL


(32.0-36.0)  L


 


Red Cell Distribution Width


  


  19.8 %


(11.6-14.8)  H


 


Platelet Count


  


  179 K/UL


(150-450)


 


Mean Platelet Volume


  


  6.6 FL


(6.5-10.1)


 


Neutrophils (%) (Auto)


  


  65.5 %


(45.0-75.0)


 


Lymphocytes (%) (Auto)


  


  24.5 %


(20.0-45.0)


 


Monocytes (%) (Auto)


  


  7.8 %


(1.0-10.0)


 


Eosinophils (%) (Auto)


  


  1.6 %


(0.0-3.0)


 


Basophils (%) (Auto)


  


  0.6 %


(0.0-2.0)


 


Sodium Level


  


  140 MMOL/L


(136-145)


 


Potassium Level


  


  3.9 MMOL/L


(3.5-5.1)


 


Chloride Level


  


  102 MMOL/L


()


 


Carbon Dioxide Level


  


  28 MMOL/L


(21-32)


 


Anion Gap


  


  10 mmol/L


(5-15)


 


Blood Urea Nitrogen


  


  37 mg/dL


(7-18)  H


 


Creatinine


  


  1.8 MG/DL


(0.55-1.30)  H


 


Estimat Glomerular Filtration


Rate 


  27.7 mL/min


(>60)


 


Glucose Level


  


  113 MG/DL


()  H


 


Calcium Level


  


  9.9 MG/DL


(8.5-10.1)


 


Total Bilirubin


  


  0.3 MG/DL


(0.2-1.0)


 


Aspartate Amino Transf


(AST/SGOT) 


  24 U/L (15-37)


 


 


Alanine Aminotransferase


(ALT/SGPT) 


  24 U/L (12-78)


 


 


Alkaline Phosphatase


  


  149 U/L


()  H


 


Total Protein


  


  7.1 G/DL


(6.4-8.2)


 


Albumin


  


  2.5 G/DL


(3.4-5.0)  L


 


Globulin  4.6 g/dL  


 


Albumin/Globulin Ratio


  


  0.5 (1.0-2.7)


L


 


Amylase Level


  


  84 U/L


()


 


Lipase


  


  316 U/L


()











Plan


Problems:  


(1) Anemia


(2) 2019 novel coronavirus disease (COVID-19)


(3) Pneumonia


(4) Multiple drug resistant organism (MDRO) culture positive


(5) Hyperkalemia


(6) Hypernatremia


(7) Severe dehydration


(8) Acute urinary retention


(9) Sepsis


Assessment & Plan:  Pt presented on admission with multiple Pressure injuries. 

Pt noted to have band of  Blotchy red rash  with open and closed Blisters 

affecting  R Brachial to R forearm. Lateral R back /R flank is also blotchy 

erythematous with clusters of serous filled blisters. On Lateral L Back to L 

flank is Blotchy, erythematous with clusters of serous filled blisters.  


Reabsorbing DTPI that is partially opened Sacrum(L) 6cm x (W)5.5cm.  Base of 

wound is Maroon with with scattered areas that are maryellen and loose peeling soft 

black cap.Surrounding Non-Blanching erythema without induration.


Non-Blanching erythema that is indurated with delineated margins R trochanter(L)

5.5cm x (W)6.5cm.


No evidence of skin breakdown L trochanter.


Non-Blanching erythema noted to R and L ischial tuberosities.


DTPI medial R heel (L)3.3cm x (W)3.4cm. Base of injury is, fluctuant, maroon 

with delineated margins. surrounding red,  boggy heel.


DTPI Lateral R Heel(L)1.5cm x (W)2.5cm. Base of injury is indurated, maroon 

with purpuric center.


Non-Blanching erythema without induration /fluctuance lateral R malleolus(L)2cm 

x (W)1.5cm.





Tx.Plan:


Apply Moisture Barrier Paste to Sacrum. Cover with Optifoam drsg. Change every 

3 days and prn.


           


Apply Cavilon Skin Barrier to R and L trochanter. Cover each site with Optifoam 

drsgs. Change every 7 days and prn.


           


Apply Moisture Barrier Paste to R and L Ischial tuberosities with each 

Incontinence care.


           


Apply Cavilon Skin Barrier to Medial and Lateral R Heel. Cover with Optifoam 

drsg. Change every7 days and prn.


           


Apply Cavilon Skin Barrier to R lateral ,and medial Malleoli. Cover each site 

with Optifoam drsgs. Change every 7 days and prn.


           


Apply Cavilon Skin Barrier to L Heel and Malleoli. Cover each site with 

Optifoam drsgs. Change every 7 days and prn.


           


Cover open blisters as needed with Optifoam drsgs. 


           


Reposition at least every 2 hours or as tolerated.


           


Off-load heels with pillow.


           


Nutritional optimization





DAILY ESTIMATED NEEDS:


Needs based on Sepsis, renal, wound 50.9kg  


25-35  kcals/kg 


8850-0736  total kcals


1.25-1.5  g protein/kg


64-76  g total protein 


25-30  mL/kg


8520-3461  total fluid mLs





NUTRITION DIAGNOSIS:


1. Swallowing difficulty r/t dysphagia as evidenced by pt is GT dep.


2. Altered nutrition related lab values r/t sepsis and severe dehydration,


pre-diabetes as evidenced by Elev WBC (31.6 ->18.2), elev Na (179-> WNL),


elev BUN (131->48), elev Creat (5.1->2.2), elev K (5.5-> wnl->5.5, 5.3),


elev BGs (93, 113, 185), A1C of 6.4.





CURRENT TF: Nepro @35  








ENTERAL NUTRITION RECOMMENDATIONS:


LOWER GOAL RATE -> NEPRO @ 35ML/HR X 24 HRS  to provide 840ml, 1512kcal, 68g 

prot, 610ml free water 





- LOWER GOAL RATE: meets 100% est kcal/prot needs


- Flush per MD, DIAN over 30 degrees.








ADDITIONAL RECOMMENDATIONS:


1) Monitor lytes and renal status-> TF change to Nepro (8/28), elev K 


2) Per SNF wt of 112# 


3) NISS for BG control: A1C 6.4, TF at goal + added D5+ Decadron 


4) Wound healing: TF @ goal provides 100% RDI 


    add Vit C 500mg QD + José Manuel BID via GT  





micro reviewed


covid negative


c diff negative


bc ngtd 


wounds do not seem to be etiology of infection 





(10) UTI (urinary tract infection)


(11) ARF (acute renal failure)


(12) Psychosis











Alex Nicholas Sep 12, 2020 12:38

## 2020-09-12 NOTE — NEPHROLOGY PROGRESS NOTE
Assessment/Plan


Problem List:  


(1) ARF (acute renal failure)


Assessment:  Underlying CKD





(2) Sepsis


(3) UTI (urinary tract infection)


(4) Severe dehydration


(5) Hypernatremia


(6) Acute urinary retention


(7) Anemia


Assessment





Acute renal failure


Possible underlying chronic renal insufficiency


Severe dehydration


Hypotension


Sepsis, UTI


History of psychiatric disease


Elevated troponin I


Patient full code


Plan





September 12: Status quo.  Lab reviewed.  Serum creatinine lower at 1.8.  

Patient remains full code.  Continue per consultants.


September 11: Lab reviewed.  Serum creatinine remains at 2 unchanged.  

Medication list reviewed.  Continue current management.


September 10: Labs reviewed.  Medication list reviewed.  Serum creatinine 

slightly higher at 2.  We will continue to monitor renal parameters and ID to 

avoid nephrotoxic antibiotics as possible.


September 9: Lab reviewed.  Serum creatinine stable at 1.9.  Continue per 

consultants.


September 8: Labs reviewed.  Serum creatinine stable.  Electrolytes within 

reasonable range.


September 7: Labs reviewed.  Remains stable from renal standpoint of view, as 

serum creatinine leveled off around 1.8 and 1.9.  Will continue to monitor 

renal parameters


September 6: No chemistry panel done today.  Status quo.  Continue per 

consultants.


September 5: Status quo.  Labs reviewed.  Stable from renal standpoint of view.


September 4: Status quo.  Labs reviewed.  Serum creatinine unchanged.  Continue 

per consultants.


September 3:Late note entry due to system problem at the [facility] [place] [facility] today. Labs 

reviewed.  Electrolytes within normal limit.  Leukocytosis worsened.  Continue 

per consultants.  Serum creatinine 1.8 unchanged.


September 2: Low magnesium and low phosphorus corrected.  Continue per 

consultants.  On nasal cannula now.  Serum creatinine 1.8.


September 1: Serum creatinine 1.8 stable.  Continue per pulmonary.  Continue to 

monitor renal parameters.


August 31: On Venturi mask.  Serum creatinine lowered to 1.8.  Continue per 

consultants.  Medication list reviewed.  Potassium supplement given.


August 30: Status quo.  Creatinine lower at 2.  Other electrolytes within 

normal limits.  Continue per consultants.


August 29: Labs reviewed.  Creatinine 2.2 stable.  Potassium 3.1.  20 M EQ KCl 

given.  Continue her consultants.


August 28: Serum potassium 5.5.Creatinine 2.2.  1 dose Kayexalate given.  

Remains of 75 cc IV fluid.  We will continue to monitor renal parameters.


August 27: Serum potassium 5.3.  Creatinine higher at 2.2.  Medication list 

reviewed.  Suggest to avoid nephrotoxic's like Bactrim if possible.  Continue 

to monitor renal parameters and electrolytes.  Continue per pulmonary to adjust 

pulmonary status.  May require BiPAP.


August 26: Serum sodium lower.  Renal parameters appear more stable.  

Nevertheless leukocytosis is worsened.  Continue per consultants.


August 25: Serum sodium higher.  Continue D5W 75 cc an hour.  Continue to 

monitor electrolytes.  Continue per consultants.  Serum creatinine down to 2.4 

from 5.1 on admission.  Leukocytosis persists.


August 24: Patient now on isolation for COVID-19.  Serum creatinine is 

plateauing.  Will decrease the IV fluid to 75 cc an hour.  Continue management 

per ID.  Will monitor electrolytes and renal parameters.


August 23: Discussed with MARTIN Carter.  Patient needs a Mitchell catheter.  Accurate 

intake and output.  Decrease  cc D5W an hour.  1 dose of IV Venofer and 

then subcu Epogen ordered for anemia.  Continue to monitor renal parameters


August 22: Renal parameters improving.  Continue D5W IV fluid.  Potassium 

supplement IV given.  Continue per consultants.





Previously:


Fluid challenge


Monitor renal parameters and electrolytes


Monitor intake and output


Mitchell catheter


Antibiotics


Avoid nephrotoxic's


Chest x-ray





Kidney ultrasound findings: 


Right kidney measures 8.9 cm in length. Left kidney measures 9 cm in


length. Both kidneys demonstrate markedly increased echogenicity. No 

hydronephrosis. 


There are bilateral renal cysts. Normal inferior vena cava. Bladder is empty,


contains a Mitchell catheter. 


 


Incidentally noted are gallstones.


 


Impression: Markedly echogenic kidneys, consistent with medical renal disease


Negative for hydronephrosis


Empty bladder with a Mitchell catheter


Incidental finding of cholelithiasis.





Subjective


ROS Limited/Unobtainable:  Yes





Objective


Objective





Last 24 Hour Vital Signs








  Date Time  Temp Pulse Resp B/P (MAP) Pulse Ox O2 Delivery O2 Flow Rate FiO2


 


9/12/20 08:00 97.7 100 19 121/70 (87) 98   


 


9/12/20 04:00 97.9 99 20 123/68 (86) 98   


 


9/12/20 00:00 98.2 98 20 111/68 (82) 98   


 


9/11/20 20:00 97.2 99 20 136/76 (96) 98   


 


9/11/20 19:08     97 Nasal Cannula 3.0 32


 


9/11/20 16:00 97.7 81 18 118/70 (86) 97   


 


9/11/20 12:00 97.5 87 18 119/69 (86) 97   

















Intake and Output  


 


 9/11/20 9/12/20





 19:00 07:00


 


Intake Total 1685 ml 985 ml


 


Balance 1685 ml 985 ml


 


  


 


Intake Free Water 400 ml 200 ml


 


IV Total 900 ml 750 ml


 


Tube Feeding 385 ml 35 ml








Laboratory Tests


9/11/20 17:45: 


Urine Color Pale yellow, Urine Appearance Clear, Urine pH 8, Urine Specific 

Gravity 1.010, Urine Protein Negative, Urine Glucose (UA) Negative, Urine 

Ketones Negative, Urine Blood Negative, Urine Nitrite Negative, Urine Bilirubin 

Negative, Urine Urobilinogen Normal, Urine Leukocyte Esterase Negative


9/12/20 05:50: 


White Blood Count 12.0H, Red Blood Count 3.78L, Hemoglobin 12.2, Hematocrit 39.2

, Mean Corpuscular Volume 104H, Mean Corpuscular Hemoglobin 32.1H, Mean 

Corpuscular Hemoglobin Concent 31.0L, Red Cell Distribution Width 19.8H, 

Platelet Count 179, Mean Platelet Volume 6.6, Neutrophils (%) (Auto) 65.5, 

Lymphocytes (%) (Auto) 24.5, Monocytes (%) (Auto) 7.8, Eosinophils (%) (Auto) 

1.6, Basophils (%) (Auto) 0.6, Sodium Level 140, Potassium Level 3.9, Chloride 

Level 102, Carbon Dioxide Level 28, Anion Gap 10, Blood Urea Nitrogen 37H, 

Creatinine 1.8H, Estimat Glomerular Filtration Rate 27.7, Glucose Level 113H, 

Calcium Level 9.9, Total Bilirubin 0.3, Aspartate Amino Transf (AST/SGOT) 24, 

Alanine Aminotransferase (ALT/SGPT) 24, Alkaline Phosphatase 149H, Total 

Protein 7.1, Albumin 2.5L, Globulin 4.6, Albumin/Globulin Ratio 0.5L, Amylase 

Level 84, Lipase 316


Height (Feet):  5


Height (Inches):  2.00


Weight (Pounds):  123


General Appearance:  no apparent distress


Cardiovascular:  tachycardia


Respiratory/Chest:  decreased breath sounds


Abdomen:  soft


Objective


No change











Derik Dos Santos MD Sep 12, 2020 11:11

## 2020-09-12 NOTE — INFECTIOUS DISEASES PROG NOTE
Assessment/Plan


69yo F with:





Sepsis


Fever; SP


Leukocytosis; worsening (sp steroids)- Recurrent


Fungemia


      -9/9 2d echo: no discrete vegetations


     -9/6 Bcx NTD


    -9/3 u/a neg


          Bcx 1/4 C. albicas





JASWINDER to 5.1, improving 


Hypoxic resp failure, sp NRB mask >VM> 4l NC 9/1


VRE UTI


Hx of COVID-19 1 mo ago ( doubt recurrent COVID-19) 


HAP


          9/11 CXR:   There are increased lung markings in the left lower lobe 

suspicious for early developing infiltrate.  Progress films are recommended.





           9/9 CXR: No acute process


            9/3 sp cx yeast


            9/1 rapid COVID PCR neg; 9/3 rapid COVID PCR neg


   8/29 CXR:   Improving left lower lobe infiltrate and left pleural effusion. 

There is a probable small right pleural effusion.


   8/27 cdiff neg


           8/26 CXR: There are increased interstitial congestion and left 

pleural effusion,since prior exam of 8/22/2020


               Bcx NTD


            8/22 sp cx MDR ABC (S bactrim; I Colistin, Polymixin B, Minocycline)


   8/21 BCx NTD


   8/21 UA+, UCx >100k VRE (S Zyvox)


   8/21 CXR: Left retrocardiac mild atelectasis versus infiltrate.


   8/21 COVID rapid neg; 8/22 repeat COVID-19 +








BL arm rash, appears evolving, now small pustules with dry/crusting, querry 

resolving Shingles? Though odd to have on both arms


   8/21 HSV & VZV PCR ordered





Plan:





Empiric Micafungin #7 for fungemia


       -9/10 SP Minocycline #17


       -9/9 Sp IV bactrim #16


       -8/29 SP ZYvox #7


       --8/24 SP Cefepime #4


       --8/23 SP IV Vancomcyin #4





F/u cx


Monitor CBC/BMP


Trend BUE rash


Dc covid isolation


f/u Bcx x2


repeat cultures


sp cx


Discussed with RN and pharmacy staff





Subjective


Allergies:  


Coded Allergies:  


     AMPICILLIN (Verified  Allergy, Unknown, hives, 8/21/20)


     PENICILLINS (Verified  Allergy, Unknown, hives, 8/21/20)


     TETRACYCLINES (Verified  Allergy, Unknown, hives, 8/21/20)


afebrile


wbc improving





Objective





Last 24 Hour Vital Signs








  Date Time  Temp Pulse Resp B/P (MAP) Pulse Ox O2 Delivery O2 Flow Rate FiO2


 


9/12/20 16:00 98.0 96 20 128/69 (88) 99   


 


9/12/20 12:00 97.7 101 19 117/67 (84) 96   


 


9/12/20 09:00      Room Air  


 


9/12/20 08:00 97.7 100 19 121/70 (87) 98   


 


9/12/20 04:00 97.9 99 20 123/68 (86) 98   


 


9/12/20 00:00 98.2 98 20 111/68 (82) 98   


 


9/11/20 20:00 97.2 99 20 136/76 (96) 98   


 


9/11/20 19:08     97 Nasal Cannula 3.0 32








Height (Feet):  5


Height (Inches):  2.00


Weight (Pounds):  123


HEENT:  atraumatic


Respiratory/Chest:  other - mild Rsp distress


Abdomen:  no organomegaly





Laboratory Tests








Test


  9/12/20


05:50


 


White Blood Count


  12.0 K/UL


(4.8-10.8)  H


 


Red Blood Count


  3.78 M/UL


(4.20-5.40)  L


 


Hemoglobin


  12.2 G/DL


(12.0-16.0)


 


Hematocrit


  39.2 %


(37.0-47.0)


 


Mean Corpuscular Volume


  104 FL (80-99)


H


 


Mean Corpuscular Hemoglobin


  32.1 PG


(27.0-31.0)  H


 


Mean Corpuscular Hemoglobin


Concent 31.0 G/DL


(32.0-36.0)  L


 


Red Cell Distribution Width


  19.8 %


(11.6-14.8)  H


 


Platelet Count


  179 K/UL


(150-450)


 


Mean Platelet Volume


  6.6 FL


(6.5-10.1)


 


Neutrophils (%) (Auto)


  65.5 %


(45.0-75.0)


 


Lymphocytes (%) (Auto)


  24.5 %


(20.0-45.0)


 


Monocytes (%) (Auto)


  7.8 %


(1.0-10.0)


 


Eosinophils (%) (Auto)


  1.6 %


(0.0-3.0)


 


Basophils (%) (Auto)


  0.6 %


(0.0-2.0)


 


Sodium Level


  140 MMOL/L


(136-145)


 


Potassium Level


  3.9 MMOL/L


(3.5-5.1)


 


Chloride Level


  102 MMOL/L


()


 


Carbon Dioxide Level


  28 MMOL/L


(21-32)


 


Anion Gap


  10 mmol/L


(5-15)


 


Blood Urea Nitrogen


  37 mg/dL


(7-18)  H


 


Creatinine


  1.8 MG/DL


(0.55-1.30)  H


 


Estimat Glomerular Filtration


Rate 27.7 mL/min


(>60)


 


Glucose Level


  113 MG/DL


()  H


 


Calcium Level


  9.9 MG/DL


(8.5-10.1)


 


Total Bilirubin


  0.3 MG/DL


(0.2-1.0)


 


Aspartate Amino Transf


(AST/SGOT) 24 U/L (15-37)


 


 


Alanine Aminotransferase


(ALT/SGPT) 24 U/L (12-78)


 


 


Alkaline Phosphatase


  149 U/L


()  H


 


Total Protein


  7.1 G/DL


(6.4-8.2)


 


Albumin


  2.5 G/DL


(3.4-5.0)  L


 


Globulin 4.6 g/dL  


 


Albumin/Globulin Ratio


  0.5 (1.0-2.7)


L


 


Amylase Level


  84 U/L


()


 


Lipase


  316 U/L


()











Current Medications








 Medications


  (Trade)  Dose


 Ordered  Sig/Raji


 Route


 PRN Reason  Start Time


 Stop Time Status Last Admin


Dose Admin


 


 Acetaminophen


  (Tylenol)  650 mg  Q6H  PRN


 GT


 Mild Pain (Pain Scale 1-3)  9/8/20 06:00


 10/4/20 05:59   


 


 


 Ascorbic Acid


  (Vitamin C)  500 mg  DAILY


 GT


   9/8/20 09:00


 10/4/20 08:59  9/12/20 09:21


 


 


 Clonidine HCl


  (Catapres Tab)  0.1 mg  Q4H  PRN


 GT


 bp over 160 syst  9/8/20 07:45


 12/3/20 19:40   


 


 


 Dextrose  1,000 ml @ 


 75 mls/hr  [id number]


 IV


   9/8/20 06:00


 10/4/20 19:40  9/12/20 17:16


 


 


 Docusate Sodium


  (Colace)  100 mg  EVERY 8  HOURS


 GT


   9/8/20 06:00


 9/20/20 08:59  9/11/20 05:40


 


 


 Enoxaparin Sodium


  (Lovenox)  40 mg  DAILY


 SUBQ


   9/8/20 09:00


 11/19/20 08:59  9/12/20 09:21


 


 


 Epoetin Jaswant


  (Epoetin


 Jaswant-EPBX(NON


 ESRD))  8,000 unit  MON-WED-FRI


 SUBQ


   9/11/20 21:00


 12/10/20 20:59   


 


 


 Micafungin Sodium


 100 mg/Sodium


 Chloride  110 ml @ 


 110 mls/hr  Q24H


 IVPB


   9/11/20 21:00


 9/18/20 20:59  9/11/20 21:06


 


 


 Nystatin


  (Nystop Powder)  1 applic  THREE TIMES A  DAY


 TOPIC


   9/10/20 09:00


 12/8/20 18:14  9/12/20 17:16


 


 


 Pantoprazole


  (Protonix)  40 mg  EVERY 12  HOURS


 IVP


   9/8/20 09:00


 9/20/20 20:59  9/12/20 09:21


 


 


 Polyethylene


 Glycol


  (Miralax)  17 gm  BEDTIME


 GT


   9/8/20 21:00


 9/20/20 20:59  9/10/20 20:57


 

















Tanisha Brownlee M.D. Sep 12, 2020 18:00

## 2020-09-12 NOTE — PULMONOLOGY PROGRESS NOTE
Subjective


ROS Limited/Unobtainable:  No


Constitutional:  Reports: no symptoms


HEENT:  Repors: no symptoms


Respiratory:  Reports: no symptoms


Cardiovascular:  Reports: no symptoms


Allergies:  


Coded Allergies:  


     AMPICILLIN (Verified  Allergy, Unknown, hives, 8/21/20)


     PENICILLINS (Verified  Allergy, Unknown, hives, 8/21/20)


     TETRACYCLINES (Verified  Allergy, Unknown, hives, 8/21/20)





Objective





Last 24 Hour Vital Signs








  Date Time  Temp Pulse Resp B/P (MAP) Pulse Ox O2 Delivery O2 Flow Rate FiO2


 


9/12/20 08:00 97.7 100 19 121/70 (87) 98   


 


9/12/20 04:00 97.9 99 20 123/68 (86) 98   


 


9/12/20 00:00 98.2 98 20 111/68 (82) 98   


 


9/11/20 20:00 97.2 99 20 136/76 (96) 98   


 


9/11/20 19:08     97 Nasal Cannula 3.0 32


 


9/11/20 16:00 97.7 81 18 118/70 (86) 97   


 


9/11/20 12:00 97.5 87 18 119/69 (86) 97   

















Intake and Output  


 


 9/11/20 9/12/20





 19:00 07:00


 


Intake Total 1685 ml 985 ml


 


Balance 1685 ml 985 ml


 


  


 


Intake Free Water 400 ml 200 ml


 


IV Total 900 ml 750 ml


 


Tube Feeding 385 ml 35 ml








General Appearance:  no acute distress, other - chronically ill looking 

bedridden female


HEENT:  normocephalic, atraumatic, other - VM


Respiratory:  chest wall non-tender, rhonchi - bilaterally - scattered


Cardiovascular:  normal peripheral pulses, normal rate - SR on tele


Abdomen:  normal bowel sounds, soft, non tender


Genitourinary:  normal external genitalia


Skin:  other - BUE crusting lesions


Neurologic:  CNs II-XII grossly normal, abnormal gait - bedridden


Lymphatic:  no neck adenopathy


Laboratory Tests


9/11/20 17:45: 


Urine Color Pale yellow, Urine Appearance Clear, Urine pH 8, Urine Specific 

Gravity 1.010, Urine Protein Negative, Urine Glucose (UA) Negative, Urine 

Ketones Negative, Urine Blood Negative, Urine Nitrite Negative, Urine Bilirubin 

Negative, Urine Urobilinogen Normal, Urine Leukocyte Esterase Negative


9/12/20 05:50: 


White Blood Count 12.0H, Red Blood Count 3.78L, Hemoglobin 12.2, Hematocrit 39.2

, Mean Corpuscular Volume 104H, Mean Corpuscular Hemoglobin 32.1H, Mean 

Corpuscular Hemoglobin Concent 31.0L, Red Cell Distribution Width 19.8H, 

Platelet Count 179, Mean Platelet Volume 6.6, Neutrophils (%) (Auto) 65.5, 

Lymphocytes (%) (Auto) 24.5, Monocytes (%) (Auto) 7.8, Eosinophils (%) (Auto) 

1.6, Basophils (%) (Auto) 0.6, Sodium Level 140, Potassium Level 3.9, Chloride 

Level 102, Carbon Dioxide Level 28, Anion Gap 10, Blood Urea Nitrogen 37H, 

Creatinine 1.8H, Estimat Glomerular Filtration Rate 27.7, Glucose Level 113H, 

Calcium Level 9.9, Total Bilirubin 0.3, Aspartate Amino Transf (AST/SGOT) 24, 

Alanine Aminotransferase (ALT/SGPT) 24, Alkaline Phosphatase 149H, Total 

Protein 7.1, Albumin 2.5L, Globulin 4.6, Albumin/Globulin Ratio 0.5L, Amylase 

Level 84, Lipase 316





Current Medications








 Medications


  (Trade)  Dose


 Ordered  Sig/Raji


 Route


 PRN Reason  Start Time


 Stop Time Status Last Admin


Dose Admin


 


 Acetaminophen


  (Tylenol)  650 mg  Q6H  PRN


 GT


 Mild Pain (Pain Scale 1-3)  9/8/20 06:00


 10/4/20 05:59   


 


 


 Ascorbic Acid


  (Vitamin C)  500 mg  DAILY


 GT


   9/8/20 09:00


 10/4/20 08:59  9/11/20 09:05


 


 


 Clonidine HCl


  (Catapres Tab)  0.1 mg  Q4H  PRN


 GT


 bp over 160 syst  9/8/20 07:45


 12/3/20 19:40   


 


 


 Dextrose  1,000 ml @ 


 75 mls/hr  [id number]


 IV


   9/8/20 06:00


 10/4/20 19:40  9/12/20 03:20


 


 


 Docusate Sodium


  (Colace)  100 mg  EVERY 8  HOURS


 GT


   9/8/20 06:00


 9/20/20 08:59  9/11/20 05:40


 


 


 Enoxaparin Sodium


  (Lovenox)  40 mg  DAILY


 SUBQ


   9/8/20 09:00


 11/19/20 08:59  9/11/20 09:07


 


 


 Epoetin Jaswant


  (Epoetin


 Jaswant-EPBX(NON


 ESRD))  8,000 unit  MON-WED-FRI


 SUBQ


   9/11/20 21:00


 12/10/20 20:59   


 


 


 Micafungin Sodium


 100 mg/Sodium


 Chloride  110 ml @ 


 110 mls/hr  Q24H


 IVPB


   9/11/20 21:00


 9/18/20 20:59  9/11/20 21:06


 


 


 Nystatin


  (Nystop Powder)  1 applic  THREE TIMES A  DAY


 TOPIC


   9/10/20 09:00


 12/8/20 18:14  9/11/20 18:00


 


 


 Pantoprazole


  (Protonix)  40 mg  EVERY 12  HOURS


 IVP


   9/8/20 09:00


 9/20/20 20:59  9/11/20 21:04


 


 


 Polyethylene


 Glycol


  (Miralax)  17 gm  BEDTIME


 GT


   9/8/20 21:00


 9/20/20 20:59  9/10/20 20:57


 











Assessment/Plan


Problems:  


(1) 2019 novel coronavirus disease (COVID-19)


(2) Multiple drug resistant organism (MDRO) culture positive


(3) Sepsis


(4) ARF (acute renal failure)


(5) Severe dehydration


(6) Hypernatremia


(7) Psychosis


(8) Pacemaker


Assessment/Plan





WBC still high


Impression: No acute process


ESR and CRP





COVID positive





f/u electrolytes


renal studies


urine electrolytes


dvt prophylaxis











Live Brambila MD Sep 12, 2020 09:23

## 2020-09-12 NOTE — DIAGNOSTIC IMAGING REPORT
EXAM:

  XR Chest, 1 View

 

CLINICAL HISTORY:

  COUGH

 

TECHNIQUE:

  Frontal view of the chest.

 

COMPARISON:

  9/9/20

 

FINDINGS:

  Lungs:  There are increased lung markings in the left lower lobe 

suspicious for early developing infiltrate.  Progress films are 

recommended.

  Pleural space:  Unremarkable.  No pneumothorax.

  Heart:  Unremarkable.  No cardiomegaly.

  Mediastinum:  Unremarkable.

  Bones/joints:  Unremarkable.

  Tubes, lines and devices:  The right subclavian cardiac leads in good 

position.

 

IMPRESSION:     

  There are increased lung markings in the left lower lobe suspicious for 

early developing infiltrate.  Progress films are recommended.

## 2020-09-12 NOTE — INTERNAL MED PROGRESS NOTE
Subjective


Date of Service:  Sep 12, 2020


Physician Name


Wing Hebert


Attending Physician


Umer Wahl MD





Current Medications








 Medications


  (Trade)  Dose


 Ordered  Sig/Raji


 Route


 PRN Reason  Start Time


 Stop Time Status Last Admin


Dose Admin


 


 Acetaminophen


  (Tylenol)  650 mg  Q6H  PRN


 GT


 Mild Pain (Pain Scale 1-3)  9/8/20 06:00


 10/4/20 05:59   


 


 


 Ascorbic Acid


  (Vitamin C)  500 mg  DAILY


 GT


   9/8/20 09:00


 10/4/20 08:59  9/12/20 09:21


 


 


 Clonidine HCl


  (Catapres Tab)  0.1 mg  Q4H  PRN


 GT


 bp over 160 syst  9/8/20 07:45


 12/3/20 19:40   


 


 


 Dextrose  1,000 ml @ 


 75 mls/hr  [id number]


 IV


   9/8/20 06:00


 10/4/20 19:40  9/12/20 03:20


 


 


 Docusate Sodium


  (Colace)  100 mg  EVERY 8  HOURS


 GT


   9/8/20 06:00


 9/20/20 08:59  9/11/20 05:40


 


 


 Enoxaparin Sodium


  (Lovenox)  40 mg  DAILY


 SUBQ


   9/8/20 09:00


 11/19/20 08:59  9/12/20 09:21


 


 


 Epoetin Jaswant


  (Epoetin


 Jaswant-EPBX(NON


 ESRD))  8,000 unit  MON-WED-FRI


 SUBQ


   9/11/20 21:00


 12/10/20 20:59   


 


 


 Micafungin Sodium


 100 mg/Sodium


 Chloride  110 ml @ 


 110 mls/hr  Q24H


 IVPB


   9/11/20 21:00


 9/18/20 20:59  9/11/20 21:06


 


 


 Nystatin


  (Nystop Powder)  1 applic  THREE TIMES A  DAY


 TOPIC


   9/10/20 09:00


 12/8/20 18:14  9/12/20 12:11


 


 


 Pantoprazole


  (Protonix)  40 mg  EVERY 12  HOURS


 IVP


   9/8/20 09:00


 9/20/20 20:59  9/12/20 09:21


 


 


 Polyethylene


 Glycol


  (Miralax)  17 gm  BEDTIME


 GT


   9/8/20 21:00


 9/20/20 20:59  9/10/20 20:57


 








Allergies:  


Coded Allergies:  


     AMPICILLIN (Verified  Allergy, Unknown, hives, 8/21/20)


     PENICILLINS (Verified  Allergy, Unknown, hives, 8/21/20)


     TETRACYCLINES (Verified  Allergy, Unknown, hives, 8/21/20)


ROS Limited/Unobtainable:  Yes


Subjective


71 YO F with previous COVID 19 pos admitted with shortness of breath.  Now 

pneumonia and sepsis.  Cover for Int Med-DR Wahl.





Objective





Last Vital Signs








  Date Time  Temp Pulse Resp B/P (MAP) Pulse Ox O2 Delivery O2 Flow Rate FiO2


 


9/12/20 12:00 97.7 101 19 117/67 (84) 96   


 


9/12/20 09:00      Room Air  


 


9/11/20 19:08       3.0 32











Laboratory Tests








Test


  9/11/20


17:45 9/12/20


05:50


 


Urine Color Pale yellow   


 


Urine Appearance Clear   


 


Urine pH 8 (4.5-8.0)   


 


Urine Specific Gravity


  1.010


(1.005-1.035) 


 


 


Urine Protein


  Negative


(NEGATIVE) 


 


 


Urine Glucose (UA)


  Negative


(NEGATIVE) 


 


 


Urine Ketones


  Negative


(NEGATIVE) 


 


 


Urine Blood


  Negative


(NEGATIVE) 


 


 


Urine Nitrite


  Negative


(NEGATIVE) 


 


 


Urine Bilirubin


  Negative


(NEGATIVE) 


 


 


Urine Urobilinogen


  Normal MG/DL


(0.0-1.0) 


 


 


Urine Leukocyte Esterase


  Negative


(NEGATIVE) 


 


 


White Blood Count


  


  12.0 K/UL


(4.8-10.8)  H


 


Red Blood Count


  


  3.78 M/UL


(4.20-5.40)  L


 


Hemoglobin


  


  12.2 G/DL


(12.0-16.0)


 


Hematocrit


  


  39.2 %


(37.0-47.0)


 


Mean Corpuscular Volume


  


  104 FL (80-99)


H


 


Mean Corpuscular Hemoglobin


  


  32.1 PG


(27.0-31.0)  H


 


Mean Corpuscular Hemoglobin


Concent 


  31.0 G/DL


(32.0-36.0)  L


 


Red Cell Distribution Width


  


  19.8 %


(11.6-14.8)  H


 


Platelet Count


  


  179 K/UL


(150-450)


 


Mean Platelet Volume


  


  6.6 FL


(6.5-10.1)


 


Neutrophils (%) (Auto)


  


  65.5 %


(45.0-75.0)


 


Lymphocytes (%) (Auto)


  


  24.5 %


(20.0-45.0)


 


Monocytes (%) (Auto)


  


  7.8 %


(1.0-10.0)


 


Eosinophils (%) (Auto)


  


  1.6 %


(0.0-3.0)


 


Basophils (%) (Auto)


  


  0.6 %


(0.0-2.0)


 


Sodium Level


  


  140 MMOL/L


(136-145)


 


Potassium Level


  


  3.9 MMOL/L


(3.5-5.1)


 


Chloride Level


  


  102 MMOL/L


()


 


Carbon Dioxide Level


  


  28 MMOL/L


(21-32)


 


Anion Gap


  


  10 mmol/L


(5-15)


 


Blood Urea Nitrogen


  


  37 mg/dL


(7-18)  H


 


Creatinine


  


  1.8 MG/DL


(0.55-1.30)  H


 


Estimat Glomerular Filtration


Rate 


  27.7 mL/min


(>60)


 


Glucose Level


  


  113 MG/DL


()  H


 


Calcium Level


  


  9.9 MG/DL


(8.5-10.1)


 


Total Bilirubin


  


  0.3 MG/DL


(0.2-1.0)


 


Aspartate Amino Transf


(AST/SGOT) 


  24 U/L (15-37)


 


 


Alanine Aminotransferase


(ALT/SGPT) 


  24 U/L (12-78)


 


 


Alkaline Phosphatase


  


  149 U/L


()  H


 


Total Protein


  


  7.1 G/DL


(6.4-8.2)


 


Albumin


  


  2.5 G/DL


(3.4-5.0)  L


 


Globulin  4.6 g/dL  


 


Albumin/Globulin Ratio


  


  0.5 (1.0-2.7)


L


 


Amylase Level


  


  84 U/L


()


 


Lipase


  


  316 U/L


()

















Intake and Output  


 


 9/11/20 9/12/20





 19:00 07:00


 


Intake Total 1685 ml 985 ml


 


Balance 1685 ml 985 ml


 


  


 


Intake Free Water 400 ml 200 ml


 


IV Total 900 ml 750 ml


 


Tube Feeding 385 ml 35 ml








Objective


General Appearance:  WD/WN, no apparent distress, lethargic


EENT:  PERRL/EOMI, normal ENT inspection


Neck:  non-tender, normal alignment, supple, normal inspection


Cardiovascular:  normal peripheral pulses, normal rate, regular rhythm, no 

gallop/murmur, no JVD


Respiratory/Chest:  Venturi mask; chest wall non-tender, respiratory distress, 

crackles/rales, rhonchi - bilaterally, expiratory wheezing


Abdomen:  normal bowel sounds, non tender, soft, no organomegaly, no mass


Extremities:  normal range of motion, non-tender


Neurologic:  CNs II-XII grossly normal, no motor/sensory deficits


Skin:  normal pigmentation, warm/dry





Assessment/Plan


Problem List:  


(1) UTI (urinary tract infection)


Assessment & Plan:  Vanco res enterococcus.  Cont zyvox per ID





(2) Sepsis


Assessment & Plan:  ID= Dr Brownlee.  Continue IV bactrim, micafungin and 

minocycline





(3) Hypernatremia


Assessment & Plan:  Nephrology=DR Dos Santos





(4) Severe dehydration


(5) ARF (acute renal failure)


Assessment & Plan:  Nephrology = Dr Dos Santos





(6) Pneumonia


Assessment & Plan:  Acenitobacter.  Cont minocycline, linezolid and IV bactrim 

per ID=Dr Brownlee; pulmonary = Dr Brambila





(7) Hyperkalemia











Wing Hebert MD Sep 12, 2020 13:13

## 2020-09-13 VITALS — SYSTOLIC BLOOD PRESSURE: 120 MMHG | DIASTOLIC BLOOD PRESSURE: 64 MMHG

## 2020-09-13 VITALS — SYSTOLIC BLOOD PRESSURE: 122 MMHG | DIASTOLIC BLOOD PRESSURE: 68 MMHG

## 2020-09-13 VITALS — SYSTOLIC BLOOD PRESSURE: 134 MMHG | DIASTOLIC BLOOD PRESSURE: 79 MMHG

## 2020-09-13 VITALS — DIASTOLIC BLOOD PRESSURE: 71 MMHG | SYSTOLIC BLOOD PRESSURE: 130 MMHG

## 2020-09-13 VITALS — DIASTOLIC BLOOD PRESSURE: 69 MMHG | SYSTOLIC BLOOD PRESSURE: 129 MMHG

## 2020-09-13 VITALS — DIASTOLIC BLOOD PRESSURE: 66 MMHG | SYSTOLIC BLOOD PRESSURE: 127 MMHG

## 2020-09-13 LAB
ADD MANUAL DIFF: NO
ANION GAP SERPL CALC-SCNC: 9 MMOL/L (ref 5–15)
BASOPHILS NFR BLD AUTO: 1.3 % (ref 0–2)
BUN SERPL-MCNC: 37 MG/DL (ref 7–18)
CALCIUM SERPL-MCNC: 9.8 MG/DL (ref 8.5–10.1)
CHLORIDE SERPL-SCNC: 103 MMOL/L (ref 98–107)
CO2 SERPL-SCNC: 28 MMOL/L (ref 21–32)
CREAT SERPL-MCNC: 1.6 MG/DL (ref 0.55–1.3)
EOSINOPHIL NFR BLD AUTO: 1.1 % (ref 0–3)
ERYTHROCYTE [DISTWIDTH] IN BLOOD BY AUTOMATED COUNT: 19.4 % (ref 11.6–14.8)
HCT VFR BLD CALC: 40.9 % (ref 37–47)
HGB BLD-MCNC: 12.5 G/DL (ref 12–16)
LYMPHOCYTES NFR BLD AUTO: 22.2 % (ref 20–45)
MCV RBC AUTO: 104 FL (ref 80–99)
MONOCYTES NFR BLD AUTO: 8.3 % (ref 1–10)
NEUTROPHILS NFR BLD AUTO: 67 % (ref 45–75)
PLATELET # BLD: 165 K/UL (ref 150–450)
POTASSIUM SERPL-SCNC: 3.9 MMOL/L (ref 3.5–5.1)
RBC # BLD AUTO: 3.94 M/UL (ref 4.2–5.4)
SODIUM SERPL-SCNC: 140 MMOL/L (ref 136–145)
WBC # BLD AUTO: 11.9 K/UL (ref 4.8–10.8)

## 2020-09-13 RX ADMIN — NYSTATIN SCH APPLIC: 100000 POWDER TOPICAL at 12:15

## 2020-09-13 RX ADMIN — NYSTATIN SCH APPLIC: 100000 POWDER TOPICAL at 08:37

## 2020-09-13 RX ADMIN — DEXTROSE MONOHYDRATE SCH MLS/HR: 50 INJECTION, SOLUTION INTRAVENOUS at 21:13

## 2020-09-13 RX ADMIN — DOCUSATE SODIUM SCH MG: 50 LIQUID ORAL at 13:06

## 2020-09-13 RX ADMIN — Medication SCH MG: at 08:36

## 2020-09-13 RX ADMIN — DOCUSATE SODIUM SCH MG: 50 LIQUID ORAL at 06:00

## 2020-09-13 RX ADMIN — POLYETHYLENE GLYCOL 3350 SCH GM: 17 POWDER, FOR SOLUTION ORAL at 21:13

## 2020-09-13 RX ADMIN — PANTOPRAZOLE SODIUM SCH MG: 40 INJECTION, POWDER, FOR SOLUTION INTRAVENOUS at 08:36

## 2020-09-13 RX ADMIN — NYSTATIN SCH APPLIC: 100000 POWDER TOPICAL at 17:09

## 2020-09-13 RX ADMIN — ENOXAPARIN SODIUM SCH MG: 40 INJECTION SUBCUTANEOUS at 08:39

## 2020-09-13 RX ADMIN — PANTOPRAZOLE SODIUM SCH MG: 40 INJECTION, POWDER, FOR SOLUTION INTRAVENOUS at 21:13

## 2020-09-13 RX ADMIN — DOCUSATE SODIUM SCH MG: 50 LIQUID ORAL at 21:13

## 2020-09-13 NOTE — INTERNAL MED PROGRESS NOTE
Subjective


Date of Service:  Sep 13, 2020


Physician Name


Wing Hebert


Attending Physician


Umer Wahl MD





Current Medications








 Medications


  (Trade)  Dose


 Ordered  Sig/Raji


 Route


 PRN Reason  Start Time


 Stop Time Status Last Admin


Dose Admin


 


 Acetaminophen


  (Tylenol)  650 mg  Q6H  PRN


 GT


 Mild Pain (Pain Scale 1-3)  9/8/20 06:00


 10/4/20 05:59   


 


 


 Ascorbic Acid


  (Vitamin C)  500 mg  DAILY


 GT


   9/8/20 09:00


 10/4/20 08:59  9/13/20 08:36


 


 


 Clonidine HCl


  (Catapres Tab)  0.1 mg  Q4H  PRN


 GT


 bp over 160 syst  9/8/20 07:45


 12/3/20 19:40   


 


 


 Dextrose  1,000 ml @ 


 75 mls/hr  [id number]


 IV


   9/8/20 06:00


 10/4/20 19:40  9/13/20 06:47


 


 


 Docusate Sodium


  (Colace)  100 mg  EVERY 8  HOURS


 GT


   9/8/20 06:00


 9/20/20 08:59  9/13/20 13:06


 


 


 Enoxaparin Sodium


  (Lovenox)  40 mg  DAILY


 SUBQ


   9/8/20 09:00


 11/19/20 08:59  9/13/20 08:39


 


 


 Epoetin Jaswant


  (Epoetin


 Jaswant-EPBX(NON


 ESRD))  8,000 unit  MON-WED-FRI


 SUBQ


   9/11/20 21:00


 12/10/20 20:59   


 


 


 Micafungin Sodium


 100 mg/Sodium


 Chloride  110 ml @ 


 110 mls/hr  Q24H


 IVPB


   9/11/20 21:00


 9/18/20 20:59  9/12/20 21:37


 


 


 Nystatin


  (Nystop Powder)  1 applic  THREE TIMES A  DAY


 TOPIC


   9/10/20 09:00


 12/8/20 18:14  9/13/20 12:15


 


 


 Pantoprazole


  (Protonix)  40 mg  EVERY 12  HOURS


 IVP


   9/8/20 09:00


 9/20/20 20:59  9/13/20 08:36


 


 


 Polyethylene


 Glycol


  (Miralax)  17 gm  BEDTIME


 GT


   9/8/20 21:00


 9/20/20 20:59  9/10/20 20:57


 








Allergies:  


Coded Allergies:  


     AMPICILLIN (Verified  Allergy, Unknown, hives, 8/21/20)


     PENICILLINS (Verified  Allergy, Unknown, hives, 8/21/20)


     TETRACYCLINES (Verified  Allergy, Unknown, hives, 8/21/20)


ROS Limited/Unobtainable:  Yes


Subjective


69 YO F with previous COVID 19 pos admitted with shortness of breath.  Now 

pneumonia and sepsis.  Cover for Int Med-DR Wahl.





Objective





Last Vital Signs








  Date Time  Temp Pulse Resp B/P (MAP) Pulse Ox O2 Delivery O2 Flow Rate FiO2


 


9/13/20 15:50 98.6 104 18 127/66 (86) 98   


 


9/13/20 08:10      Room Air  


 


9/11/20 19:08       3.0 32











Laboratory Tests








Test


  9/13/20


07:00


 


White Blood Count


  11.9 K/UL


(4.8-10.8)  H


 


Red Blood Count


  3.94 M/UL


(4.20-5.40)  L


 


Hemoglobin


  12.5 G/DL


(12.0-16.0)


 


Hematocrit


  40.9 %


(37.0-47.0)


 


Mean Corpuscular Volume


  104 FL (80-99)


H


 


Mean Corpuscular Hemoglobin


  31.8 PG


(27.0-31.0)  H


 


Mean Corpuscular Hemoglobin


Concent 30.7 G/DL


(32.0-36.0)  L


 


Red Cell Distribution Width


  19.4 %


(11.6-14.8)  H


 


Platelet Count


  165 K/UL


(150-450)


 


Mean Platelet Volume


  7.1 FL


(6.5-10.1)


 


Neutrophils (%) (Auto)


  67.0 %


(45.0-75.0)


 


Lymphocytes (%) (Auto)


  22.2 %


(20.0-45.0)


 


Monocytes (%) (Auto)


  8.3 %


(1.0-10.0)


 


Eosinophils (%) (Auto)


  1.1 %


(0.0-3.0)


 


Basophils (%) (Auto)


  1.3 %


(0.0-2.0)


 


Sodium Level


  140 MMOL/L


(136-145)


 


Potassium Level


  3.9 MMOL/L


(3.5-5.1)


 


Chloride Level


  103 MMOL/L


()


 


Carbon Dioxide Level


  28 MMOL/L


(21-32)


 


Anion Gap


  9 mmol/L


(5-15)


 


Blood Urea Nitrogen


  37 mg/dL


(7-18)  H


 


Creatinine


  1.6 MG/DL


(0.55-1.30)  H


 


Estimat Glomerular Filtration


Rate 31.7 mL/min


(>60)


 


Glucose Level


  119 MG/DL


()  H


 


Calcium Level


  9.8 MG/DL


(8.5-10.1)











Microbiology








 Date/Time


Source Procedure


Growth Status


 


 


 9/11/20 18:30


Blood Blood Culture - Preliminary


NO GROWTH AFTER 24 HOURS Resulted


 


 9/11/20 18:15


Blood Blood Culture - Preliminary


NO GROWTH AFTER 24 HOURS Resulted

















Intake and Output  


 


 9/12/20 9/13/20





 19:00 07:00


 


Intake Total 984 ml 1230 ml


 


Output Total  800 ml


 


Balance 984 ml 430 ml


 


  


 


Intake Free Water 200 ml 400 ml


 


IV Total 749 ml 410 ml


 


Tube Feeding 35 ml 420 ml


 


Output Urine Total  800 ml








Objective


General Appearance:  WD/WN, no apparent distress, lethargic


EENT:  PERRL/EOMI, normal ENT inspection


Neck:  non-tender, normal alignment, supple, normal inspection


Cardiovascular:  normal peripheral pulses, normal rate, regular rhythm, no 

gallop/murmur, no JVD


Respiratory/Chest:  Venturi mask; chest wall non-tender, respiratory distress, 

crackles/rales, rhonchi - bilaterally, expiratory wheezing


Abdomen:  normal bowel sounds, non tender, soft, no organomegaly, no mass


Extremities:  normal range of motion, non-tender


Neurologic:  CNs II-XII grossly normal, no motor/sensory deficits


Skin:  normal pigmentation, warm/dry





Assessment/Plan


Problem List:  


(1) UTI (urinary tract infection)


Assessment & Plan:  Vanco res enterococcus.  Cont zyvox per ID





(2) Sepsis


Assessment & Plan:  ID= Dr Brownlee.  Continue IV bactrim, micafungin and 

minocycline





(3) Hypernatremia


Assessment & Plan:  Nephrology=DR Dos Santos





(4) Severe dehydration


(5) ARF (acute renal failure)


Assessment & Plan:  Nephrology = Dr Dos Santos





(6) Pneumonia


Assessment & Plan:  Acenitobacter.  Cont minocycline, linezolid and IV bactrim 

per ID=Dr Brownlee; pulmonary = Dr Brambila





(7) Hyperkalemia











Wing Hebert MD Sep 13, 2020 16:04

## 2020-09-13 NOTE — PULMONOLOGY PROGRESS NOTE
Subjective


ROS Limited/Unobtainable:  No


Constitutional:  Reports: no symptoms


HEENT:  Repors: no symptoms


Respiratory:  Reports: no symptoms


Cardiovascular:  Reports: no symptoms


Allergies:  


Coded Allergies:  


     AMPICILLIN (Verified  Allergy, Unknown, hives, 8/21/20)


     PENICILLINS (Verified  Allergy, Unknown, hives, 8/21/20)


     TETRACYCLINES (Verified  Allergy, Unknown, hives, 8/21/20)





Objective





Last 24 Hour Vital Signs








  Date Time  Temp Pulse Resp B/P (MAP) Pulse Ox O2 Delivery O2 Flow Rate FiO2


 


9/13/20 15:50 98.6 104 18 127/66 (86) 98   


 


9/13/20 11:48 98.8 102 19 130/71 (90) 98   


 


9/13/20 08:10      Room Air  


 


9/13/20 08:00 98.7 100 18 122/68 (86) 98   


 


9/13/20 04:00 98.8 105 18 120/64 (82) 96   


 


9/13/20 00:00 97.9 103 17 134/79 (97) 94   


 


9/12/20 21:00      Room Air  


 


9/12/20 20:00 97.9 99 17 114/73 (87) 94   

















Intake and Output  


 


 9/12/20 9/13/20





 19:00 07:00


 


Intake Total 984 ml 1230 ml


 


Output Total  800 ml


 


Balance 984 ml 430 ml


 


  


 


Intake Free Water 200 ml 400 ml


 


IV Total 749 ml 410 ml


 


Tube Feeding 35 ml 420 ml


 


Output Urine Total  800 ml








General Appearance:  no acute distress, other - chronically ill looking 

bedridden female


HEENT:  normocephalic, atraumatic, other - VM


Respiratory:  chest wall non-tender, rhonchi - bilaterally - scattered


Cardiovascular:  normal peripheral pulses, normal rate - SR on tele


Abdomen:  normal bowel sounds, soft, non tender


Genitourinary:  normal external genitalia


Skin:  other - BUE crusting lesions


Neurologic:  CNs II-XII grossly normal, abnormal gait - bedridden


Lymphatic:  no neck adenopathy





Microbiology








 Date/Time


Source Procedure


Growth Status


 


 


 9/11/20 18:30


Blood Blood Culture - Preliminary


NO GROWTH AFTER 24 HOURS Resulted


 


 9/11/20 18:15


Blood Blood Culture - Preliminary


NO GROWTH AFTER 24 HOURS Resulted








Laboratory Tests


9/13/20 07:00: 


White Blood Count 11.9H, Red Blood Count 3.94L, Hemoglobin 12.5, Hematocrit 40.9

, Mean Corpuscular Volume 104H, Mean Corpuscular Hemoglobin 31.8H, Mean 

Corpuscular Hemoglobin Concent 30.7L, Red Cell Distribution Width 19.4H, 

Platelet Count 165, Mean Platelet Volume 7.1, Neutrophils (%) (Auto) 67.0, 

Lymphocytes (%) (Auto) 22.2, Monocytes (%) (Auto) 8.3, Eosinophils (%) (Auto) 

1.1, Basophils (%) (Auto) 1.3, Sodium Level 140, Potassium Level 3.9, Chloride 

Level 103, Carbon Dioxide Level 28, Anion Gap 9, Blood Urea Nitrogen 37H, 

Creatinine 1.6H, Estimat Glomerular Filtration Rate 31.7, Glucose Level 119H, 

Calcium Level 9.8





Current Medications








 Medications


  (Trade)  Dose


 Ordered  Sig/Raji


 Route


 PRN Reason  Start Time


 Stop Time Status Last Admin


Dose Admin


 


 Acetaminophen


  (Tylenol)  650 mg  Q6H  PRN


 GT


 Mild Pain (Pain Scale 1-3)  9/8/20 06:00


 10/4/20 05:59   


 


 


 Ascorbic Acid


  (Vitamin C)  500 mg  DAILY


 GT


   9/8/20 09:00


 10/4/20 08:59  9/13/20 08:36


 


 


 Clonidine HCl


  (Catapres Tab)  0.1 mg  Q4H  PRN


 GT


 bp over 160 syst  9/8/20 07:45


 12/3/20 19:40   


 


 


 Dextrose  1,000 ml @ 


 75 mls/hr  [id number]


 IV


   9/8/20 06:00


 10/4/20 19:40  9/13/20 19:21


 


 


 Docusate Sodium


  (Colace)  100 mg  EVERY 8  HOURS


 GT


   9/8/20 06:00


 9/20/20 08:59  9/13/20 13:06


 


 


 Enoxaparin Sodium


  (Lovenox)  40 mg  DAILY


 SUBQ


   9/8/20 09:00


 11/19/20 08:59  9/13/20 08:39


 


 


 Epoetin Jaswant


  (Epoetin


 Jaswant-EPBX(NON


 ESRD))  8,000 unit  MON-WED-FRI


 SUBQ


   9/11/20 21:00


 12/10/20 20:59   


 


 


 Micafungin Sodium


 100 mg/Sodium


 Chloride  110 ml @ 


 110 mls/hr  Q24H


 IVPB


   9/11/20 21:00


 9/18/20 20:59  9/12/20 21:37


 


 


 Nystatin


  (Nystop Powder)  1 applic  THREE TIMES A  DAY


 TOPIC


   9/10/20 09:00


 12/8/20 18:14  9/13/20 17:09


 


 


 Pantoprazole


  (Protonix)  40 mg  EVERY 12  HOURS


 IVP


   9/8/20 09:00


 9/20/20 20:59  9/13/20 08:36


 


 


 Polyethylene


 Glycol


  (Miralax)  17 gm  BEDTIME


 GT


   9/8/20 21:00


 9/20/20 20:59  9/10/20 20:57


 











Assessment/Plan


Problems:  


(1) 2019 novel coronavirus disease (COVID-19)


(2) Multiple drug resistant organism (MDRO) culture positive


(3) Sepsis


(4) ARF (acute renal failure)


(5) Severe dehydration


(6) Hypernatremia


(7) Psychosis


(8) Pacemaker


Assessment/Plan


all reviwed


WBC still high


CXR: Impression: No acute process


ESR and CRP





COVID positive





f/u electrolytes


renal studies


urine electrolytes


dvt prophylaxis











Live Brambila MD Sep 13, 2020 19:35

## 2020-09-13 NOTE — SURGERY PROGRESS NOTE
Surgery Progress Note


Subjective


Symptoms:  improved, tolerating diet, passing flatus, BM





Objective





Last 24 Hour Vital Signs








  Date Time  Temp Pulse Resp B/P (MAP) Pulse Ox O2 Delivery O2 Flow Rate FiO2


 


9/13/20 15:50 98.6 104 18 127/66 (86) 98   


 


9/13/20 11:48 98.8 102 19 130/71 (90) 98   


 


9/13/20 08:10      Room Air  


 


9/13/20 08:00 98.7 100 18 122/68 (86) 98   


 


9/13/20 04:00 98.8 105 18 120/64 (82) 96   


 


9/13/20 00:00 97.9 103 17 134/79 (97) 94   


 


9/12/20 21:00      Room Air  


 


9/12/20 20:00 97.9 99 17 114/73 (87) 94   








I&O











Intake and Output  


 


 9/12/20 9/13/20





 19:00 07:00


 


Intake Total 984 ml 1230 ml


 


Output Total  800 ml


 


Balance 984 ml 430 ml


 


  


 


Intake Free Water 200 ml 400 ml


 


IV Total 749 ml 410 ml


 


Tube Feeding 35 ml 420 ml


 


Output Urine Total  800 ml








Dressing:  saturated


Cardiovascular:  RSR


Respiratory:  decreased breath sounds


Abdomen:  soft, non-tender, present bowel sounds


Extremities:  no edema, no tenderness, no cyanosis





Laboratory Tests








Test


  9/13/20


07:00


 


White Blood Count


  11.9 K/UL


(4.8-10.8)  H


 


Red Blood Count


  3.94 M/UL


(4.20-5.40)  L


 


Hemoglobin


  12.5 G/DL


(12.0-16.0)


 


Hematocrit


  40.9 %


(37.0-47.0)


 


Mean Corpuscular Volume


  104 FL (80-99)


H


 


Mean Corpuscular Hemoglobin


  31.8 PG


(27.0-31.0)  H


 


Mean Corpuscular Hemoglobin


Concent 30.7 G/DL


(32.0-36.0)  L


 


Red Cell Distribution Width


  19.4 %


(11.6-14.8)  H


 


Platelet Count


  165 K/UL


(150-450)


 


Mean Platelet Volume


  7.1 FL


(6.5-10.1)


 


Neutrophils (%) (Auto)


  67.0 %


(45.0-75.0)


 


Lymphocytes (%) (Auto)


  22.2 %


(20.0-45.0)


 


Monocytes (%) (Auto)


  8.3 %


(1.0-10.0)


 


Eosinophils (%) (Auto)


  1.1 %


(0.0-3.0)


 


Basophils (%) (Auto)


  1.3 %


(0.0-2.0)


 


Sodium Level


  140 MMOL/L


(136-145)


 


Potassium Level


  3.9 MMOL/L


(3.5-5.1)


 


Chloride Level


  103 MMOL/L


()


 


Carbon Dioxide Level


  28 MMOL/L


(21-32)


 


Anion Gap


  9 mmol/L


(5-15)


 


Blood Urea Nitrogen


  37 mg/dL


(7-18)  H


 


Creatinine


  1.6 MG/DL


(0.55-1.30)  H


 


Estimat Glomerular Filtration


Rate 31.7 mL/min


(>60)


 


Glucose Level


  119 MG/DL


()  H


 


Calcium Level


  9.8 MG/DL


(8.5-10.1)











Plan


Problems:  


(1) Anemia


(2) 2019 novel coronavirus disease (COVID-19)


(3) Pneumonia


(4) Multiple drug resistant organism (MDRO) culture positive


(5) Hyperkalemia


(6) Hypernatremia


(7) Severe dehydration


(8) Acute urinary retention


(9) Sepsis


Assessment & Plan:  Pt presented on admission with multiple Pressure injuries. 

Pt noted to have band of  Blotchy red rash  with open and closed Blisters 

affecting  R Brachial to R forearm. Lateral R back /R flank is also blotchy 

erythematous with clusters of serous filled blisters. On Lateral L Back to L 

flank is Blotchy, erythematous with clusters of serous filled blisters.  


Reabsorbing DTPI that is partially opened Sacrum(L) 6cm x (W)5.5cm.  Base of 

wound is Maroon with with scattered areas that are maryellen and loose peeling soft 

black cap.Surrounding Non-Blanching erythema without induration.


Non-Blanching erythema that is indurated with delineated margins R trochanter(L)

5.5cm x (W)6.5cm.


No evidence of skin breakdown L trochanter.


Non-Blanching erythema noted to R and L ischial tuberosities.


DTPI medial R heel (L)3.3cm x (W)3.4cm. Base of injury is, fluctuant, maroon 

with delineated margins. surrounding red,  boggy heel.


DTPI Lateral R Heel(L)1.5cm x (W)2.5cm. Base of injury is indurated, maroon 

with purpuric center.


Non-Blanching erythema without induration /fluctuance lateral R malleolus(L)2cm 

x (W)1.5cm.





Tx.Plan:


Apply Moisture Barrier Paste to Sacrum. Cover with Optifoam drsg. Change every 

3 days and prn.


           


Apply Cavilon Skin Barrier to R and L trochanter. Cover each site with Optifoam 

drsgs. Change every 7 days and prn.


           


Apply Moisture Barrier Paste to R and L Ischial tuberosities with each 

Incontinence care.


           


Apply Cavilon Skin Barrier to Medial and Lateral R Heel. Cover with Optifoam 

drsg. Change every7 days and prn.


           


Apply Cavilon Skin Barrier to R lateral ,and medial Malleoli. Cover each site 

with Optifoam drsgs. Change every 7 days and prn.


           


Apply Cavilon Skin Barrier to L Heel and Malleoli. Cover each site with 

Optifoam drsgs. Change every 7 days and prn.


           


Cover open blisters as needed with Optifoam drsgs. 


           


Reposition at least every 2 hours or as tolerated.


           


Off-load heels with pillow.


           


Nutritional optimization





DAILY ESTIMATED NEEDS:


Needs based on Sepsis, renal, wound 50.9kg  


25-35  kcals/kg 


0208-5638  total kcals


1.25-1.5  g protein/kg


64-76  g total protein 


25-30  mL/kg


7525-7365  total fluid mLs





NUTRITION DIAGNOSIS:


1. Swallowing difficulty r/t dysphagia as evidenced by pt is GT dep.


2. Altered nutrition related lab values r/t sepsis and severe dehydration,


pre-diabetes as evidenced by Elev WBC (31.6 ->18.2), elev Na (179-> WNL),


elev BUN (131->48), elev Creat (5.1->2.2), elev K (5.5-> wnl->5.5, 5.3),


elev BGs (93, 113, 185), A1C of 6.4.





CURRENT TF: Nepro @35  








ENTERAL NUTRITION RECOMMENDATIONS:


LOWER GOAL RATE -> NEPRO @ 35ML/HR X 24 HRS  to provide 840ml, 1512kcal, 68g 

prot, 610ml free water 





- LOWER GOAL RATE: meets 100% est kcal/prot needs


- Flush per MD, HOB over 30 degrees.








ADDITIONAL RECOMMENDATIONS:


1) Monitor lytes and renal status-> TF change to Nepro (8/28), elev K 


2) Per SNF wt of 112# 


3) NISS for BG control: A1C 6.4, TF at goal + added D5+ Decadron 


4) Wound healing: TF @ goal provides 100% RDI 


    add Vit C 500mg QD + José Manuel BID via GT  





micro reviewed


covid negative


c diff negative


bc ngtd 


wounds do not seem to be etiology of infection 





(10) UTI (urinary tract infection)


(11) ARF (acute renal failure)


(12) Psychosis











Alex Nicholas Sep 13, 2020 16:51

## 2020-09-13 NOTE — NEPHROLOGY PROGRESS NOTE
Assessment/Plan


Problem List:  


(1) ARF (acute renal failure)


Assessment:  Underlying CKD





(2) Sepsis


(3) UTI (urinary tract infection)


(4) Severe dehydration


(5) Hypernatremia


(6) Acute urinary retention


(7) Anemia


Assessment





Acute renal failure


Possible underlying chronic renal insufficiency


Severe dehydration


Hypotension


Sepsis, UTI


History of psychiatric disease


Elevated troponin I


Patient full code


Plan


September 13: Labs reviewed. Serum creatinine lower at 1.6.  Continue with same 

management.  


September 12: Status quo.  Lab reviewed.  Serum creatinine lower at 1.8.  

Patient remains full code.  Continue per consultants.


September 11: Lab reviewed.  Serum creatinine remains at 2 unchanged.  

Medication list reviewed.  Continue current management.


September 10: Labs reviewed.  Medication list reviewed.  Serum creatinine 

slightly higher at 2.  We will continue to monitor renal parameters and ID to 

avoid nephrotoxic antibiotics as possible.


September 9: Lab reviewed.  Serum creatinine stable at 1.9.  Continue per 

consultants.


September 8: Labs reviewed.  Serum creatinine stable.  Electrolytes within 

reasonable range.


September 7: Labs reviewed.  Remains stable from renal standpoint of view, as 

serum creatinine leveled off around 1.8 and 1.9.  Will continue to monitor 

renal parameters


September 6: No chemistry panel done today.  Status quo.  Continue per 

consultants.


September 5: Status quo.  Labs reviewed.  Stable from renal standpoint of view.


September 4: Status quo.  Labs reviewed.  Serum creatinine unchanged.  Continue 

per consultants.


September 3:Late note entry due to system problem at the [facility] today. Labs 

reviewed.  Electrolytes within normal limit.  Leukocytosis worsened.  Continue 

per consultants.  Serum creatinine 1.8 unchanged.


September 2: Low magnesium and low phosphorus corrected.  Continue per 

consultants.  On nasal cannula now.  Serum creatinine 1.8.


September 1: Serum creatinine 1.8 stable.  Continue per pulmonary.  Continue to 

monitor renal parameters.


August 31: On Venturi mask.  Serum creatinine lowered to 1.8.  Continue per 

consultants.  Medication list reviewed.  Potassium supplement given.


August 30: Status quo.  Creatinine lower at 2.  Other electrolytes within 

normal limits.  Continue per consultants.


August 29: Labs reviewed.  Creatinine 2.2 stable.  Potassium 3.1.  20 M EQ KCl 

given.  Continue her consultants.


August 28: Serum potassium 5.5.Creatinine 2.2.  1 dose Kayexalate given.  

Remains of 75 cc IV fluid.  We will continue to monitor renal parameters.


August 27: Serum potassium 5.3.  Creatinine higher at 2.2.  Medication list 

reviewed.  Suggest to avoid nephrotoxic's like Bactrim if possible.  Continue 

to monitor renal parameters and electrolytes.  Continue per pulmonary to adjust 

pulmonary status.  May require BiPAP.


August 26: Serum sodium lower.  Renal parameters appear more stable.  

Nevertheless leukocytosis is worsened.  Continue per consultants.


August 25: Serum sodium higher.  Continue D5W 75 cc an hour.  Continue to 

monitor electrolytes.  Continue per consultants.  Serum creatinine down to 2.4 

from 5.1 on admission.  Leukocytosis persists.


August 24: Patient now on isolation for COVID-19.  Serum creatinine is 

plateauing.  Will decrease the IV fluid to 75 cc an hour.  Continue management 

per ID.  Will monitor electrolytes and renal parameters.


August 23: Discussed with MARTIN Carter.  Patient needs a Mitchell catheter.  Accurate 

intake and output.  Decrease  cc D5W an hour.  1 dose of IV Venofer and 

then subcu Epogen ordered for anemia.  Continue to monitor renal parameters


August 22: Renal parameters improving.  Continue D5W IV fluid.  Potassium 

supplement IV given.  Continue per consultants.





Previously:


Fluid challenge


Monitor renal parameters and electrolytes


Monitor intake and output


Mitchell catheter


Antibiotics


Avoid nephrotoxic's


Chest x-ray





Kidney ultrasound findings: 


Right kidney measures 8.9 cm in length. Left kidney measures 9 cm in


length. Both kidneys demonstrate markedly increased echogenicity. No 

hydronephrosis. 


There are bilateral renal cysts. Normal inferior vena cava. Bladder is empty,


contains a Mitchell catheter. 


 


Incidentally noted are gallstones.


 


Impression: Markedly echogenic kidneys, consistent with medical renal disease


Negative for hydronephrosis


Empty bladder with a Mitchell catheter


Incidental finding of cholelithiasis.





Subjective


ROS Limited/Unobtainable:  Yes





Objective


Objective





Last 24 Hour Vital Signs








  Date Time  Temp Pulse Resp B/P (MAP) Pulse Ox O2 Delivery O2 Flow Rate FiO2


 


9/13/20 11:48 98.8 102 19 130/71 (90) 98   


 


9/13/20 08:10      Room Air  


 


9/13/20 08:00 98.7 100 18 122/68 (86) 98   


 


9/13/20 04:00 98.8 105 18 120/64 (82) 96   


 


9/13/20 00:00 97.9 103 17 134/79 (97) 94   


 


9/12/20 21:00      Room Air  


 


9/12/20 20:00 97.9 99 17 114/73 (87) 94   


 


9/12/20 16:00 98.0 96 20 128/69 (88) 99   

















Intake and Output  


 


 9/12/20 9/13/20





 19:00 07:00


 


Intake Total 984 ml 1230 ml


 


Output Total  800 ml


 


Balance 984 ml 430 ml


 


  


 


Intake Free Water 200 ml 400 ml


 


IV Total 749 ml 410 ml


 


Tube Feeding 35 ml 420 ml


 


Output Urine Total  800 ml








Laboratory Tests


9/13/20 07:00: 


White Blood Count 11.9H, Red Blood Count 3.94L, Hemoglobin 12.5, Hematocrit 40.9

, Mean Corpuscular Volume 104H, Mean Corpuscular Hemoglobin 31.8H, Mean 

Corpuscular Hemoglobin Concent 30.7L, Red Cell Distribution Width 19.4H, 

Platelet Count 165, Mean Platelet Volume 7.1, Neutrophils (%) (Auto) 67.0, 

Lymphocytes (%) (Auto) 22.2, Monocytes (%) (Auto) 8.3, Eosinophils (%) (Auto) 

1.1, Basophils (%) (Auto) 1.3, Sodium Level 140, Potassium Level 3.9, Chloride 

Level 103, Carbon Dioxide Level 28, Anion Gap 9, Blood Urea Nitrogen 37H, 

Creatinine 1.6H, Estimat Glomerular Filtration Rate 31.7, Glucose Level 119H, 

Calcium Level 9.8


Height (Feet):  5


Height (Inches):  2.00


Weight (Pounds):  123


General Appearance:  no apparent distress


Cardiovascular:  tachycardia


Respiratory/Chest:  decreased breath sounds


Abdomen:  distended


Objective


No change











Derik Dos Santos MD Sep 13, 2020 14:31

## 2020-09-14 VITALS — SYSTOLIC BLOOD PRESSURE: 122 MMHG | DIASTOLIC BLOOD PRESSURE: 74 MMHG

## 2020-09-14 VITALS — SYSTOLIC BLOOD PRESSURE: 121 MMHG | DIASTOLIC BLOOD PRESSURE: 79 MMHG

## 2020-09-14 VITALS — DIASTOLIC BLOOD PRESSURE: 76 MMHG | SYSTOLIC BLOOD PRESSURE: 127 MMHG

## 2020-09-14 VITALS — SYSTOLIC BLOOD PRESSURE: 113 MMHG | DIASTOLIC BLOOD PRESSURE: 57 MMHG

## 2020-09-14 VITALS — DIASTOLIC BLOOD PRESSURE: 72 MMHG | SYSTOLIC BLOOD PRESSURE: 120 MMHG

## 2020-09-14 VITALS — DIASTOLIC BLOOD PRESSURE: 70 MMHG | SYSTOLIC BLOOD PRESSURE: 117 MMHG

## 2020-09-14 LAB
ADD MANUAL DIFF: NO
ALBUMIN SERPL-MCNC: 2.7 G/DL (ref 3.4–5)
ALBUMIN/GLOB SERPL: 0.7 {RATIO} (ref 1–2.7)
ALP SERPL-CCNC: 127 U/L (ref 46–116)
ALT SERPL-CCNC: 22 U/L (ref 12–78)
ANION GAP SERPL CALC-SCNC: 9 MMOL/L (ref 5–15)
AST SERPL-CCNC: 23 U/L (ref 15–37)
BASOPHILS NFR BLD AUTO: 2.7 % (ref 0–2)
BILIRUB SERPL-MCNC: 0.4 MG/DL (ref 0.2–1)
BUN SERPL-MCNC: 36 MG/DL (ref 7–18)
CALCIUM SERPL-MCNC: 8.3 MG/DL (ref 8.5–10.1)
CHLORIDE SERPL-SCNC: 107 MMOL/L (ref 98–107)
CO2 SERPL-SCNC: 29 MMOL/L (ref 21–32)
CREAT SERPL-MCNC: 1.6 MG/DL (ref 0.55–1.3)
EOSINOPHIL NFR BLD AUTO: 1 % (ref 0–3)
ERYTHROCYTE [DISTWIDTH] IN BLOOD BY AUTOMATED COUNT: 18.9 % (ref 11.6–14.8)
GLOBULIN SER-MCNC: 4.1 G/DL
HCT VFR BLD CALC: 40 % (ref 37–47)
HGB BLD-MCNC: 12.8 G/DL (ref 12–16)
LYMPHOCYTES NFR BLD AUTO: 23.2 % (ref 20–45)
MCV RBC AUTO: 103 FL (ref 80–99)
MONOCYTES NFR BLD AUTO: 7.9 % (ref 1–10)
NEUTROPHILS NFR BLD AUTO: 65.2 % (ref 45–75)
PHOSPHATE SERPL-MCNC: 3.6 MG/DL (ref 2.5–4.9)
PLATELET # BLD: 166 K/UL (ref 150–450)
POTASSIUM SERPL-SCNC: 4.2 MMOL/L (ref 3.5–5.1)
RBC # BLD AUTO: 3.9 M/UL (ref 4.2–5.4)
SODIUM SERPL-SCNC: 145 MMOL/L (ref 136–145)
WBC # BLD AUTO: 14.1 K/UL (ref 4.8–10.8)

## 2020-09-14 RX ADMIN — NYSTATIN SCH APPLIC: 100000 POWDER TOPICAL at 18:22

## 2020-09-14 RX ADMIN — DOCUSATE SODIUM SCH MG: 50 LIQUID ORAL at 20:29

## 2020-09-14 RX ADMIN — Medication SCH MG: at 09:24

## 2020-09-14 RX ADMIN — ENOXAPARIN SODIUM SCH MG: 40 INJECTION SUBCUTANEOUS at 09:28

## 2020-09-14 RX ADMIN — NYSTATIN SCH APPLIC: 100000 POWDER TOPICAL at 09:37

## 2020-09-14 RX ADMIN — DOCUSATE SODIUM SCH MG: 50 LIQUID ORAL at 05:38

## 2020-09-14 RX ADMIN — PANTOPRAZOLE SODIUM SCH MG: 40 INJECTION, POWDER, FOR SOLUTION INTRAVENOUS at 20:29

## 2020-09-14 RX ADMIN — DOCUSATE SODIUM SCH MG: 50 LIQUID ORAL at 14:33

## 2020-09-14 RX ADMIN — NYSTATIN SCH APPLIC: 100000 POWDER TOPICAL at 13:20

## 2020-09-14 RX ADMIN — PANTOPRAZOLE SODIUM SCH MG: 40 INJECTION, POWDER, FOR SOLUTION INTRAVENOUS at 09:24

## 2020-09-14 RX ADMIN — POLYETHYLENE GLYCOL 3350 SCH GM: 17 POWDER, FOR SOLUTION ORAL at 20:29

## 2020-09-14 NOTE — SURGERY PROGRESS NOTE
Surgery Progress Note


Subjective


Additional Comments


bao cute events


comfortable


stable


states feels well today 


tolerating tf





Objective





Last 24 Hour Vital Signs








  Date Time  Temp Pulse Resp B/P (MAP) Pulse Ox O2 Delivery O2 Flow Rate FiO2


 


9/14/20 12:00 98.9 97 18 127/76 (93) 98   


 


9/14/20 09:00      Room Air  


 


9/14/20 08:00 99.1 107 20 120/72 (88) 96   


 


9/14/20 07:00     96 Nasal Cannula 2.0 28


 


9/14/20 04:00 98.0 111 21 122/74 (90) 95   


 


9/14/20 00:00 98.6 110 21 117/70 (86) 95   


 


9/13/20 20:00 98.2 110 22 129/69 (89) 94   


 


9/13/20 19:52     98 Nasal Cannula 3.0 32








I&O











Intake and Output  


 


 9/13/20 9/14/20





 19:00 07:00


 


Intake Total 1820 ml 


 


Output Total  950 ml


 


Balance 1820 ml -950 ml


 


  


 


Intake Free Water 500 ml 


 


IV Total 900 ml 


 


Tube Feeding 420 ml 


 


Output Urine Total  950 ml


 


# Voids  1


 


# Bowel Movements  1








Dressing:  other


Wound:  other


Cardiovascular:  RSR


Respiratory:  decreased breath sounds


Abdomen:  soft, non-tender, present bowel sounds


Extremities:  no edema, no tenderness, no cyanosis





Laboratory Tests








Test


  9/14/20


12:10


 


White Blood Count


  14.1 K/UL


(4.8-10.8)  H


 


Red Blood Count


  3.90 M/UL


(4.20-5.40)  L


 


Hemoglobin


  12.8 G/DL


(12.0-16.0)


 


Hematocrit


  40.0 %


(37.0-47.0)


 


Mean Corpuscular Volume


  103 FL (80-99)


H


 


Mean Corpuscular Hemoglobin


  32.8 PG


(27.0-31.0)  H


 


Mean Corpuscular Hemoglobin


Concent 32.0 G/DL


(32.0-36.0)


 


Red Cell Distribution Width


  18.9 %


(11.6-14.8)  H


 


Platelet Count


  166 K/UL


(150-450)


 


Mean Platelet Volume


  6.7 FL


(6.5-10.1)


 


Neutrophils (%) (Auto)


  65.2 %


(45.0-75.0)


 


Lymphocytes (%) (Auto)


  23.2 %


(20.0-45.0)


 


Monocytes (%) (Auto)


  7.9 %


(1.0-10.0)


 


Eosinophils (%) (Auto)


  1.0 %


(0.0-3.0)


 


Basophils (%) (Auto)


  2.7 %


(0.0-2.0)  H


 


Erythrocyte Sedimentation Rate


  65 MM/HR


(0-30)  H


 


Sodium Level


  145 MMOL/L


(136-145)


 


Potassium Level


  4.2 MMOL/L


(3.5-5.1)


 


Chloride Level


  107 MMOL/L


()


 


Carbon Dioxide Level


  29 MMOL/L


(21-32)


 


Anion Gap


  9 mmol/L


(5-15)


 


Blood Urea Nitrogen


  36 mg/dL


(7-18)  H


 


Creatinine


  1.6 MG/DL


(0.55-1.30)  H


 


Estimat Glomerular Filtration


Rate 31.7 mL/min


(>60)


 


Glucose Level


  119 MG/DL


()  H


 


Calcium Level


  8.3 MG/DL


(8.5-10.1)  L


 


Phosphorus Level


  3.6 MG/DL


(2.5-4.9)


 


Magnesium Level


  2.0 MG/DL


(1.8-2.4)


 


Total Bilirubin


  0.4 MG/DL


(0.2-1.0)


 


Aspartate Amino Transf


(AST/SGOT) 23 U/L (15-37)


 


 


Alanine Aminotransferase


(ALT/SGPT) 22 U/L (12-78)


 


 


Alkaline Phosphatase


  127 U/L


()  H


 


C-Reactive Protein,


Quantitative 1.2 mg/dL


(0.00-0.90)  H


 


Total Protein


  6.8 G/DL


(6.4-8.2)


 


Albumin


  2.7 G/DL


(3.4-5.0)  L


 


Globulin 4.1 g/dL  


 


Albumin/Globulin Ratio


  0.7 (1.0-2.7)


L











Plan


Problems:  


(1) Anemia


(2) 2019 novel coronavirus disease (COVID-19)


(3) Pneumonia


(4) Multiple drug resistant organism (MDRO) culture positive


(5) Hyperkalemia


(6) Hypernatremia


(7) Severe dehydration


(8) Acute urinary retention


(9) Sepsis


Assessment & Plan:  Pt presented on admission with multiple Pressure injuries. 

Pt noted to have band of  Blotchy red rash  with open and closed Blisters 

affecting  R Brachial to R forearm. Lateral R back /R flank is also blotchy 

erythematous with clusters of serous filled blisters. On Lateral L Back to L 

flank is Blotchy, erythematous with clusters of serous filled blisters.  


Reabsorbing DTPI that is partially opened Sacrum(L) 6cm x (W)5.5cm.  Base of 

wound is Maroon with with scattered areas that are maryellen and loose peeling soft 

black cap.Surrounding Non-Blanching erythema without induration.


Non-Blanching erythema that is indurated with delineated margins R trochanter(L)

5.5cm x (W)6.5cm.


No evidence of skin breakdown L trochanter.


Non-Blanching erythema noted to R and L ischial tuberosities.


DTPI medial R heel (L)3.3cm x (W)3.4cm. Base of injury is, fluctuant, maroon 

with delineated margins. surrounding red,  boggy heel.


DTPI Lateral R Heel(L)1.5cm x (W)2.5cm. Base of injury is indurated, maroon 

with purpuric center.


Non-Blanching erythema without induration /fluctuance lateral R malleolus(L)2cm 

x (W)1.5cm.





Tx.Plan:


Apply Moisture Barrier Paste to Sacrum. Cover with Optifoam drsg. Change every 

3 days and prn.


           


Apply Cavilon Skin Barrier to R and L trochanter. Cover each site with Optifoam 

drsgs. Change every 7 days and prn.


           


Apply Moisture Barrier Paste to R and L Ischial tuberosities with each 

Incontinence care.


           


Apply Cavilon Skin Barrier to Medial and Lateral R Heel. Cover with Optifoam 

drsg. Change every7 days and prn.


           


Apply Cavilon Skin Barrier to R lateral ,and medial Malleoli. Cover each site 

with Optifoam drsgs. Change every 7 days and prn.


           


Apply Cavilon Skin Barrier to L Heel and Malleoli. Cover each site with 

Optifoam drsgs. Change every 7 days and prn.


           


Cover open blisters as needed with Optifoam drsgs. 


           


Reposition at least every 2 hours or as tolerated.


           


Off-load heels with pillow.


           


Nutritional optimization





DAILY ESTIMATED NEEDS:


Needs based on Sepsis, renal, wound 50.9kg  


25-35  kcals/kg 


9794-9957  total kcals


1.25-1.5  g protein/kg


64-76  g total protein 


25-30  mL/kg


8000-2553  total fluid mLs





NUTRITION DIAGNOSIS:


1. Swallowing difficulty r/t dysphagia as evidenced by pt is GT dep.


2. Altered nutrition related lab values r/t sepsis and severe dehydration,


pre-diabetes as evidenced by Elev WBC (31.6 ->18.2), elev Na (179-> WNL),


elev BUN (131->48), elev Creat (5.1->2.2), elev K (5.5-> wnl->5.5, 5.3),


elev BGs (93, 113, 185), A1C of 6.4.





CURRENT TF: Nepro @35  








ENTERAL NUTRITION RECOMMENDATIONS:


LOWER GOAL RATE -> NEPRO @ 35ML/HR X 24 HRS  to provide 840ml, 1512kcal, 68g 

prot, 610ml free water 





- LOWER GOAL RATE: meets 100% est kcal/prot needs


- Flush per MD, HOB over 30 degrees.








ADDITIONAL RECOMMENDATIONS:


1) Monitor lytes and renal status-> TF change to Nepro (8/28), elev K 


2) Per [facility] wt of 112# 


3) NISS for BG control: A1C 6.4, TF at goal + added D5+ Decadron 


4) Wound healing: TF @ goal provides 100% RDI 


    add Vit C 500mg QD + José Manuel BID via GT  





micro reviewed


covid negative


c diff negative


bc ngtd 


wounds do not seem to be etiology of infection 





(10) UTI (urinary tract infection)


(11) ARF (acute renal failure)


(12) Psychosis











Alex Nicholas Sep 14, 2020 16:46

## 2020-09-14 NOTE — INFECTIOUS DISEASES PROG NOTE
Assessment/Plan


69yo F with:





Sepsis


Fever; SP


Leukocytosis; worsening (sp steroids)- Recurrent; worsening


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


      -9/11 BCx 1/4 yeast


      -9/9 2d echo: no discrete vegetations


     -9/6 Bcx Neg


    -9/3 u/a neg


          Bcx 1/4 C. albicas





JASWINDER to 5.1, improving 


Hypoxic resp failure, sp NRB mask >VM> 4l NC 9/1> RA


VRE UTI


Hx of COVID-19 1 mo ago ( doubt recurrent COVID-19) 


HAP


          9/13 sp cx p


          9/11 CXR:   There are increased lung markings in the left lower lobe 

suspicious for early developing infiltrate.  Progress films are recommended.


           9/9 CXR: No acute process


            9/3 sp cx yeast


            9/1 rapid COVID PCR neg; 9/3 rapid COVID PCR neg


   8/29 CXR:   Improving left lower lobe infiltrate and left pleural effusion. 

There is a probable small right pleural effusion.


   8/27 cdiff neg


           8/26 CXR: There are increased interstitial congestion and left 

pleural effusion,since prior exam of 8/22/2020


               Bcx NTD


            8/22 sp cx MDR ABC (S bactrim; I Colistin, Polymixin B, Minocycline)


   8/21 BCx NTD


   8/21 UA+, UCx >100k VRE (S Zyvox)


   8/21 CXR: Left retrocardiac mild atelectasis versus infiltrate.


   8/21 COVID rapid neg; 8/22 repeat COVID-19 +








BL arm rash, appears evolving, now small pustules with dry/crusting, querry 

resolving Shingles? Though odd to have on both arms


   8/21 HSV & VZV PCR ordered





Plan:





Switch Micafungin #9 to IV Amphotericin B for recurrent fungemia


       --requested micro lab to ID and sensitivities for yeast on 9/11 Bcx


       --repeat BCx x2


       --DOROTHEA to eval for endocarditis


       --monitor Cr, K, Mg





       -9/10 SP Minocycline #17


       -9/9 Sp IV bactrim #16


       -8/29 SP ZYvox #7


       --8/24 SP Cefepime #4


       --8/23 SP IV Vancomcyin #4





F/u cx


Monitor CBC/BMP


Trend BUE rash


Dc covid isolation


f/u sp cx





Discussed with RN





Subjective


Allergies:  


Coded Allergies:  


     AMPICILLIN (Verified  Allergy, Unknown, hives, 8/21/20)


     PENICILLINS (Verified  Allergy, Unknown, hives, 8/21/20)


     TETRACYCLINES (Verified  Allergy, Unknown, hives, 8/21/20)


afebrile


wbc increased


recurrent fungemia


at RA





Objective





Last 24 Hour Vital Signs








  Date Time  Temp Pulse Resp B/P (MAP) Pulse Ox O2 Delivery O2 Flow Rate FiO2


 


9/14/20 12:00 98.9 97 18 127/76 (93) 98   


 


9/14/20 09:00      Room Air  


 


9/14/20 08:00 99.1 107 20 120/72 (88) 96   


 


9/14/20 07:00     96 Nasal Cannula 2.0 28


 


9/14/20 04:00 98.0 111 21 122/74 (90) 95   


 


9/14/20 00:00 98.6 110 21 117/70 (86) 95   


 


9/13/20 20:00 98.2 110 22 129/69 (89) 94   


 


9/13/20 19:52     98 Nasal Cannula 3.0 32


 


9/13/20 15:50 98.6 104 18 127/66 (86) 98   








Height (Feet):  5


Height (Inches):  2.00


Weight (Pounds):  123


General Appearance:  no acute distress, other - chronically ill looking 

bedridden female


HEENT:  normocephalic, atraumatic, other - VM


Respiratory:  chest wall non-tender, rhonchi - bilaterally - scattered


Cardiovascular:  normal peripheral pulses, normal rate


Abdomen:  normal bowel sounds, soft, non tender





Microbiology








 Date/Time


Source Procedure


Growth Status


 


 


 9/11/20 18:30


Blood Blood Culture - Preliminary


NO GROWTH AFTER 48 HOURS Resulted


 


 9/11/20 18:15


Blood Blood Culture - Preliminary Resulted





 9/13/20 05:20


Sputum Gram Stain - Final Resulted


 


 9/13/20 05:20


Sputum Sputum Culture


Pending Resulted











Laboratory Tests








Test


  9/14/20


12:10


 


White Blood Count


  14.1 K/UL


(4.8-10.8)  H


 


Red Blood Count


  3.90 M/UL


(4.20-5.40)  L


 


Hemoglobin


  12.8 G/DL


(12.0-16.0)


 


Hematocrit


  40.0 %


(37.0-47.0)


 


Mean Corpuscular Volume


  103 FL (80-99)


H


 


Mean Corpuscular Hemoglobin


  32.8 PG


(27.0-31.0)  H


 


Mean Corpuscular Hemoglobin


Concent 32.0 G/DL


(32.0-36.0)


 


Red Cell Distribution Width


  18.9 %


(11.6-14.8)  H


 


Platelet Count


  166 K/UL


(150-450)


 


Mean Platelet Volume


  6.7 FL


(6.5-10.1)


 


Neutrophils (%) (Auto)


  65.2 %


(45.0-75.0)


 


Lymphocytes (%) (Auto)


  23.2 %


(20.0-45.0)


 


Monocytes (%) (Auto)


  7.9 %


(1.0-10.0)


 


Eosinophils (%) (Auto)


  1.0 %


(0.0-3.0)


 


Basophils (%) (Auto)


  2.7 %


(0.0-2.0)  H


 


Erythrocyte Sedimentation Rate


  65 MM/HR


(0-30)  H


 


Sodium Level


  145 MMOL/L


(136-145)


 


Potassium Level


  4.2 MMOL/L


(3.5-5.1)


 


Chloride Level


  107 MMOL/L


()


 


Carbon Dioxide Level


  29 MMOL/L


(21-32)


 


Anion Gap


  9 mmol/L


(5-15)


 


Blood Urea Nitrogen


  36 mg/dL


(7-18)  H


 


Creatinine


  1.6 MG/DL


(0.55-1.30)  H


 


Estimat Glomerular Filtration


Rate 31.7 mL/min


(>60)


 


Glucose Level


  119 MG/DL


()  H


 


Calcium Level


  8.3 MG/DL


(8.5-10.1)  L


 


Phosphorus Level


  3.6 MG/DL


(2.5-4.9)


 


Magnesium Level


  2.0 MG/DL


(1.8-2.4)


 


Total Bilirubin


  0.4 MG/DL


(0.2-1.0)


 


Aspartate Amino Transf


(AST/SGOT) 23 U/L (15-37)


 


 


Alanine Aminotransferase


(ALT/SGPT) 22 U/L (12-78)


 


 


Alkaline Phosphatase


  127 U/L


()  H


 


C-Reactive Protein,


Quantitative 1.2 mg/dL


(0.00-0.90)  H


 


Total Protein


  6.8 G/DL


(6.4-8.2)


 


Albumin


  2.7 G/DL


(3.4-5.0)  L


 


Globulin 4.1 g/dL  


 


Albumin/Globulin Ratio


  0.7 (1.0-2.7)


L











Current Medications








 Medications


  (Trade)  Dose


 Ordered  Sig/Raji


 Route


 PRN Reason  Start Time


 Stop Time Status Last Admin


Dose Admin


 


 Acetaminophen


  (Tylenol)  650 mg  Q6H  PRN


 GT


 Mild Pain (Pain Scale 1-3)  9/8/20 06:00


 10/4/20 05:59   


 


 


 Ascorbic Acid


  (Vitamin C)  500 mg  DAILY


 GT


   9/8/20 09:00


 10/4/20 08:59  9/14/20 09:24


 


 


 Clonidine HCl


  (Catapres Tab)  0.1 mg  Q4H  PRN


 GT


 bp over 160 syst  9/8/20 07:45


 12/3/20 19:40   


 


 


 Dextrose  1,000 ml @ 


 75 mls/hr  [id number]


 IV


   9/8/20 06:00


 10/4/20 19:40  9/14/20 09:36


 


 


 Docusate Sodium


  (Colace)  100 mg  EVERY 8  HOURS


 GT


   9/8/20 06:00


 9/20/20 08:59  9/14/20 14:33


 


 


 Enoxaparin Sodium


  (Lovenox)  40 mg  DAILY


 SUBQ


   9/8/20 09:00


 11/19/20 08:59  9/14/20 09:28


 


 


 Epoetin Jaswant


  (Epoetin


 Jaswant-EPBX(NON


 ESRD))  8,000 unit  MON-WED-FRI


 SUBQ


   9/11/20 21:00


 12/10/20 20:59   


 


 


 Micafungin Sodium


 100 mg/Sodium


 Chloride  110 ml @ 


 110 mls/hr  Q24H


 IVPB


   9/11/20 21:00


 9/18/20 20:59  9/13/20 21:13


 


 


 Nystatin


  (Nystop Powder)  1 applic  THREE TIMES A  DAY


 TOPIC


   9/10/20 09:00


 12/8/20 18:14  9/14/20 13:20


 


 


 Pantoprazole


  (Protonix)  40 mg  EVERY 12  HOURS


 IVP


   9/8/20 09:00


 9/20/20 20:59  9/14/20 09:24


 


 


 Polyethylene


 Glycol


  (Miralax)  17 gm  BEDTIME


 GT


   9/8/20 21:00


 9/20/20 20:59  9/13/20 21:13


 

















Tanisha Brownlee M.D. Sep 14, 2020 14:50

## 2020-09-14 NOTE — INTERNAL MED PROGRESS NOTE
Subjective


Date of Service:  Sep 14, 2020


Physician Name


Wing Hebert


Attending Physician


Umer Wahl MD





Current Medications








 Medications


  (Trade)  Dose


 Ordered  Sig/Raji


 Route


 PRN Reason  Start Time


 Stop Time Status Last Admin


Dose Admin


 


 Acetaminophen


  (Tylenol)  650 mg  Q6H  PRN


 GT


 Mild Pain (Pain Scale 1-3)  9/8/20 06:00


 10/4/20 05:59   


 


 


 Ascorbic Acid


  (Vitamin C)  500 mg  DAILY


 GT


   9/8/20 09:00


 10/4/20 08:59  9/14/20 09:24


 


 


 Clonidine HCl


  (Catapres Tab)  0.1 mg  Q4H  PRN


 GT


 bp over 160 syst  9/8/20 07:45


 12/3/20 19:40   


 


 


 Dextrose  1,000 ml @ 


 75 mls/hr  [id number]


 IV


   9/8/20 06:00


 10/4/20 19:40  9/14/20 09:36


 


 


 Docusate Sodium


  (Colace)  100 mg  EVERY 8  HOURS


 GT


   9/8/20 06:00


 9/20/20 08:59  9/14/20 14:33


 


 


 Enoxaparin Sodium


  (Lovenox)  40 mg  DAILY


 SUBQ


   9/8/20 09:00


 11/19/20 08:59  9/14/20 09:28


 


 


 Epoetin Jaswant


  (Epoetin


 Jaswant-EPBX(NON


 ESRD))  8,000 unit  MON-WED-FRI


 SUBQ


   9/16/20 21:00


 12/15/20 20:59   


 


 


 Nystatin


  (Nystop Powder)  1 applic  THREE TIMES A  DAY


 TOPIC


   9/10/20 09:00


 12/8/20 18:14  9/14/20 18:22


 


 


 Pantoprazole


  (Protonix)  40 mg  EVERY 12  HOURS


 IVP


   9/8/20 09:00


 9/20/20 20:59  9/14/20 09:24


 


 


 Polyethylene


 Glycol


  (Miralax)  17 gm  BEDTIME


 GT


   9/8/20 21:00


 9/20/20 20:59  9/13/20 21:13


 


 


 Voriconazole


  (Vfend)  200 mg  Q12HR


 GT


   9/14/20 18:00


 9/21/20 17:59  9/14/20 18:22


 








Allergies:  


Coded Allergies:  


     AMPICILLIN (Verified  Allergy, Unknown, hives, 8/21/20)


     PENICILLINS (Verified  Allergy, Unknown, hives, 8/21/20)


     TETRACYCLINES (Verified  Allergy, Unknown, hives, 8/21/20)


ROS Limited/Unobtainable:  Yes


Subjective


71 YO F with previous COVID 19 pos admitted with shortness of breath.  Now 

pneumonia and sepsis.  Cover for Int Med-DR Wahl.





Objective





Last Vital Signs








  Date Time  Temp Pulse Resp B/P (MAP) Pulse Ox O2 Delivery O2 Flow Rate FiO2


 


9/14/20 16:00 98.9 98 18 121/79 (93) 98   


 


9/14/20 09:00      Room Air  


 


9/14/20 07:00       2.0 28











Laboratory Tests








Test


  9/14/20


12:10


 


White Blood Count


  14.1 K/UL


(4.8-10.8)  H


 


Red Blood Count


  3.90 M/UL


(4.20-5.40)  L


 


Hemoglobin


  12.8 G/DL


(12.0-16.0)


 


Hematocrit


  40.0 %


(37.0-47.0)


 


Mean Corpuscular Volume


  103 FL (80-99)


H


 


Mean Corpuscular Hemoglobin


  32.8 PG


(27.0-31.0)  H


 


Mean Corpuscular Hemoglobin


Concent 32.0 G/DL


(32.0-36.0)


 


Red Cell Distribution Width


  18.9 %


(11.6-14.8)  H


 


Platelet Count


  166 K/UL


(150-450)


 


Mean Platelet Volume


  6.7 FL


(6.5-10.1)


 


Neutrophils (%) (Auto)


  65.2 %


(45.0-75.0)


 


Lymphocytes (%) (Auto)


  23.2 %


(20.0-45.0)


 


Monocytes (%) (Auto)


  7.9 %


(1.0-10.0)


 


Eosinophils (%) (Auto)


  1.0 %


(0.0-3.0)


 


Basophils (%) (Auto)


  2.7 %


(0.0-2.0)  H


 


Erythrocyte Sedimentation Rate


  65 MM/HR


(0-30)  H


 


Sodium Level


  145 MMOL/L


(136-145)


 


Potassium Level


  4.2 MMOL/L


(3.5-5.1)


 


Chloride Level


  107 MMOL/L


()


 


Carbon Dioxide Level


  29 MMOL/L


(21-32)


 


Anion Gap


  9 mmol/L


(5-15)


 


Blood Urea Nitrogen


  36 mg/dL


(7-18)  H


 


Creatinine


  1.6 MG/DL


(0.55-1.30)  H


 


Estimat Glomerular Filtration


Rate 31.7 mL/min


(>60)


 


Glucose Level


  119 MG/DL


()  H


 


Calcium Level


  8.3 MG/DL


(8.5-10.1)  L


 


Phosphorus Level


  3.6 MG/DL


(2.5-4.9)


 


Magnesium Level


  2.0 MG/DL


(1.8-2.4)


 


Total Bilirubin


  0.4 MG/DL


(0.2-1.0)


 


Aspartate Amino Transf


(AST/SGOT) 23 U/L (15-37)


 


 


Alanine Aminotransferase


(ALT/SGPT) 22 U/L (12-78)


 


 


Alkaline Phosphatase


  127 U/L


()  H


 


C-Reactive Protein,


Quantitative 1.2 mg/dL


(0.00-0.90)  H


 


Total Protein


  6.8 G/DL


(6.4-8.2)


 


Albumin


  2.7 G/DL


(3.4-5.0)  L


 


Globulin 4.1 g/dL  


 


Albumin/Globulin Ratio


  0.7 (1.0-2.7)


L











Microbiology








 Date/Time


Source Procedure


Growth Status


 


 


 9/13/20 05:20


Sputum Gram Stain - Final Resulted


 


 9/13/20 05:20


Sputum Sputum Culture


Pending Resulted

















Intake and Output  


 


 9/13/20 9/14/20





 19:00 07:00


 


Intake Total 1820 ml 


 


Output Total  950 ml


 


Balance 1820 ml -950 ml


 


  


 


Intake Free Water 500 ml 


 


IV Total 900 ml 


 


Tube Feeding 420 ml 


 


Output Urine Total  950 ml


 


# Voids  1


 


# Bowel Movements  1








Objective


General Appearance:  WD/WN, no apparent distress, lethargic


EENT:  PERRL/EOMI, normal ENT inspection


Neck:  non-tender, normal alignment, supple, normal inspection


Cardiovascular:  normal peripheral pulses, normal rate, regular rhythm, no 

gallop/murmur, no JVD


Respiratory/Chest:  Venturi mask; chest wall non-tender, respiratory distress, 

crackles/rales, rhonchi - bilaterally, expiratory wheezing


Abdomen:  normal bowel sounds, non tender, soft, no organomegaly, no mass


Extremities:  normal range of motion, non-tender


Neurologic:  CNs II-XII grossly normal, no motor/sensory deficits


Skin:  normal pigmentation, warm/dry





Assessment/Plan


Problem List:  


(1) UTI (urinary tract infection)


Assessment & Plan:  Vanco res enterococcus.  Cont zyvox per ID





(2) Sepsis


Assessment & Plan:  ID= Dr Brownlee.  Continue IV bactrim, micafungin and 

minocycline





(3) Hypernatremia


Assessment & Plan:  Nephrology=DR Dos Santos





(4) Severe dehydration


(5) ARF (acute renal failure)


Assessment & Plan:  Nephrology = Dr Dos Santos





(6) Pneumonia


Assessment & Plan:  Acenitobacter.  Cont minocycline, linezolid and IV bactrim 

per ID=Dr Brownlee; pulmonary = Dr Brambila





(7) Hyperkalemia











Wing Hebert MD Sep 14, 2020 18:41

## 2020-09-14 NOTE — NEPHROLOGY PROGRESS NOTE
Assessment/Plan


Problem List:  


(1) ARF (acute renal failure)


Assessment:  Underlying CKD





(2) Sepsis


(3) UTI (urinary tract infection)


(4) Severe dehydration


(5) Hypernatremia


(6) Acute urinary retention


(7) Anemia


Assessment





Acute renal failure


Possible underlying chronic renal insufficiency


Severe dehydration


Hypotension


Sepsis, UTI


History of psychiatric disease


Elevated troponin I


Patient full code


Plan


September 14.:  Chemistry panel still pending.  Patient appears stable from a 

renal standpoint of view.


September 13: Labs reviewed. Serum creatinine lower at 1.6.  Continue with same 

management.  


September 12: Status quo.  Lab reviewed.  Serum creatinine lower at 1.8.  

Patient remains full code.  Continue per consultants.


September 11: Lab reviewed.  Serum creatinine remains at 2 unchanged.  

Medication list reviewed.  Continue current management.


September 10: Labs reviewed.  Medication list reviewed.  Serum creatinine 

slightly higher at 2.  We will continue to monitor renal parameters and ID to 

avoid nephrotoxic antibiotics as possible.


September 9: Lab reviewed.  Serum creatinine stable at 1.9.  Continue per 

consultants.


September 8: Labs reviewed.  Serum creatinine stable.  Electrolytes within 

reasonable range.


September 7: Labs reviewed.  Remains stable from renal standpoint of view, as 

serum creatinine leveled off around 1.8 and 1.9.  Will continue to monitor 

renal parameters


September 6: No chemistry panel done today.  Status quo.  Continue per 

consultants.


September 5: Status quo.  Labs reviewed.  Stable from renal standpoint of view.


September 4: Status quo.  Labs reviewed.  Serum creatinine unchanged.  Continue 

per consultants.


September 3:Late note entry due to system problem at the [facility] [place] today. Labs 

reviewed.  Electrolytes within normal limit.  Leukocytosis worsened.  Continue 

per consultants.  Serum creatinine 1.8 unchanged.


September 2: Low magnesium and low phosphorus corrected.  Continue per 

consultants.  On nasal cannula now.  Serum creatinine 1.8.


September 1: Serum creatinine 1.8 stable.  Continue per pulmonary.  Continue to 

monitor renal parameters.


August 31: On Venturi mask.  Serum creatinine lowered to 1.8.  Continue per 

consultants.  Medication list reviewed.  Potassium supplement given.


August 30: Status quo.  Creatinine lower at 2.  Other electrolytes within 

normal limits.  Continue per consultants.


August 29: Labs reviewed.  Creatinine 2.2 stable.  Potassium 3.1.  20 M EQ KCl 

given.  Continue her consultants.


August 28: Serum potassium 5.5.Creatinine 2.2.  1 dose Kayexalate given.  

Remains of 75 cc IV fluid.  We will continue to monitor renal parameters.


August 27: Serum potassium 5.3.  Creatinine higher at 2.2.  Medication list 

reviewed.  Suggest to avoid nephrotoxic's like Bactrim if possible.  Continue 

to monitor renal parameters and electrolytes.  Continue per pulmonary to adjust 

pulmonary status.  May require BiPAP.


August 26: Serum sodium lower.  Renal parameters appear more stable.  

Nevertheless leukocytosis is worsened.  Continue per consultants.


August 25: Serum sodium higher.  Continue D5W 75 cc an hour.  Continue to 

monitor electrolytes.  Continue per consultants.  Serum creatinine down to 2.4 

from 5.1 on admission.  Leukocytosis persists.


August 24: Patient now on isolation for COVID-19.  Serum creatinine is 

plateauing.  Will decrease the IV fluid to 75 cc an hour.  Continue management 

per ID.  Will monitor electrolytes and renal parameters.


August 23: Discussed with MARTIN Carter.  Patient needs a Mitchell catheter.  Accurate 

intake and output.  Decrease  cc D5W an hour.  1 dose of IV Venofer and 

then subcu Epogen ordered for anemia.  Continue to monitor renal parameters


August 22: Renal parameters improving.  Continue D5W IV fluid.  Potassium 

supplement IV given.  Continue per consultants.





Previously:


Fluid challenge


Monitor renal parameters and electrolytes


Monitor intake and output


Mitchell catheter


Antibiotics


Avoid nephrotoxic's


Chest x-ray





Kidney ultrasound findings: 


Right kidney measures 8.9 cm in length. Left kidney measures 9 cm in


length. Both kidneys demonstrate markedly increased echogenicity. No 

hydronephrosis. 


There are bilateral renal cysts. Normal inferior vena cava. Bladder is empty,


contains a Mitchell catheter. 


 


Incidentally noted are gallstones.


 


Impression: Markedly echogenic kidneys, consistent with medical renal disease


Negative for hydronephrosis


Empty bladder with a Mitchell catheter


Incidental finding of cholelithiasis.





Subjective


ROS Limited/Unobtainable:  Yes





Objective


Objective





Last 24 Hour Vital Signs








  Date Time  Temp Pulse Resp B/P (MAP) Pulse Ox O2 Delivery O2 Flow Rate FiO2


 


9/14/20 09:00      Room Air  


 


9/14/20 08:00 99.1 107 20 120/72 (88) 96   


 


9/14/20 04:00 98.0 111 21 122/74 (90) 95   


 


9/14/20 00:00 98.6 110 21 117/70 (86) 95   


 


9/13/20 20:00 98.2 110 22 129/69 (89) 94   


 


9/13/20 19:52     98 Nasal Cannula 3.0 32


 


9/13/20 15:50 98.6 104 18 127/66 (86) 98   

















Intake and Output  


 


 9/13/20 9/14/20





 19:00 07:00


 


Intake Total 1820 ml 


 


Output Total  950 ml


 


Balance 1820 ml -950 ml


 


  


 


Intake Free Water 500 ml 


 


IV Total 900 ml 


 


Tube Feeding 420 ml 


 


Output Urine Total  950 ml


 


# Voids  1


 


# Bowel Movements  1








Laboratory Tests


9/14/20 12:10: 


White Blood Count 14.1H, Red Blood Count 3.90L, Hemoglobin 12.8, Hematocrit 40.0

, Mean Corpuscular Volume 103H, Mean Corpuscular Hemoglobin 32.8H, Mean 

Corpuscular Hemoglobin Concent 32.0, Red Cell Distribution Width 18.9H, 

Platelet Count 166, Mean Platelet Volume 6.7, Neutrophils (%) (Auto) 65.2, 

Lymphocytes (%) (Auto) 23.2, Monocytes (%) (Auto) 7.9, Eosinophils (%) (Auto) 

1.0, Basophils (%) (Auto) 2.7H, Erythrocyte Sedimentation Rate [Pending], 

Sodium Level [Pending], Potassium Level [Pending], Chloride Level [Pending], 

Carbon Dioxide Level [Pending], Blood Urea Nitrogen [Pending], Creatinine [

Pending], Estimat Glomerular Filtration Rate [Pending], Glucose Level [Pending]

, Calcium Level [Pending], Phosphorus Level 3.6, Magnesium Level 2.0, Total 

Bilirubin [Pending], Aspartate Amino Transf (AST/SGOT) [Pending], Alanine 

Aminotransferase (ALT/SGPT) [Pending], Alkaline Phosphatase [Pending], C-

Reactive Protein, Quantitative 1.2H, Total Protein [Pending], Albumin [Pending]

, Globulin [Pending]


Height (Feet):  5


Height (Inches):  2.00


Weight (Pounds):  123


General Appearance:  no apparent distress


Cardiovascular:  tachycardia


Respiratory/Chest:  decreased breath sounds


Abdomen:  distended


Objective


No change











Derik Dos Santos MD Sep 14, 2020 13:05

## 2020-09-14 NOTE — PULMONOLOGY PROGRESS NOTE
Subjective


ROS Limited/Unobtainable:  No


Constitutional:  Reports: no symptoms


HEENT:  Repors: no symptoms


Respiratory:  Reports: no symptoms


Cardiovascular:  Reports: no symptoms


Allergies:  


Coded Allergies:  


     AMPICILLIN (Verified  Allergy, Unknown, hives, 8/21/20)


     PENICILLINS (Verified  Allergy, Unknown, hives, 8/21/20)


     TETRACYCLINES (Verified  Allergy, Unknown, hives, 8/21/20)





Objective





Last 24 Hour Vital Signs








  Date Time  Temp Pulse Resp B/P (MAP) Pulse Ox O2 Delivery O2 Flow Rate FiO2


 


9/14/20 09:00      Room Air  


 


9/14/20 08:00 99.1 107 20 120/72 (88) 96   


 


9/14/20 04:00 98.0 111 21 122/74 (90) 95   


 


9/14/20 00:00 98.6 110 21 117/70 (86) 95   


 


9/13/20 20:00 98.2 110 22 129/69 (89) 94   


 


9/13/20 19:52     98 Nasal Cannula 3.0 32


 


9/13/20 15:50 98.6 104 18 127/66 (86) 98   

















Intake and Output  


 


 9/13/20 9/14/20





 19:00 07:00


 


Intake Total 1820 ml 


 


Output Total  950 ml


 


Balance 1820 ml -950 ml


 


  


 


Intake Free Water 500 ml 


 


IV Total 900 ml 


 


Tube Feeding 420 ml 


 


Output Urine Total  950 ml


 


# Voids  1


 


# Bowel Movements  1








General Appearance:  no acute distress, other - chronically ill looking 

bedridden female


HEENT:  normocephalic, atraumatic, other - VM


Respiratory:  chest wall non-tender, rhonchi - bilaterally - scattered


Cardiovascular:  normal peripheral pulses, normal rate - SR on tele


Abdomen:  normal bowel sounds, soft, non tender


Genitourinary:  normal external genitalia


Skin:  other - BUE crusting lesions


Neurologic:  CNs II-XII grossly normal, abnormal gait - bedridden


Lymphatic:  no neck adenopathy





Microbiology








 Date/Time


Source Procedure


Growth Status


 


 


 9/11/20 18:30


Blood Blood Culture - Preliminary


NO GROWTH AFTER 48 HOURS Resulted


 


 9/11/20 18:15


Blood Blood Culture - Preliminary Resulted





 9/13/20 05:20


Sputum Gram Stain - Final Resulted


 


 9/13/20 05:20


Sputum Sputum Culture


Pending Resulted








Laboratory Tests


9/14/20 12:10: 


White Blood Count 14.1H, Red Blood Count 3.90L, Hemoglobin 12.8, Hematocrit 40.0

, Mean Corpuscular Volume 103H, Mean Corpuscular Hemoglobin 32.8H, Mean 

Corpuscular Hemoglobin Concent 32.0, Red Cell Distribution Width 18.9H, 

Platelet Count 166, Mean Platelet Volume 6.7, Neutrophils (%) (Auto) 65.2, 

Lymphocytes (%) (Auto) 23.2, Monocytes (%) (Auto) 7.9, Eosinophils (%) (Auto) 

1.0, Basophils (%) (Auto) 2.7H, Erythrocyte Sedimentation Rate [Pending], 

Sodium Level [Pending], Potassium Level [Pending], Chloride Level [Pending], 

Carbon Dioxide Level [Pending], Blood Urea Nitrogen [Pending], Creatinine [

Pending], Estimat Glomerular Filtration Rate [Pending], Glucose Level [Pending]

, Calcium Level [Pending], Phosphorus Level [Pending], Magnesium Level [Pending]

, Total Bilirubin [Pending], Aspartate Amino Transf (AST/SGOT) [Pending], 

Alanine Aminotransferase (ALT/SGPT) [Pending], Alkaline Phosphatase [Pending], C

-Reactive Protein, Quantitative [Pending], Total Protein [Pending], Albumin [

Pending], Globulin [Pending]





Current Medications








 Medications


  (Trade)  Dose


 Ordered  Sig/Raji


 Route


 PRN Reason  Start Time


 Stop Time Status Last Admin


Dose Admin


 


 Acetaminophen


  (Tylenol)  650 mg  Q6H  PRN


 GT


 Mild Pain (Pain Scale 1-3)  9/8/20 06:00


 10/4/20 05:59   


 


 


 Ascorbic Acid


  (Vitamin C)  500 mg  DAILY


 GT


   9/8/20 09:00


 10/4/20 08:59  9/14/20 09:24


 


 


 Clonidine HCl


  (Catapres Tab)  0.1 mg  Q4H  PRN


 GT


 bp over 160 syst  9/8/20 07:45


 12/3/20 19:40   


 


 


 Dextrose  1,000 ml @ 


 75 mls/hr  [id number]


 IV


   9/8/20 06:00


 10/4/20 19:40  9/14/20 09:36


 


 


 Docusate Sodium


  (Colace)  100 mg  EVERY 8  HOURS


 GT


   9/8/20 06:00


 9/20/20 08:59  9/13/20 21:13


 


 


 Enoxaparin Sodium


  (Lovenox)  40 mg  DAILY


 SUBQ


   9/8/20 09:00


 11/19/20 08:59  9/14/20 09:28


 


 


 Epoetin Jaswant


  (Epoetin


 Jaswant-EPBX(NON


 ESRD))  8,000 unit  MON-WED-FRI


 SUBQ


   9/11/20 21:00


 12/10/20 20:59   


 


 


 Micafungin Sodium


 100 mg/Sodium


 Chloride  110 ml @ 


 110 mls/hr  Q24H


 IVPB


   9/11/20 21:00


 9/18/20 20:59  9/13/20 21:13


 


 


 Nystatin


  (Nystop Powder)  1 applic  THREE TIMES A  DAY


 TOPIC


   9/10/20 09:00


 12/8/20 18:14  9/14/20 09:37


 


 


 Pantoprazole


  (Protonix)  40 mg  EVERY 12  HOURS


 IVP


   9/8/20 09:00


 9/20/20 20:59  9/14/20 09:24


 


 


 Polyethylene


 Glycol


  (Miralax)  17 gm  BEDTIME


 GT


   9/8/20 21:00


 9/20/20 20:59  9/13/20 21:13


 











Assessment/Plan


Problems:  


(1) 2019 novel coronavirus disease (COVID-19)


(2) Multiple drug resistant organism (MDRO) culture positive


(3) Sepsis


(4) ARF (acute renal failure)


(5) Severe dehydration


(6) Hypernatremia


(7) Psychosis


(8) Pacemaker


Assessment/Plan


all reviwed


WBC still high


CXR: Impression: No acute process


ESR and CRP reviewed





COVID positive





f/u electrolytes


renal studies


urine electrolytes


dvt prophylaxis











Live Brambila MD Sep 14, 2020 12:44

## 2020-09-15 VITALS — DIASTOLIC BLOOD PRESSURE: 72 MMHG | SYSTOLIC BLOOD PRESSURE: 125 MMHG

## 2020-09-15 VITALS — SYSTOLIC BLOOD PRESSURE: 126 MMHG | DIASTOLIC BLOOD PRESSURE: 72 MMHG

## 2020-09-15 VITALS — DIASTOLIC BLOOD PRESSURE: 76 MMHG | SYSTOLIC BLOOD PRESSURE: 130 MMHG

## 2020-09-15 VITALS — DIASTOLIC BLOOD PRESSURE: 76 MMHG | SYSTOLIC BLOOD PRESSURE: 123 MMHG

## 2020-09-15 VITALS — SYSTOLIC BLOOD PRESSURE: 115 MMHG | DIASTOLIC BLOOD PRESSURE: 62 MMHG

## 2020-09-15 VITALS — SYSTOLIC BLOOD PRESSURE: 120 MMHG | DIASTOLIC BLOOD PRESSURE: 60 MMHG

## 2020-09-15 LAB
ADD MANUAL DIFF: NO
ALBUMIN SERPL-MCNC: 2.7 G/DL (ref 3.4–5)
ALBUMIN/GLOB SERPL: 0.6 {RATIO} (ref 1–2.7)
ALP SERPL-CCNC: 119 U/L (ref 46–116)
ALT SERPL-CCNC: 20 U/L (ref 12–78)
ANION GAP SERPL CALC-SCNC: 10 MMOL/L (ref 5–15)
AST SERPL-CCNC: 17 U/L (ref 15–37)
BASOPHILS NFR BLD AUTO: 0.4 % (ref 0–2)
BILIRUB SERPL-MCNC: 0.6 MG/DL (ref 0.2–1)
BUN SERPL-MCNC: 37 MG/DL (ref 7–18)
CALCIUM SERPL-MCNC: 9.9 MG/DL (ref 8.5–10.1)
CHLORIDE SERPL-SCNC: 110 MMOL/L (ref 98–107)
CO2 SERPL-SCNC: 30 MMOL/L (ref 21–32)
CREAT SERPL-MCNC: 1.6 MG/DL (ref 0.55–1.3)
EOSINOPHIL NFR BLD AUTO: 1.3 % (ref 0–3)
ERYTHROCYTE [DISTWIDTH] IN BLOOD BY AUTOMATED COUNT: 18.9 % (ref 11.6–14.8)
GLOBULIN SER-MCNC: 4.9 G/DL
HCT VFR BLD CALC: 39.3 % (ref 37–47)
HGB BLD-MCNC: 12.2 G/DL (ref 12–16)
LYMPHOCYTES NFR BLD AUTO: 31.8 % (ref 20–45)
MCV RBC AUTO: 104 FL (ref 80–99)
MONOCYTES NFR BLD AUTO: 7.4 % (ref 1–10)
NEUTROPHILS NFR BLD AUTO: 59.1 % (ref 45–75)
PHOSPHATE SERPL-MCNC: 4.1 MG/DL (ref 2.5–4.9)
PLATELET # BLD: 171 K/UL (ref 150–450)
POTASSIUM SERPL-SCNC: 4.8 MMOL/L (ref 3.5–5.1)
RBC # BLD AUTO: 3.77 M/UL (ref 4.2–5.4)
SODIUM SERPL-SCNC: 150 MMOL/L (ref 136–145)
WBC # BLD AUTO: 12.3 K/UL (ref 4.8–10.8)

## 2020-09-15 RX ADMIN — DOCUSATE SODIUM SCH MG: 50 LIQUID ORAL at 13:26

## 2020-09-15 RX ADMIN — PANTOPRAZOLE SODIUM SCH MG: 40 INJECTION, POWDER, FOR SOLUTION INTRAVENOUS at 22:39

## 2020-09-15 RX ADMIN — POLYETHYLENE GLYCOL 3350 SCH GM: 17 POWDER, FOR SOLUTION ORAL at 21:48

## 2020-09-15 RX ADMIN — DOCUSATE SODIUM SCH MG: 50 LIQUID ORAL at 21:48

## 2020-09-15 RX ADMIN — ENOXAPARIN SODIUM SCH MG: 40 INJECTION SUBCUTANEOUS at 09:23

## 2020-09-15 RX ADMIN — Medication SCH MG: at 09:21

## 2020-09-15 RX ADMIN — NYSTATIN SCH APPLIC: 100000 POWDER TOPICAL at 13:27

## 2020-09-15 RX ADMIN — PANTOPRAZOLE SODIUM SCH MG: 40 INJECTION, POWDER, FOR SOLUTION INTRAVENOUS at 09:21

## 2020-09-15 RX ADMIN — NYSTATIN SCH APPLIC: 100000 POWDER TOPICAL at 09:33

## 2020-09-15 RX ADMIN — NYSTATIN SCH APPLIC: 100000 POWDER TOPICAL at 17:44

## 2020-09-15 RX ADMIN — DOCUSATE SODIUM SCH MG: 50 LIQUID ORAL at 05:08

## 2020-09-15 NOTE — INFECTIOUS DISEASES PROG NOTE
Assessment/Plan


69yo F with:





Sepsis


Fever; SP


Leukocytosis; worsening (sp steroids)- Recurrent; worsening


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


      -9/11 BCx 1/4 yeast


      -9/9 2d echo: no discrete vegetations


     -9/6 Bcx Neg


    -9/3 u/a neg


          Bcx 1/4 C. albicas





JASWINDER to 5.1, improving 


Hypoxic resp failure, sp NRB mask >VM> 4l NC 9/1> RA


VRE UTI


Hx of COVID-19 1 mo ago ( doubt recurrent COVID-19) 


HAP


          9/13 sp cx p


          9/11 CXR:   There are increased lung markings in the left lower lobe 

suspicious for early developing infiltrate.  Progress films are recommended.


           9/9 CXR: No acute process


            9/3 sp cx yeast


            9/1 rapid COVID PCR neg; 9/3 rapid COVID PCR neg


   8/29 CXR:   Improving left lower lobe infiltrate and left pleural effusion. 

There is a probable small right pleural effusion.


   8/27 cdiff neg


           8/26 CXR: There are increased interstitial congestion and left 

pleural effusion,since prior exam of 8/22/2020


               Bcx NTD


            8/22 sp cx MDR ABC (S bactrim; I Colistin, Polymixin B, Minocycline)


   8/21 BCx NTD


   8/21 UA+, UCx >100k VRE (S Zyvox)


   8/21 CXR: Left retrocardiac mild atelectasis versus infiltrate.


   8/21 COVID rapid neg; 8/22 repeat COVID-19 +








BL arm rash, appears evolving, now small pustules with dry/crusting, querry 

resolving Shingles? Though odd to have on both arms


   8/21 HSV & VZV PCR ordered





Plan:





PO Voriconazole #2 (abx d #9)


      -if not improving, will switch to IV AMphotercin


       --requested micro lab to ID and sensitivities for yeast on 9/11 Bcx


       --f/u repeat BCx x2


       --DOROTHEA to eval for endocarditis





       -9/14 SP Micafungin #9


       -9/10 SP Minocycline #17


       -9/9 Sp IV bactrim #16


       -8/29 SP ZYvox #7


       --8/24 SP Cefepime #4


       --8/23 SP IV Vancomcyin #4





F/u cx


Monitor CBC/BMP


Trend BUE rash


Dc covid isolation


f/u sp cx





Discussed with RN





Subjective


Allergies:  


Coded Allergies:  


     AMPICILLIN (Verified  Allergy, Unknown, hives, 8/21/20)


     PENICILLINS (Verified  Allergy, Unknown, hives, 8/21/20)


     TETRACYCLINES (Verified  Allergy, Unknown, hives, 8/21/20)


afebrile


wbc improving


repeat Bcx p





Objective





Last 24 Hour Vital Signs








  Date Time  Temp Pulse Resp B/P (MAP) Pulse Ox O2 Delivery O2 Flow Rate FiO2


 


9/15/20 12:00 98.2 102 18 130/76 (94) 96   


 


9/15/20 09:00      Room Air  


 


9/15/20 08:26     97 Room Air  21


 


9/15/20 08:00 97.9 100 18 126/72 (90) 97   


 


9/15/20 04:00 98.2 105 20 125/72 (89) 96   


 


9/15/20 00:00 97.9 106 22 120/60 (80) 97   


 


9/14/20 20:00 97.7 102 20 113/57 (75) 93   


 


9/14/20 16:00 98.9 98 18 121/79 (93) 98   








Height (Feet):  5


Height (Inches):  2.00


Weight (Pounds):  123


General Appearance:  no acute distress, other - chronically ill looking 

bedridden female


HEENT:  normocephalic, atraumatic, other - VM


Respiratory:  chest wall non-tender, rhonchi - bilaterally - scattered


Cardiovascular:  normal peripheral pulses, normal rate


Abdomen:  normal bowel sounds, soft, non tender





Microbiology








 Date/Time


Source Procedure


Growth Status





 


 9/13/20 05:20


Sputum Gram Stain - Final Complete


 


 9/13/20 05:20


Sputum Sputum Culture - Final


NORMAL UPPER RESPIRATORY LATASHA PRESENT Complete











Laboratory Tests








Test


 9/15/20


05:20


 


White Blood Count


 12.3 K/UL


(4.8-10.8)  H


 


Red Blood Count


 3.77 M/UL


(4.20-5.40)  L


 


Hemoglobin


 12.2 G/DL


(12.0-16.0)


 


Hematocrit


 39.3 %


(37.0-47.0)


 


Mean Corpuscular Volume


 104 FL (80-99)


H


 


Mean Corpuscular Hemoglobin


 32.3 PG


(27.0-31.0)  H


 


Mean Corpuscular Hemoglobin


Concent 31.0 G/DL


(32.0-36.0)  L


 


Red Cell Distribution Width


 18.9 %


(11.6-14.8)  H


 


Platelet Count


 171 K/UL


(150-450)


 


Mean Platelet Volume


 6.9 FL


(6.5-10.1)


 


Neutrophils (%) (Auto)


 59.1 %


(45.0-75.0)


 


Lymphocytes (%) (Auto)


 31.8 %


(20.0-45.0)


 


Monocytes (%) (Auto)


 7.4 %


(1.0-10.0)


 


Eosinophils (%) (Auto)


 1.3 %


(0.0-3.0)


 


Basophils (%) (Auto)


 0.4 %


(0.0-2.0)


 


Sodium Level


 150 MMOL/L


(136-145)  H


 


Potassium Level


 4.8 MMOL/L


(3.5-5.1)


 


Chloride Level


 110 MMOL/L


()  H


 


Carbon Dioxide Level


 30 MMOL/L


(21-32)


 


Anion Gap


 10 mmol/L


(5-15)


 


Blood Urea Nitrogen


 37 mg/dL


(7-18)  H


 


Creatinine


 1.6 MG/DL


(0.55-1.30)  H


 


Estimat Glomerular Filtration


Rate 31.7 mL/min


(>60)


 


Glucose Level


 105 MG/DL


()


 


Calcium Level


 9.9 MG/DL


(8.5-10.1)


 


Phosphorus Level


 4.1 MG/DL


(2.5-4.9)


 


Magnesium Level


 2.3 MG/DL


(1.8-2.4)


 


Total Bilirubin


 0.6 MG/DL


(0.2-1.0)


 


Aspartate Amino Transf


(AST/SGOT) 17 U/L (15-37)





 


Alanine Aminotransferase


(ALT/SGPT) 20 U/L (12-78)





 


Alkaline Phosphatase


 119 U/L


()  H


 


Total Protein


 7.6 G/DL


(6.4-8.2)


 


Albumin


 2.7 G/DL


(3.4-5.0)  L


 


Globulin 4.9 g/dL  


 


Albumin/Globulin Ratio


 0.6 (1.0-2.7)


L











Current Medications








 Medications


  (Trade)  Dose


 Ordered  Sig/Raji


 Route


 PRN Reason  Start Time


 Stop Time Status Last Admin


Dose Admin


 


 Acetaminophen


  (Tylenol)  650 mg  Q6H  PRN


 GT


 Mild Pain (Pain Scale 1-3)  9/8/20 06:00


 10/4/20 05:59   





 


 Ascorbic Acid


  (Vitamin C)  500 mg  DAILY


 GT


   9/8/20 09:00


 10/4/20 08:59  9/15/20 09:21





 


 Clonidine HCl


  (Catapres Tab)  0.1 mg  Q4H  PRN


 GT


 bp over 160 syst  9/8/20 07:45


 12/3/20 19:40   





 


 Dextrose  1,000 ml @ 


 75 mls/hr  [id number]


 IV


   9/8/20 06:00


 10/4/20 19:40  9/15/20 11:43





 


 Docusate Sodium


  (Colace)  100 mg  EVERY 8  HOURS


 GT


   9/8/20 06:00


 9/20/20 08:59  9/15/20 13:26





 


 Enoxaparin Sodium


  (Lovenox)  40 mg  DAILY


 SUBQ


   9/8/20 09:00


 11/19/20 08:59  9/15/20 09:23





 


 Epoetin Jaswant


  (Epoetin


 Jaswant-EPBX(NON


 ESRD))  8,000 unit  MON-WED-FRI


 SUBQ


   9/16/20 21:00


 12/15/20 20:59   





 


 Nystatin


  (Nystop Powder)  1 applic  THREE TIMES A  DAY


 TOPIC


   9/10/20 09:00


 12/8/20 18:14  9/15/20 13:27





 


 Pantoprazole


  (Protonix)  40 mg  EVERY 12  HOURS


 IVP


   9/8/20 09:00


 9/20/20 20:59  9/15/20 09:21





 


 Polyethylene


 Glycol


  (Miralax)  17 gm  BEDTIME


 GT


   9/8/20 21:00


 9/20/20 20:59  9/14/20 20:29





 


 Voriconazole


  (Vfend)  200 mg  Q12HR


 GT


   9/14/20 18:00


 9/21/20 17:59  9/15/20 09:21




















Tanisha Brownlee M.D. Sep 15, 2020 15:50

## 2020-09-15 NOTE — NEPHROLOGY PROGRESS NOTE
Assessment/Plan


Problem List:  


(1) ARF (acute renal failure)


Assessment:  Underlying CKD





(2) Sepsis


(3) UTI (urinary tract infection)


(4) Severe dehydration


(5) Hypernatremia


(6) Acute urinary retention


(7) Anemia


Assessment





Acute renal failure


Possible underlying chronic renal insufficiency


Severe dehydration


Hypotension


Sepsis, UTI


History of psychiatric disease


Elevated troponin I


Patient full code


Plan


September 15: Labs reviewed.  Serum creatinine 1.6 stable.  Continue per 

consultants.


September 14.:  Chemistry panel still pending.  Patient appears stable from a 

renal standpoint of view.


September 13: Labs reviewed. Serum creatinine lower at 1.6.  Continue with same 

management.  


September 12: Status quo.  Lab reviewed.  Serum creatinine lower at 1.8.  

Patient remains full code.  Continue per consultants.


September 11: Lab reviewed.  Serum creatinine remains at 2 unchanged.  

Medication list reviewed.  Continue current management.


September 10: Labs reviewed.  Medication list reviewed.  Serum creatinine 

slightly higher at 2.  We will continue to monitor renal parameters and ID to 

avoid nephrotoxic antibiotics as possible.


September 9: Lab reviewed.  Serum creatinine stable at 1.9.  Continue per 

consultants.


September 8: Labs reviewed.  Serum creatinine stable.  Electrolytes within 

reasonable range.


September 7: Labs reviewed.  Remains stable from renal standpoint of view, as 

serum creatinine leveled off around 1.8 and 1.9.  Will continue to monitor 

renal parameters


September 6: No chemistry panel done today.  Status quo.  Continue per 

consultants.


September 5: Status quo.  Labs reviewed.  Stable from renal standpoint of view.


September 4: Status quo.  Labs reviewed.  Serum creatinine unchanged.  Continue 

per consultants.


September 3:Late note entry due to system problem at the [facility] today. Labs 

reviewed.  Electrolytes within normal limit.  Leukocytosis worsened.  Continue 

per consultants.  Serum creatinine 1.8 unchanged.


September 2: Low magnesium and low phosphorus corrected.  Continue per 

consultants.  On nasal cannula now.  Serum creatinine 1.8.


September 1: Serum creatinine 1.8 stable.  Continue per pulmonary.  Continue to 

monitor renal parameters.


August 31: On Venturi mask.  Serum creatinine lowered to 1.8.  Continue per 

consultants.  Medication list reviewed.  Potassium supplement given.


August 30: Status quo.  Creatinine lower at 2.  Other electrolytes within 

normal limits.  Continue per consultants.


August 29: Labs reviewed.  Creatinine 2.2 stable.  Potassium 3.1.  20 M EQ KCl 

given.  Continue her consultants.


August 28: Serum potassium 5.5.Creatinine 2.2.  1 dose Kayexalate given.  

Remains of 75 cc IV fluid.  We will continue to monitor renal parameters.


August 27: Serum potassium 5.3.  Creatinine higher at 2.2.  Medication list 

reviewed.  Suggest to avoid nephrotoxic's like Bactrim if possible.  Continue 

to monitor renal parameters and electrolytes.  Continue per pulmonary to adjust 

pulmonary status.  May require BiPAP.


August 26: Serum sodium lower.  Renal parameters appear more stable.  

Nevertheless leukocytosis is worsened.  Continue per consultants.


August 25: Serum sodium higher.  Continue D5W 75 cc an hour.  Continue to 

monitor electrolytes.  Continue per consultants.  Serum creatinine down to 2.4 

from 5.1 on admission.  Leukocytosis persists.


August 24: Patient now on isolation for COVID-19.  Serum creatinine is 

plateauing.  Will decrease the IV fluid to 75 cc an hour.  Continue management 

per ID.  Will monitor electrolytes and renal parameters.


August 23: Discussed with MARTIN Carter.  Patient needs a Mitchell catheter.  Accurate 

intake and output.  Decrease  cc D5W an hour.  1 dose of IV Venofer and 

then subcu Epogen ordered for anemia.  Continue to monitor renal parameters


August 22: Renal parameters improving.  Continue D5W IV fluid.  Potassium 

supplement IV given.  Continue per consultants.





Previously:


Fluid challenge


Monitor renal parameters and electrolytes


Monitor intake and output


Mitchell catheter


Antibiotics


Avoid nephrotoxic's


Chest x-ray





Kidney ultrasound findings: 


Right kidney measures 8.9 cm in length. Left kidney measures 9 cm in


length. Both kidneys demonstrate markedly increased echogenicity. No 

hydronephrosis. 


There are bilateral renal cysts. Normal inferior vena cava. Bladder is empty,


contains a Mitchell catheter. 


 


Incidentally noted are gallstones.


 


Impression: Markedly echogenic kidneys, consistent with medical renal disease


Negative for hydronephrosis


Empty bladder with a Mitchell catheter


Incidental finding of cholelithiasis.





Subjective


ROS Limited/Unobtainable:  Yes





Objective


Objective





Last 24 Hour Vital Signs








  Date Time  Temp Pulse Resp B/P (MAP) Pulse Ox O2 Delivery O2 Flow Rate FiO2


 


9/15/20 08:26     97 Room Air  21


 


9/15/20 08:00 97.9 100 18 126/72 (90) 97   


 


9/15/20 04:00 98.2 105 20 125/72 (89) 96   


 


9/15/20 00:00 97.9 106 22 120/60 (80) 97   


 


9/14/20 20:00 97.7 102 20 113/57 (75) 93   


 


9/14/20 16:00 98.9 98 18 121/79 (93) 98   


 


9/14/20 12:00 98.9 97 18 127/76 (93) 98   

















Intake and Output  


 


 9/14/20 9/15/20





 19:00 07:00


 


Intake Total 110 ml 1170 ml


 


Output Total 500 ml 600 ml


 


Balance -390 ml 570 ml


 


  


 


Intake Free Water  200 ml


 


IV Total 75 ml 900 ml


 


Tube Feeding 35 ml 70 ml


 


Output Urine Total 500 ml 600 ml


 


# Bowel Movements  1








Laboratory Tests


9/14/20 12:10: 


White Blood Count 14.1H, Red Blood Count 3.90L, Hemoglobin 12.8, Hematocrit 40.0

, Mean Corpuscular Volume 103H, Mean Corpuscular Hemoglobin 32.8H, Mean 

Corpuscular Hemoglobin Concent 32.0, Red Cell Distribution Width 18.9H, 

Platelet Count 166, Mean Platelet Volume 6.7, Neutrophils (%) (Auto) 65.2, 

Lymphocytes (%) (Auto) 23.2, Monocytes (%) (Auto) 7.9, Eosinophils (%) (Auto) 

1.0, Basophils (%) (Auto) 2.7H, Erythrocyte Sedimentation Rate 65H, Sodium 

Level 145, Potassium Level 4.2, Chloride Level 107, Carbon Dioxide Level 29, 

Anion Gap 9, Blood Urea Nitrogen 36H, Creatinine 1.6H, Estimat Glomerular 

Filtration Rate 31.7, Glucose Level 119H, Calcium Level 8.3L, Phosphorus Level 

3.6, Magnesium Level 2.0, Total Bilirubin 0.4, Aspartate Amino Transf (AST/SGOT

) 23, Alanine Aminotransferase (ALT/SGPT) 22, Alkaline Phosphatase 127H, C-

Reactive Protein, Quantitative 1.2H, Total Protein 6.8, Albumin 2.7L, Globulin 

4.1, Albumin/Globulin Ratio 0.7L


9/15/20 05:20: 


White Blood Count 12.3H, Red Blood Count 3.77L, Hemoglobin 12.2, Hematocrit 39.3

, Mean Corpuscular Volume 104H, Mean Corpuscular Hemoglobin 32.3H, Mean 

Corpuscular Hemoglobin Concent 31.0L, Red Cell Distribution Width 18.9H, 

Platelet Count 171, Mean Platelet Volume 6.9, Neutrophils (%) (Auto) 59.1, 

Lymphocytes (%) (Auto) 31.8, Monocytes (%) (Auto) 7.4, Eosinophils (%) (Auto) 

1.3, Basophils (%) (Auto) 0.4, Sodium Level 150H, Potassium Level 4.8, Chloride 

Level 110H, Carbon Dioxide Level 30, Anion Gap 10, Blood Urea Nitrogen 37H, 

Creatinine 1.6H, Estimat Glomerular Filtration Rate 31.7, Glucose Level 105, 

Calcium Level 9.9, Phosphorus Level 4.1, Magnesium Level 2.3, Total Bilirubin 

0.6, Aspartate Amino Transf (AST/SGOT) 17, Alanine Aminotransferase (ALT/SGPT) 

20, Alkaline Phosphatase 119H, Total Protein 7.6, Albumin 2.7L, Globulin 4.9, 

Albumin/Globulin Ratio 0.6L


Height (Feet):  5


Height (Inches):  2.00


Weight (Pounds):  123


General Appearance:  no apparent distress, lethargic


Cardiovascular:  tachycardia


Respiratory/Chest:  decreased breath sounds


Abdomen:  distended


Objective


No change











Derik Dos Santos MD Sep 15, 2020 09:33

## 2020-09-15 NOTE — INTERNAL MED PROGRESS NOTE
Subjective


Date of Service:  Sep 15, 2020


Physician Name


Wing Hebert


Attending Physician


Umer Wahl MD





Current Medications








 Medications


  (Trade)  Dose


 Ordered  Sig/Raji


 Route


 PRN Reason  Start Time


 Stop Time Status Last Admin


Dose Admin


 


 Acetaminophen


  (Tylenol)  650 mg  Q6H  PRN


 GT


 Mild Pain (Pain Scale 1-3)  9/8/20 06:00


 10/4/20 05:59   


 


 


 Ascorbic Acid


  (Vitamin C)  500 mg  DAILY


 GT


   9/8/20 09:00


 10/4/20 08:59  9/15/20 09:21


 


 


 Clonidine HCl


  (Catapres Tab)  0.1 mg  Q4H  PRN


 GT


 bp over 160 syst  9/8/20 07:45


 12/3/20 19:40   


 


 


 Dextrose  1,000 ml @ 


 75 mls/hr  [id number]


 IV


   9/8/20 06:00


 10/4/20 19:40  9/15/20 11:43


 


 


 Docusate Sodium


  (Colace)  100 mg  EVERY 8  HOURS


 GT


   9/8/20 06:00


 9/20/20 08:59  9/15/20 13:26


 


 


 Enoxaparin Sodium


  (Lovenox)  40 mg  DAILY


 SUBQ


   9/8/20 09:00


 11/19/20 08:59  9/15/20 09:23


 


 


 Epoetin Jaswant


  (Epoetin


 Jaswant-EPBX(NON


 ESRD))  8,000 unit  MON-WED-FRI


 SUBQ


   9/16/20 21:00


 12/15/20 20:59   


 


 


 Nystatin


  (Nystop Powder)  1 applic  THREE TIMES A  DAY


 TOPIC


   9/10/20 09:00


 12/8/20 18:14  9/15/20 17:44


 


 


 Pantoprazole


  (Protonix)  40 mg  EVERY 12  HOURS


 IVP


   9/8/20 09:00


 9/20/20 20:59  9/15/20 09:21


 


 


 Polyethylene


 Glycol


  (Miralax)  17 gm  BEDTIME


 GT


   9/8/20 21:00


 9/20/20 20:59  9/14/20 20:29


 


 


 Voriconazole


  (Vfend)  200 mg  Q12HR


 GT


   9/14/20 18:00


 9/21/20 17:59  9/15/20 09:21


 








Allergies:  


Coded Allergies:  


     AMPICILLIN (Verified  Allergy, Unknown, hives, 8/21/20)


     PENICILLINS (Verified  Allergy, Unknown, hives, 8/21/20)


     TETRACYCLINES (Verified  Allergy, Unknown, hives, 8/21/20)


ROS Limited/Unobtainable:  Yes


Subjective


71 YO F with previous COVID 19 pos admitted with shortness of breath.  Now 

pneumonia and sepsis.  Cover for Int Med-DR Wahl.





Objective





Last Vital Signs








  Date Time  Temp Pulse Resp B/P (MAP) Pulse Ox O2 Delivery O2 Flow Rate FiO2


 


9/15/20 16:00 98.4 98 18 115/62 (79) 95   


 


9/15/20 09:00      Room Air  


 


9/15/20 08:26        21


 


9/14/20 07:00       2.0 











Laboratory Tests








Test


  9/15/20


05:20


 


White Blood Count


  12.3 K/UL


(4.8-10.8)  H


 


Red Blood Count


  3.77 M/UL


(4.20-5.40)  L


 


Hemoglobin


  12.2 G/DL


(12.0-16.0)


 


Hematocrit


  39.3 %


(37.0-47.0)


 


Mean Corpuscular Volume


  104 FL (80-99)


H


 


Mean Corpuscular Hemoglobin


  32.3 PG


(27.0-31.0)  H


 


Mean Corpuscular Hemoglobin


Concent 31.0 G/DL


(32.0-36.0)  L


 


Red Cell Distribution Width


  18.9 %


(11.6-14.8)  H


 


Platelet Count


  171 K/UL


(150-450)


 


Mean Platelet Volume


  6.9 FL


(6.5-10.1)


 


Neutrophils (%) (Auto)


  59.1 %


(45.0-75.0)


 


Lymphocytes (%) (Auto)


  31.8 %


(20.0-45.0)


 


Monocytes (%) (Auto)


  7.4 %


(1.0-10.0)


 


Eosinophils (%) (Auto)


  1.3 %


(0.0-3.0)


 


Basophils (%) (Auto)


  0.4 %


(0.0-2.0)


 


Sodium Level


  150 MMOL/L


(136-145)  H


 


Potassium Level


  4.8 MMOL/L


(3.5-5.1)


 


Chloride Level


  110 MMOL/L


()  H


 


Carbon Dioxide Level


  30 MMOL/L


(21-32)


 


Anion Gap


  10 mmol/L


(5-15)


 


Blood Urea Nitrogen


  37 mg/dL


(7-18)  H


 


Creatinine


  1.6 MG/DL


(0.55-1.30)  H


 


Estimat Glomerular Filtration


Rate 31.7 mL/min


(>60)


 


Glucose Level


  105 MG/DL


()


 


Calcium Level


  9.9 MG/DL


(8.5-10.1)


 


Phosphorus Level


  4.1 MG/DL


(2.5-4.9)


 


Magnesium Level


  2.3 MG/DL


(1.8-2.4)


 


Total Bilirubin


  0.6 MG/DL


(0.2-1.0)


 


Aspartate Amino Transf


(AST/SGOT) 17 U/L (15-37)


 


 


Alanine Aminotransferase


(ALT/SGPT) 20 U/L (12-78)


 


 


Alkaline Phosphatase


  119 U/L


()  H


 


Total Protein


  7.6 G/DL


(6.4-8.2)


 


Albumin


  2.7 G/DL


(3.4-5.0)  L


 


Globulin 4.9 g/dL  


 


Albumin/Globulin Ratio


  0.6 (1.0-2.7)


L











Microbiology








 Date/Time


Source Procedure


Growth Status


 


 


 9/13/20 05:20


Sputum Gram Stain - Final Complete


 


 9/13/20 05:20


Sputum Sputum Culture - Final


NORMAL UPPER RESPIRATORY LATASHA PRESENT Complete

















Intake and Output  


 


 9/14/20 9/15/20





 19:00 07:00


 


Intake Total 110 ml 1170 ml


 


Output Total 500 ml 600 ml


 


Balance -390 ml 570 ml


 


  


 


Intake Free Water  200 ml


 


IV Total 75 ml 900 ml


 


Tube Feeding 35 ml 70 ml


 


Output Urine Total 500 ml 600 ml


 


# Bowel Movements  1








Objective


General Appearance:  WD/WN, no apparent distress, lethargic


EENT:  PERRL/EOMI, normal ENT inspection


Neck:  non-tender, normal alignment, supple, normal inspection


Cardiovascular:  normal peripheral pulses, normal rate, regular rhythm, no 

gallop/murmur, no JVD


Respiratory/Chest:  Venturi mask; chest wall non-tender, respiratory distress, 

crackles/rales, rhonchi - bilaterally, expiratory wheezing


Abdomen:  normal bowel sounds, non tender, soft, no organomegaly, no mass


Extremities:  normal range of motion, non-tender


Neurologic:  CNs II-XII grossly normal, no motor/sensory deficits


Skin:  normal pigmentation, warm/dry





Assessment/Plan


Problem List:  


(1) UTI (urinary tract infection)


Assessment & Plan:  Vanco res enterococcus.  Cont zyvox per ID





(2) Sepsis


Assessment & Plan:  ID= Dr Brownlee.  Continue IV bactrim, micafungin and 

minocycline





(3) Hypernatremia


Assessment & Plan:  Nephrology=DR Dos Santos





(4) Severe dehydration


(5) ARF (acute renal failure)


Assessment & Plan:  Nephrology = Dr Dos Santos





(6) Pneumonia


Assessment & Plan:  Acenitobacter.  Cont minocycline, linezolid and IV bactrim 

per ID=Dr Brownlee; pulmonary = Dr Brambila





(7) Hyperkalemia











Wing Hebert MD Sep 15, 2020 17:52

## 2020-09-15 NOTE — SURGERY PROGRESS NOTE
Surgery Progress Note


Subjective


Additional Comments


stable


labs noted


exam stable





Objective





Last 24 Hour Vital Signs








  Date Time  Temp Pulse Resp B/P (MAP) Pulse Ox O2 Delivery O2 Flow Rate FiO2


 


9/15/20 16:00 98.4 98 18 115/62 (79) 95   


 


9/15/20 12:00 98.2 102 18 130/76 (94) 96   


 


9/15/20 09:00      Room Air  


 


9/15/20 08:26     97 Room Air  21


 


9/15/20 08:00 97.9 100 18 126/72 (90) 97   


 


9/15/20 04:00 98.2 105 20 125/72 (89) 96   


 


9/15/20 00:00 97.9 106 22 120/60 (80) 97   


 


9/14/20 20:00 97.7 102 20 113/57 (75) 93   








I&O











Intake and Output  


 


 9/14/20 9/15/20





 19:00 07:00


 


Intake Total 110 ml 1170 ml


 


Output Total 500 ml 600 ml


 


Balance -390 ml 570 ml


 


  


 


Intake Free Water  200 ml


 


IV Total 75 ml 900 ml


 


Tube Feeding 35 ml 70 ml


 


Output Urine Total 500 ml 600 ml


 


# Bowel Movements  1








Dressing:  other


Wound:  other


Cardiovascular:  RSR


Respiratory:  decreased breath sounds


Abdomen:  soft, non-tender, present bowel sounds


Extremities:  no tenderness, no cyanosis





Laboratory Tests








Test


 9/15/20


05:20


 


White Blood Count


 12.3 K/UL


(4.8-10.8)  H


 


Red Blood Count


 3.77 M/UL


(4.20-5.40)  L


 


Hemoglobin


 12.2 G/DL


(12.0-16.0)


 


Hematocrit


 39.3 %


(37.0-47.0)


 


Mean Corpuscular Volume


 104 FL (80-99)


H


 


Mean Corpuscular Hemoglobin


 32.3 PG


(27.0-31.0)  H


 


Mean Corpuscular Hemoglobin


Concent 31.0 G/DL


(32.0-36.0)  L


 


Red Cell Distribution Width


 18.9 %


(11.6-14.8)  H


 


Platelet Count


 171 K/UL


(150-450)


 


Mean Platelet Volume


 6.9 FL


(6.5-10.1)


 


Neutrophils (%) (Auto)


 59.1 %


(45.0-75.0)


 


Lymphocytes (%) (Auto)


 31.8 %


(20.0-45.0)


 


Monocytes (%) (Auto)


 7.4 %


(1.0-10.0)


 


Eosinophils (%) (Auto)


 1.3 %


(0.0-3.0)


 


Basophils (%) (Auto)


 0.4 %


(0.0-2.0)


 


Sodium Level


 150 MMOL/L


(136-145)  H


 


Potassium Level


 4.8 MMOL/L


(3.5-5.1)


 


Chloride Level


 110 MMOL/L


()  H


 


Carbon Dioxide Level


 30 MMOL/L


(21-32)


 


Anion Gap


 10 mmol/L


(5-15)


 


Blood Urea Nitrogen


 37 mg/dL


(7-18)  H


 


Creatinine


 1.6 MG/DL


(0.55-1.30)  H


 


Estimat Glomerular Filtration


Rate 31.7 mL/min


(>60)


 


Glucose Level


 105 MG/DL


()


 


Calcium Level


 9.9 MG/DL


(8.5-10.1)


 


Phosphorus Level


 4.1 MG/DL


(2.5-4.9)


 


Magnesium Level


 2.3 MG/DL


(1.8-2.4)


 


Total Bilirubin


 0.6 MG/DL


(0.2-1.0)


 


Aspartate Amino Transf


(AST/SGOT) 17 U/L (15-37)





 


Alanine Aminotransferase


(ALT/SGPT) 20 U/L (12-78)





 


Alkaline Phosphatase


 119 U/L


()  H


 


Total Protein


 7.6 G/DL


(6.4-8.2)


 


Albumin


 2.7 G/DL


(3.4-5.0)  L


 


Globulin 4.9 g/dL  


 


Albumin/Globulin Ratio


 0.6 (1.0-2.7)


L











Plan


Problems:  


(1) Anemia


(2) 2019 novel coronavirus disease (COVID-19)


(3) Pneumonia


(4) Multiple drug resistant organism (MDRO) culture positive


(5) Hyperkalemia


(6) Hypernatremia


(7) Severe dehydration


(8) Acute urinary retention


(9) Sepsis


Assessment & Plan:  Pt presented on admission with multiple Pressure injuries. 

Pt noted to have band of  Blotchy red rash  with open and closed Blisters 

affecting  R Brachial to R forearm. Lateral R back /R flank is also blotchy 

erythematous with clusters of serous filled blisters. On Lateral L Back to L 

flank is Blotchy, erythematous with clusters of serous filled blisters.  


Reabsorbing DTPI that is partially opened Sacrum(L) 6cm x (W)5.5cm.  Base of 

wound is Maroon with with scattered areas that are maryellen and loose peeling soft 

black cap.Surrounding Non-Blanching erythema without induration.


Non-Blanching erythema that is indurated with delineated margins R trochanter(L)

5.5cm x (W)6.5cm.


No evidence of skin breakdown L trochanter.


Non-Blanching erythema noted to R and L ischial tuberosities.


DTPI medial R heel (L)3.3cm x (W)3.4cm. Base of injury is, fluctuant, maroon 

with delineated margins. surrounding red,  boggy heel.


DTPI Lateral R Heel(L)1.5cm x (W)2.5cm. Base of injury is indurated, maroon 

with purpuric center.


Non-Blanching erythema without induration /fluctuance lateral R malleolus(L)2cm 

x (W)1.5cm.





Tx.Plan:


Apply Moisture Barrier Paste to Sacrum. Cover with Optifoam drsg. Change every 

3 days and prn.


           


Apply Cavilon Skin Barrier to R and L trochanter. Cover each site with Optifoam 

drsgs. Change every 7 days and prn.


           


Apply Moisture Barrier Paste to R and L Ischial tuberosities with each 

Incontinence care.


           


Apply Cavilon Skin Barrier to Medial and Lateral R Heel. Cover with Optifoam 

drsg. Change every7 days and prn.


           


Apply Cavilon Skin Barrier to R lateral ,and medial Malleoli. Cover each site 

with Optifoam drsgs. Change every 7 days and prn.


           


Apply Cavilon Skin Barrier to L Heel and Malleoli. Cover each site with 

Optifoam drsgs. Change every 7 days and prn.


           


Cover open blisters as needed with Optifoam drsgs. 


           


Reposition at least every 2 hours or as tolerated.


           


Off-load heels with pillow.


           


Nutritional optimization





DAILY ESTIMATED NEEDS:


Needs based on Sepsis, renal, wound 50.9kg  


25-35  kcals/kg 


6912-4306  total kcals


1.25-1.5  g protein/kg


64-76  g total protein 


25-30  mL/kg


0144-4293  total fluid mLs





NUTRITION DIAGNOSIS:


1. Swallowing difficulty r/t dysphagia as evidenced by pt is GT dep.


2. Altered nutrition related lab values r/t sepsis and severe dehydration,


pre-diabetes as evidenced by Elev WBC (31.6 ->18.2), elev Na (179-> WNL),


elev BUN (131->48), elev Creat (5.1->2.2), elev K (5.5-> wnl->5.5, 5.3),


elev BGs (93, 113, 185), A1C of 6.4.





CURRENT TF: Nepro @35  








ENTERAL NUTRITION RECOMMENDATIONS:


LOWER GOAL RATE -> NEPRO @ 35ML/HR X 24 HRS  to provide 840ml, 1512kcal, 68g 

prot, 610ml free water 





- LOWER GOAL RATE: meets 100% est kcal/prot needs


- Flush per MD, HOB over 30 degrees.








ADDITIONAL RECOMMENDATIONS:


1) Monitor lytes and renal status-> TF change to Nepro (8/28), elev K 


2) Per SNF wt of 112# 


3) NISS for BG control: A1C 6.4, TF at goal + added D5+ Decadron 


4) Wound healing: TF @ goal provides 100% RDI 


    add Vit C 500mg QD + José Manuel BID via GT  





micro reviewed


covid negative


c diff negative


bc ngtd 


wounds do not seem to be etiology of infection 





(10) UTI (urinary tract infection)


(11) ARF (acute renal failure)


(12) Psychosis











BeanAlex otero Sep 15, 2020 18:27

## 2020-09-16 VITALS — DIASTOLIC BLOOD PRESSURE: 73 MMHG | SYSTOLIC BLOOD PRESSURE: 122 MMHG

## 2020-09-16 VITALS — SYSTOLIC BLOOD PRESSURE: 126 MMHG | DIASTOLIC BLOOD PRESSURE: 71 MMHG

## 2020-09-16 VITALS — SYSTOLIC BLOOD PRESSURE: 114 MMHG | DIASTOLIC BLOOD PRESSURE: 69 MMHG

## 2020-09-16 VITALS — SYSTOLIC BLOOD PRESSURE: 108 MMHG | DIASTOLIC BLOOD PRESSURE: 60 MMHG

## 2020-09-16 VITALS — SYSTOLIC BLOOD PRESSURE: 116 MMHG | DIASTOLIC BLOOD PRESSURE: 71 MMHG

## 2020-09-16 VITALS — DIASTOLIC BLOOD PRESSURE: 69 MMHG | SYSTOLIC BLOOD PRESSURE: 118 MMHG

## 2020-09-16 LAB
ADD MANUAL DIFF: NO
ANION GAP SERPL CALC-SCNC: 11 MMOL/L (ref 5–15)
BASOPHILS NFR BLD AUTO: 1.2 % (ref 0–2)
BUN SERPL-MCNC: 36 MG/DL (ref 7–18)
CALCIUM SERPL-MCNC: 9.5 MG/DL (ref 8.5–10.1)
CHLORIDE SERPL-SCNC: 106 MMOL/L (ref 98–107)
CO2 SERPL-SCNC: 26 MMOL/L (ref 21–32)
CREAT SERPL-MCNC: 1.5 MG/DL (ref 0.55–1.3)
EOSINOPHIL NFR BLD AUTO: 1.9 % (ref 0–3)
ERYTHROCYTE [DISTWIDTH] IN BLOOD BY AUTOMATED COUNT: 17.7 % (ref 11.6–14.8)
HCT VFR BLD CALC: 38.5 % (ref 37–47)
HGB BLD-MCNC: 12.3 G/DL (ref 12–16)
LYMPHOCYTES NFR BLD AUTO: 29.7 % (ref 20–45)
MCV RBC AUTO: 102 FL (ref 80–99)
MONOCYTES NFR BLD AUTO: 5.8 % (ref 1–10)
NEUTROPHILS NFR BLD AUTO: 61.4 % (ref 45–75)
PLATELET # BLD: 169 K/UL (ref 150–450)
POTASSIUM SERPL-SCNC: 4.3 MMOL/L (ref 3.5–5.1)
RBC # BLD AUTO: 3.79 M/UL (ref 4.2–5.4)
SODIUM SERPL-SCNC: 143 MMOL/L (ref 136–145)
WBC # BLD AUTO: 11 K/UL (ref 4.8–10.8)

## 2020-09-16 RX ADMIN — Medication SCH MG: at 09:48

## 2020-09-16 RX ADMIN — NYSTATIN SCH APPLIC: 100000 POWDER TOPICAL at 18:09

## 2020-09-16 RX ADMIN — DOCUSATE SODIUM SCH MG: 50 LIQUID ORAL at 21:02

## 2020-09-16 RX ADMIN — POLYETHYLENE GLYCOL 3350 SCH GM: 17 POWDER, FOR SOLUTION ORAL at 20:44

## 2020-09-16 RX ADMIN — DOCUSATE SODIUM SCH MG: 50 LIQUID ORAL at 14:05

## 2020-09-16 RX ADMIN — PANTOPRAZOLE SODIUM SCH MG: 40 INJECTION, POWDER, FOR SOLUTION INTRAVENOUS at 09:48

## 2020-09-16 RX ADMIN — NYSTATIN SCH APPLIC: 100000 POWDER TOPICAL at 14:05

## 2020-09-16 RX ADMIN — NYSTATIN SCH APPLIC: 100000 POWDER TOPICAL at 09:51

## 2020-09-16 RX ADMIN — DOCUSATE SODIUM SCH MG: 50 LIQUID ORAL at 05:28

## 2020-09-16 RX ADMIN — PANTOPRAZOLE SODIUM SCH MG: 40 INJECTION, POWDER, FOR SOLUTION INTRAVENOUS at 20:44

## 2020-09-16 RX ADMIN — ENOXAPARIN SODIUM SCH MG: 40 INJECTION SUBCUTANEOUS at 09:50

## 2020-09-16 NOTE — PULMONOLOGY PROGRESS NOTE
Subjective


ROS Limited/Unobtainable:  Yes


Constitutional:  Reports: no symptoms


HEENT:  Repors: no symptoms


Respiratory:  Reports: no symptoms


Cardiovascular:  Reports: no symptoms


Allergies:  


Coded Allergies:  


     AMPICILLIN (Verified  Allergy, Unknown, hives, 8/21/20)


     PENICILLINS (Verified  Allergy, Unknown, hives, 8/21/20)


     TETRACYCLINES (Verified  Allergy, Unknown, hives, 8/21/20)





Objective





Last 24 Hour Vital Signs








  Date Time  Temp Pulse Resp B/P (MAP) Pulse Ox O2 Delivery O2 Flow Rate FiO2


 


9/16/20 16:00 98.2 105 19 122/73 (89) 96   


 


9/16/20 12:00 98.2 98 17 114/69 (84) 95   


 


9/16/20 09:00      Room Air  


 


9/16/20 08:52     96 Room Air  21


 


9/16/20 08:00 97.4 99 18 108/60 (76) 96   


 


9/16/20 04:00 98.1 99 20 118/69 (85) 97   


 


9/16/20 00:00 97.7 98 20 116/71 (86) 98   


 


9/15/20 21:00      Room Air  


 


9/15/20 20:00 98.2 96 20 123/76 (92) 93   


 


9/15/20 19:01     97 Room Air  21

















Intake and Output  


 


 9/15/20 9/16/20





 19:00 07:00


 


Intake Total 970 ml 1575 ml


 


Output Total 1120 ml 


 


Balance -150 ml 1575 ml


 


  


 


Intake Free Water  400 ml


 


IV Total 900 ml 825 ml


 


Tube Feeding 70 ml 350 ml


 


Output Urine Total 1120 ml 








General Appearance:  no acute distress, other - chronically ill looking 

bedridden female


HEENT:  normocephalic, atraumatic, other - VM


Respiratory:  chest wall non-tender, rhonchi - bilaterally - scattered


Cardiovascular:  normal peripheral pulses, normal rate - SR on tele


Abdomen:  normal bowel sounds, soft, non tender


Genitourinary:  normal external genitalia


Skin:  other - BUE crusting lesions


Neurologic:  CNs II-XII grossly normal, abnormal gait - bedridden


Lymphatic:  no neck adenopathy





Microbiology








 Date/Time


Source Procedure


Growth Status





 


 9/14/20 16:45


Blood Blood Culture - Preliminary


NO GROWTH AFTER 24 HOURS Resulted


 


 9/14/20 16:20


Blood Blood Culture - Preliminary


NO GROWTH AFTER 24 HOURS Resulted








Laboratory Tests


9/16/20 05:10: 


White Blood Count 11.0H, Red Blood Count 3.79L, Hemoglobin 12.3, Hematocrit 

38.5, Mean Corpuscular Volume 102H, Mean Corpuscular Hemoglobin 32.5H, Mean 

Corpuscular Hemoglobin Concent 32.0, Red Cell Distribution Width 17.7H, Platelet

Count 169, Mean Platelet Volume 6.4L, Neutrophils (%) (Auto) 61.4, Lymphocytes 

(%) (Auto) 29.7, Monocytes (%) (Auto) 5.8, Eosinophils (%) (Auto) 1.9, Basophils

(%) (Auto) 1.2, Sodium Level 143, Potassium Level 4.3, Chloride Level 106, 

Carbon Dioxide Level 26, Anion Gap 11, Blood Urea Nitrogen 36H, Creatinine 1.5H,

Estimat Glomerular Filtration Rate 34.1, Glucose Level 99, Calcium Level 9.5





Current Medications








 Medications


  (Trade)  Dose


 Ordered  Sig/Raji


 Route


 PRN Reason  Start Time


 Stop Time Status Last Admin


Dose Admin


 


 Acetaminophen


  (Tylenol)  650 mg  Q6H  PRN


 GT


 Mild Pain (Pain Scale 1-3)  9/8/20 06:00


 10/4/20 05:59   





 


 Ascorbic Acid


  (Vitamin C)  500 mg  DAILY


 GT


   9/8/20 09:00


 10/4/20 08:59  9/16/20 09:48





 


 Clonidine HCl


  (Catapres Tab)  0.1 mg  Q4H  PRN


 GT


 bp over 160 syst  9/8/20 07:45


 12/3/20 19:40   





 


 Dextrose  1,000 ml @ 


 75 mls/hr  [id number]


 IV


   9/8/20 06:00


 10/4/20 19:40  9/16/20 05:20





 


 Docusate Sodium


  (Colace)  100 mg  EVERY 8  HOURS


 GT


   9/8/20 06:00


 9/20/20 08:59  9/16/20 14:05





 


 Enoxaparin Sodium


  (Lovenox)  40 mg  DAILY


 SUBQ


   9/8/20 09:00


 11/19/20 08:59  9/16/20 09:50





 


 Epoetin Jaswant


  (Epoetin


 Jaswant-EPBX(NON


 ESRD))  8,000 unit  MON-WED-FRI


 SUBQ


   9/16/20 21:00


 12/15/20 20:59   





 


 Nystatin


  (Nystop Powder)  1 applic  THREE TIMES A  DAY


 TOPIC


   9/10/20 09:00


 12/8/20 18:14  9/16/20 14:05





 


 Pantoprazole


  (Protonix)  40 mg  EVERY 12  HOURS


 IVP


   9/8/20 09:00


 9/20/20 20:59  9/16/20 09:48





 


 Polyethylene


 Glycol


  (Miralax)  17 gm  BEDTIME


 GT


   9/8/20 21:00


 9/20/20 20:59  9/15/20 21:48





 


 Voriconazole


  (Vfend)  200 mg  Q12HR


 GT


   9/14/20 18:00


 9/21/20 17:59  9/16/20 09:48














Assessment/Plan


Problems:  


(1) Sepsis


(2) Fungemia


(3) 2019 novel coronavirus disease (COVID-19)


(4) Multiple drug resistant organism (MDRO) culture positive


(5) ARF (acute renal failure)


(6) Severe dehydration


(7) Hypernatremia


(8) Psychosis


(9) Pacemaker


Assessment/Plan


all reviwed


WBC still high


CXR: Impression: No acute process


ESR and CRP reviewed





COVID positive





f/u electrolytes


renal studies


urine electrolytes


dvt prophylaxis











Live Brambila MD              Sep 16, 2020 17:43

## 2020-09-16 NOTE — INTERNAL MED PROGRESS NOTE
Subjective


Date of Service:  Sep 16, 2020


Physician Name


Wing Hebert


Attending Physician


Umer Wahl MD





Current Medications








 Medications


  (Trade)  Dose


 Ordered  Sig/Raji


 Route


 PRN Reason  Start Time


 Stop Time Status Last Admin


Dose Admin


 


 Acetaminophen


  (Tylenol)  650 mg  Q6H  PRN


 GT


 Mild Pain (Pain Scale 1-3)  9/8/20 06:00


 10/4/20 05:59   





 


 Ascorbic Acid


  (Vitamin C)  500 mg  DAILY


 GT


   9/8/20 09:00


 10/4/20 08:59  9/16/20 09:48





 


 Clonidine HCl


  (Catapres Tab)  0.1 mg  Q4H  PRN


 GT


 bp over 160 syst  9/8/20 07:45


 12/3/20 19:40   





 


 Dextrose  1,000 ml @ 


 75 mls/hr  [id number]


 IV


   9/8/20 06:00


 10/4/20 19:40  9/16/20 05:20





 


 Docusate Sodium


  (Colace)  100 mg  EVERY 8  HOURS


 GT


   9/8/20 06:00


 9/20/20 08:59  9/16/20 05:28





 


 Enoxaparin Sodium


  (Lovenox)  40 mg  DAILY


 SUBQ


   9/8/20 09:00


 11/19/20 08:59  9/16/20 09:50





 


 Epoetin Jaswant


  (Epoetin


 Jaswant-EPBX(NON


 ESRD))  8,000 unit  MON-WED-FRI


 SUBQ


   9/16/20 21:00


 12/15/20 20:59   





 


 Nystatin


  (Nystop Powder)  1 applic  THREE TIMES A  DAY


 TOPIC


   9/10/20 09:00


 12/8/20 18:14  9/16/20 09:51





 


 Pantoprazole


  (Protonix)  40 mg  EVERY 12  HOURS


 IVP


   9/8/20 09:00


 9/20/20 20:59  9/16/20 09:48





 


 Polyethylene


 Glycol


  (Miralax)  17 gm  BEDTIME


 GT


   9/8/20 21:00


 9/20/20 20:59  9/15/20 21:48





 


 Voriconazole


  (Vfend)  200 mg  Q12HR


 GT


   9/14/20 18:00


 9/21/20 17:59  9/16/20 09:48











Allergies:  


Coded Allergies:  


     AMPICILLIN (Verified  Allergy, Unknown, hives, 8/21/20)


     PENICILLINS (Verified  Allergy, Unknown, hives, 8/21/20)


     TETRACYCLINES (Verified  Allergy, Unknown, hives, 8/21/20)


ROS Limited/Unobtainable:  Yes


Subjective


69 YO F with previous COVID 19 pos admitted with shortness of breath.  Now 

pneumonia and sepsis.  Cover for Int Med-DR Wahl.





Objective





Last Vital Signs








  Date Time  Temp Pulse Resp B/P (MAP) Pulse Ox O2 Delivery O2 Flow Rate FiO2


 


9/16/20 09:00      Room Air  


 


9/16/20 08:52     96   21


 


9/16/20 08:00 97.4 99 18 108/60 (76)    


 


9/14/20 07:00       2.0 











Laboratory Tests








Test


 9/16/20


05:10


 


White Blood Count


 11.0 K/UL


(4.8-10.8)  H


 


Red Blood Count


 3.79 M/UL


(4.20-5.40)  L


 


Hemoglobin


 12.3 G/DL


(12.0-16.0)


 


Hematocrit


 38.5 %


(37.0-47.0)


 


Mean Corpuscular Volume


 102 FL (80-99)


H


 


Mean Corpuscular Hemoglobin


 32.5 PG


(27.0-31.0)  H


 


Mean Corpuscular Hemoglobin


Concent 32.0 G/DL


(32.0-36.0)


 


Red Cell Distribution Width


 17.7 %


(11.6-14.8)  H


 


Platelet Count


 169 K/UL


(150-450)


 


Mean Platelet Volume


 6.4 FL


(6.5-10.1)  L


 


Neutrophils (%) (Auto)


 61.4 %


(45.0-75.0)


 


Lymphocytes (%) (Auto)


 29.7 %


(20.0-45.0)


 


Monocytes (%) (Auto)


 5.8 %


(1.0-10.0)


 


Eosinophils (%) (Auto)


 1.9 %


(0.0-3.0)


 


Basophils (%) (Auto)


 1.2 %


(0.0-2.0)


 


Sodium Level


 143 MMOL/L


(136-145)


 


Potassium Level


 4.3 MMOL/L


(3.5-5.1)


 


Chloride Level


 106 MMOL/L


()


 


Carbon Dioxide Level


 26 MMOL/L


(21-32)


 


Anion Gap


 11 mmol/L


(5-15)


 


Blood Urea Nitrogen


 36 mg/dL


(7-18)  H


 


Creatinine


 1.5 MG/DL


(0.55-1.30)  H


 


Estimat Glomerular Filtration


Rate 34.1 mL/min


(>60)


 


Glucose Level


 99 MG/DL


()


 


Calcium Level


 9.5 MG/DL


(8.5-10.1)











Microbiology








 Date/Time


Source Procedure


Growth Status





 


 9/14/20 16:45


Blood Blood Culture - Preliminary


NO GROWTH AFTER 24 HOURS Resulted


 


 9/14/20 16:20


Blood Blood Culture - Preliminary


NO GROWTH AFTER 24 HOURS Resulted

















Intake and Output  


 


 9/15/20 9/16/20





 19:00 07:00


 


Intake Total 970 ml 1575 ml


 


Output Total 1120 ml 


 


Balance -150 ml 1575 ml


 


  


 


Intake Free Water  400 ml


 


IV Total 900 ml 825 ml


 


Tube Feeding 70 ml 350 ml


 


Output Urine Total 1120 ml 








Objective


General Appearance:  WD/WN, no apparent distress, lethargic


EENT:  PERRL/EOMI, normal ENT inspection


Neck:  non-tender, normal alignment, supple, normal inspection


Cardiovascular:  normal peripheral pulses, normal rate, regular rhythm, no gall

op/murmur, no JVD


Respiratory/Chest:  Venturi mask; chest wall non-tender, respiratory distress, 

crackles/rales, rhonchi - bilaterally, expiratory wheezing


Abdomen:  normal bowel sounds, non tender, soft, no organomegaly, no mass


Extremities:  normal range of motion, non-tender


Neurologic:  CNs II-XII grossly normal, no motor/sensory deficits


Skin:  normal pigmentation, warm/dry





Assessment/Plan


Problem List:  


(1) UTI (urinary tract infection)


Assessment & Plan:  Vanco res enterococcus.  Cont zyvox per ID





(2) Sepsis


Assessment & Plan:  ID= Dr Brownlee.  Continue IV bactrim, micafungin and 

minocycline





(3) Hypernatremia


Assessment & Plan:  Nephrology=DR Dos Santos





(4) Severe dehydration


(5) ARF (acute renal failure)


Assessment & Plan:  Nephrology = Dr Dos Santos





(6) Pneumonia


Assessment & Plan:  Acenitobacter.  Cont minocycline, linezolid and IV bactrim 

per ID=Dr Brownlee; pulmonary = Dr Brambila





(7) Hyperkalemia











Wing Hebert MD               Sep 16, 2020 12:59

## 2020-09-16 NOTE — SURGERY PROGRESS NOTE
Surgery Progress Note


Subjective


Symptoms:  improved, tolerating diet





Objective





Last 24 Hour Vital Signs








  Date Time  Temp Pulse Resp B/P (MAP) Pulse Ox O2 Delivery O2 Flow Rate FiO2


 


9/16/20 12:00 98.2 98 17 114/69 (84) 95   


 


9/16/20 09:00      Room Air  


 


9/16/20 08:52     96 Room Air  21


 


9/16/20 08:00 97.4 99 18 108/60 (76) 96   


 


9/16/20 04:00 98.1 99 20 118/69 (85) 97   


 


9/16/20 00:00 97.7 98 20 116/71 (86) 98   


 


9/15/20 21:00      Room Air  


 


9/15/20 20:00 98.2 96 20 123/76 (92) 93   


 


9/15/20 19:01     97 Room Air  21


 


9/15/20 16:00 98.4 98 18 115/62 (79) 95   








I&O











Intake and Output  


 


 9/15/20 9/16/20





 19:00 07:00


 


Intake Total 970 ml 1575 ml


 


Output Total 1120 ml 


 


Balance -150 ml 1575 ml


 


  


 


Intake Free Water  400 ml


 


IV Total 900 ml 825 ml


 


Tube Feeding 70 ml 350 ml


 


Output Urine Total 1120 ml 








Dressing:  saturated


Cardiovascular:  RSR


Respiratory:  clear, decreased breath sounds


Abdomen:  non-tender, present bowel sounds


Extremities:  no edema, no tenderness





Laboratory Tests








Test


 9/16/20


05:10


 


White Blood Count


 11.0 K/UL


(4.8-10.8)  H


 


Red Blood Count


 3.79 M/UL


(4.20-5.40)  L


 


Hemoglobin


 12.3 G/DL


(12.0-16.0)


 


Hematocrit


 38.5 %


(37.0-47.0)


 


Mean Corpuscular Volume


 102 FL (80-99)


H


 


Mean Corpuscular Hemoglobin


 32.5 PG


(27.0-31.0)  H


 


Mean Corpuscular Hemoglobin


Concent 32.0 G/DL


(32.0-36.0)


 


Red Cell Distribution Width


 17.7 %


(11.6-14.8)  H


 


Platelet Count


 169 K/UL


(150-450)


 


Mean Platelet Volume


 6.4 FL


(6.5-10.1)  L


 


Neutrophils (%) (Auto)


 61.4 %


(45.0-75.0)


 


Lymphocytes (%) (Auto)


 29.7 %


(20.0-45.0)


 


Monocytes (%) (Auto)


 5.8 %


(1.0-10.0)


 


Eosinophils (%) (Auto)


 1.9 %


(0.0-3.0)


 


Basophils (%) (Auto)


 1.2 %


(0.0-2.0)


 


Sodium Level


 143 MMOL/L


(136-145)


 


Potassium Level


 4.3 MMOL/L


(3.5-5.1)


 


Chloride Level


 106 MMOL/L


()


 


Carbon Dioxide Level


 26 MMOL/L


(21-32)


 


Anion Gap


 11 mmol/L


(5-15)


 


Blood Urea Nitrogen


 36 mg/dL


(7-18)  H


 


Creatinine


 1.5 MG/DL


(0.55-1.30)  H


 


Estimat Glomerular Filtration


Rate 34.1 mL/min


(>60)


 


Glucose Level


 99 MG/DL


()


 


Calcium Level


 9.5 MG/DL


(8.5-10.1)











Plan


Problems:  


(1) Anemia


(2) 2019 novel coronavirus disease (COVID-19)


(3) Pneumonia


(4) Multiple drug resistant organism (MDRO) culture positive


(5) Hyperkalemia


(6) Hypernatremia


(7) Severe dehydration


(8) Acute urinary retention


(9) Sepsis


Assessment & Plan:  Pt presented on admission with multiple Pressure injuries. 

Pt noted to have band of  Blotchy red rash  with open and closed Blisters 

affecting  R Brachial to R forearm. Lateral R back /R flank is also blotchy 

erythematous with clusters of serous filled blisters. On Lateral L Back to L 

flank is Blotchy, erythematous with clusters of serous filled blisters.  


Reabsorbing DTPI that is partially opened Sacrum(L) 6cm x (W)5.5cm.  Base of 

wound is Maroon with with scattered areas that are maryellen and loose peeling soft 

black cap.Surrounding Non-Blanching erythema without induration.


Non-Blanching erythema that is indurated with delineated margins R 

trochanter(L)5.5cm x (W)6.5cm.


No evidence of skin breakdown L trochanter.


Non-Blanching erythema noted to R and L ischial tuberosities.


DTPI medial R heel (L)3.3cm x (W)3.4cm. Base of injury is, fluctuant, maroon 

with delineated margins. surrounding red,  boggy heel.


DTPI Lateral R Heel(L)1.5cm x (W)2.5cm. Base of injury is indurated, maroon with

purpuric center.


Non-Blanching erythema without induration /fluctuance lateral R malleolus(L)2cm 

x (W)1.5cm.





Tx.Plan:


Apply Moisture Barrier Paste to Sacrum. Cover with Optifoam drsg. Change every 3

days and prn.


           


Apply Cavilon Skin Barrier to R and L trochanter. Cover each site with Optifoam 

drsgs. Change every 7 days and prn.


           


Apply Moisture Barrier Paste to R and L Ischial tuberosities with each 

Incontinence care.


           


Apply Cavilon Skin Barrier to Medial and Lateral R Heel. Cover with Optifoam 

drsg. Change every7 days and prn.


           


Apply Cavilon Skin Barrier to R lateral ,and medial Malleoli. Cover each site 

with Optifoam drsgs. Change every 7 days and prn.


           


Apply Cavilon Skin Barrier to L Heel and Malleoli. Cover each site with Optifoam

drsgs. Change every 7 days and prn.


           


Cover open blisters as needed with Optifoam drsgs. 


           


Reposition at least every 2 hours or as tolerated.


           


Off-load heels with pillow.


           


Nutritional optimization





DAILY ESTIMATED NEEDS:


Needs based on Sepsis, renal, wound 50.9kg  


25-35  kcals/kg 


[id number]  total kcals


1.25-1.5  g protein/kg


64-76  g total protein 


25-30  mL/kg


7162-6781  total fluid mLs





NUTRITION DIAGNOSIS:


1. Swallowing difficulty r/t dysphagia as evidenced by pt is GT dep.


2. Altered nutrition related lab values r/t sepsis and severe dehydration,


pre-diabetes as evidenced by Elev WBC (31.6 ->18.2), elev Na (179-> WNL),


elev BUN (131->48), elev Creat (5.1->2.2), elev K (5.5-> wnl->5.5, 5.3),


elev BGs (93, 113, 185), A1C of 6.4.





CURRENT TF: Nepro @35  








ENTERAL NUTRITION RECOMMENDATIONS:


LOWER GOAL RATE -> NEPRO @ 35ML/HR X 24 HRS  to provide 840ml, 1512kcal, 68g 

prot, 610ml free water 





- LOWER GOAL RATE: meets 100% est kcal/prot needs


- Flush per MD, HOB over 30 degrees.








ADDITIONAL RECOMMENDATIONS:


1) Monitor lytes and renal status-> TF change to Nepro (8/28), elev K 


2) Per SNF wt of 112# 


3) NISS for BG control: A1C 6.4, TF at goal + added D5+ Decadron 


4) Wound healing: TF @ goal provides 100% RDI 


    add Vit C 500mg QD + José Manuel BID via GT  





micro reviewed


covid negative


c diff negative


bc ngtd 


wounds do not seem to be etiology of infection 





(10) UTI (urinary tract infection)


(11) ARF (acute renal failure)


(12) Psychosis











Alex Nicholas                Sep 16, 2020 14:30

## 2020-09-16 NOTE — INFECTIOUS DISEASES PROG NOTE
Assessment/Plan


69yo F with:





Sepsis


Fever; SP


Leukocytosis; (sp steroids)- Recurrent; worsened, now improvnig


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


      -9/14 Bcx NTD


      -9/11 BCx 1/4 yeast


      -9/9 2d echo: no discrete vegetations


     -9/6 Bcx Neg


    -9/3 u/a neg


          Bcx 1/4 C. albicas





JASWINDER to 5.1, improving 


Hypoxic resp failure, sp NRB mask >VM> 4l NC 9/1> RA


VRE UTI


Hx of COVID-19 1 mo ago ( doubt recurrent COVID-19) 


HAP


          9/13 sp cx p


          9/11 CXR:   There are increased lung markings in the left lower lobe s

uspicious for early developing infiltrate.  Progress films are recommended.


           9/9 CXR: No acute process


            9/3 sp cx yeast


            9/1 rapid COVID PCR neg; 9/3 rapid COVID PCR neg


   8/29 CXR:   Improving left lower lobe infiltrate and left pleural effusion. 

There is a probable small right pleural effusion.


   8/27 cdiff neg


           8/26 CXR: There are increased interstitial congestion and left 

pleural effusion,since prior exam of 8/22/2020


               Bcx NTD


            8/22 sp cx MDR ABC (S bactrim; I Colistin, Polymixin B, Minocycline)


   8/21 BCx NTD


   8/21 UA+, UCx >100k VRE (S Zyvox)


   8/21 CXR: Left retrocardiac mild atelectasis versus infiltrate.


   8/21 COVID rapid neg; 8/22 repeat COVID-19 +








BL arm rash, appears evolving, now small pustules with dry/crusting, querry 

resolving Shingles? Though odd to have on both arms


   8/21 HSV & VZV PCR ordered





9/16 - pt is on PPM





Plan:





PO Voriconazole #3 (abx d #10)


      -if not improving, will switch to IV AMphotercin


       --requested micro lab to ID and sensitivities for yeast on 9/11 Bcx


       --f/u repeat BCx x2


       --DOROTHEA to eval for endocarditis





       -9/14 SP Micafungin #9


       -9/10 SP Minocycline #17


       -9/9 Sp IV bactrim #16


       -8/29 SP ZYvox #7


       --8/24 SP Cefepime #4


       --8/23 SP IV Vancomcyin #4





F/u cx


Monitor CBC/BMP


Trend BUE rash


Dc covid isolation


f/u sp cx





Discussed with RN





Subjective


Allergies:  


Coded Allergies:  


     AMPICILLIN (Verified  Allergy, Unknown, hives, 8/21/20)


     PENICILLINS (Verified  Allergy, Unknown, hives, 8/21/20)


     TETRACYCLINES (Verified  Allergy, Unknown, hives, 8/21/20)


afebrile


wbc improving


repeat Bcx p





Objective





Last 24 Hour Vital Signs








  Date Time  Temp Pulse Resp B/P (MAP) Pulse Ox O2 Delivery O2 Flow Rate FiO2


 


9/16/20 09:00      Room Air  


 


9/16/20 08:52     96 Room Air  21


 


9/16/20 08:00 97.4 99 18 108/60 (76) 96   


 


9/16/20 04:00 98.1 99 20 118/69 (85) 97   


 


9/16/20 00:00 97.7 98 20 116/71 (86) 98   


 


9/15/20 21:00      Room Air  


 


9/15/20 20:00 98.2 96 20 123/76 (92) 93   


 


9/15/20 19:01     97 Room Air  21


 


9/15/20 16:00 98.4 98 18 115/62 (79) 95   


 


9/15/20 12:00 98.2 102 18 130/76 (94) 96   








Height (Feet):  5


Height (Inches):  2.00


Weight (Pounds):  123


General Appearance:  no acute distress, other - chronically ill looking 

bedridden female


HEENT:  normocephalic, atraumatic, other - VM


Respiratory:  chest wall non-tender, rhonchi - bilaterally - scattered


Cardiovascular:  normal peripheral pulses, normal rate


Abdomen:  normal bowel sounds, soft, non tender





Microbiology








 Date/Time


Source Procedure


Growth Status





 


 9/14/20 16:45


Blood Blood Culture - Preliminary


NO GROWTH AFTER 24 HOURS Resulted


 


 9/14/20 16:20


Blood Blood Culture - Preliminary


NO GROWTH AFTER 24 HOURS Resulted











Laboratory Tests








Test


 9/16/20


05:10


 


White Blood Count


 11.0 K/UL


(4.8-10.8)  H


 


Red Blood Count


 3.79 M/UL


(4.20-5.40)  L


 


Hemoglobin


 12.3 G/DL


(12.0-16.0)


 


Hematocrit


 38.5 %


(37.0-47.0)


 


Mean Corpuscular Volume


 102 FL (80-99)


H


 


Mean Corpuscular Hemoglobin


 32.5 PG


(27.0-31.0)  H


 


Mean Corpuscular Hemoglobin


Concent 32.0 G/DL


(32.0-36.0)


 


Red Cell Distribution Width


 17.7 %


(11.6-14.8)  H


 


Platelet Count


 169 K/UL


(150-450)


 


Mean Platelet Volume


 6.4 FL


(6.5-10.1)  L


 


Neutrophils (%) (Auto)


 61.4 %


(45.0-75.0)


 


Lymphocytes (%) (Auto)


 29.7 %


(20.0-45.0)


 


Monocytes (%) (Auto)


 5.8 %


(1.0-10.0)


 


Eosinophils (%) (Auto)


 1.9 %


(0.0-3.0)


 


Basophils (%) (Auto)


 1.2 %


(0.0-2.0)


 


Sodium Level


 143 MMOL/L


(136-145)


 


Potassium Level


 4.3 MMOL/L


(3.5-5.1)


 


Chloride Level


 106 MMOL/L


()


 


Carbon Dioxide Level


 26 MMOL/L


(21-32)


 


Anion Gap


 11 mmol/L


(5-15)


 


Blood Urea Nitrogen


 36 mg/dL


(7-18)  H


 


Creatinine


 1.5 MG/DL


(0.55-1.30)  H


 


Estimat Glomerular Filtration


Rate 34.1 mL/min


(>60)


 


Glucose Level


 99 MG/DL


()


 


Calcium Level


 9.5 MG/DL


(8.5-10.1)











Current Medications








 Medications


  (Trade)  Dose


 Ordered  Sig/Raji


 Route


 PRN Reason  Start Time


 Stop Time Status Last Admin


Dose Admin


 


 Acetaminophen


  (Tylenol)  650 mg  Q6H  PRN


 GT


 Mild Pain (Pain Scale 1-3)  9/8/20 06:00


 10/4/20 05:59   





 


 Ascorbic Acid


  (Vitamin C)  500 mg  DAILY


 GT


   9/8/20 09:00


 10/4/20 08:59  9/16/20 09:48





 


 Clonidine HCl


  (Catapres Tab)  0.1 mg  Q4H  PRN


 GT


 bp over 160 syst  9/8/20 07:45


 12/3/20 19:40   





 


 Dextrose  1,000 ml @ 


 75 mls/hr  [id number]


 IV


   9/8/20 06:00


 10/4/20 19:40  9/16/20 05:20





 


 Docusate Sodium


  (Colace)  100 mg  EVERY 8  HOURS


 GT


   9/8/20 06:00


 9/20/20 08:59  9/16/20 05:28





 


 Enoxaparin Sodium


  (Lovenox)  40 mg  DAILY


 SUBQ


   9/8/20 09:00


 11/19/20 08:59  9/16/20 09:50





 


 Epoetin Jaswant


  (Epoetin


 Jaswant-EPBX(NON


 ESRD))  8,000 unit  MON-WED-FRI


 SUBQ


   9/16/20 21:00


 12/15/20 20:59   





 


 Nystatin


  (Nystop Powder)  1 applic  THREE TIMES A  DAY


 TOPIC


   9/10/20 09:00


 12/8/20 18:14  9/16/20 09:51





 


 Pantoprazole


  (Protonix)  40 mg  EVERY 12  HOURS


 IVP


   9/8/20 09:00


 9/20/20 20:59  9/16/20 09:48





 


 Polyethylene


 Glycol


  (Miralax)  17 gm  BEDTIME


 GT


   9/8/20 21:00


 9/20/20 20:59  9/15/20 21:48





 


 Voriconazole


  (Vfend)  200 mg  Q12HR


 GT


   9/14/20 18:00


 9/21/20 17:59  9/16/20 09:48




















Tanisha Brownlee M.D.            Sep 16, 2020 12:05

## 2020-09-16 NOTE — NEPHROLOGY PROGRESS NOTE
Assessment/Plan


Problem List:  


(1) ARF (acute renal failure)


Assessment:  Underlying CKD





(2) Sepsis


(3) UTI (urinary tract infection)


(4) Severe dehydration


(5) Hypernatremia


(6) Acute urinary retention


(7) Anemia


Assessment





Acute renal failure


Possible underlying chronic renal insufficiency


Severe dehydration


Hypotension


Sepsis, UTI


History of psychiatric disease


Elevated troponin I


Patient full code


Plan


September 16: Lab reviewed.  Serum creatinine lower at 1.5.  Continue rest.


September 15: Labs reviewed.  Serum creatinine 1.6 stable.  Continue per 

consultants.


September 14.:  Chemistry panel still pending.  Patient appears stable from a 

renal standpoint of view.


September 13: Labs reviewed. Serum creatinine lower at 1.6.  Continue with same 

management.  


September 12: Status quo.  Lab reviewed.  Serum creatinine lower at 1.8.  

Patient remains full code.  Continue per consultants.


September 11: Lab reviewed.  Serum creatinine remains at 2 unchanged.  

Medication list reviewed.  Continue current management.


September 10: Labs reviewed.  Medication list reviewed.  Serum creatinine 

slightly higher at 2.  We will continue to monitor renal parameters and ID to 

avoid nephrotoxic antibiotics as possible.


September 9: Lab reviewed.  Serum creatinine stable at 1.9.  Continue per 

consultants.


September 8: Labs reviewed.  Serum creatinine stable.  Electrolytes within 

reasonable range.


September 7: Labs reviewed.  Remains stable from renal standpoint of view, as 

serum creatinine leveled off around 1.8 and 1.9.  Will continue to monitor renal

parameters


September 6: No chemistry panel done today.  Status quo.  Continue per 

consultants.


September 5: Status quo.  Labs reviewed.  Stable from renal standpoint of view.


September 4: Status quo.  Labs reviewed.  Serum creatinine unchanged.  Continue 

per consultants.


September 3:[place] [facility] [place]

viewed.  Electrolytes within normal limit.  Leukocytosis worsened.  Continue per

consultants.  Serum creatinine 1.8 unchanged.


September 2: Low magnesium and low phosphorus corrected.  Continue per 

consultants.  On nasal cannula now.  Serum creatinine 1.8.


September 1: Serum creatinine 1.8 stable.  Continue per pulmonary.  Continue to 

monitor renal parameters.


August 31: On Venturi mask.  Serum creatinine lowered to 1.8.  Continue per 

consultants.  Medication list reviewed.  Potassium supplement given.


August 30: Status quo.  Creatinine lower at 2.  Other electrolytes within normal

limits.  Continue per consultants.


August 29: Labs reviewed.  Creatinine 2.2 stable.  Potassium 3.1.  20 M EQ KCl 

given.  Continue her consultants.


August 28: Serum potassium 5.5.Creatinine 2.2.  1 dose Kayexalate given.  

Remains of 75 cc IV fluid.  We will continue to monitor renal parameters.


August 27: Serum potassium 5.3.  Creatinine higher at 2.2.  Medication list 

reviewed.  Suggest to avoid nephrotoxic's like Bactrim if possible.  Continue to

monitor renal parameters and electrolytes.  Continue per pulmonary to adjust 

pulmonary status.  May require BiPAP.


August 26: Serum sodium lower.  Renal parameters appear more stable.  

Nevertheless leukocytosis is worsened.  Continue per consultants.


August 25: Serum sodium higher.  Continue D5W 75 cc an hour.  Continue to 

monitor electrolytes.  Continue per consultants.  Serum creatinine down to 2.4 

from 5.1 on admission.  Leukocytosis persists.


August 24: Patient now on isolation for COVID-19.  Serum creatinine is 

plateauing.  Will decrease the IV fluid to 75 cc an hour.  Continue management 

per ID.  Will monitor electrolytes and renal parameters.


August 23: Discussed with MARTIN Carter.  Patient needs a Mitchell catheter.  Accurate 

intake and output.  Decrease  cc D5W an hour.  1 dose of IV Venofer and 

then subcu Epogen ordered for anemia.  Continue to monitor renal parameters


August 22: Renal parameters improving.  Continue D5W IV fluid.  Potassium 

supplement IV given.  Continue per consultants.





Previously:


Fluid challenge


Monitor renal parameters and electrolytes


Monitor intake and output


Mitchell catheter


Antibiotics


Avoid nephrotoxic's


Chest x-ray





Kidney ultrasound findings: 


Right kidney measures 8.9 cm in length. Left kidney measures 9 cm in


length. Both kidneys demonstrate markedly increased echogenicity. No 

hydronephrosis. 


There are bilateral renal cysts. Normal inferior vena cava. Bladder is empty,


contains a Mitchell catheter. 


 


Incidentally noted are gallstones.


 


Impression: Markedly echogenic kidneys, consistent with medical renal disease


Negative for hydronephrosis


Empty bladder with a Mitchell catheter


Incidental finding of cholelithiasis.





Subjective


ROS Limited/Unobtainable:  Yes


Constitutional:  Reports: malaise





Objective


Objective





Last 24 Hour Vital Signs








  Date Time  Temp Pulse Resp B/P (MAP) Pulse Ox O2 Delivery O2 Flow Rate FiO2


 


9/16/20 08:52     96 Room Air  21


 


9/16/20 08:00 97.4 99 18 108/60 (76) 96   


 


9/16/20 04:00 98.1 99 20 118/69 (85) 97   


 


9/16/20 00:00 97.7 98 20 116/71 (86) 98   


 


9/15/20 21:00      Room Air  


 


9/15/20 20:00 98.2 96 20 123/76 (92) 93   


 


9/15/20 19:01     97 Room Air  21


 


9/15/20 16:00 98.4 98 18 115/62 (79) 95   


 


9/15/20 12:00 98.2 102 18 130/76 (94) 96   

















Intake and Output  


 


 9/15/20 9/16/20





 19:00 07:00


 


Intake Total 970 ml 1575 ml


 


Output Total 1120 ml 


 


Balance -150 ml 1575 ml


 


  


 


Intake Free Water  400 ml


 


IV Total 900 ml 825 ml


 


Tube Feeding 70 ml 350 ml


 


Output Urine Total 1120 ml 








Laboratory Tests


9/16/20 05:10: 


White Blood Count 11.0H, Red Blood Count 3.79L, Hemoglobin 12.3, Hematocrit 

38.5, Mean Corpuscular Volume 102H, Mean Corpuscular Hemoglobin 32.5H, Mean 

Corpuscular Hemoglobin Concent 32.0, Red Cell Distribution Width 17.7H, Platelet

Count 169, Mean Platelet Volume 6.4L, Neutrophils (%) (Auto) 61.4, Lymphocytes 

(%) (Auto) 29.7, Monocytes (%) (Auto) 5.8, Eosinophils (%) (Auto) 1.9, Basophils

(%) (Auto) 1.2, Sodium Level 143, Potassium Level 4.3, Chloride Level 106, 

Carbon Dioxide Level 26, Anion Gap 11, Blood Urea Nitrogen 36H, Creatinine 1.5H,

Estimat Glomerular Filtration Rate 34.1, Glucose Level 99, Calcium Level 9.5


Height (Feet):  5


Height (Inches):  2.00


Weight (Pounds):  123


General Appearance:  no apparent distress, lethargic


Cardiovascular:  tachycardia


Respiratory/Chest:  decreased breath sounds


Abdomen:  distended


Objective


No change











Derik Dos Santos MD            Sep 16, 2020 10:29

## 2020-09-17 VITALS — SYSTOLIC BLOOD PRESSURE: 123 MMHG | DIASTOLIC BLOOD PRESSURE: 78 MMHG

## 2020-09-17 VITALS — SYSTOLIC BLOOD PRESSURE: 102 MMHG | DIASTOLIC BLOOD PRESSURE: 60 MMHG

## 2020-09-17 VITALS — SYSTOLIC BLOOD PRESSURE: 120 MMHG | DIASTOLIC BLOOD PRESSURE: 75 MMHG

## 2020-09-17 VITALS — DIASTOLIC BLOOD PRESSURE: 69 MMHG | SYSTOLIC BLOOD PRESSURE: 119 MMHG

## 2020-09-17 VITALS — SYSTOLIC BLOOD PRESSURE: 120 MMHG | DIASTOLIC BLOOD PRESSURE: 69 MMHG

## 2020-09-17 LAB
ADD MANUAL DIFF: NO
ANION GAP SERPL CALC-SCNC: 9 MMOL/L (ref 5–15)
BASOPHILS NFR BLD AUTO: 0.5 % (ref 0–2)
BUN SERPL-MCNC: 38 MG/DL (ref 7–18)
CALCIUM SERPL-MCNC: 9.2 MG/DL (ref 8.5–10.1)
CHLORIDE SERPL-SCNC: 102 MMOL/L (ref 98–107)
CO2 SERPL-SCNC: 29 MMOL/L (ref 21–32)
CREAT SERPL-MCNC: 1.5 MG/DL (ref 0.55–1.3)
EOSINOPHIL NFR BLD AUTO: 0.9 % (ref 0–3)
ERYTHROCYTE [DISTWIDTH] IN BLOOD BY AUTOMATED COUNT: 17.1 % (ref 11.6–14.8)
HCT VFR BLD CALC: 35.9 % (ref 37–47)
HGB BLD-MCNC: 11.5 G/DL (ref 12–16)
LYMPHOCYTES NFR BLD AUTO: 23 % (ref 20–45)
MCV RBC AUTO: 102 FL (ref 80–99)
MONOCYTES NFR BLD AUTO: 5.6 % (ref 1–10)
NEUTROPHILS NFR BLD AUTO: 70 % (ref 45–75)
PLATELET # BLD: 182 K/UL (ref 150–450)
POTASSIUM SERPL-SCNC: 3.5 MMOL/L (ref 3.5–5.1)
RBC # BLD AUTO: 3.52 M/UL (ref 4.2–5.4)
SODIUM SERPL-SCNC: 140 MMOL/L (ref 136–145)
WBC # BLD AUTO: 12.6 K/UL (ref 4.8–10.8)

## 2020-09-17 RX ADMIN — DOCUSATE SODIUM SCH MG: 50 LIQUID ORAL at 13:44

## 2020-09-17 RX ADMIN — Medication SCH MG: at 09:04

## 2020-09-17 RX ADMIN — NYSTATIN SCH APPLIC: 100000 POWDER TOPICAL at 13:44

## 2020-09-17 RX ADMIN — PANTOPRAZOLE SODIUM SCH MG: 40 INJECTION, POWDER, FOR SOLUTION INTRAVENOUS at 09:04

## 2020-09-17 RX ADMIN — NYSTATIN SCH APPLIC: 100000 POWDER TOPICAL at 09:06

## 2020-09-17 RX ADMIN — ENOXAPARIN SODIUM SCH MG: 40 INJECTION SUBCUTANEOUS at 09:05

## 2020-09-17 RX ADMIN — DOCUSATE SODIUM SCH MG: 50 LIQUID ORAL at 05:33

## 2020-09-17 NOTE — INFECTIOUS DISEASES PROG NOTE
Assessment/Plan


69yo F with:





Sepsis


Fever; SP


Leukocytosis; (sp steroids)- Recurrent; worsened, now improvnig


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


      -9/14 Bcx NTD


      -9/11 BCx 1/4 yeast


      -9/9 2d echo: no discrete vegetations


     -9/6 Bcx Neg


    -9/3 u/a neg


          Bcx 1/4 C. albicas





JASWINDER to 5.1, improving 


Hypoxic resp failure, sp NRB mask >VM> 4l NC 9/1> RA


VRE UTI


Hx of COVID-19 1 mo ago ( doubt recurrent COVID-19) 


HAP


          9/13 sp cx p


          9/11 CXR:   There are increased lung markings in the left lower lobe s

uspicious for early developing infiltrate.  Progress films are recommended.


           9/9 CXR: No acute process


            9/3 sp cx yeast


            9/1 rapid COVID PCR neg; 9/3 rapid COVID PCR neg


   8/29 CXR:   Improving left lower lobe infiltrate and left pleural effusion. 

There is a probable small right pleural effusion.


   8/27 cdiff neg


           8/26 CXR: There are increased interstitial congestion and left 

pleural effusion,since prior exam of 8/22/2020


               Bcx NTD


            8/22 sp cx MDR ABC (S bactrim; I Colistin, Polymixin B, Minocycline)


   8/21 BCx NTD


   8/21 UA+, UCx >100k VRE (S Zyvox)


   8/21 CXR: Left retrocardiac mild atelectasis versus infiltrate.


   8/21 COVID rapid neg; 8/22 repeat COVID-19 +








BL arm rash, appears evolving, now small pustules with dry/crusting, querry 

resolving Shingles? Though odd to have on both arms


   8/21 HSV & VZV PCR ordered





9/16 - pt is on PPM





Plan:





PO Voriconazole #4 (abx d #11)


      -if not improving, will switch to IV AMphotercin


       --requested micro lab to ID and sensitivities for yeast on 9/11 Bcx


       --f/u repeat BCx x2


       --DOROTHEA to eval for endocarditis





       -9/14 SP Micafungin #9


       -9/10 SP Minocycline #17


       -9/9 Sp IV bactrim #16


       -8/29 SP ZYvox #7


       --8/24 SP Cefepime #4


       --8/23 SP IV Vancomcyin #4





F/u cx


Monitor CBC/BMP


Trend BUE rash


Dc covid isolation


f/u sp cx





Discussed with RN





Subjective


Allergies:  


Coded Allergies:  


     AMPICILLIN (Verified  Allergy, Unknown, hives, 8/21/20)


     PENICILLINS (Verified  Allergy, Unknown, hives, 8/21/20)


     TETRACYCLINES (Verified  Allergy, Unknown, hives, 8/21/20)


afebrile


wbc remains bt 11-12


repeat Bcx NTD





Objective





Last 24 Hour Vital Signs








  Date Time  Temp Pulse Resp B/P (MAP) Pulse Ox O2 Delivery O2 Flow Rate FiO2


 


9/17/20 09:00      Room Air  


 


9/17/20 08:00 98.8 100 18 123/78 (93) 97   


 


9/17/20 04:00 99.3 103 22 120/75 (90) 95   


 


9/17/20 00:00 99.0 98 20 119/69 (86) 98   


 


9/16/20 20:00 99.7 102 18 126/71 (89) 97   


 


9/16/20 16:00 98.2 105 19 122/73 (89) 96   


 


9/16/20 12:00 98.2 98 17 114/69 (84) 95   








Height (Feet):  5


Height (Inches):  2.00


Weight (Pounds):  123


General Appearance:  no acute distress, other - chronically ill looking 

bedridden female


HEENT:  normocephalic, atraumatic, other - VM


Respiratory:  chest wall non-tender, rhonchi - bilaterally - scattered


Cardiovascular:  normal peripheral pulses, normal rate


Abdomen:  normal bowel sounds, soft, non tender





Microbiology








 Date/Time


Source Procedure


Growth Status





 


 9/14/20 16:45


Blood Blood Culture - Preliminary


NO GROWTH AFTER 48 HOURS Resulted


 


 9/14/20 16:20


Blood Blood Culture - Preliminary


NO GROWTH AFTER 48 HOURS Resulted











Laboratory Tests








Test


 9/17/20


05:31


 


White Blood Count


 12.6 K/UL


(4.8-10.8)  H


 


Red Blood Count


 3.52 M/UL


(4.20-5.40)  L


 


Hemoglobin


 11.5 G/DL


(12.0-16.0)  L


 


Hematocrit


 35.9 %


(37.0-47.0)  L


 


Mean Corpuscular Volume


 102 FL (80-99)


H


 


Mean Corpuscular Hemoglobin


 32.6 PG


(27.0-31.0)  H


 


Mean Corpuscular Hemoglobin


Concent 31.9 G/DL


(32.0-36.0)  L


 


Red Cell Distribution Width


 17.1 %


(11.6-14.8)  H


 


Platelet Count


 182 K/UL


(150-450)


 


Mean Platelet Volume


 7.2 FL


(6.5-10.1)


 


Neutrophils (%) (Auto)


 70.0 %


(45.0-75.0)


 


Lymphocytes (%) (Auto)


 23.0 %


(20.0-45.0)


 


Monocytes (%) (Auto)


 5.6 %


(1.0-10.0)


 


Eosinophils (%) (Auto)


 0.9 %


(0.0-3.0)


 


Basophils (%) (Auto)


 0.5 %


(0.0-2.0)


 


Sodium Level


 140 MMOL/L


(136-145)


 


Potassium Level


 3.5 MMOL/L


(3.5-5.1)


 


Chloride Level


 102 MMOL/L


()


 


Carbon Dioxide Level


 29 MMOL/L


(21-32)


 


Anion Gap


 9 mmol/L


(5-15)


 


Blood Urea Nitrogen


 38 mg/dL


(7-18)  H


 


Creatinine


 1.5 MG/DL


(0.55-1.30)  H


 


Estimat Glomerular Filtration


Rate 34.1 mL/min


(>60)


 


Glucose Level


 111 MG/DL


()  H


 


Calcium Level


 9.2 MG/DL


(8.5-10.1)











Current Medications








 Medications


  (Trade)  Dose


 Ordered  Sig/Raji


 Route


 PRN Reason  Start Time


 Stop Time Status Last Admin


Dose Admin


 


 Acetaminophen


  (Tylenol)  650 mg  Q6H  PRN


 GT


 Mild Pain (Pain Scale 1-3)  9/8/20 06:00


 10/4/20 05:59   





 


 Ascorbic Acid


  (Vitamin C)  500 mg  DAILY


 GT


   9/8/20 09:00


 10/4/20 08:59  9/17/20 09:04





 


 Clonidine HCl


  (Catapres Tab)  0.1 mg  Q4H  PRN


 GT


 bp over 160 syst  9/8/20 07:45


 12/3/20 19:40   





 


 Dextrose  1,000 ml @ 


 75 mls/hr  [id number]


 IV


   9/8/20 06:00


 10/4/20 19:40  9/17/20 03:04





 


 Docusate Sodium


  (Colace)  100 mg  EVERY 8  HOURS


 GT


   9/8/20 06:00


 9/20/20 08:59  9/17/20 05:33





 


 Enoxaparin Sodium


  (Lovenox)  40 mg  DAILY


 SUBQ


   9/8/20 09:00


 11/19/20 08:59  9/17/20 09:05





 


 Epoetin Jaswant


  (Epoetin


 Jaswant-EPBX(NON


 ESRD))  8,000 unit  MON-WED-FRI


 SUBQ


   9/16/20 21:00


 12/15/20 20:59   





 


 Nystatin


  (Nystop Powder)  1 applic  THREE TIMES A  DAY


 TOPIC


   9/10/20 09:00


 12/8/20 18:14  9/17/20 09:06





 


 Pantoprazole


  (Protonix)  40 mg  EVERY 12  HOURS


 IVP


   9/8/20 09:00


 9/20/20 20:59  9/17/20 09:04





 


 Polyethylene


 Glycol


  (Miralax)  17 gm  BEDTIME


 GT


   9/8/20 21:00


 9/20/20 20:59  9/16/20 20:44





 


 Voriconazole


  (Vfend)  200 mg  Q12HR


 GT


   9/14/20 18:00


 9/21/20 17:59  9/17/20 09:04




















Tanisha Brownlee M.D.            Sep 17, 2020 10:56

## 2020-09-17 NOTE — NEPHROLOGY PROGRESS NOTE
Assessment/Plan


Problem List:  


(1) ARF (acute renal failure)


Assessment:  Underlying CKD





(2) Sepsis


(3) UTI (urinary tract infection)


(4) Severe dehydration


(5) Hypernatremia


(6) Acute urinary retention


(7) Anemia


Assessment





Acute renal failure


Possible underlying chronic renal insufficiency


Severe dehydration


Hypotension


Sepsis, UTI


History of psychiatric disease


Elevated troponin I


Patient full code


Plan


September 17: Lab reviewed.  Serum creatinine 1.5 unchanged.  Continues to be 

tachycardic.  Continue per consultants.  Not much to add from renal standpoint 

to view at this time.


September 16: Lab reviewed.  Serum creatinine lower at 1.5.  Continue rest.


September 15: Labs reviewed.  Serum creatinine 1.6 stable.  Continue per 

consultants.


September 14.:  Chemistry panel still pending.  Patient appears stable from a 

renal standpoint of view.


September 13: Labs reviewed. Serum creatinine lower at 1.6.  Continue with same 

management.  


September 12: Status quo.  Lab reviewed.  Serum creatinine lower at 1.8.  

Patient remains full code.  Continue per consultants.


September 11: Lab reviewed.  Serum creatinine remains at 2 unchanged.  

Medication list reviewed.  Continue current management.


September 10: Labs reviewed.  Medication list reviewed.  Serum creatinine 

slightly higher at 2.  We will continue to monitor renal parameters and ID to 

avoid nephrotoxic antibiotics as possible.


September 9: Lab reviewed.  Serum creatinine stable at 1.9.  Continue per 

consultants.


September 8: Labs reviewed.  Serum creatinine stable.  Electrolytes within 

reasonable range.


September 7: Labs reviewed.  Remains stable from renal standpoint of view, as 

serum creatinine leveled off around 1.8 and 1.9.  Will continue to monitor renal

parameters


September 6: No chemistry panel done today.  Status quo.  Continue per 

consultants.


September 5: Status quo.  Labs reviewed.  Stable from renal standpoint of view.


September 4: Status quo.  Labs reviewed.  Serum creatinine unchanged.  Continue 

per consultants.


September 3:Late note entry due to system problem at the [facility] [place] today. Labs 

reviewed.  Electrolytes within normal limit.  Leukocytosis worsened.  Continue 

per consultants.  Serum creatinine 1.8 unchanged.


September 2: Low magnesium and low phosphorus corrected.  Continue per consult

ants.  On nasal cannula now.  Serum creatinine 1.8.


September 1: Serum creatinine 1.8 stable.  Continue per pulmonary.  Continue to 

monitor renal parameters.


August 31: On Venturi mask.  Serum creatinine lowered to 1.8.  Continue per 

consultants.  Medication list reviewed.  Potassium supplement given.


August 30: Status quo.  Creatinine lower at 2.  Other electrolytes within normal

limits.  Continue per consultants.


August 29: Labs reviewed.  Creatinine 2.2 stable.  Potassium 3.1.  20 M EQ KCl 

given.  Continue her consultants.


August 28: Serum potassium 5.5.Creatinine 2.2.  1 dose Kayexalate given.  

Remains of 75 cc IV fluid.  We will continue to monitor renal parameters.


August 27: Serum potassium 5.3.  Creatinine higher at 2.2.  Medication list 

reviewed.  Suggest to avoid nephrotoxic's like Bactrim if possible.  Continue to

monitor renal parameters and electrolytes.  Continue per pulmonary to adjust 

pulmonary status.  May require BiPAP.


August 26: Serum sodium lower.  Renal parameters appear more stable.  Ne

vertheless leukocytosis is worsened.  Continue per consultants.


August 25: Serum sodium higher.  Continue D5W 75 cc an hour.  Continue to 

monitor electrolytes.  Continue per consultants.  Serum creatinine down to 2.4 

from 5.1 on admission.  Leukocytosis persists.


August 24: Patient now on isolation for COVID-19.  Serum creatinine is 

plateauing.  Will decrease the IV fluid to 75 cc an hour.  Continue management 

per ID.  Will monitor electrolytes and renal parameters.


August 23: Discussed with MARTIN Carter.  Patient needs a Mitchell catheter.  Accurate 

intake and output.  Decrease  cc D5W an hour.  1 dose of IV Venofer and 

then subcu Epogen ordered for anemia.  Continue to monitor renal parameters


August 22: Renal parameters improving.  Continue D5W IV fluid.  Potassium 

supplement IV given.  Continue per consultants.





Previously:


Fluid challenge


Monitor renal parameters and electrolytes


Monitor intake and output


Mitchell catheter


Antibiotics


Avoid nephrotoxic's


Chest x-ray





Kidney ultrasound findings: 


Right kidney measures 8.9 cm in length. Left kidney measures 9 cm in


length. Both kidneys demonstrate markedly increased echogenicity. No 

hydronephrosis. 


There are bilateral renal cysts. Normal inferior vena cava. Bladder is empty,


contains a Mitchell catheter. 


 


Incidentally noted are gallstones.


 


Impression: Markedly echogenic kidneys, consistent with medical renal disease


Negative for hydronephrosis


Empty bladder with a Mitchell catheter


Incidental finding of cholelithiasis.





Subjective


ROS Limited/Unobtainable:  Yes





Objective


Objective





Last 24 Hour Vital Signs








  Date Time  Temp Pulse Resp B/P (MAP) Pulse Ox O2 Delivery O2 Flow Rate FiO2


 


9/17/20 12:00 97.9 100 18 102/60 (74) 97   


 


9/17/20 09:00      Room Air  


 


9/17/20 08:00 98.8 100 18 123/78 (93) 97   


 


9/17/20 04:00 99.3 103 22 120/75 (90) 95   


 


9/17/20 00:00 99.0 98 20 119/69 (86) 98   


 


9/16/20 20:00 99.7 102 18 126/71 (89) 97   


 


9/16/20 16:00 98.2 105 19 122/73 (89) 96   

















Intake and Output  


 


 9/16/20 9/17/20





 19:00 07:00


 


Intake Total 835 ml 965 ml


 


Output Total 1000 ml 


 


Balance -165 ml 965 ml


 


  


 


Intake Free Water 200 ml 200 ml


 


IV Total 600 ml 450 ml


 


Tube Feeding 35 ml 315 ml


 


Output Urine Total 1000 ml 


 


# Bowel Movements 1 








Laboratory Tests


9/17/20 05:31: 


White Blood Count 12.6H, Red Blood Count 3.52L, Hemoglobin 11.5L, Hematocrit 

35.9L, Mean Corpuscular Volume 102H, Mean Corpuscular Hemoglobin 32.6H, Mean 

Corpuscular Hemoglobin Concent 31.9L, Red Cell Distribution Width 17.1H, 

Platelet Count 182, Mean Platelet Volume 7.2, Neutrophils (%) (Auto) 70.0, 

Lymphocytes (%) (Auto) 23.0, Monocytes (%) (Auto) 5.6, Eosinophils (%) (Auto) 

0.9, Basophils (%) (Auto) 0.5, Sodium Level 140, Potassium Level 3.5, Chloride 

Level 102, Carbon Dioxide Level 29, Anion Gap 9, Blood Urea Nitrogen 38H, 

Creatinine 1.5H, Estimat Glomerular Filtration Rate 34.1, Glucose Level 111H, 

Calcium Level 9.2


Height (Feet):  5


Height (Inches):  2.00


Weight (Pounds):  123


General Appearance:  no apparent distress, lethargic


Cardiovascular:  tachycardia


Respiratory/Chest:  decreased breath sounds


Abdomen:  distended


Objective


No change











Derik Dos Santos MD            Sep 17, 2020 15:13

## 2020-09-17 NOTE — SURGERY PROGRESS NOTE
Surgery Progress Note


Subjective


Additional Comments


labs noted


exam stable


comfortable





Objective





Last 24 Hour Vital Signs








  Date Time  Temp Pulse Resp B/P (MAP) Pulse Ox O2 Delivery O2 Flow Rate FiO2


 


9/17/20 09:00      Room Air  


 


9/17/20 08:00 98.8 100 18 123/78 (93) 97   


 


9/17/20 04:00 99.3 103 22 120/75 (90) 95   


 


9/17/20 00:00 99.0 98 20 119/69 (86) 98   


 


9/16/20 20:00 99.7 102 18 126/71 (89) 97   


 


9/16/20 16:00 98.2 105 19 122/73 (89) 96   


 


9/16/20 12:00 98.2 98 17 114/69 (84) 95   








I&O











Intake and Output  


 


 9/16/20 9/17/20





 19:00 07:00


 


Intake Total 835 ml 965 ml


 


Output Total 1000 ml 


 


Balance -165 ml 965 ml


 


  


 


Intake Free Water 200 ml 200 ml


 


IV Total 600 ml 450 ml


 


Tube Feeding 35 ml 315 ml


 


Output Urine Total 1000 ml 


 


# Bowel Movements 1 








Dressing:  other


Wound:  other


Cardiovascular:  RSR


Respiratory:  decreased breath sounds


Abdomen:  soft, non-tender, present bowel sounds


Extremities:  no tenderness, no cyanosis





Laboratory Tests








Test


 9/17/20


05:31


 


White Blood Count


 12.6 K/UL


(4.8-10.8)  H


 


Red Blood Count


 3.52 M/UL


(4.20-5.40)  L


 


Hemoglobin


 11.5 G/DL


(12.0-16.0)  L


 


Hematocrit


 35.9 %


(37.0-47.0)  L


 


Mean Corpuscular Volume


 102 FL (80-99)


H


 


Mean Corpuscular Hemoglobin


 32.6 PG


(27.0-31.0)  H


 


Mean Corpuscular Hemoglobin


Concent 31.9 G/DL


(32.0-36.0)  L


 


Red Cell Distribution Width


 17.1 %


(11.6-14.8)  H


 


Platelet Count


 182 K/UL


(150-450)


 


Mean Platelet Volume


 7.2 FL


(6.5-10.1)


 


Neutrophils (%) (Auto)


 70.0 %


(45.0-75.0)


 


Lymphocytes (%) (Auto)


 23.0 %


(20.0-45.0)


 


Monocytes (%) (Auto)


 5.6 %


(1.0-10.0)


 


Eosinophils (%) (Auto)


 0.9 %


(0.0-3.0)


 


Basophils (%) (Auto)


 0.5 %


(0.0-2.0)


 


Sodium Level


 140 MMOL/L


(136-145)


 


Potassium Level


 3.5 MMOL/L


(3.5-5.1)


 


Chloride Level


 102 MMOL/L


()


 


Carbon Dioxide Level


 29 MMOL/L


(21-32)


 


Anion Gap


 9 mmol/L


(5-15)


 


Blood Urea Nitrogen


 38 mg/dL


(7-18)  H


 


Creatinine


 1.5 MG/DL


(0.55-1.30)  H


 


Estimat Glomerular Filtration


Rate 34.1 mL/min


(>60)


 


Glucose Level


 111 MG/DL


()  H


 


Calcium Level


 9.2 MG/DL


(8.5-10.1)











Plan


Problems:  


(1) Anemia


(2) 2019 novel coronavirus disease (COVID-19)


(3) Pneumonia


(4) Multiple drug resistant organism (MDRO) culture positive


(5) Hyperkalemia


(6) Hypernatremia


(7) Severe dehydration


(8) Acute urinary retention


(9) Sepsis


Assessment & Plan:  Pt presented on admission with multiple Pressure injuries. 

Pt noted to have band of  Blotchy red rash  with open and closed Blisters 

affecting  R Brachial to R forearm. Lateral R back /R flank is also blotchy 

erythematous with clusters of serous filled blisters. On Lateral L Back to L 

flank is Blotchy, erythematous with clusters of serous filled blisters.  


Reabsorbing DTPI that is partially opened Sacrum(L) 6cm x (W)5.5cm.  Base of 

wound is Maroon with with scattered areas that are maryellen and loose peeling soft 

black cap.Surrounding Non-Blanching erythema without induration.


Non-Blanching erythema that is indurated with delineated margins R 

trochanter(L)5.5cm x (W)6.5cm.


No evidence of skin breakdown L trochanter.


Non-Blanching erythema noted to R and L ischial tuberosities.


DTPI medial R heel (L)3.3cm x (W)3.4cm. Base of injury is, fluctuant, maroon 

with delineated margins. surrounding red,  boggy heel.


DTPI Lateral R Heel(L)1.5cm x (W)2.5cm. Base of injury is indurated, maroon with

purpuric center.


Non-Blanching erythema without induration /fluctuance lateral R malleolus(L)2cm 

x (W)1.5cm.





Tx.Plan:


Apply Moisture Barrier Paste to Sacrum. Cover with Optifoam drsg. Change every 3

days and prn.


           


Apply Cavilon Skin Barrier to R and L trochanter. Cover each site with Optifoam 

drsgs. Change every 7 days and prn.


           


Apply Moisture Barrier Paste to R and L Ischial tuberosities with each 

Incontinence care.


           


Apply Cavilon Skin Barrier to Medial and Lateral R Heel. Cover with Optifoam d

rsg. Change every7 days and prn.


           


Apply Cavilon Skin Barrier to R lateral ,and medial Malleoli. Cover each site 

with Optifoam drsgs. Change every 7 days and prn.


           


Apply Cavilon Skin Barrier to L Heel and Malleoli. Cover each site with Optifoam

drsgs. Change every 7 days and prn.


           


Cover open blisters as needed with Optifoam drsgs. 


           


Reposition at least every 2 hours or as tolerated.


           


Off-load heels with pillow.


           


Nutritional optimization





DAILY ESTIMATED NEEDS:


Needs based on Sepsis, renal, wound 50.9kg  


25-35  kcals/kg 


[id number]  total kcals


1.25-1.5  g protein/kg


64-76  g total protein 


25-30  mL/kg


8203-1865  total fluid mLs





NUTRITION DIAGNOSIS:


1. Swallowing difficulty r/t dysphagia as evidenced by pt is GT dep.


2. Altered nutrition related lab values r/t sepsis and severe dehydration,


pre-diabetes as evidenced by Elev WBC (31.6 ->18.2), elev Na (179-> WNL),


elev BUN (131->48), elev Creat (5.1->2.2), elev K (5.5-> wnl->5.5, 5.3),


elev BGs (93, 113, 185), A1C of 6.4.





CURRENT TF: Nepro @35  








ENTERAL NUTRITION RECOMMENDATIONS:


LOWER GOAL RATE -> NEPRO @ 35ML/HR X 24 HRS  to provide 840ml, 1512kcal, 68g 

prot, 610ml free water 





- LOWER GOAL RATE: meets 100% est kcal/prot needs


- Flush per MD, HOB over 30 degrees.








ADDITIONAL RECOMMENDATIONS:


1) Monitor lytes and renal status-> TF change to Nepro (8/28), elev K 


2) Per SNF wt of 112# 


3) NISS for BG control: A1C 6.4, TF at goal + added D5+ Decadron 


4) Wound healing: TF @ goal provides 100% RDI 


    add Vit C 500mg QD + José Manuel BID via GT  





micro reviewed


covid negative


c diff negative


bc ngtd 


wounds do not seem to be etiology of infection 





(10) UTI (urinary tract infection)


(11) ARF (acute renal failure)


(12) Psychosis











YuAlex                Sep 17, 2020 10:41

## 2020-09-17 NOTE — PULMONOLOGY PROGRESS NOTE
Subjective


ROS Limited/Unobtainable:  Yes


Constitutional:  Reports: no symptoms


HEENT:  Repors: no symptoms


Respiratory:  Reports: no symptoms


Cardiovascular:  Reports: no symptoms


Allergies:  


Coded Allergies:  


     AMPICILLIN (Verified  Allergy, Unknown, hives, 8/21/20)


     PENICILLINS (Verified  Allergy, Unknown, hives, 8/21/20)


     TETRACYCLINES (Verified  Allergy, Unknown, hives, 8/21/20)





Objective





Last 24 Hour Vital Signs








  Date Time  Temp Pulse Resp B/P (MAP) Pulse Ox O2 Delivery O2 Flow Rate FiO2


 


9/17/20 12:00 97.9 100 18 102/60 (74) 97   


 


9/17/20 09:00      Room Air  


 


9/17/20 08:00 98.8 100 18 123/78 (93) 97   


 


9/17/20 04:00 99.3 103 22 120/75 (90) 95   


 


9/17/20 00:00 99.0 98 20 119/69 (86) 98   


 


9/16/20 20:00 99.7 102 18 126/71 (89) 97   


 


9/16/20 16:00 98.2 105 19 122/73 (89) 96   

















Intake and Output  


 


 9/16/20 9/17/20





 19:00 07:00


 


Intake Total 835 ml 965 ml


 


Output Total 1000 ml 


 


Balance -165 ml 965 ml


 


  


 


Intake Free Water 200 ml 200 ml


 


IV Total 600 ml 450 ml


 


Tube Feeding 35 ml 315 ml


 


Output Urine Total 1000 ml 


 


# Bowel Movements 1 








General Appearance:  no acute distress, other - chronically ill looking 

bedridden female


HEENT:  normocephalic, atraumatic, other - VM


Respiratory:  chest wall non-tender, rhonchi - bilaterally - scattered


Cardiovascular:  normal peripheral pulses, normal rate - SR on tele


Abdomen:  normal bowel sounds, soft, non tender


Genitourinary:  normal external genitalia


Skin:  other - BUE crusting lesions


Neurologic:  CNs II-XII grossly normal, abnormal gait - bedridden


Lymphatic:  no neck adenopathy





Microbiology








 Date/Time


Source Procedure


Growth Status





 


 9/14/20 16:45


Blood Blood Culture - Preliminary


NO GROWTH AFTER 48 HOURS Resulted


 


 9/14/20 16:20


Blood Blood Culture - Preliminary


NO GROWTH AFTER 48 HOURS Resulted








Laboratory Tests


9/17/20 05:31: 


White Blood Count 12.6H, Red Blood Count 3.52L, Hemoglobin 11.5L, Hematocrit 

35.9L, Mean Corpuscular Volume 102H, Mean Corpuscular Hemoglobin 32.6H, Mean 

Corpuscular Hemoglobin Concent 31.9L, Red Cell Distribution Width 17.1H, Platele

t Count 182, Mean Platelet Volume 7.2, Neutrophils (%) (Auto) 70.0, Lymphocytes 

(%) (Auto) 23.0, Monocytes (%) (Auto) 5.6, Eosinophils (%) (Auto) 0.9, Basophils

(%) (Auto) 0.5, Sodium Level 140, Potassium Level 3.5, Chloride Level 102, 

Carbon Dioxide Level 29, Anion Gap 9, Blood Urea Nitrogen 38H, Creatinine 1.5H, 

Estimat Glomerular Filtration Rate 34.1, Glucose Level 111H, Calcium Level 9.2





Current Medications








 Medications


  (Trade)  Dose


 Ordered  Sig/Raji


 Route


 PRN Reason  Start Time


 Stop Time Status Last Admin


Dose Admin


 


 Acetaminophen


  (Tylenol)  650 mg  Q6H  PRN


 GT


 Mild Pain (Pain Scale 1-3)  9/8/20 06:00


 10/4/20 05:59   





 


 Ascorbic Acid


  (Vitamin C)  500 mg  DAILY


 GT


   9/8/20 09:00


 10/4/20 08:59  9/17/20 09:04





 


 Clonidine HCl


  (Catapres Tab)  0.1 mg  Q4H  PRN


 GT


 bp over 160 syst  9/8/20 07:45


 12/3/20 19:40   





 


 Dextrose  1,000 ml @ 


 75 mls/hr  [id number]


 IV


   9/8/20 06:00


 10/4/20 19:40  9/17/20 03:04





 


 Docusate Sodium


  (Colace)  100 mg  EVERY 8  HOURS


 GT


   9/8/20 06:00


 9/20/20 08:59  9/17/20 05:33





 


 Enoxaparin Sodium


  (Lovenox)  40 mg  DAILY


 SUBQ


   9/8/20 09:00


 11/19/20 08:59  9/17/20 09:05





 


 Epoetin Jaswant


  (Epoetin


 Jaswant-EPBX(NON


 ESRD))  8,000 unit  MON-WED-FRI


 SUBQ


   9/16/20 21:00


 12/15/20 20:59   





 


 Nystatin


  (Nystop Powder)  1 applic  THREE TIMES A  DAY


 TOPIC


   9/10/20 09:00


 12/8/20 18:14  9/17/20 09:06





 


 Pantoprazole


  (Protonix)  40 mg  EVERY 12  HOURS


 IVP


   9/8/20 09:00


 9/20/20 20:59  9/17/20 09:04





 


 Polyethylene


 Glycol


  (Miralax)  17 gm  BEDTIME


 GT


   9/8/20 21:00


 9/20/20 20:59  9/16/20 20:44





 


 Voriconazole


  (Vfend)  200 mg  Q12HR


 GT


   9/14/20 18:00


 9/21/20 17:59  9/17/20 09:04














Assessment/Plan


Problems:  


(1) Sepsis


(2) Fungemia


(3) 2019 novel coronavirus disease (COVID-19)


(4) Multiple drug resistant organism (MDRO) culture positive


(5) ARF (acute renal failure)


(6) Severe dehydration


(7) Hypernatremia


(8) Psychosis


(9) Pacemaker


Assessment/Plan


all reviwed


WBC still high


ESR and CRP reviewed





COVID positive





f/u electrolytes


renal studies


urine electrolytes


dvt prophylaxis











Live Brambila MD              Sep 17, 2020 12:54